# Patient Record
Sex: FEMALE | Race: OTHER | NOT HISPANIC OR LATINO | Employment: OTHER | URBAN - METROPOLITAN AREA
[De-identification: names, ages, dates, MRNs, and addresses within clinical notes are randomized per-mention and may not be internally consistent; named-entity substitution may affect disease eponyms.]

---

## 2017-01-04 ENCOUNTER — HISTORICAL (OUTPATIENT)
Dept: ADMINISTRATIVE | Facility: HOSPITAL | Age: 62
End: 2017-01-04

## 2017-01-05 LAB — HOMOCYSTEINE: 11.3 UMOL/L (ref 0–15)

## 2017-01-06 LAB
CARDIOLIPIN IGA SER IA-ACNC: <9 APL U/ML (ref 0–11)
CARDIOLIPIN IGG SER IA-ACNC: <9 GPL U/ML (ref 0–14)
CARDIOLIPIN IGM SER IA-ACNC: <9 MPL U/ML (ref 0–12)

## 2017-01-07 LAB — FACT VIII SPEC-IMP: 127 % (ref 57–163)

## 2017-11-01 LAB
ALBUMIN SERPL-MCNC: 4.2 G/DL (ref 3.6–5.1)
ALBUMIN/GLOB SERPL: 1.3 (CALC) (ref 1–2.5)
ALP SERPL-CCNC: 80 U/L (ref 33–130)
ALT SERPL-CCNC: 24 U/L (ref 6–29)
AST SERPL-CCNC: 19 U/L (ref 10–35)
BASOPHILS # BLD AUTO: 61 CELLS/UL (ref 0–200)
BASOPHILS NFR BLD AUTO: 1 %
BILIRUB SERPL-MCNC: 0.4 MG/DL (ref 0.2–1.2)
BUN SERPL-MCNC: 21 MG/DL (ref 7–25)
BUN/CREAT SERPL: 18 (CALC) (ref 6–22)
CALCIUM SERPL-MCNC: 10.1 MG/DL (ref 8.6–10.4)
CHLORIDE SERPL-SCNC: 102 MMOL/L (ref 98–110)
CO2 SERPL-SCNC: 32 MMOL/L (ref 20–31)
CREAT SERPL-MCNC: 1.17 MG/DL (ref 0.5–0.99)
EOSINOPHIL # BLD AUTO: 189 CELLS/UL (ref 15–500)
EOSINOPHIL NFR BLD AUTO: 3.1 %
ERYTHROCYTE [DISTWIDTH] IN BLOOD BY AUTOMATED COUNT: 12 % (ref 11–15)
GFR SERPL CREATININE-BSD FRML MDRD: 50 ML/MIN/1.73M2
GLOBULIN SER CALC-MCNC: 3.3 G/DL (CALC) (ref 1.9–3.7)
GLUCOSE SERPL-MCNC: 136 MG/DL (ref 65–99)
HCT VFR BLD AUTO: 41 % (ref 35–45)
HGB BLD-MCNC: 13.6 G/DL (ref 11.7–15.5)
LYMPHOCYTES # BLD AUTO: 2251 CELLS/UL (ref 850–3900)
LYMPHOCYTES NFR BLD AUTO: 36.9 %
MCH RBC QN AUTO: 30.2 PG (ref 27–33)
MCHC RBC AUTO-ENTMCNC: 33.2 G/DL (ref 32–36)
MCV RBC AUTO: 91.1 FL (ref 80–100)
MONOCYTES # BLD AUTO: 555 CELLS/UL (ref 200–950)
MONOCYTES NFR BLD AUTO: 9.1 %
NEUTROPHILS # BLD AUTO: 3044 CELLS/UL (ref 1500–7800)
NEUTROPHILS NFR BLD AUTO: 49.9 %
PLATELET # BLD AUTO: 378 THOUSAND/UL (ref 140–400)
PMV BLD REES-ECKER: 9.3 FL (ref 7.5–12.5)
POTASSIUM SERPL-SCNC: 4.5 MMOL/L (ref 3.5–5.3)
PROT SERPL-MCNC: 7.5 G/DL (ref 6.1–8.1)
RBC # BLD AUTO: 4.5 MILLION/UL (ref 3.8–5.1)
SODIUM SERPL-SCNC: 142 MMOL/L (ref 135–146)
WBC # BLD AUTO: 6.1 THOUSAND/UL (ref 3.8–10.8)

## 2017-11-09 ENCOUNTER — OFFICE VISIT (OUTPATIENT)
Dept: HEMATOLOGY/ONCOLOGY | Facility: CLINIC | Age: 62
End: 2017-11-09
Payer: OTHER GOVERNMENT

## 2017-11-09 VITALS
HEART RATE: 76 BPM | BODY MASS INDEX: 29.81 KG/M2 | HEIGHT: 62 IN | RESPIRATION RATE: 18 BRPM | DIASTOLIC BLOOD PRESSURE: 72 MMHG | WEIGHT: 162 LBS | SYSTOLIC BLOOD PRESSURE: 110 MMHG | TEMPERATURE: 98 F

## 2017-11-09 DIAGNOSIS — D63.1 ANEMIA, CHRONIC RENAL FAILURE, STAGE 2 (MILD): ICD-10-CM

## 2017-11-09 DIAGNOSIS — Z85.528 HX OF RENAL CELL CANCER: ICD-10-CM

## 2017-11-09 DIAGNOSIS — D63.8 ANEMIA OF CHRONIC DISORDER: ICD-10-CM

## 2017-11-09 DIAGNOSIS — N18.2 ANEMIA, CHRONIC RENAL FAILURE, STAGE 2 (MILD): ICD-10-CM

## 2017-11-09 PROCEDURE — 99213 OFFICE O/P EST LOW 20 MIN: CPT | Mod: ,,, | Performed by: INTERNAL MEDICINE

## 2017-11-09 RX ORDER — GLIPIZIDE 10 MG/1
TABLET, FILM COATED, EXTENDED RELEASE ORAL
COMMUNITY
End: 2018-12-27

## 2017-11-09 RX ORDER — VALSARTAN 320 MG/1
TABLET ORAL
COMMUNITY
End: 2017-12-20 | Stop reason: SDUPTHER

## 2017-11-09 RX ORDER — TOLTERODINE 2 MG/1
CAPSULE, EXTENDED RELEASE ORAL
COMMUNITY
Start: 2017-09-03 | End: 2018-12-27

## 2017-11-09 RX ORDER — ATORVASTATIN CALCIUM 20 MG/1
20 TABLET, FILM COATED ORAL
COMMUNITY
End: 2019-08-12

## 2017-11-09 RX ORDER — TRAZODONE HYDROCHLORIDE 50 MG/1
TABLET ORAL
COMMUNITY
End: 2017-12-20 | Stop reason: SDUPTHER

## 2017-11-09 RX ORDER — AMLODIPINE BESYLATE 5 MG/1
5 TABLET ORAL
COMMUNITY
End: 2018-11-20 | Stop reason: DRUGHIGH

## 2017-11-09 RX ORDER — FLUTICASONE PROPIONATE 50 MCG
1 SPRAY, SUSPENSION (ML) NASAL
COMMUNITY
End: 2017-12-20 | Stop reason: SDUPTHER

## 2017-11-09 RX ORDER — INSULIN GLARGINE 100 [IU]/ML
INJECTION, SOLUTION SUBCUTANEOUS
COMMUNITY
Start: 2017-10-30 | End: 2018-12-27

## 2017-11-09 RX ORDER — INSULIN LISPRO 100 [IU]/ML
INJECTION, SUSPENSION SUBCUTANEOUS
COMMUNITY
Start: 2017-10-05 | End: 2017-12-20 | Stop reason: SDUPTHER

## 2017-11-09 RX ORDER — CETIRIZINE HYDROCHLORIDE 10 MG/1
10 TABLET ORAL
COMMUNITY

## 2017-11-09 RX ORDER — NAPROXEN SODIUM 220 MG
TABLET ORAL
COMMUNITY

## 2017-11-09 RX ORDER — INSULIN LISPRO 100 [IU]/ML
INJECTION, SUSPENSION SUBCUTANEOUS
COMMUNITY
Start: 2017-10-30 | End: 2017-12-20 | Stop reason: SDUPTHER

## 2017-11-09 RX ORDER — METOPROLOL SUCCINATE 50 MG/1
TABLET, EXTENDED RELEASE ORAL
COMMUNITY
Start: 2017-10-16 | End: 2019-11-20 | Stop reason: SDUPTHER

## 2017-11-09 RX ORDER — ESOMEPRAZOLE MAGNESIUM 40 MG/1
40 CAPSULE, DELAYED RELEASE ORAL
COMMUNITY
End: 2019-11-20 | Stop reason: SDUPTHER

## 2017-11-09 RX ORDER — ASPIRIN 325 MG
100 TABLET, DELAYED RELEASE (ENTERIC COATED) ORAL
COMMUNITY
End: 2019-03-01

## 2017-11-09 RX ORDER — GABAPENTIN 600 MG/1
TABLET ORAL
COMMUNITY
Start: 2017-11-06 | End: 2019-08-12 | Stop reason: SDUPTHER

## 2017-11-09 NOTE — PROGRESS NOTES
[facility] Hematology/Oncology  PROGRESS NOTE      Subjective:       Patient ID:   NAME: Payton Castillo : 1955     62 y.o. female    Referring Doc: Ana  Other Physicians: Corona Haro    Chief Complaint:  Anemia/renal ca f/u    History of Present Illness:     Patient returns today for a regularly scheduled follow-up visit.  The patient had recent CT scans and blood work. She is here with a family relative. She is doing ok. She has chronic aches with sciatica. She denies any CP, SOB, HA's or N/V. She had foot surgery at end of MAy 2017.             ROS:   GEN: normal without any fever, night sweats or weight loss  HEENT: normal with no HA's, sore throat, stiff neck, changes in vision  CV: normal with no CP, SOB, PND, PALMA or orthopnea  PULM: normal with no SOB, cough, hemoptysis, sputum or pleuritic pain  GI: normal with no abdominal pain, nausea, vomiting, constipation, diarrhea, melanotic stools, BRBPR, or hematemesis  : normal with no hematuria, dysuria  BREAST: normal with no mass, discharge, pain  SKIN: normal with no rash, erythema, bruising, or swelling    Allergies:  Review of patient's allergies indicates:   Allergen Reactions    Aspartame Other (See Comments)     Headache      Fructose Other (See Comments)     headache    Monosodium glutamate Other (See Comments)     Headache      Pcn [penicillins]      Rash         Medications:    Current Outpatient Prescriptions:     metoprolol succinate (TOPROL XL) 50 MG 24 hr tablet, TAKE 1 TABLET IN THE MORNING AND 2 TABLETS IN THE EVENING, Disp: , Rfl:     tolterodine (DETROL LA) 2 MG Cp24, , Disp: , Rfl:     amlodipine (NORVASC) 5 MG tablet, Take 5 mg by mouth every evening., Disp: , Rfl:     amLODIPine (NORVASC) 5 MG tablet, Take 5 mg by mouth., Disp: , Rfl:     amlodipine-valsartan (EXFORGE) 5-160 mg per tablet, Take 1 tablet by mouth daily as needed., Disp: , Rfl:     atorvastatin (LIPITOR) 20 MG tablet, Take 20 mg by mouth once daily.,  Disp: , Rfl:     atorvastatin (LIPITOR) 20 MG tablet, Take 20 mg by mouth., Disp: , Rfl:     CALCIUM CARBONATE (CALCIUM 300 ORAL), Take 400 mg by mouth once daily., Disp: , Rfl:     CALCIUM CARBONATE/VITAMIN D3 (VITAMIN D-3 ORAL), Take 600 Units by mouth once daily., Disp: , Rfl:     cetirizine (ZYRTEC) 10 MG tablet, Take 10 mg by mouth once daily., Disp: , Rfl:     cetirizine (ZYRTEC) 10 MG tablet, Take 10 mg by mouth., Disp: , Rfl:     co-enzyme Q-10 50 mg capsule, Take 100 mg by mouth., Disp: , Rfl:     esomeprazole (NEXIUM) 40 MG capsule, Take 40 mg by mouth., Disp: , Rfl:     fluticasone (FLONASE) 50 mcg/actuation nasal spray, 1 spray by Each Nare route once daily., Disp: , Rfl:     fluticasone (FLONASE) 50 mcg/actuation nasal spray, 1 spray., Disp: , Rfl:     FREESTYLE LANCETS MISC, by Misc.(Non-Drug; Combo Route) route 3 (three) times daily., Disp: , Rfl:     gabapentin (NEURONTIN) 600 MG tablet, , Disp: , Rfl:     glipiZIDE (GLUCOTROL) 10 MG TR24, Take by mouth., Disp: , Rfl:     HUMALOG MIX 75-25 100 unit/mL (75-25) Susp, , Disp: , Rfl:     HUMALOG MIX 75-25 KWIKPEN 100 unit/mL (75-25) InPn, , Disp: , Rfl:     insulin glargine (LANTUS SOLOSTAR) 100 unit/mL (3 mL) InPn pen, Inject 60 Units into the skin once daily., Disp: , Rfl:     INSULIN NPL/INSULIN LISPRO (HUMALOG MIX 75-25 KWIKPEN SUBQ), Inject 20 Units into the skin 2 (two) times daily before meals. Breakfast and dinner, Disp: , Rfl:     INSULIN NPL/INSULIN LISPRO (HUMALOG MIX 75-25 KWIKPEN SUBQ), Inject 15 Units into the skin with lunch., Disp: , Rfl:     insulin syringe-needle U-100 0.3 mL 30 Syrg, by Misc.(Non-Drug; Combo Route) route 3 (three) times daily., Disp: , Rfl:     LANTUS 100 unit/mL injection, , Disp: , Rfl:     metoprolol succinate (TOPROL-XL) 50 MG 24 hr tablet, Take 50 mg by mouth every evening., Disp: , Rfl:     METOPROLOL SUCCINATE ORAL, Take by mouth., Disp: , Rfl:     omeprazole (PRILOSEC) 40 MG capsule, Take  "40 mg by mouth once daily., Disp: , Rfl:     potassium 99 mg Tab, Take 1 tablet by mouth., Disp: , Rfl:     pregabalin (LYRICA) 50 MG capsule, Take 50 mg by mouth 3 (three) times daily., Disp: , Rfl:     trazodone (DESYREL) 50 MG tablet, Take 50 mg by mouth every evening., Disp: , Rfl:     traZODone (DESYREL) 50 MG tablet, Take by mouth., Disp: , Rfl:     valsartan (DIOVAN) 320 MG tablet, Take 320 mg by mouth once daily., Disp: , Rfl:     valsartan (DIOVAN) 320 MG tablet, Take by mouth., Disp: , Rfl:     PMHx/PSHx Updates:  See patient's last visit with me on 4/4/2017.  See H&P on 10/11/2006        Pathology:  See path Aug 2014          Objective:     Vitals:  Blood pressure 110/72, pulse 76, temperature 98.1 °F (36.7 °C), resp. rate 18, height 5' 2" (1.575 m), weight 73.5 kg (162 lb).    Physical Examination:   GEN: no apparent distress, comfortable; AAOx3  HEAD: atraumatic and normocephalic  EYES: no pallor, no icterus, PERRLA  ENT: OMM, no pharyngeal erythema, external ears WNL; no nasal discharge; no thrush  NECK: no masses, thyroid normal, trachea midline, no LAD/LN's, supple  CV: RRR with no murmur; normal pulse; normal S1 and S2; no pedal edema  CHEST: Normal respiratory effort; CTAB; normal breath sounds; no wheeze or crackles  ABDOM: nontender and nondistended; soft; normal bowel sounds; no rebound/guarding  MUSC/Skeletal: ROM normal; no crepitus; joints normal; no deformities or arthropathy  EXTREM: no clubbing, cyanosis, inflammation or swelling  SKIN: no rashes, lesions, ulcers, petechiae or subcutaneous nodules  : no bright  NEURO: grossly intact; motor/sensory WNL; AAOx3; no tremors  PSYCH: normal mood, affect and behavior  LYMPH: normal cervical, supraclavicular, axillary and groin LN's            Labs:     10/31/2017      Radiology/Diagnostic Studies:    CT 11/1/2017    I have reviewed all available lab results and radiology reports.    Assessment/Plan:   (1) 62 y.o. female with diagnosis of " chronic anemia and thrombocytopenia in the past  - multifactorial anemia process with underlying anemia of chronic disorders and anemia of chronic renal  - latest labs wnl    (2) prior left nephrectomy in Aug 2014  - Stage I renal cell carcinoma  - followed by Dr Hill with   - CT scans on 11/1/2017 negative    (3) CRI - followed by Dr Tommy Haro with nephrology    (4) Chronic joint/hip issues        PLAN:  1. F/u with PCP,  and Neph  2. Check labs again in one year  3.  RTC in 12 months  Fax note to Estrellita Perez and Sergio    Discussion:     I have explained all of the above in detail and the patient understands all of the current recommendation(s). I have answered all of their questions to the best of my ability and to their complete satisfaction.   The patient is to continue with the current management plan.            Electronically signed by Sebastien Tavarez MD

## 2017-12-20 ENCOUNTER — OFFICE VISIT (OUTPATIENT)
Dept: ORTHOPEDICS | Facility: CLINIC | Age: 62
End: 2017-12-20
Payer: OTHER GOVERNMENT

## 2017-12-20 VITALS
SYSTOLIC BLOOD PRESSURE: 144 MMHG | HEIGHT: 62 IN | DIASTOLIC BLOOD PRESSURE: 80 MMHG | WEIGHT: 162 LBS | HEART RATE: 72 BPM | BODY MASS INDEX: 29.81 KG/M2

## 2017-12-20 DIAGNOSIS — M54.16 SPINAL STENOSIS OF LUMBAR REGION WITH RADICULOPATHY: Primary | ICD-10-CM

## 2017-12-20 DIAGNOSIS — M48.061 SPINAL STENOSIS OF LUMBAR REGION WITH RADICULOPATHY: Primary | ICD-10-CM

## 2017-12-20 PROCEDURE — 99213 OFFICE O/P EST LOW 20 MIN: CPT | Mod: ,,, | Performed by: ORTHOPAEDIC SURGERY

## 2017-12-20 RX ORDER — DOXEPIN HYDROCHLORIDE 25 MG/1
1 CAPSULE ORAL DAILY
COMMUNITY
Start: 2017-11-27 | End: 2019-03-01

## 2017-12-20 RX ORDER — SOLIFENACIN SUCCINATE 5 MG/1
5 TABLET, FILM COATED ORAL NIGHTLY
Status: ON HOLD | COMMUNITY
End: 2024-01-26 | Stop reason: CLARIF

## 2017-12-20 NOTE — LETTER
December 20, 2017      Lavon Perez MD  [address]  [place] [address]           [facility]  [address] [facility]  [address]  [facility] [address]  Phone: [phone]  Fax: [phone]          Patient: Payton Castillo   MR Number: [id number]   YOB: 1955   Date of Visit: 12/20/2017       Dear Dr. Lavon Perez:    Thank you for referring Payton Castillo to me for evaluation. Attached you will find relevant portions of my assessment and plan of care.    If you have questions, please do not hesitate to call me. I look forward to following Payton Castillo along with you.    Sincerely,    Rasta Goff Jr., MD    Enclosure  CC:  No Recipients    If you would like to receive this communication electronically, please contact [email or web address] or [phone] to request more information on [employer] Link access.    For providers and/or their staff who would like to refer a patient to [facility], please contact us through our one-stop-shop provider referral line, [facility], at [phone].    If you feel you have received this communication in error or would no longer like to receive these types of communications, please e-mail [email or web address]

## 2017-12-20 NOTE — PROGRESS NOTES
Subjective:       Chief Complaint    Chief Complaint   Patient presents with    Hip Pain     NC, Left hip pain.  Chronic hip pain since the 1970s she has dealt with.  Got worse over the last 5 years.  She does have groin pain.  Pain in the left hip/buttock area.  Previous MRI of the L-Spine done on 12/14/17.  No xrays.  Pain does shoot down her leg.  Hx of back issues.       HPI  Payton Castillo is a 62 y.o.  female who presents       Past History  Past Medical History:   Diagnosis Date    Anemia of chronic disorder 11/9/2017    Anemia, chronic renal failure, stage 2 (mild) 11/9/2017    Asthma     Cancer     kidney    Cataract of right eye     CKD (chronic kidney disease), stage III     Diabetes mellitus     type II    Fibromyalgia     GERD (gastroesophageal reflux disease)     Hx of renal cell cancer - left nephrectomy Aug 2014 11/9/2017    Hypercholesteremia     Hypertension     IDDM (insulin dependent diabetes mellitus)     Lumbar spondylosis     Lumbosacral radiculopathy     Osteopenia     Palpitations     Plantar fasciitis, right     Syncope 2/2012    Tachycardia     Thoracic radiculopathy     TMJ (dislocation of temporomandibular joint)      Past Surgical History:   Procedure Laterality Date    EYE SURGERY      left cataract    JOINT REPLACEMENT Right     knee    SHOULDER OPEN ROTATOR CUFF REPAIR Right     labral repair    TOTAL KNEE ARTHROPLASTY Left 2014    TUBAL LIGATION      TUMOR REMOVAL  2014    from kidney     Social History     Social History    Marital status:      Spouse name: N/A    Number of children: N/A    Years of education: N/A     Occupational History    Not on file.     Social History Main Topics    Smoking status: Never Smoker    Smokeless tobacco: Never Used    Alcohol use No    Drug use: No    Sexual activity: Not on file     Other Topics Concern    Not on file     Social History Narrative    No narrative on file         Medications  Current  Outpatient Prescriptions   Medication Sig    amLODIPine (NORVASC) 5 MG tablet Take 5 mg by mouth.    amlodipine-valsartan (EXFORGE) 5-160 mg per tablet Take 1 tablet by mouth daily as needed.    atorvastatin (LIPITOR) 20 MG tablet Take 20 mg by mouth.    CALCIUM CARBONATE/VITAMIN D3 (VITAMIN D-3 ORAL) Take 600 Units by mouth once daily.    cetirizine (ZYRTEC) 10 MG tablet Take 10 mg by mouth.    co-enzyme Q-10 50 mg capsule Take 100 mg by mouth.    diphenhydramine-acetaminophen (TYLENOL PM)  mg Tab Take 1 tablet by mouth nightly as needed.    doxepin (SINEQUAN) 25 MG capsule Take 1 capsule by mouth once daily.    esomeprazole (NEXIUM) 40 MG capsule Take 40 mg by mouth.    fluticasone (FLONASE) 50 mcg/actuation nasal spray 1 spray by Each Nare route once daily.    FREESTYLE LANCETS MISC by Misc.(Non-Drug; Combo Route) route 3 (three) times daily.    gabapentin (NEURONTIN) 600 MG tablet     glipiZIDE (GLUCOTROL) 10 MG TR24 Take by mouth.    insulin glargine (LANTUS SOLOSTAR) 100 unit/mL (3 mL) InPn pen Inject 60 Units into the skin once daily.    INSULIN NPL/INSULIN LISPRO (HUMALOG MIX 75-25 KWIKPEN SUBQ) Inject 20 Units into the skin 2 (two) times daily before meals. Breakfast and dinner    insulin syringe-needle U-100 0.3 mL 30 Syrg by Misc.(Non-Drug; Combo Route) route 3 (three) times daily.    LANTUS 100 unit/mL injection     metoprolol succinate (TOPROL XL) 50 MG 24 hr tablet TAKE 1 TABLET IN THE MORNING AND 2 TABLETS IN THE EVENING    omeprazole (PRILOSEC) 40 MG capsule Take 40 mg by mouth once daily.    potassium 99 mg Tab Take 1 tablet by mouth.    pregabalin (LYRICA) 50 MG capsule Take 50 mg by mouth 3 (three) times daily.    solifenacin (VESICARE) 5 MG tablet Take 5 mg by mouth.    tolterodine (DETROL LA) 2 MG Cp24     trazodone (DESYREL) 50 MG tablet Take 50 mg by mouth every evening.    valsartan (DIOVAN) 320 MG tablet Take 320 mg by mouth once daily.     No current  facility-administered medications for this visit.        Allergies  Review of patient's allergies indicates:   Allergen Reactions    Aspartame      headache    Fructose      headache  migraine      Monosodium glutamate      Headache      Pcn [penicillins] Rash         Review of Systems     Constitutional: Negative    HENT: Negative  Eyes: Negative  Respiratory: Negative  Cardiovascular: Negative  Musculoskeletal: HPI  Skin: Negative  Neurological: Negative  Hematological: Negative  Endocrine: Negative      Physical Exam    Vitals:    12/20/17 1538   BP: (!) 144/80   Pulse: 72     Physical Examination:Lumbar spine examination reveals exquisite tenderness in the left sciatic notch, gluteal area. She has tenderness in her lumbosacral area when she bends the straight leg raising appears to be negative and the hamstrings. Moderately tight. She can flex to 70°. Is able to heel toe walk. She has painless full range of motion in both hips. Absent knee and ankle jerks. Bilateral total knees are present. Has a protuberant abdomen. She is a little bit overweight but is not morbidly obese. Does appear to be physically deconditioned.     Skin-clear  General appearance -  well appearing, and in no distress  Mental status - awake  Neck - supple  Chest -  symmetric air entry  Heart - normal rate   Abdomen - soft      Assessment/Plan   Spinal stenosis of lumbar region with radiculopathy       MRI that was done some the 14th 2017 was reviewed. She appears to have foraminal stenosis at L3-4 and L5-1. Has seen Dr. Rollins review is ago and was told it. He could not help her. She would like to see Dr. Sukhdev Fuentes and possibly get a second opinion from Dr Rollins.      This note was dictated using voice recognition software and may contain grammatical errors.

## 2018-10-05 ENCOUNTER — OFFICE VISIT (OUTPATIENT)
Dept: ORTHOPEDICS | Facility: CLINIC | Age: 63
End: 2018-10-05
Payer: OTHER GOVERNMENT

## 2018-10-05 VITALS — BODY MASS INDEX: 29.81 KG/M2 | HEIGHT: 62 IN | WEIGHT: 162 LBS

## 2018-10-05 DIAGNOSIS — M70.61 TROCHANTERIC BURSITIS OF BOTH HIPS: ICD-10-CM

## 2018-10-05 DIAGNOSIS — M54.16 SPINAL STENOSIS OF LUMBAR REGION WITH RADICULOPATHY: Primary | ICD-10-CM

## 2018-10-05 DIAGNOSIS — M48.061 SPINAL STENOSIS OF LUMBAR REGION WITH RADICULOPATHY: Primary | ICD-10-CM

## 2018-10-05 DIAGNOSIS — M70.62 TROCHANTERIC BURSITIS OF BOTH HIPS: ICD-10-CM

## 2018-10-05 PROCEDURE — 99213 OFFICE O/P EST LOW 20 MIN: CPT | Mod: ,,, | Performed by: ORTHOPAEDIC SURGERY

## 2018-10-05 NOTE — PROGRESS NOTES
Subjective:       Chief Complaint    Chief Complaint   Patient presents with    Follow-up     Rt shoulder surgery in 2005. Pt states that she has Bursitis in Rt shoulder and has a piercing pain in Rt shoulder joint x 1 year. She also reports tingling in her rt arms and fingers. She states that she started working part time instead of full time and it has helped. Pt also complains of Left hip pain and requesting an injection.       HPI  Payton Castillo is a 63 y.o.  female who presents with multiple complaints. The worst one is the left hip. Is a chronic ongoing issue. He has bilateral total knee replacements. Insulin-dependent diabetic. Has never had back surgery. Has diabetic neuropathy of the lower extremities.      Past History  Past Medical History:   Diagnosis Date    Anemia of chronic disorder 11/9/2017    Anemia, chronic renal failure, stage 2 (mild) 11/9/2017    Asthma     Cancer     kidney    Cataract of right eye     CKD (chronic kidney disease), stage III     Diabetes mellitus     type II    Fibromyalgia     GERD (gastroesophageal reflux disease)     Hx of renal cell cancer - left nephrectomy Aug 2014 11/9/2017    Hypercholesteremia     Hypertension     IDDM (insulin dependent diabetes mellitus)     Lumbar spondylosis     Lumbosacral radiculopathy     Osteopenia     Palpitations     Plantar fasciitis, right     Syncope 2/2012    Tachycardia     Thoracic radiculopathy     TMJ (dislocation of temporomandibular joint)      Past Surgical History:   Procedure Laterality Date    CATARACT EXTRACTION Left 03/2016    EXTRACTION-CATARACT-IOL Right 3/23/2016    Performed by [facility] Back II, MD at [facility] OR    EXTRACTION-CATARACT-IOL Left 3/9/2016    Performed by [facility] II, [facility] at [facility] OR    JOINT REPLACEMENT Right     knee    SHOULDER OPEN ROTATOR CUFF REPAIR Right 02/2005    labral repair    TOTAL KNEE ARTHROPLASTY Left 2014    TUBAL LIGATION  1990    TUMOR REMOVAL  2014     from kidney     Social History     Socioeconomic History    Marital status:      Spouse name: Not on file    Number of children: Not on file    Years of education: Not on file    Highest education level: Not on file   Social Needs    Financial resource strain: Not on file    Food insecurity - worry: Not on file    Food insecurity - inability: Not on file    Transportation needs - medical: Not on file    Transportation needs - non-medical: Not on file   Occupational History    Not on file   Tobacco Use    Smoking status: Never Smoker    Smokeless tobacco: Never Used   Substance and Sexual Activity    Alcohol use: No    Drug use: No    Sexual activity: Not on file   Other Topics Concern    Not on file   Social History Narrative    Not on file         Medications  Current Outpatient Medications   Medication Sig    amLODIPine (NORVASC) 5 MG tablet Take 5 mg by mouth.    atorvastatin (LIPITOR) 20 MG tablet Take 20 mg by mouth.    cetirizine (ZYRTEC) 10 MG tablet Take 10 mg by mouth.    co-enzyme Q-10 50 mg capsule Take 100 mg by mouth.    diphenhydramine-acetaminophen (TYLENOL PM)  mg Tab Take 1 tablet by mouth nightly as needed.    doxepin (SINEQUAN) 25 MG capsule Take 1 capsule by mouth once daily.    esomeprazole (NEXIUM) 40 MG capsule Take 40 mg by mouth.    fluticasone (FLONASE) 50 mcg/actuation nasal spray 1 spray by Each Nare route once daily.    FREESTYLE LANCETS MISC by Misc.(Non-Drug; Combo Route) route 3 (three) times daily.    gabapentin (NEURONTIN) 600 MG tablet     insulin glargine (LANTUS SOLOSTAR) 100 unit/mL (3 mL) InPn pen Inject 60 Units into the skin once daily.    INSULIN NPL/INSULIN LISPRO (HUMALOG MIX 75-25 KWIKPEN SUBQ) Inject 20 Units into the skin 2 (two) times daily before meals. Breakfast and dinner    insulin syringe-needle U-100 0.3 mL 30 Syrg by Misc.(Non-Drug; Combo Route) route 3 (three) times daily.    LANTUS 100 unit/mL injection      metoprolol succinate (TOPROL XL) 50 MG 24 hr tablet TAKE 1 TABLET IN THE MORNING AND 2 TABLETS IN THE EVENING    potassium 99 mg Tab Take 1 tablet by mouth.    solifenacin (VESICARE) 5 MG tablet Take 5 mg by mouth.    amlodipine-valsartan (EXFORGE) 5-160 mg per tablet Take 1 tablet by mouth daily as needed.    CALCIUM CARBONATE/VITAMIN D3 (VITAMIN D-3 ORAL) Take 600 Units by mouth once daily.    glipiZIDE (GLUCOTROL) 10 MG TR24 Take by mouth.    omeprazole (PRILOSEC) 40 MG capsule Take 40 mg by mouth once daily.    pregabalin (LYRICA) 50 MG capsule Take 50 mg by mouth 3 (three) times daily.    tolterodine (DETROL LA) 2 MG Cp24     trazodone (DESYREL) 50 MG tablet Take 50 mg by mouth every evening.    valsartan (DIOVAN) 320 MG tablet Take 320 mg by mouth once daily.     No current facility-administered medications for this visit.        Allergies  Review of patient's allergies indicates:   Allergen Reactions    Aspartame      headache    Fructose      headache  migraine      Monosodium glutamate      Headache      Pcn [penicillins] Rash         Review of Systems     Constitutional: Negative    HENT: Negative  Eyes: Negative  Respiratory: Negative  Cardiovascular: Negative  Musculoskeletal: HPI  Skin: Negative  Neurological: Negative  Hematological: Negative  Endocrine: Negative      Physical Exam    There were no vitals filed for this visit.  Physical Examination: Her spine examination reveals flexion, 60°. Tenderness in the gluteal areas bilaterally as well as the trochanteric bursa is. Bilateral total knees are present. Able to heel toe walk with difficulty. Absent knee and ankle jerks. Patient has a protuberant abdomen. Does not appear to be grossly overweight.     Skin-  General appearance -  well appearing, and in no distress  Mental status - awake  Neck - supple  Chest -  symmetric air entry  Heart - normal rate   Abdomen - soft      Assessment/Plan   Spinal stenosis of lumbar region with  radiculopathy            This note was dictated using voice recognition software and may contain grammatical errors.

## 2018-10-05 NOTE — LETTER
October 5, 2018      Lavon Perez MD  [address]  [place] [address]           [facility]  [address]  [address]  [place] [address]  Phone: [phone]  Fax: [phone]          Patient: Payton Castillo   MR Number: [id number]   YOB: 1955   Date of Visit: 10/5/2018       Dear Dr. Lavon Perez:    Thank you for referring Payton Castillo to me for evaluation. Attached you will find relevant portions of my assessment and plan of care.    If you have questions, please do not hesitate to call me. I look forward to following Payton Castillo along with you.    Sincerely,    Rasta Goff Jr., MD    Enclosure  CC:  No Recipients    If you would like to receive this communication electronically, please contact [email or web address] or [phone] to request more information on [employer] Link access.    For providers and/or their staff who would like to refer a patient to [facility], please contact us through our one-stop-shop provider referral line, [facility], at [phone].    If you feel you have received this communication in error or would no longer like to receive these types of communications, please e-mail [email or web address]

## 2018-11-15 ENCOUNTER — TELEPHONE (OUTPATIENT)
Dept: HEMATOLOGY/ONCOLOGY | Facility: CLINIC | Age: 63
End: 2018-11-15

## 2018-11-15 DIAGNOSIS — Z85.528 HX OF RENAL CELL CANCER: Primary | ICD-10-CM

## 2018-11-15 NOTE — TELEPHONE ENCOUNTER
Called patient let her know labs were put into [facility] she can go by anytime to get them drawn         ----- Message from Misty Jensen sent at 11/15/2018  9:00 AM CST -----  Pt needs yearly blood work orders put in for [facility].  Please call when done.  She will likely go on mOnday.    CB#     Thanks,  [email or web address]

## 2018-11-15 NOTE — TELEPHONE ENCOUNTER
----- Message from Misty Jensen sent at 11/15/2018  9:00 AM CST -----  Pt needs yearly blood work orders put in for [facility].  Please call when done.  She will likely go on mOnday.    CB#     Thanks,  Misty

## 2018-11-20 ENCOUNTER — OFFICE VISIT (OUTPATIENT)
Dept: ORTHOPEDICS | Facility: CLINIC | Age: 63
End: 2018-11-20
Payer: OTHER GOVERNMENT

## 2018-11-20 VITALS — HEIGHT: 62 IN | BODY MASS INDEX: 29.81 KG/M2 | WEIGHT: 162 LBS

## 2018-11-20 DIAGNOSIS — M75.121 COMPLETE TEAR OF RIGHT ROTATOR CUFF: Primary | ICD-10-CM

## 2018-11-20 LAB
ALBUMIN SERPL-MCNC: 4.1 G/DL (ref 3.6–5.1)
ALBUMIN/GLOB SERPL: 1.2 (CALC) (ref 1–2.5)
ALP SERPL-CCNC: 70 U/L (ref 33–130)
ALT SERPL-CCNC: 33 U/L (ref 6–29)
AST SERPL-CCNC: 26 U/L (ref 10–35)
BASOPHILS # BLD AUTO: 62 CELLS/UL (ref 0–200)
BASOPHILS NFR BLD AUTO: 0.7 %
BILIRUB SERPL-MCNC: 0.6 MG/DL (ref 0.2–1.2)
BUN SERPL-MCNC: 20 MG/DL (ref 7–25)
BUN/CREAT SERPL: 16 (CALC) (ref 6–22)
CALCIUM SERPL-MCNC: 9.7 MG/DL (ref 8.6–10.4)
CHLORIDE SERPL-SCNC: 104 MMOL/L (ref 98–110)
CO2 SERPL-SCNC: 35 MMOL/L (ref 20–32)
CREAT SERPL-MCNC: 1.23 MG/DL (ref 0.5–0.99)
EOSINOPHIL # BLD AUTO: 285 CELLS/UL (ref 15–500)
EOSINOPHIL NFR BLD AUTO: 3.2 %
ERYTHROCYTE [DISTWIDTH] IN BLOOD BY AUTOMATED COUNT: 12.9 % (ref 11–15)
GFR SERPL CREATININE-BSD FRML MDRD: 47 ML/MIN/1.73M2
GLOBULIN SER CALC-MCNC: 3.4 G/DL (CALC) (ref 1.9–3.7)
GLUCOSE SERPL-MCNC: 57 MG/DL (ref 65–99)
HCT VFR BLD AUTO: 40.2 % (ref 35–45)
HGB BLD-MCNC: 13.9 G/DL (ref 11.7–15.5)
LYMPHOCYTES # BLD AUTO: 2937 CELLS/UL (ref 850–3900)
LYMPHOCYTES NFR BLD AUTO: 33 %
MCH RBC QN AUTO: 31.7 PG (ref 27–33)
MCHC RBC AUTO-ENTMCNC: 34.6 G/DL (ref 32–36)
MCV RBC AUTO: 91.8 FL (ref 80–100)
MONOCYTES # BLD AUTO: 623 CELLS/UL (ref 200–950)
MONOCYTES NFR BLD AUTO: 7 %
NEUTROPHILS # BLD AUTO: 4993 CELLS/UL (ref 1500–7800)
NEUTROPHILS NFR BLD AUTO: 56.1 %
PLATELET # BLD AUTO: 348 THOUSAND/UL (ref 140–400)
PMV BLD REES-ECKER: 9.3 FL (ref 7.5–12.5)
POTASSIUM SERPL-SCNC: 4.2 MMOL/L (ref 3.5–5.3)
PROT SERPL-MCNC: 7.5 G/DL (ref 6.1–8.1)
RBC # BLD AUTO: 4.38 MILLION/UL (ref 3.8–5.1)
SODIUM SERPL-SCNC: 141 MMOL/L (ref 135–146)
WBC # BLD AUTO: 8.9 THOUSAND/UL (ref 3.8–10.8)

## 2018-11-20 PROCEDURE — 73030 X-RAY EXAM OF SHOULDER: CPT | Mod: FY,RT,, | Performed by: ORTHOPAEDIC SURGERY

## 2018-11-20 PROCEDURE — 99213 OFFICE O/P EST LOW 20 MIN: CPT | Mod: ,,, | Performed by: ORTHOPAEDIC SURGERY

## 2018-11-20 RX ORDER — AMLODIPINE BESYLATE 2.5 MG/1
5 TABLET ORAL DAILY
COMMUNITY
Start: 2018-09-17 | End: 2023-02-01

## 2018-11-20 RX ORDER — LOSARTAN POTASSIUM 50 MG/1
1 TABLET ORAL DAILY
COMMUNITY
Start: 2018-10-28 | End: 2019-08-12 | Stop reason: SDUPTHER

## 2018-11-20 NOTE — PROGRESS NOTES
Subjective:       Chief Complaint    Chief Complaint   Patient presents with    Follow-up     RIGHT SHOULDER ONLY.  Hx of right rotator cuff repair in 2005.  Painful off & on since approx. 2015.  Pain has increased over the years w/ no known injuries.  Diagnosed w/ bursitis many years ago.  Pain is located in the shoulder w/ some radiation down into the upper arm.  Laying on it & lifting/pulling on it @ work makes the pain worse.  No recent x-rays.       HPI  Payton Castillo is a 63 y.o.  female who presents is taken today suggest a high riding humeral head which would be compatible with a complete rotator cuff tear.      Past History  Past Medical History:   Diagnosis Date    Anemia of chronic disorder 11/9/2017    Anemia, chronic renal failure, stage 2 (mild) 11/9/2017    Asthma     Cancer     kidney    Cataract of right eye     CKD (chronic kidney disease), stage III     Diabetes mellitus     type II    Fibromyalgia     GERD (gastroesophageal reflux disease)     Hx of renal cell cancer - left nephrectomy Aug 2014 11/9/2017    Hypercholesteremia     Hypertension     IDDM (insulin dependent diabetes mellitus)     Lumbar spondylosis     Lumbosacral radiculopathy     Osteopenia     Palpitations     Plantar fasciitis, right     Syncope 2/2012    Tachycardia     Thoracic radiculopathy     TMJ (dislocation of temporomandibular joint)      Past Surgical History:   Procedure Laterality Date    CATARACT EXTRACTION Left 03/2016    EXTRACTION-CATARACT-IOL Right 3/23/2016    [facility] at [facility]    EXTRACTION-CATARACT-IOL Left 3/9/2016    [facility] at [facility]    JOINT REPLACEMENT Right     knee    SHOULDER OPEN ROTATOR CUFF REPAIR Right 02/2005    labral repair    TOTAL KNEE ARTHROPLASTY Left 2014    TUBAL LIGATION  1990    TUMOR REMOVAL  2014    from kidney     Social History     Socioeconomic History    Marital status:      Spouse name:  Not on file    Number of children: Not on file    Years of education: Not on file    Highest education level: Not on file   Social Needs    Financial resource strain: Not on file    Food insecurity - worry: Not on file    Food insecurity - inability: Not on file    Transportation needs - medical: Not on file    Transportation needs - non-medical: Not on file   Occupational History    Not on file   Tobacco Use    Smoking status: Never Smoker    Smokeless tobacco: Never Used   Substance and Sexual Activity    Alcohol use: No    Drug use: No    Sexual activity: Not on file   Other Topics Concern    Not on file   Social History Narrative    Not on file         Medications  Current Outpatient Medications   Medication Sig    amLODIPine (NORVASC) 2.5 MG tablet Take 1 tablet by mouth once daily.    atorvastatin (LIPITOR) 20 MG tablet Take 20 mg by mouth.    CALCIUM CARBONATE/VITAMIN D3 (VITAMIN D-3 ORAL) Take 600 Units by mouth once daily.    cetirizine (ZYRTEC) 10 MG tablet Take 10 mg by mouth.    co-enzyme Q-10 50 mg capsule Take 100 mg by mouth.    diphenhydramine-acetaminophen (TYLENOL PM)  mg Tab Take 1 tablet by mouth nightly as needed.    doxepin (SINEQUAN) 25 MG capsule Take 1 capsule by mouth once daily.    esomeprazole (NEXIUM) 40 MG capsule Take 40 mg by mouth.    fluticasone (FLONASE) 50 mcg/actuation nasal spray 1 spray by Each Nare route once daily.    FREESTYLE LANCETS MISC by Misc.(Non-Drug; Combo Route) route 3 (three) times daily.    gabapentin (NEURONTIN) 600 MG tablet     glipiZIDE (GLUCOTROL) 10 MG TR24 Take by mouth.    insulin glargine (LANTUS SOLOSTAR) 100 unit/mL (3 mL) InPn pen Inject 60 Units into the skin once daily.    INSULIN NPL/INSULIN LISPRO (HUMALOG MIX 75-25 KWIKPEN SUBQ) Inject 20 Units into the skin 2 (two) times daily before meals. Breakfast and dinner    insulin syringe-needle U-100 0.3 mL 30 Syrg by Misc.(Non-Drug; Combo Route) route 3 (three)  times daily.    LANTUS 100 unit/mL injection     losartan (COZAAR) 50 MG tablet Take 1 tablet by mouth once daily.    metoprolol succinate (TOPROL XL) 50 MG 24 hr tablet TAKE 1 TABLET IN THE MORNING AND 2 TABLETS IN THE EVENING    omeprazole (PRILOSEC) 40 MG capsule Take 40 mg by mouth once daily.    potassium 99 mg Tab Take 1 tablet by mouth.    pregabalin (LYRICA) 50 MG capsule Take 50 mg by mouth 3 (three) times daily.    solifenacin (VESICARE) 5 MG tablet Take 5 mg by mouth.    tolterodine (DETROL LA) 2 MG Cp24     trazodone (DESYREL) 50 MG tablet Take 50 mg by mouth every evening.    valsartan (DIOVAN) 320 MG tablet Take 320 mg by mouth once daily.     No current facility-administered medications for this visit.        Allergies  Review of patient's allergies indicates:   Allergen Reactions    Aspartame      headache      Fructose      headache      Monosodium glutamate Other (See Comments)     Headache      Penicillins Itching and Rash     Rash         Review of Systems     Constitutional: Negative    HENT: Negative  Eyes: Negative  Respiratory: Negative  Cardiovascular: Negative  Musculoskeletal: HPI  Skin: Negative  Neurological: Negative  Hematological: Negative  Endocrine: Negative      Physical Exam    There were no vitals filed for this visit.  Physical Examination: Right shoulder examination reveals tenderness over the greater tuberosity bicipital groove region. Positive impingement signs. Internal rotation to the bra line. Abduction, forward flexion 160° with pain. Ac joint was nontender. Internal rotation to the bra line. Scaption 90°. External rotation 60°.     Skin-clear  General appearance -  well appearing, and in no distress  Mental status - awake  Neck - supple  Chest -  symmetric air entry  Heart - normal rate   Abdomen - soft      Assessment/Plan   Complete tear of right rotator cuff  -     X-ray Shoulder 2 or More Views Right      MRI ordered. she will also do surgery either in  late January or early February as indicated.      This note was dictated using voice recognition software and may contain grammatical errors.

## 2018-11-20 NOTE — LETTER
November 20, 2018      Lavon Perez MD  [address]  [place] [address]           [facility] - Orthopedic Surgery  [address]  [address]  [place] [address]  Phone: [phone]  Fax: [phone]          Patient: Payton Castillo   MR Number: [id number]   YOB: 1955   Date of Visit: 11/20/2018       Dear Dr. Lavon Perez:    Thank you for referring Payton Castillo to me for evaluation. Attached you will find relevant portions of my assessment and plan of care.    If you have questions, please do not hesitate to call me. I look forward to following Payton Castillo along with you.    Sincerely,    Rasta Goff Jr., MD    Enclosure  CC:  No Recipients    If you would like to receive this communication electronically, please contact [email or web address] or [phone] to request more information on [employer] Link access.    For providers and/or their staff who would like to refer a patient to [facility], please contact us through our one-stop-shop provider referral line, [facility], at [phone].    If you feel you have received this communication in error or would no longer like to receive these types of communications, please e-mail [email or web address]

## 2018-11-26 ENCOUNTER — OFFICE VISIT (OUTPATIENT)
Dept: HEMATOLOGY/ONCOLOGY | Facility: CLINIC | Age: 63
End: 2018-11-26
Payer: OTHER GOVERNMENT

## 2018-11-26 VITALS
HEIGHT: 66 IN | WEIGHT: 161.38 LBS | DIASTOLIC BLOOD PRESSURE: 77 MMHG | HEART RATE: 54 BPM | BODY MASS INDEX: 25.94 KG/M2 | TEMPERATURE: 98 F | SYSTOLIC BLOOD PRESSURE: 152 MMHG

## 2018-11-26 DIAGNOSIS — Z85.528 HX OF RENAL CELL CANCER: ICD-10-CM

## 2018-11-26 DIAGNOSIS — D63.1 ANEMIA, CHRONIC RENAL FAILURE, STAGE 2 (MILD): ICD-10-CM

## 2018-11-26 DIAGNOSIS — N18.2 ANEMIA, CHRONIC RENAL FAILURE, STAGE 2 (MILD): ICD-10-CM

## 2018-11-26 DIAGNOSIS — D63.8 ANEMIA OF CHRONIC DISORDER: Primary | ICD-10-CM

## 2018-11-26 PROCEDURE — 99214 OFFICE O/P EST MOD 30 MIN: CPT | Mod: ,,, | Performed by: INTERNAL MEDICINE

## 2018-11-26 NOTE — LETTER
November 26, 2018      Lavon Perez MD  [address]  [place] [address]           [facility] - Hematology Oncology  [address]  [address]  [place] [address]  Phone: [phone]  Fax: [phone]          Patient: Payton Castillo   MR Number: [id number]   YOB: 1955   Date of Visit: 11/26/2018       Dear Dr. Lavon Perez:    Thank you for referring Payton Castillo to me for evaluation. Attached you will find relevant portions of my assessment and plan of care.    If you have questions, please do not hesitate to call me. I look forward to following Payton Castillo along with you.    Sincerely,    Sebastien Tavarez MD    Enclosure  CC:  No Recipients    If you would like to receive this communication electronically, please contact [email or web address] or [phone] to request more information on [employer] Link access.    For providers and/or their staff who would like to refer a patient to [facility], please contact us through our one-stop-shop provider referral line, [facility], at [phone].    If you feel you have received this communication in error or would no longer like to receive these types of communications, please e-mail [email or web address]

## 2018-11-26 NOTE — PROGRESS NOTES
[facility] Hematology/Oncology  PROGRESS NOTE      Subjective:       Patient ID:   NAME: Payton Castillo : 1955     63 y.o. female    Referring Doc: Ana  Other Physicians: Corona Haro    Chief Complaint:  Anemia/renal ca f/u    History of Present Illness:     Patient returns today for a regularly scheduled follow-up visit.  She is here by herself. She is doing ok. She has chronic aches with sciatica. She denies any CP, SOB, HA's or N/V.She is planning to have right shoulder surgery (#2) with Dr Faye in 2019.           ROS:   GEN: normal without any fever, night sweats or weight loss  HEENT: normal with no HA's, sore throat, stiff neck, changes in vision  CV: normal with no CP, SOB, PND, PALMA or orthopnea  PULM: normal with no SOB, cough, hemoptysis, sputum or pleuritic pain  GI: normal with no abdominal pain, nausea, vomiting, constipation, diarrhea, melanotic stools, BRBPR, or hematemesis  : normal with no hematuria, dysuria  BREAST: normal with no mass, discharge, pain  SKIN: normal with no rash, erythema, bruising, or swelling    Allergies:  Review of patient's allergies indicates:   Allergen Reactions    Aspartame Other (See Comments)     Headache      Fructose Other (See Comments)     headache    Monosodium glutamate Other (See Comments)     Headache      Pcn [penicillins]      Rash         Medications:    Current Outpatient Medications:     amLODIPine (NORVASC) 2.5 MG tablet, Take 1 tablet by mouth once daily., Disp: , Rfl:     atorvastatin (LIPITOR) 20 MG tablet, Take 20 mg by mouth., Disp: , Rfl:     CALCIUM CARBONATE/VITAMIN D3 (VITAMIN D-3 ORAL), Take 600 Units by mouth once daily., Disp: , Rfl:     cetirizine (ZYRTEC) 10 MG tablet, Take 10 mg by mouth., Disp: , Rfl:     co-enzyme Q-10 50 mg capsule, Take 100 mg by mouth., Disp: , Rfl:     diphenhydramine-acetaminophen (TYLENOL PM)  mg Tab, Take 1 tablet by mouth nightly as needed., Disp: , Rfl:     doxepin (SINEQUAN)  25 MG capsule, Take 1 capsule by mouth once daily., Disp: , Rfl:     esomeprazole (NEXIUM) 40 MG capsule, Take 40 mg by mouth., Disp: , Rfl:     fluticasone (FLONASE) 50 mcg/actuation nasal spray, 1 spray by Each Nare route once daily., Disp: , Rfl:     FREESTYLE LANCETS MISC, by Misc.(Non-Drug; Combo Route) route 3 (three) times daily., Disp: , Rfl:     gabapentin (NEURONTIN) 600 MG tablet, , Disp: , Rfl:     glipiZIDE (GLUCOTROL) 10 MG TR24, Take by mouth., Disp: , Rfl:     insulin glargine (LANTUS SOLOSTAR) 100 unit/mL (3 mL) InPn pen, Inject 60 Units into the skin once daily., Disp: , Rfl:     INSULIN NPL/INSULIN LISPRO (HUMALOG MIX 75-25 KWIKPEN SUBQ), Inject 20 Units into the skin 2 (two) times daily before meals. Breakfast and dinner, Disp: , Rfl:     insulin syringe-needle U-100 0.3 mL 30 Syrg, by Misc.(Non-Drug; Combo Route) route 3 (three) times daily., Disp: , Rfl:     LANTUS 100 unit/mL injection, , Disp: , Rfl:     losartan (COZAAR) 50 MG tablet, Take 1 tablet by mouth once daily., Disp: , Rfl:     metoprolol succinate (TOPROL XL) 50 MG 24 hr tablet, TAKE 1 TABLET IN THE MORNING AND 2 TABLETS IN THE EVENING, Disp: , Rfl:     omeprazole (PRILOSEC) 40 MG capsule, Take 40 mg by mouth once daily., Disp: , Rfl:     potassium 99 mg Tab, Take 1 tablet by mouth., Disp: , Rfl:     pregabalin (LYRICA) 50 MG capsule, Take 50 mg by mouth 3 (three) times daily., Disp: , Rfl:     solifenacin (VESICARE) 5 MG tablet, Take 5 mg by mouth., Disp: , Rfl:     tolterodine (DETROL LA) 2 MG Cp24, , Disp: , Rfl:     trazodone (DESYREL) 50 MG tablet, Take 50 mg by mouth every evening., Disp: , Rfl:     valsartan (DIOVAN) 320 MG tablet, Take 320 mg by mouth once daily., Disp: , Rfl:     PMHx/PSHx Updates:  See patient's last visit with me on 11/9/2017  See H&P on 10/11/2006        Pathology:  See path Aug 2014          Objective:     Vitals:  Blood pressure (!) 152/77, pulse (!) 54, temperature 97.5 °F (36.4  "°C), height 5' 6" (1.676 m), weight 73.2 kg (161 lb 6.4 oz).    Physical Examination:   GEN: no apparent distress, comfortable; AAOx3  HEAD: atraumatic and normocephalic  EYES: no pallor, no icterus, PERRLA  ENT: OMM, no pharyngeal erythema, external ears WNL; no nasal discharge; no thrush  NECK: no masses, thyroid normal, trachea midline, no LAD/LN's, supple  CV: RRR with no murmur; normal pulse; normal S1 and S2; no pedal edema  CHEST: Normal respiratory effort; CTAB; normal breath sounds; no wheeze or crackles  ABDOM: nontender and nondistended; soft; normal bowel sounds; no rebound/guarding  MUSC/Skeletal: ROM normal; no crepitus; joints normal; no deformities or arthropathy  EXTREM: no clubbing, cyanosis, inflammation or swelling  SKIN: no rashes, lesions, ulcers, petechiae or subcutaneous nodules  : no bright  NEURO: grossly intact; motor/sensory WNL; AAOx3; no tremors  PSYCH: normal mood, affect and behavior  LYMPH: normal cervical, supraclavicular, axillary and groin LN's            Labs:     11/19/2018  Lab Results   Component Value Date    WBC 8.9 11/19/2018    HGB 13.9 11/19/2018    HCT 40.2 11/19/2018    MCV 91.8 11/19/2018     11/19/2018     BMP  Lab Results   Component Value Date     11/19/2018    K 4.2 11/19/2018     11/19/2018    CO2 35 (H) 11/19/2018    BUN 20 11/19/2018    CREATININE 1.23 (H) 11/19/2018    CALCIUM 9.7 11/19/2018    ESTGFRAFRICA 54 (L) 11/19/2018    EGFRNONAA 47 (L) 11/19/2018     Lab Results   Component Value Date    ALT 33 (H) 11/19/2018    AST 26 11/19/2018    ALKPHOS 70 11/19/2018    BILITOT 0.6 11/19/2018             Radiology/Diagnostic Studies:    CT 11/1/2017    I have reviewed all available lab results and radiology reports.    Assessment/Plan:   (1) 63 y.o. female with diagnosis of chronic anemia and thrombocytopenia in the past  - multifactorial anemia process with underlying anemia of chronic disorders and anemia of chronic renal  - latest labs wnl - " no residual anemia or tcp    (2) prior left nephrectomy in Aug 2014  - Stage I renal cell carcinoma  - followed by Dr Hill with   - CT scans on 11/1/2017 negative  - recent US of kidneys about a month ago - negative per patient    (3) CRI - followed by Dr Tommy Haro with nephrology    (4) Chronic joint/hip issues    1. Anemia of chronic disorder     2. Anemia, chronic renal failure, stage 2 (mild)     3. Hx of renal cell cancer - left nephrectomy Aug 2014           PLAN:  1. F/u with PCP,  and Neph  2. Check labs again in one year  3.  RTC in 12 months  4. Planned right shoulder surgery in Jan 2019 with Dr Faye  Fax note to Estrellita Perez Kreiger and Sergio    Discussion:     I have explained all of the above in detail and the patient understands all of the current recommendation(s). I have answered all of their questions to the best of my ability and to their complete satisfaction.   The patient is to continue with the current management plan.            Electronically signed by Sebastien Tavarez MD

## 2018-12-27 ENCOUNTER — OFFICE VISIT (OUTPATIENT)
Dept: ORTHOPEDICS | Facility: CLINIC | Age: 63
End: 2018-12-27
Payer: OTHER GOVERNMENT

## 2018-12-27 VITALS
WEIGHT: 161 LBS | HEIGHT: 66 IN | BODY MASS INDEX: 25.88 KG/M2 | SYSTOLIC BLOOD PRESSURE: 136 MMHG | DIASTOLIC BLOOD PRESSURE: 78 MMHG | HEART RATE: 65 BPM

## 2018-12-27 DIAGNOSIS — M75.51 BURSITIS OF RIGHT SHOULDER: ICD-10-CM

## 2018-12-27 DIAGNOSIS — G56.01 CARPAL TUNNEL SYNDROME OF RIGHT WRIST: Primary | ICD-10-CM

## 2018-12-27 DIAGNOSIS — M50.90 CERVICAL DISC DISEASE: ICD-10-CM

## 2018-12-27 PROCEDURE — 20610 DRAIN/INJ JOINT/BURSA W/O US: CPT | Mod: RT,,, | Performed by: ORTHOPAEDIC SURGERY

## 2018-12-27 PROCEDURE — 99213 OFFICE O/P EST LOW 20 MIN: CPT | Mod: 25,,, | Performed by: ORTHOPAEDIC SURGERY

## 2018-12-27 RX ORDER — INSULIN LISPRO 100 [IU]/ML
INJECTION, SUSPENSION SUBCUTANEOUS
COMMUNITY
Start: 2018-12-19 | End: 2018-12-27

## 2018-12-27 RX ORDER — TRIAMCINOLONE ACETONIDE 40 MG/ML
40 INJECTION, SUSPENSION INTRA-ARTICULAR; INTRAMUSCULAR
Status: DISCONTINUED | OUTPATIENT
Start: 2018-12-27 | End: 2018-12-27 | Stop reason: HOSPADM

## 2018-12-27 RX ADMIN — TRIAMCINOLONE ACETONIDE 40 MG: 40 INJECTION, SUSPENSION INTRA-ARTICULAR; INTRAMUSCULAR at 01:12

## 2018-12-27 NOTE — PROCEDURES
Large Joint Aspiration/Injection: R subacromial bursa  Date/Time: 12/27/2018 1:41 PM  Performed by: Cesario Clarke MD  Authorized by: Cesario Clarke MD     Consent Done?:  Yes (Verbal)  Indications:  Pain and joint swelling  Procedure site marked: Yes    Timeout: Prior to procedure the correct patient, procedure, and site was verified      Location:  Shoulder  Site:  R subacromial bursa  Prep: Patient was prepped and draped in usual sterile fashion    Needle size:  22 G  Approach:  Lateral  Medications:  40 mg triamcinolone acetonide 40 mg/mL  Patient tolerance:  Patient tolerated the procedure well with no immediate complications

## 2018-12-27 NOTE — PROGRESS NOTES
Past Medical History:   Diagnosis Date    Anemia of chronic disorder 11/9/2017    Anemia, chronic renal failure, stage 2 (mild) 11/9/2017    Asthma     Cancer     kidney    Cataract of right eye     CKD (chronic kidney disease), stage III     Diabetes mellitus     type II    Fibromyalgia     GERD (gastroesophageal reflux disease)     Hx of renal cell cancer - left nephrectomy Aug 2014 11/9/2017    Hypercholesteremia     Hypertension     IDDM (insulin dependent diabetes mellitus)     Lumbar spondylosis     Lumbosacral radiculopathy     Osteopenia     Palpitations     Plantar fasciitis, right     Syncope 2/2012    Tachycardia     Thoracic radiculopathy     TMJ (dislocation of temporomandibular joint)        Past Surgical History:   Procedure Laterality Date    CATARACT EXTRACTION Left 03/2016    EXTRACTION-CATARACT-IOL Right 3/23/2016    [facility]    EXTRACTION-CATARACT-IOL Left 3/9/2016    [facility]    JOINT REPLACEMENT Right     knee    SHOULDER OPEN ROTATOR CUFF REPAIR Right 02/2005    labral repair    TOTAL KNEE ARTHROPLASTY Left 2014    TUBAL LIGATION  1990    TUMOR REMOVAL  2014    from kidney       Current Outpatient Medications   Medication Sig    amLODIPine (NORVASC) 2.5 MG tablet Take 1 tablet by mouth once daily.    atorvastatin (LIPITOR) 20 MG tablet Take 20 mg by mouth.    cetirizine (ZYRTEC) 10 MG tablet Take 10 mg by mouth.    co-enzyme Q-10 50 mg capsule Take 100 mg by mouth.    diphenhydramine-acetaminophen (TYLENOL PM)  mg Tab Take 1 tablet by mouth nightly as needed.    esomeprazole (NEXIUM) 40 MG capsule Take 40 mg by mouth.    flash glucose sensor (FREESTYLE BANG 10 DAY SENSOR) Kit USE   TO CHECK GLUCOSE IN THE MORNING BEFORE BREAKFAST    fluticasone (FLONASE) 50 mcg/actuation nasal spray 1 spray by Each Nare route once daily.    FREESTYLE LANCETS MISC by Misc.(Non-Drug; Combo  Route) route 3 (three) times daily.    gabapentin (NEURONTIN) 600 MG tablet     insulin glargine (LANTUS SOLOSTAR) 100 unit/mL (3 mL) InPn pen Inject 60 Units into the skin once daily.    INSULIN NPL/INSULIN LISPRO (HUMALOG MIX 75-25 KWIKPEN SUBQ) Inject 20 Units into the skin 2 (two) times daily before meals. Breakfast and dinner    insulin syringe-needle U-100 0.3 mL 30 Syrg by Misc.(Non-Drug; Combo Route) route 3 (three) times daily.    losartan (COZAAR) 50 MG tablet Take 1 tablet by mouth once daily.    metoprolol succinate (TOPROL XL) 50 MG 24 hr tablet TAKE 1 TABLET IN THE MORNING AND 2 TABLETS IN THE EVENING    potassium 99 mg Tab Take 1 tablet by mouth.    solifenacin (VESICARE) 5 MG tablet Take 5 mg by mouth.    CALCIUM CARBONATE/VITAMIN D3 (VITAMIN D-3 ORAL) Take 600 Units by mouth once daily.    doxepin (SINEQUAN) 25 MG capsule Take 1 capsule by mouth once daily.    trazodone (DESYREL) 50 MG tablet Take 50 mg by mouth every evening.     No current facility-administered medications for this visit.        Review of patient's allergies indicates:   Allergen Reactions    Aspartame      headache      Fructose      headache      Monosodium glutamate Other (See Comments)     Headache      Penicillins Itching and Rash     Rash       Family History   Problem Relation Age of Onset    Stomach cancer Mother     Breast cancer Mother     Diabetes Mother     Cancer Father        Social History     Socioeconomic History    Marital status:      Spouse name: Not on file    Number of children: Not on file    Years of education: Not on file    Highest education level: Not on file   Social Needs    Financial resource strain: Not on file    Food insecurity - worry: Not on file    Food insecurity - inability: Not on file    Transportation needs - medical: Not on file    Transportation needs - non-medical: Not on file   Occupational History    Not on file   Tobacco Use    Smoking status:  "Never Smoker    Smokeless tobacco: Never Used   Substance and Sexual Activity    Alcohol use: No    Drug use: No    Sexual activity: Not on file   Other Topics Concern    Not on file   Social History Narrative    Not on file       Chief Complaint:   Chief Complaint   Patient presents with    Right Shoulder - Pain, Follow-up     Hx of right rotator cuff repair in 2005.  Painful off & on since approx. 2015. Here for Review of  MRI Right Shoulder.        Consulting Physician: Lavon Perez MD    History of Present Illness:    This is a 63 y.o. year old female who complains of patient is seen in the past has a history of right rotator cuff repair in 2005 she is having pain for the last 3 years she had  A recent MRI of the right shoulder      ROS    Examination:    Vital Signs:    Vitals:    12/27/18 1302   BP: 136/78   Pulse: 65   Weight: 73 kg (161 lb)   Height: 5' 6" (1.676 m)   PainSc:   4   PainLoc: Shoulder       This a well-developed, well nourished patient in no acute distress.    Alert and oriented and cooperative to examination.       Physical Exam: right shoulder-patient has good range of motion of the shoulder minimal discomfort today has. Right rotator cuff        Neck-patient has painful extension of the neck and decreased tilt to the right she complains of right-sided neck pain and pain radiating into her shoulder into her hand her detail reflexes are intact and symmetrical she is neurologically intact patient does have decreased sensation in the median nerve distribution      Imaging:  The MRI of the right shoulder show no evidence of any full-thickness rotator cuff tears and showed the distal clavicle resection from previous surgery and some mild degenerative changes     Assessment: patient fails to show any evidence of a rotator cuff tear right shoulder she is at evidence of a right carpal tunel syndrome and perhaps some cervical disc disease    Plan:  I would recommend we go ahead and get " a nerve conduction test of the right upper extremity and also an MRI of her cervical spine      DISCLAIMER: This note may have been dictated using voice recognition software and may contain grammatical errors.     NOTE: Consult report sent to referring provider via [employer] EMR.

## 2018-12-27 NOTE — LETTER
December 27, 2018      Lavon Perez MD  [address]  [place] [address]           [facility] - Orthopedic Surgery  [address]  [address]  [place] [address]  Phone: [phone]  Fax: [phone]          Patient: Payton Castillo   MR Number: [id number]   YOB: 1955   Date of Visit: 12/27/2018       Dear Dr. Lavon Perez:    Thank you for referring Payton Castillo to me for evaluation. Attached you will find relevant portions of my assessment and plan of care.    If you have questions, please do not hesitate to call me. I look forward to following Payton Castillo along with you.    Sincerely,    Cesario Clarke MD    Enclosure  CC:  No Recipients    If you would like to receive this communication electronically, please contact [email or web address] or [phone] to request more information on [employer] Link access.    For providers and/or their staff who would like to refer a patient to [facility], please contact us through our one-stop-shop provider referral line, [facility], at [phone].    If you feel you have received this communication in error or would no longer like to receive these types of communications, please e-mail [email or web address]

## 2019-03-01 ENCOUNTER — OFFICE VISIT (OUTPATIENT)
Dept: ORTHOPEDICS | Facility: CLINIC | Age: 64
End: 2019-03-01
Payer: OTHER GOVERNMENT

## 2019-03-01 VITALS
DIASTOLIC BLOOD PRESSURE: 60 MMHG | WEIGHT: 161 LBS | HEART RATE: 56 BPM | SYSTOLIC BLOOD PRESSURE: 140 MMHG | BODY MASS INDEX: 25.88 KG/M2 | HEIGHT: 66 IN

## 2019-03-01 DIAGNOSIS — M50.90 CERVICAL DISC DISEASE: Primary | ICD-10-CM

## 2019-03-01 PROCEDURE — 99214 OFFICE O/P EST MOD 30 MIN: CPT | Mod: ,,, | Performed by: ORTHOPAEDIC SURGERY

## 2019-03-01 PROCEDURE — 99214 PR OFFICE/OUTPT VISIT, EST, LEVL IV, 30-39 MIN: ICD-10-PCS | Mod: ,,, | Performed by: ORTHOPAEDIC SURGERY

## 2019-03-01 RX ORDER — AZITHROMYCIN 250 MG/1
TABLET, FILM COATED ORAL
COMMUNITY
Start: 2019-02-26 | End: 2019-08-12

## 2019-03-01 RX ORDER — PREDNISONE 10 MG/1
TABLET ORAL
COMMUNITY
Start: 2019-02-26 | End: 2019-08-12

## 2019-03-01 NOTE — LETTER
March 1, 2019      Lavon Perez MD  [address] [facility]  [place] [address]           [facility]  [address]  [address]  [place] [address]  Phone: [phone]  Fax: [phone]          Patient: Payton Castillo   MR Number: [id number]   YOB: 1955   Date of Visit: 3/1/2019       Dear Dr. Lavon Perez:    Thank you for referring Payton Castillo to me for evaluation. Attached you will find relevant portions of my assessment and plan of care.    If you have questions, please do not hesitate to call me. I look forward to following Payton Castillo along with you.    Sincerely,    Cesario Clarke MD    Enclosure  CC:  No Recipients    If you would like to receive this communication electronically, please contact [email or web address] or [phone] to request more information on [employer] Link access.    For providers and/or their staff who would like to refer a patient to [facility], please contact us through our one-stop-shop provider referral line, [facility], at [phone].    If you feel you have received this communication in error or would no longer like to receive these types of communications, please e-mail [email or web address]

## 2019-03-01 NOTE — PROGRESS NOTES
Past Medical History:   Diagnosis Date    Anemia of chronic disorder 11/9/2017    Anemia, chronic renal failure, stage 2 (mild) 11/9/2017    Asthma     Cancer     kidney    Cataract of right eye     CKD (chronic kidney disease), stage III     Diabetes mellitus     type II    Fibromyalgia     GERD (gastroesophageal reflux disease)     Hx of renal cell cancer - left nephrectomy Aug 2014 11/9/2017    Hypercholesteremia     Hypertension     IDDM (insulin dependent diabetes mellitus)     Lumbar spondylosis     Lumbosacral radiculopathy     Osteopenia     Palpitations     Plantar fasciitis, right     Syncope 2/2012    Tachycardia     Thoracic radiculopathy     TMJ (dislocation of temporomandibular joint)        Past Surgical History:   Procedure Laterality Date    CATARACT EXTRACTION Left 03/2016    EXTRACTION-CATARACT-IOL Right 3/23/2016    [facility]    EXTRACTION-CATARACT-IOL Left 3/9/2016    [facility] at [facility] OR    JOINT REPLACEMENT Right     knee    SHOULDER OPEN ROTATOR CUFF REPAIR Right 02/2005    labral repair    TOTAL KNEE ARTHROPLASTY Left 2014    TUBAL LIGATION  1990    TUMOR REMOVAL  2014    from kidney       Current Outpatient Medications   Medication Sig    amLODIPine (NORVASC) 2.5 MG tablet Take 1 tablet by mouth once daily.    atorvastatin (LIPITOR) 20 MG tablet Take 20 mg by mouth.    azithromycin (Z-ADDI) 250 MG tablet Take 2 tablets (500 mg) on  Day 1,  followed by 1 tablet (250 mg) once daily on Days 2 through 5.    cetirizine (ZYRTEC) 10 MG tablet Take 10 mg by mouth.    diphenhydramine-acetaminophen (TYLENOL PM)  mg Tab Take 1 tablet by mouth nightly as needed.    esomeprazole (NEXIUM) 40 MG capsule Take 40 mg by mouth.    flash glucose sensor (FREESTYLE BANG 10 DAY SENSOR) Kit USE   TO CHECK GLUCOSE IN THE MORNING BEFORE BREAKFAST    fluticasone (FLONASE) 50 mcg/actuation nasal spray 1 spray by  Each Nare route once daily.    FREESTYLE LANCETS MISC by Misc.(Non-Drug; Combo Route) route 3 (three) times daily.    gabapentin (NEURONTIN) 600 MG tablet     insulin glargine (LANTUS SOLOSTAR) 100 unit/mL (3 mL) InPn pen Inject 60 Units into the skin once daily.    INSULIN NPL/INSULIN LISPRO (HUMALOG MIX 75-25 KWIKPEN SUBQ) Inject 20 Units into the skin 2 (two) times daily before meals. Breakfast and dinner    insulin syringe-needle U-100 0.3 mL 30 Syrg by Misc.(Non-Drug; Combo Route) route 3 (three) times daily.    losartan (COZAAR) 50 MG tablet Take 1 tablet by mouth once daily.    metoprolol succinate (TOPROL XL) 50 MG 24 hr tablet TAKE 1 TABLET IN THE MORNING AND 2 TABLETS IN THE EVENING    potassium 99 mg Tab Take 1 tablet by mouth.    predniSONE (DELTASONE) 10 MG tablet     solifenacin (VESICARE) 5 MG tablet Take 5 mg by mouth.     No current facility-administered medications for this visit.        Review of patient's allergies indicates:   Allergen Reactions    Aspartame      headache      Fructose      headache      Monosodium glutamate Other (See Comments)     Headache      Penicillins Itching and Rash     Rash       Family History   Problem Relation Age of Onset    Stomach cancer Mother     Breast cancer Mother     Diabetes Mother     Cancer Father        Social History     Socioeconomic History    Marital status:      Spouse name: Not on file    Number of children: Not on file    Years of education: Not on file    Highest education level: Not on file   Social Needs    Financial resource strain: Not on file    Food insecurity - worry: Not on file    Food insecurity - inability: Not on file    Transportation needs - medical: Not on file    Transportation needs - non-medical: Not on file   Occupational History    Not on file   Tobacco Use    Smoking status: Never Smoker    Smokeless tobacco: Never Used   Substance and Sexual Activity    Alcohol use: No    Drug use: No  "   Sexual activity: Not on file   Other Topics Concern    Not on file   Social History Narrative    Not on file       Chief Complaint:   Chief Complaint   Patient presents with    Spine - Follow-up     Here for Review of  MRI C-Spine       Consulting Physician: Lavon Perez MD    History of Present Illness:    This is a 64 y.o. year old female who complains ofpatient returns today has a history of neck pain and had a recent cervical spine MRI      ROS    Examination:    Vital Signs:    Vitals:    03/01/19 0849   BP: (!) 140/60   Pulse: (!) 56   Weight: 73 kg (161 lb)   Height: 5' 6" (1.676 m)   PainSc: 0-No pain   PainLoc: Neck       This a well-developed, well nourished patient in no acute distress.    Alert and oriented and cooperative to examination.       Physical Exam: neck-atient has some restricted range of motion and complains of pain with extension and tilt to the right most for pain appears to be around the base of the neck and into the trapezial area of the right shoulder her deep tendon reflexes are intact and symmetrical and she is neurologically intact      Right shoulder-patient has excellent range of motion of her right shoulder there is no crepitance her pain appears to be at the top of the shoulder over the trapezial areaand in the rhomboid area    Imaging:  MRI of the cervical spine showed some mild multilevel cervical disc disease and facet arthritis very similar to the July 2016 MRI there was no evidence of any high-grade stenosis     Assessment: multilevel cervical disc disease patient has a history of rotator cuff repair but appear her rotator cuff feels strong patient also has a history of fibromyalgia    Plan:  We'll send patient to see Dr. woods for evaluation of her cervical spine and see if he has anything offer her for her right-sided neck pain at this time her right shoulder she has good range of motion does not appear to have any weakness in rotator cuff      DISCLAIMER: This " note may have been dictated using voice recognition software and may contain grammatical errors.     NOTE: Consult report sent to referring provider via [employer].

## 2019-08-12 ENCOUNTER — OFFICE VISIT (OUTPATIENT)
Dept: FAMILY MEDICINE | Facility: CLINIC | Age: 64
End: 2019-08-12
Payer: OTHER GOVERNMENT

## 2019-08-12 VITALS
SYSTOLIC BLOOD PRESSURE: 139 MMHG | BODY MASS INDEX: 26.03 KG/M2 | WEIGHT: 162 LBS | HEIGHT: 66 IN | HEART RATE: 58 BPM | DIASTOLIC BLOOD PRESSURE: 76 MMHG

## 2019-08-12 DIAGNOSIS — N18.30 CKD STAGE 3 DUE TO TYPE 2 DIABETES MELLITUS: ICD-10-CM

## 2019-08-12 DIAGNOSIS — E11.42 TYPE 2 DIABETES MELLITUS WITH DIABETIC POLYNEUROPATHY, WITH LONG-TERM CURRENT USE OF INSULIN: ICD-10-CM

## 2019-08-12 DIAGNOSIS — I10 ESSENTIAL HYPERTENSION: Primary | ICD-10-CM

## 2019-08-12 DIAGNOSIS — E11.22 CKD STAGE 3 DUE TO TYPE 2 DIABETES MELLITUS: ICD-10-CM

## 2019-08-12 DIAGNOSIS — Z79.4 TYPE 2 DIABETES MELLITUS WITH DIABETIC POLYNEUROPATHY, WITH LONG-TERM CURRENT USE OF INSULIN: ICD-10-CM

## 2019-08-12 DIAGNOSIS — E78.2 MIXED DYSLIPIDEMIA: ICD-10-CM

## 2019-08-12 DIAGNOSIS — K21.9 GASTROESOPHAGEAL REFLUX DISEASE WITHOUT ESOPHAGITIS: ICD-10-CM

## 2019-08-12 PROCEDURE — 99204 PR OFFICE/OUTPT VISIT, NEW, LEVL IV, 45-59 MIN: ICD-10-PCS | Mod: S$PBB,,, | Performed by: INTERNAL MEDICINE

## 2019-08-12 PROCEDURE — 99204 OFFICE O/P NEW MOD 45 MIN: CPT | Mod: S$PBB,,, | Performed by: INTERNAL MEDICINE

## 2019-08-12 PROCEDURE — 99999 PR PBB SHADOW E&M-EST. PATIENT-LVL III: CPT | Mod: PBBFAC,,, | Performed by: INTERNAL MEDICINE

## 2019-08-12 PROCEDURE — 99999 PR PBB SHADOW E&M-EST. PATIENT-LVL III: ICD-10-PCS | Mod: PBBFAC,,, | Performed by: INTERNAL MEDICINE

## 2019-08-12 PROCEDURE — 99213 OFFICE O/P EST LOW 20 MIN: CPT | Mod: PBBFAC | Performed by: INTERNAL MEDICINE

## 2019-08-12 RX ORDER — ATORVASTATIN CALCIUM 40 MG/1
1 TABLET, FILM COATED ORAL DAILY
COMMUNITY
Start: 2019-06-20 | End: 2019-11-20 | Stop reason: SDUPTHER

## 2019-08-12 RX ORDER — MELOXICAM 15 MG/1
1 TABLET ORAL DAILY
COMMUNITY
Start: 2019-08-01 | End: 2019-12-23

## 2019-08-12 RX ORDER — LOSARTAN POTASSIUM 50 MG/1
50 TABLET ORAL DAILY
Qty: 90 TABLET | Refills: 2 | Status: SHIPPED | OUTPATIENT
Start: 2019-08-12 | End: 2020-05-17

## 2019-08-12 RX ORDER — GABAPENTIN 600 MG/1
600 TABLET ORAL 3 TIMES DAILY
Qty: 270 TABLET | Refills: 2 | Status: SHIPPED | OUTPATIENT
Start: 2019-08-12 | End: 2019-08-14 | Stop reason: SDUPTHER

## 2019-08-12 RX ORDER — QUINIDINE GLUCONATE 324 MG
480 TABLET, EXTENDED RELEASE ORAL 3 TIMES DAILY
COMMUNITY
End: 2020-06-23 | Stop reason: ALTCHOICE

## 2019-08-12 NOTE — PATIENT INSTRUCTIONS
Diabetes: Activity Tips    Being more active can help you manage your diabetes. The tips on this sheet can help you get the most from your exercise. They can also help you stay safe.  Staying Active  Its important for adults to spend less time sitting and being inactive. This is especially true if you have type 2 diabetes. When you are sitting for long periods of time, get up for short sessions of light activity every 30 minutes.  You should aim for at least 150 minutes a week of exercise or physical activity. Dont let more than 2 days go by without being active.  Benefit from briskness  Brisk activity gets your heart beating faster. This can help you increase your fitness, lose extra weight, and manage your blood sugar level. Try brisk walking. Or, if you have foot or leg problems, you can try swimming or bike riding. You can break up your exercise into chunks throughout the day. Work up to at least 30 minutes of steady, brisk exercise on most days.  Warm up and cool down  Warming up and cooling down reduce your risk of injury. They also help limit muscle soreness. Do a mild version of your activity for 5 minutes before and after your routine. You can also learn stretches that will help keep your muscles loose. Your healthcare provider may show you good ways to warm up and stretch.  Do the talk-sing test  The talk-sing test is a simple way to tell how hard youre exercising. If you can talk while exercising, youre in a safe range. If youre out of breath, slow down. If you can carry a tune, its time to  the pace. Walk up a hill. Increase the resistance on your stationary bike. Or swim faster.  What about eating?  You may be told to plan your exercise for 1 to 2 hours after a meal. In most cases, you dont need to eat while being active. If you take insulin or medicine that can cause low blood sugar, test your blood sugar before exercising. And carry a fast-acting sugar that will raise your blood sugar  level quickly. This includes glucose tablets or hard candy. Use it if you feel low blood sugar symptoms.  Safety tips  These tips can help you stay safe as you become fit:  · Exercise with a friend or carry a cell phone if you have one.  · Carry or wear identification, such as a necklace or bracelet, that says you have diabetes.  · Use the proper footwear and safety equipment for your activity.  · Drink water before, during, and after exercise.  · Dress properly for the weather.  · Dont exercise in very hot or very cold weather.  · Dont exercise if you are sick.  · If you are instructed to do so, test your blood sugar before and after you exercise. Have a small carbohydrate snack if your blood sugar is low before you start exercising.   When to stop exercising and call your healthcare provider  Stop exercising and call your healthcare provider right away if you notice any of the following:  · Pain, pressure, tightness, or heaviness in the chest  · Pain or heaviness in the neck, shoulders, back, arms, legs, or feet  · Unusual shortness of breath  · Dizziness or lightheadedness  · Unusually rapid or slow pulse  · Increased joint or muscle pain  · Nausea or vomiting  Date Last Reviewed: 5/1/2016  © 0497-9126 [employer]. [address] [place] [address], [place] [address]. All rights reserved. This information is not intended as a substitute for professional medical care. Always follow your healthcare professional's instructions.

## 2019-08-12 NOTE — PROGRESS NOTES
Subjective:       Patient ID: Payton Castillo is a 64 y.o. female.    Chief Complaint: Hypertension; Hyperlipidemia; Gastroesophageal Reflux; Diabetes; Back Pain; and Chronic Kidney Disease    This is the 1st visit for Ms. Payton Castillo a pleasant 64 female.  Underlying medical issues include hypertension type 2 diabetes mellitus on insulin and dyslipidemia.  She also has gastroesophageal reflux.  She has history of kidney cancer partial left-sided nephrectomy.  She follows Dr. Tavarez for the same.  She also sees Dr. Tommy macedo, nephrology for chronic kidney disease stage 3.    She also follows with Dr. Goff for her pain arthritis problems. She also has diabetic nephropathy stage III as diabetic neuropathy.    Hypertension   This is a chronic problem. The current episode started more than 1 year ago (30). The problem is controlled. Associated symptoms include malaise/fatigue. Pertinent negatives include no chest pain, headaches, palpitations or shortness of breath. Agents associated with hypertension include steroids. Risk factors for coronary artery disease include sedentary lifestyle, diabetes mellitus and dyslipidemia. Past treatments include calcium channel blockers. The current treatment provides moderate improvement. Hypertensive end-organ damage includes kidney disease. There is no history of heart failure, left ventricular hypertrophy, PVD or retinopathy. There is no history of coarctation of the aorta, hyperaldosteronism, pheochromocytoma, renovascular disease or sleep apnea.   Hyperlipidemia   This is a chronic problem. The current episode started more than 1 year ago. The problem is controlled. She has no history of obesity. Pertinent negatives include no chest pain or shortness of breath. Current antihyperlipidemic treatment includes statins. The current treatment provides moderate improvement of lipids. Risk factors for coronary artery disease include hypertension, a sedentary lifestyle,  post-menopausal, dyslipidemia and diabetes mellitus.   Gastroesophageal Reflux   She complains of heartburn. She reports no chest pain or no coughing. This is a recurrent problem. The current episode started more than 1 year ago. Pertinent negatives include no fatigue or weight loss. She has tried a PPI for the symptoms. The treatment provided moderate relief.   Diabetes   She presents for her follow-up diabetic visit. She has type 2 diabetes mellitus. Pertinent negatives for hypoglycemia include no confusion, dizziness, headaches, nervousness/anxiousness, pallor, seizures or tremors. Pertinent negatives for diabetes include no chest pain, no fatigue, no polydipsia, no polyphagia and no weight loss. Symptoms are stable. Pertinent negatives for diabetic complications include no PVD or retinopathy. Risk factors for coronary artery disease include diabetes mellitus, dyslipidemia, hypertension and post-menopausal. She is compliant with treatment some of the time. Meal planning includes avoidance of concentrated sweets. She rarely participates in exercise. Her home blood glucose trend is fluctuating minimally. An ACE inhibitor/angiotensin II receptor blocker is being taken. Eye exam is current.   Back Pain   This is a chronic problem. The current episode started more than 1 year ago. The problem has been waxing and waning since onset. The quality of the pain is described as cramping. Associated symptoms include numbness. Pertinent negatives include no chest pain, dysuria, fever, headaches, pelvic pain or weight loss. Risk factors include menopause and sedentary lifestyle. The treatment provided moderate relief.     Chronic kidney disease has been established possibly some after her nephrectomy.  This is stable at this point.  She sees Dr. gilliam with [facility] for the same.  This is stage III.  No complains of significant fatigue.  Past Medical History:   Diagnosis Date    Allergy     aspartame,fructose monosodium glutamate  and penicillins.    Anemia of chronic disorder 11/9/2017    Anemia, chronic renal failure, stage 2 (mild) 11/9/2017    Asthma     Cancer     kidney    Cataract of right eye     CKD (chronic kidney disease), stage III     Diabetes mellitus     type II    Fibromyalgia     GERD (gastroesophageal reflux disease)     Hx of renal cell cancer - left nephrectomy Aug 2014 11/9/2017    Hypercholesteremia     Hypertension     IDDM (insulin dependent diabetes mellitus)     Lumbar spondylosis     Lumbosacral radiculopathy     Osteopenia     Palpitations     Plantar fasciitis, right     Syncope 2/2012    Tachycardia     Thoracic radiculopathy     TMJ (dislocation of temporomandibular joint)      Social History     Socioeconomic History    Marital status:      Spouse name: Owen Castillo    Number of children: 2    Years of education: Not on file    Highest education level: Not on file   Occupational History    Occupation: Part - member support [employer]     Employer: [employer]   Social Needs    Financial resource strain: Not on file    Food insecurity:     Worry: Not on file     Inability: Not on file    Transportation needs:     Medical: Not on file     Non-medical: Not on file   Tobacco Use    Smoking status: Never Smoker    Smokeless tobacco: Never Used   Substance and Sexual Activity    Alcohol use: No    Drug use: No    Sexual activity: Yes     Partners: Male   Lifestyle    Physical activity:     Days per week: Not on file     Minutes per session: Not on file    Stress: Not at all   Relationships    Social connections:     Talks on phone: Not on file     Gets together: Not on file     Attends [employer] service: Not on file     Active member of club or organization: Not on file     Attends meetings of clubs or organizations: Not on file     Relationship status: Not on file   Other Topics Concern    Not on file   Social History Narrative    Not on file     Past Surgical History:    Procedure Laterality Date    CATARACT EXTRACTION Left 03/2016    EXTRACTION-CATARACT-IOL Right 3/23/2016    Performed [facility] Back [facility], [facility]    EXTRACTION-CATARACT-IOL Left 3/9/2016    Per[facility] II[facility] at [facility]    JOINT REPLACEMENT Right     knee    SHOULDER OPEN ROTATOR CUFF REPAIR Right 02/2005    labral repair    TOTAL KNEE ARTHROPLASTY Left 2014    TUBAL LIGATION  1990    TUMOR REMOVAL  2014    from kidney     Family History   Problem Relation Age of Onset    Stomach cancer Mother     Breast cancer Mother     Diabetes Mother     Cancer Father        Review of Systems   Constitutional: Positive for malaise/fatigue. Negative for activity change, chills, fatigue, fever, unexpected weight change and weight loss.   HENT: Negative for congestion, postnasal drip and sinus pressure.    Eyes: Negative for pain, discharge and visual disturbance.   Respiratory: Positive for apnea. Negative for cough, chest tightness and shortness of breath.         Patient does have obstructive sleep apnea and uses a CPAP device.   Cardiovascular: Negative for chest pain, palpitations and leg swelling.        Hypertension.  Chronic kidney disease stage 3.   Gastrointestinal: Positive for heartburn. Negative for abdominal distention, anal bleeding, constipation and diarrhea.   Endocrine: Negative for polydipsia and polyphagia.        Type 2 diabetes mellitus dyslipidemia   Genitourinary: Negative for difficulty urinating, dysuria, flank pain, frequency, menstrual problem, pelvic pain, vaginal bleeding and vaginal pain.        Patient has an intact uterus.  Pap smear is pending at this point.  She has chronic kidney disease stage 3 and follows Dr. Tommy macedo for the same.   Musculoskeletal: Positive for back pain. Negative for arthralgias and joint swelling.   Skin: Negative for color change, pallor and rash.   Allergic/Immunologic: Negative for environmental allergies, food allergies and  "immunocompromised state.   Neurological: Positive for numbness. Negative for dizziness, tremors, seizures, syncope, light-headedness and headaches.        Neuropathy symptoms due to diabetes.  Patient takes gabapentin 600 mg 3 times a day.   Hematological: Negative for adenopathy. Does not bruise/bleed easily.   Psychiatric/Behavioral: Negative for agitation, confusion and dysphoric mood. The patient is not nervous/anxious.          Objective:      Blood pressure 139/76, pulse (!) 58, height 5' 6" (1.676 m), weight 73.5 kg (162 lb). Body mass index is 26.15 kg/m².  Physical Exam   Constitutional: She is oriented to person, place, and time. She appears well-developed and well-nourished. She is cooperative. No distress.   HENT:   Head: Normocephalic and atraumatic.   Right Ear: Tympanic membrane normal.   Left Ear: Tympanic membrane normal.   Eyes: Pupils are equal, round, and reactive to light. Conjunctivae, EOM and lids are normal. Lids are everted and swept, no foreign bodies found. Right pupil is round and reactive. Left pupil is round and reactive.   Neck: Trachea normal and normal range of motion. Neck supple.   Cardiovascular: Normal rate, regular rhythm, S1 normal, S2 normal and normal heart sounds.   Pulses:       Dorsalis pedis pulses are 1+ on the right side, and 1+ on the left side.   Pulmonary/Chest: Breath sounds normal.   Abdominal: Soft. Bowel sounds are normal. She exhibits no distension. There is no hepatosplenomegaly. There is no tenderness. There is no rigidity, no rebound and no guarding.       Saw are amy of prior surgeries are noted including scars of nephrectomy .   Musculoskeletal: Normal range of motion.        Right foot: There is normal range of motion and no deformity.        Left foot: There is normal range of motion and no deformity.   Feet:   Right Foot:   Protective Sensation: 5 sites tested. 5 sites sensed.   Skin Integrity: Negative for ulcer, blister or skin breakdown.   Left Foot: "   Protective Sensation: 5 sites tested. 5 sites sensed.   Skin Integrity: Negative for ulcer, blister or skin breakdown.   Lymphadenopathy:     She has no cervical adenopathy.     She has no axillary adenopathy.   Neurological: She is alert and oriented to person, place, and time.   Reflex Scores:       Patellar reflexes are 0 on the right side and 0 on the left side.       Achilles reflexes are 0 on the right side.  Skin: Skin is warm and dry.   Psychiatric: She has a normal mood and affect. Her behavior is normal.   Nursing note and vitals reviewed.        Assessment:       1. Essential hypertension    2. Type 2 diabetes mellitus with diabetic polyneuropathy, with long-term current use of insulin    3. CKD stage 3 due to type 2 diabetes mellitus    4. Mixed dyslipidemia    5. Gastroesophageal reflux disease without esophagitis           No visits with results within 3 Month(s) from this visit.   Latest known visit with results is:   Telephone on 11/15/2018   Component Date Value Ref Range Status    WBC 11/19/2018 8.9  3.8 - 10.8 Thousand/uL Final    RBC 11/19/2018 4.38  3.80 - 5.10 Million/uL Final    Hemoglobin 11/19/2018 13.9  11.7 - 15.5 g/dL Final    Hematocrit 11/19/2018 40.2  35.0 - 45.0 % Final    Mean Corpuscular Volume 11/19/2018 91.8  80.0 - 100.0 fL Final    Mean Corpuscular Hemoglobin 11/19/2018 31.7  27.0 - 33.0 pg Final    Mean Corpuscular Hemoglobin Conc 11/19/2018 34.6  32.0 - 36.0 g/dL Final    RDW 11/19/2018 12.9  11.0 - 15.0 % Final    Platelets 11/19/2018 348  140 - 400 Thousand/uL Final    MPV 11/19/2018 9.3  7.5 - 12.5 fL Final    Neutrophils Absolute 11/19/2018 4,993  1,500 - 7,800 cells/uL Final    Lymph # 11/19/2018 2,937  850 - 3,900 cells/uL Final    Mono # 11/19/2018 623  200 - 950 cells/uL Final    Eos # 11/19/2018 285  15 - 500 cells/uL Final    Baso # 11/19/2018 62  0 - 200 cells/uL Final    Neutrophils Relative 11/19/2018 56.1  % Final    Lymph% 11/19/2018 33.0  %  Final    Mono% 11/19/2018 7.0  % Final    Eosinophil% 11/19/2018 3.2  % Final    Basophil% 11/19/2018 0.7  % Final    Glucose 11/19/2018 57* 65 - 99 mg/dL Final    BUN, Bld 11/19/2018 20  7 - 25 mg/dL Final    Creatinine 11/19/2018 1.23* 0.50 - 0.99 mg/dL Final    eGFR if non African American 11/19/2018 47* > OR = 60 mL/min/1.73m2 Final    eGFR if  11/19/2018 54* > OR = 60 mL/min/1.73m2 Final    BUN/Creatinine Ratio 11/19/2018 16  6 - 22 (calc) Final    Sodium 11/19/2018 141  135 - 146 mmol/L Final    Potassium 11/19/2018 4.2  3.5 - 5.3 mmol/L Final    Chloride 11/19/2018 104  98 - 110 mmol/L Final    CO2 11/19/2018 35* 20 - 32 mmol/L Final    Calcium 11/19/2018 9.7  8.6 - 10.4 mg/dL Final    Total Protein 11/19/2018 7.5  6.1 - 8.1 g/dL Final    Albumin 11/19/2018 4.1  3.6 - 5.1 g/dL Final    Globulin, Total 11/19/2018 3.4  1.9 - 3.7 g/dL (calc) Final    Albumin/Globulin Ratio 11/19/2018 1.2  1.0 - 2.5 (calc) Final    Total Bilirubin 11/19/2018 0.6  0.2 - 1.2 mg/dL Final    Alkaline Phosphatase 11/19/2018 70  33 - 130 U/L Final    AST 11/19/2018 26  10 - 35 U/L Final    ALT 11/19/2018 33* 6 - 29 U/L Final         Plan:           Essential hypertension  -     Comprehensive metabolic panel; Future; Expected date: 08/19/2019  -     losartan (COZAAR) 50 MG tablet; Take 1 tablet (50 mg total) by mouth once daily.  Dispense: 90 tablet; Refill: 2    Type 2 diabetes mellitus with diabetic polyneuropathy, with long-term current use of insulin  -     Hemoglobin A1c; Future; Expected date: 08/19/2019  -     Microalbumin/creatinine urine ratio; Future; Expected date: 08/19/2019  -     gabapentin (NEURONTIN) 600 MG tablet; Take 1 tablet (600 mg total) by mouth 3 (three) times daily.  Dispense: 270 tablet; Refill: 2    CKD stage 3 due to type 2 diabetes mellitus    Mixed dyslipidemia  -     Lipid panel; Future; Expected date: 08/19/2019    Gastroesophageal reflux disease without  esophagitis    Advised Ms. Castillo to monitor Blood sugars at home and record them.  Exercise, watch diet and loose weight.  keep a close eye on feet and keep them clean. Annual eye examination. Annual influenza vaccine.  Monitor HgbA1c every 3 to 6 months. Monitor urine microalbumin every year.keep LDL less than 100. Monitor blood pressure and target blood pressure 120/70.    Patient has been advised to watch diet and exercise. Avoid fried and fatty food. Compliance to medications and follow up urged.    This is patient's 1st visit to my office.  I have reviewed her basic medical issues.  I would like to review her prior medical records for completion of preventive care issues like vaccinations, immunizations and issues like colonoscopy or mammogram/Pap smear.    Her medications have been reviewed and some of them have been refilled.    I would like to see her back in approximately 6 weeks time to review her prior medical records and complete any gaps.    She will continue to watch her diet and do some form of exercise.  Cut down on the sweets and sugary foods.  More greens and vegetables.  Keep hydrated.  Try to minimize the use of meloxicam given her chronic kidney disease stage 3.  Medications like meloxicam or anti inflammatory medications can affect the kidneys in the long run.    Back pain seems to be major issue for which she is struggling and has partially disabled her also.  She will be following up with her orthopedic specialist Dr. Goff for further instructions and interventions as and when felt necessity.    Follow-up in 6 weeks.    Spent approximately 40-45 minutes review of existing medical conditions and 50% of time face-to-face discussing about medications conditions future plan of action.      Current Outpatient Medications:     amLODIPine (NORVASC) 2.5 MG tablet, Take 1 tablet by mouth once daily., Disp: , Rfl:     atorvastatin (LIPITOR) 40 MG tablet, 1 tablet once daily., Disp: , Rfl:      cetirizine (ZYRTEC) 10 MG tablet, Take 10 mg by mouth., Disp: , Rfl:     diphenhydramine-acetaminophen (TYLENOL PM)  mg Tab, Take 1 tablet by mouth nightly as needed., Disp: , Rfl:     esomeprazole (NEXIUM) 40 MG capsule, Take 40 mg by mouth., Disp: , Rfl:     ferrous gluconate (FERGON) 240 (27 FE) MG tablet, Take 480 mg by mouth 3 (three) times daily., Disp: , Rfl:     flash glucose sensor (FREESTYLE BANG 14 DAY SENSOR MISC), by Misc.(Non-Drug; Combo Route) route., Disp: , Rfl:     fluticasone (FLONASE) 50 mcg/actuation nasal spray, 1 spray by Each Nare route once daily., Disp: , Rfl:     FREESTYLE LANCETS MISC, by Misc.(Non-Drug; Combo Route) route 3 (three) times daily., Disp: , Rfl:     gabapentin (NEURONTIN) 600 MG tablet, Take 1 tablet (600 mg total) by mouth 3 (three) times daily., Disp: 270 tablet, Rfl: 2    insulin glargine (LANTUS SOLOSTAR) 100 unit/mL (3 mL) InPn pen, Inject 60 Units into the skin once daily., Disp: , Rfl:     INSULIN NPL/INSULIN LISPRO (HUMALOG MIX 75-25 KWIKPEN SUBQ), Inject 20 Units into the skin 2 (two) times daily before meals. Breakfast and dinner, Disp: , Rfl:     insulin syringe-needle U-100 0.3 mL 30 Syrg, by Misc.(Non-Drug; Combo Route) route 3 (three) times daily., Disp: , Rfl:     losartan (COZAAR) 50 MG tablet, Take 1 tablet (50 mg total) by mouth once daily., Disp: 90 tablet, Rfl: 2    meloxicam (MOBIC) 15 MG tablet, 1 tablet once daily., Disp: , Rfl:     metoprolol succinate (TOPROL XL) 50 MG 24 hr tablet, TAKE 1 TABLET IN THE MORNING AND 2 TABLETS IN THE EVENING, Disp: , Rfl:     potassium 99 mg Tab, Take 1 tablet by mouth., Disp: , Rfl:     solifenacin (VESICARE) 5 MG tablet, Take 5 mg by mouth., Disp: , Rfl:

## 2019-08-14 DIAGNOSIS — E11.42 TYPE 2 DIABETES MELLITUS WITH DIABETIC POLYNEUROPATHY, WITH LONG-TERM CURRENT USE OF INSULIN: ICD-10-CM

## 2019-08-14 DIAGNOSIS — Z79.4 TYPE 2 DIABETES MELLITUS WITH DIABETIC POLYNEUROPATHY, WITH LONG-TERM CURRENT USE OF INSULIN: ICD-10-CM

## 2019-08-14 RX ORDER — GABAPENTIN 600 MG/1
600 TABLET ORAL 3 TIMES DAILY
Qty: 270 TABLET | Refills: 2 | Status: SHIPPED | OUTPATIENT
Start: 2019-08-14 | End: 2020-06-23

## 2019-09-18 LAB
ALBUMIN SERPL-MCNC: 4.4 G/DL (ref 3.6–5.1)
ALBUMIN/CREAT UR: 202 MCG/MG CREAT
ALBUMIN/GLOB SERPL: 1.5 (CALC) (ref 1–2.5)
ALP SERPL-CCNC: 79 U/L (ref 33–130)
ALT SERPL-CCNC: 26 U/L (ref 6–29)
AST SERPL-CCNC: 22 U/L (ref 10–35)
BILIRUB SERPL-MCNC: 0.6 MG/DL (ref 0.2–1.2)
BUN SERPL-MCNC: 26 MG/DL (ref 7–25)
BUN/CREAT SERPL: 20 (CALC) (ref 6–22)
CALCIUM SERPL-MCNC: 10.2 MG/DL (ref 8.6–10.4)
CHLORIDE SERPL-SCNC: 102 MMOL/L (ref 98–110)
CHOLEST SERPL-MCNC: 163 MG/DL
CHOLEST/HDLC SERPL: 3 (CALC)
CO2 SERPL-SCNC: 31 MMOL/L (ref 20–32)
CREAT SERPL-MCNC: 1.29 MG/DL (ref 0.5–0.99)
CREAT UR-MCNC: 100 MG/DL (ref 20–275)
GFRSERPLBLD MDRD-ARVRAT: 44 ML/MIN/1.73M2
GLOBULIN SER CALC-MCNC: 3 G/DL (CALC) (ref 1.9–3.7)
GLUCOSE SERPL-MCNC: 77 MG/DL (ref 65–99)
HBA1C MFR BLD: 7.3 % OF TOTAL HGB
HDLC SERPL-MCNC: 54 MG/DL
LDLC SERPL CALC-MCNC: 80 MG/DL (CALC)
MICROALBUMIN UR-MCNC: 20.2 MG/DL
NONHDLC SERPL-MCNC: 109 MG/DL (CALC)
POTASSIUM SERPL-SCNC: 4.5 MMOL/L (ref 3.5–5.3)
PROT SERPL-MCNC: 7.4 G/DL (ref 6.1–8.1)
SODIUM SERPL-SCNC: 141 MMOL/L (ref 135–146)
TRIGL SERPL-MCNC: 194 MG/DL

## 2019-09-19 ENCOUNTER — TELEPHONE (OUTPATIENT)
Dept: FAMILY MEDICINE | Facility: CLINIC | Age: 64
End: 2019-09-19

## 2019-09-19 NOTE — TELEPHONE ENCOUNTER
----- Message from John Nolan MD sent at 9/18/2019  7:10 PM CDT -----  Hemoglobin A1c is 7.3. Kidney test is stable. Lipid panel is good. Keep follow up for further discussion.

## 2019-09-19 NOTE — PROGRESS NOTES
Hemoglobin A1c is 7.3. Kidney test is stable. Lipid panel is good. Keep follow up for further discussion.

## 2019-09-23 ENCOUNTER — TELEPHONE (OUTPATIENT)
Dept: FAMILY MEDICINE | Facility: CLINIC | Age: 64
End: 2019-09-23

## 2019-09-23 ENCOUNTER — OFFICE VISIT (OUTPATIENT)
Dept: FAMILY MEDICINE | Facility: CLINIC | Age: 64
End: 2019-09-23
Payer: OTHER GOVERNMENT

## 2019-09-23 VITALS
HEART RATE: 63 BPM | HEIGHT: 66 IN | SYSTOLIC BLOOD PRESSURE: 138 MMHG | DIASTOLIC BLOOD PRESSURE: 76 MMHG | WEIGHT: 159 LBS | BODY MASS INDEX: 25.55 KG/M2

## 2019-09-23 DIAGNOSIS — N18.30 CKD STAGE 3 DUE TO TYPE 2 DIABETES MELLITUS: ICD-10-CM

## 2019-09-23 DIAGNOSIS — I10 ESSENTIAL HYPERTENSION: Primary | ICD-10-CM

## 2019-09-23 DIAGNOSIS — E11.22 CKD STAGE 3 DUE TO TYPE 2 DIABETES MELLITUS: ICD-10-CM

## 2019-09-23 DIAGNOSIS — E78.2 MIXED DYSLIPIDEMIA: ICD-10-CM

## 2019-09-23 DIAGNOSIS — Z23 NEEDS FLU SHOT: ICD-10-CM

## 2019-09-23 DIAGNOSIS — J30.1 NON-SEASONAL ALLERGIC RHINITIS DUE TO POLLEN: ICD-10-CM

## 2019-09-23 DIAGNOSIS — Z11.59 NEED FOR HEPATITIS C SCREENING TEST: ICD-10-CM

## 2019-09-23 DIAGNOSIS — G56.03 BILATERAL CARPAL TUNNEL SYNDROME: Primary | ICD-10-CM

## 2019-09-23 DIAGNOSIS — E11.42 TYPE 2 DIABETES MELLITUS WITH DIABETIC POLYNEUROPATHY, WITH LONG-TERM CURRENT USE OF INSULIN: ICD-10-CM

## 2019-09-23 DIAGNOSIS — Z23 PNEUMOCOCCAL VACCINE ADMINISTERED: ICD-10-CM

## 2019-09-23 DIAGNOSIS — Z79.4 TYPE 2 DIABETES MELLITUS WITH DIABETIC POLYNEUROPATHY, WITH LONG-TERM CURRENT USE OF INSULIN: ICD-10-CM

## 2019-09-23 PROCEDURE — 90472 IMMUNIZATION ADMIN EACH ADD: CPT | Mod: PBBFAC | Performed by: INTERNAL MEDICINE

## 2019-09-23 PROCEDURE — 99214 PR OFFICE/OUTPT VISIT, EST, LEVL IV, 30-39 MIN: ICD-10-PCS | Mod: S$PBB,,, | Performed by: INTERNAL MEDICINE

## 2019-09-23 PROCEDURE — 90471 IMMUNIZATION ADMIN: CPT | Mod: PBBFAC | Performed by: INTERNAL MEDICINE

## 2019-09-23 PROCEDURE — 99999 PR PBB SHADOW E&M-EST. PATIENT-LVL IV: CPT | Mod: PBBFAC,,, | Performed by: INTERNAL MEDICINE

## 2019-09-23 PROCEDURE — 99214 OFFICE O/P EST MOD 30 MIN: CPT | Mod: PBBFAC,25 | Performed by: INTERNAL MEDICINE

## 2019-09-23 PROCEDURE — 99214 OFFICE O/P EST MOD 30 MIN: CPT | Mod: S$PBB,,, | Performed by: INTERNAL MEDICINE

## 2019-09-23 PROCEDURE — 99999 PR PBB SHADOW E&M-EST. PATIENT-LVL IV: ICD-10-PCS | Mod: PBBFAC,,, | Performed by: INTERNAL MEDICINE

## 2019-09-23 RX ORDER — SYRINGE-NEEDLE,INSULIN,0.5 ML 28 GAUGE
1 SYRINGE, EMPTY DISPOSABLE MISCELLANEOUS DAILY
Qty: 100 EACH | Refills: 3 | Status: SHIPPED | OUTPATIENT
Start: 2019-09-23 | End: 2019-11-22 | Stop reason: SDUPTHER

## 2019-09-23 RX ORDER — INSULIN GLARGINE 100 [IU]/ML
60 INJECTION, SOLUTION SUBCUTANEOUS NIGHTLY
Qty: 18 ML | Refills: 11
Start: 2019-09-23 | End: 2019-12-23

## 2019-09-23 RX ORDER — FLUTICASONE PROPIONATE 50 MCG
2 SPRAY, SUSPENSION (ML) NASAL DAILY
Qty: 3 BOTTLE | Refills: 3 | Status: SHIPPED | OUTPATIENT
Start: 2019-09-23 | End: 2020-08-31

## 2019-09-23 NOTE — PROGRESS NOTES
Subjective:       Patient ID: Payton Castillo is a 64 y.o. female.    Chief Complaint: Diabetes (lab review ); Hypertension; Hyperlipidemia; Chronic Kidney Disease; Sinus Problem; and Gastroesophageal Reflux    This is the 2nd visit for Ms. Payton Castillo a pleasant 64 female.  Underlying medical issues include hypertension type 2 diabetes mellitus on insulin and dyslipidemia.  She also has gastroesophageal reflux.  She has history of kidney cancer partial left-sided nephrectomy.  She follows Dr. Tavarez for the same.  She also sees Dr. Tommy macedo, nephrology for chronic kidney disease stage 3.    She also follows with Dr. Goff for her pain arthritis problems. She also has diabetic nephropathy stage III as diabetic neuropathy.    I have reviewed recent labs.  Creatinine is slightly on the higher side.  GFR is 44.  Creatinine is 1.09.  Microalbumin/creatinine ratio is 202.  Triglycerides of 194.  Total cholesterol 163 which is normal.  LDL cholesterol is 80 which is normal.  Blood counts show a hemoglobin of 13.9 and hematocrit of 40.2.  This blood count was done on 11/19/2018.  Other labs are recent.    Hypertension   This is a chronic problem. The current episode started more than 1 year ago (30). The problem is controlled. Associated symptoms include malaise/fatigue. Pertinent negatives include no chest pain, headaches, palpitations or shortness of breath. Agents associated with hypertension include steroids. Risk factors for coronary artery disease include sedentary lifestyle, diabetes mellitus and dyslipidemia. Past treatments include calcium channel blockers. The current treatment provides moderate improvement. Hypertensive end-organ damage includes kidney disease. There is no history of heart failure, left ventricular hypertrophy, PVD or retinopathy. There is no history of coarctation of the aorta, hyperaldosteronism, pheochromocytoma, renovascular disease or sleep apnea.   Hyperlipidemia   This is a chronic  problem. The current episode started more than 1 year ago. The problem is controlled. She has no history of obesity. Pertinent negatives include no chest pain or shortness of breath. Current antihyperlipidemic treatment includes statins. The current treatment provides moderate improvement of lipids. Risk factors for coronary artery disease include hypertension, a sedentary lifestyle, post-menopausal, dyslipidemia and diabetes mellitus.   Gastroesophageal Reflux   She complains of heartburn. She reports no chest pain or no coughing. This is a recurrent problem. The current episode started more than 1 year ago. Pertinent negatives include no fatigue or weight loss. She has tried a PPI for the symptoms. The treatment provided moderate relief.   Diabetes   She presents for her follow-up diabetic visit. She has type 2 diabetes mellitus. Pertinent negatives for hypoglycemia include no confusion, dizziness, headaches, nervousness/anxiousness, pallor, seizures or tremors. Pertinent negatives for diabetes include no chest pain, no fatigue, no polydipsia, no polyphagia and no weight loss. Symptoms are stable. Pertinent negatives for diabetic complications include no PVD or retinopathy. Risk factors for coronary artery disease include diabetes mellitus, dyslipidemia, hypertension and post-menopausal. She is compliant with treatment some of the time. Meal planning includes avoidance of concentrated sweets. She rarely participates in exercise. Her home blood glucose trend is fluctuating minimally. An ACE inhibitor/angiotensin II receptor blocker is being taken. Eye exam is current.   Back Pain   This is a chronic problem. The current episode started more than 1 year ago. The problem has been waxing and waning since onset. The quality of the pain is described as cramping. Associated symptoms include numbness. Pertinent negatives include no chest pain, dysuria, fever, headaches, pelvic pain or weight loss. Risk factors include  menopause and sedentary lifestyle. The treatment provided moderate relief.       Past Medical History:   Diagnosis Date    Allergy     aspartame,fructose monosodium glutamate and penicillins.    Anemia of chronic disorder 11/9/2017    Anemia, chronic renal failure, stage 2 (mild) 11/9/2017    Asthma     Cancer     kidney    Cataract of right eye     CKD (chronic kidney disease), stage III     Diabetes mellitus     type II    Fibromyalgia     GERD (gastroesophageal reflux disease)     Hx of renal cell cancer - left nephrectomy Aug 2014 11/9/2017    Hypercholesteremia     Hypertension     IDDM (insulin dependent diabetes mellitus)     Lumbar spondylosis     Lumbosacral radiculopathy     Osteopenia     Palpitations     Plantar fasciitis, right     Syncope 2/2012    Tachycardia     Thoracic radiculopathy     TMJ (dislocation of temporomandibular joint)      Social History     Socioeconomic History    Marital status:      Spouse name: Owen Castillo    Number of children: 2    Years of education: Not on file    Highest education level: Not on file   Occupational History    Occupation: Part - member support [employer]     Employer: [employer]   Social Needs    Financial resource strain: Not on file    Food insecurity:     Worry: Not on file     Inability: Not on file    Transportation needs:     Medical: Not on file     Non-medical: Not on file   Tobacco Use    Smoking status: Never Smoker    Smokeless tobacco: Never Used   Substance and Sexual Activity    Alcohol use: No    Drug use: No    Sexual activity: Yes     Partners: Male   Lifestyle    Physical activity:     Days per week: Not on file     Minutes per session: Not on file    Stress: Not at all   Relationships    Social connections:     Talks on phone: Not on file     Gets together: Not on file     Attends Mandaeism service: Not on file     Active member of club or organization: Not on file     Attends meetings of clubs or  organizations: Not on file     Relationship status: Not on file   Other Topics Concern    Not on file   Social History Narrative    Not on file     Past Surgical History:   Procedure Laterality Date    CATARACT EXTRACTION Left 03/2016    EXTRACTION-CATARACT-IOL Right 3/23/2016    Performed by [facility]Bck II, [facility] at [facility]    EXTRACTION-CATARACT-IOL Left 3/9/2016    Performed by [facility] Back II, [facility] at [facility]    JOINT REPLACEMENT Right     knee    SHOULDER OPEN ROTATOR CUFF REPAIR Right 02/2005    labral repair    TOTAL KNEE ARTHROPLASTY Left 2014    TUBAL LIGATION  1990    TUMOR REMOVAL  2014    from kidney     Family History   Problem Relation Age of Onset    Stomach cancer Mother     Breast cancer Mother     Diabetes Mother     Cancer Father        Review of Systems   Constitutional: Positive for malaise/fatigue. Negative for activity change, chills, fatigue, fever, unexpected weight change and weight loss.   HENT: Negative for congestion, postnasal drip and sinus pressure.    Eyes: Negative for pain, discharge and visual disturbance.   Respiratory: Positive for apnea. Negative for cough, chest tightness and shortness of breath.         Patient does have obstructive sleep apnea and uses a CPAP device.   Cardiovascular: Negative for chest pain, palpitations and leg swelling.        Hypertension.  Chronic kidney disease stage 3.   Gastrointestinal: Positive for heartburn. Negative for abdominal distention, anal bleeding, constipation and diarrhea.   Endocrine: Negative for polydipsia and polyphagia.        Type 2 diabetes mellitus dyslipidemia   Genitourinary: Negative for difficulty urinating, dysuria, flank pain, frequency, menstrual problem, pelvic pain, vaginal bleeding and vaginal pain.        Patient has an intact uterus.  Pap smear is pending at this point.  She has chronic kidney disease stage 3 and follows Dr. Tommy macedo for the same.   Musculoskeletal: Positive for back pain.  "Negative for arthralgias and joint swelling.   Skin: Negative for color change, pallor and rash.   Allergic/Immunologic: Negative for environmental allergies, food allergies and immunocompromised state.   Neurological: Positive for numbness. Negative for dizziness, tremors, seizures, syncope, light-headedness and headaches.        Neuropathy symptoms due to diabetes.  Patient takes gabapentin 600 mg 3 times a day.   Hematological: Negative for adenopathy. Does not bruise/bleed easily.   Psychiatric/Behavioral: Negative for agitation, confusion and dysphoric mood. The patient is not nervous/anxious.          Objective:      Blood pressure 138/76, pulse 63, height 5' 6" (1.676 m), weight 72.1 kg (159 lb). Body mass index is 25.66 kg/m².  Physical Exam   Constitutional: She appears well-developed and well-nourished. She is cooperative. No distress.   HENT:   Head: Normocephalic and atraumatic.   Eyes: Pupils are equal, round, and reactive to light. Conjunctivae, EOM and lids are normal. Lids are everted and swept, no foreign bodies found. No scleral icterus. Right pupil is round and reactive. Left pupil is round and reactive.   Neck: Trachea normal and normal range of motion. Neck supple. No tracheal deviation present. No thyromegaly present.   Cardiovascular: Normal rate, regular rhythm, S1 normal, S2 normal and normal heart sounds.   Pulses:       Dorsalis pedis pulses are 1+ on the right side, and 1+ on the left side.   Pulmonary/Chest: Breath sounds normal.   Abdominal: Soft. Bowel sounds are normal. She exhibits no distension. There is no hepatosplenomegaly. There is no tenderness. There is no rigidity, no rebound and no guarding.       Saw are amy of prior surgeries are noted including scars of nephrectomy .   Musculoskeletal: Normal range of motion. She exhibits no tenderness or deformity.        Right foot: There is normal range of motion and no deformity.        Left foot: There is normal range of motion and " no deformity.   Feet:   Right Foot:   Protective Sensation: 5 sites tested. 5 sites sensed.   Skin Integrity: Negative for ulcer, blister or skin breakdown.   Left Foot:   Protective Sensation: 5 sites tested. 5 sites sensed.   Skin Integrity: Negative for ulcer, blister or skin breakdown.   Lymphadenopathy:     She has no cervical adenopathy.     She has no axillary adenopathy.   Neurological: She is alert.   Numbness in fingers- Carpel tunnel   Skin: Skin is warm and dry.   Psychiatric: She has a normal mood and affect. Her behavior is normal.   Nursing note and vitals reviewed.        Assessment:       1. Essential hypertension    2. Type 2 diabetes mellitus with diabetic polyneuropathy, with long-term current use of insulin    3. CKD stage 3 due to type 2 diabetes mellitus    4. Mixed dyslipidemia    5. Needs flu shot    6. Pneumococcal vaccine administered    7. Need for hepatitis C screening test    8. Non-seasonal allergic rhinitis due to pollen         Carpel Tunnel is still a problem  Office Visit on 08/12/2019   Component Date Value Ref Range Status    Hemoglobin A1C 09/17/2019 7.3* <5.7 % of total Hgb Final    Glucose 09/17/2019 77  65 - 99 mg/dL Final    BUN, Bld 09/17/2019 26* 7 - 25 mg/dL Final    Creatinine 09/17/2019 1.29* 0.50 - 0.99 mg/dL Final    eGFR if non African American 09/17/2019 44* > OR = 60 mL/min/1.73m2 Final    eGFR if  09/17/2019 51* > OR = 60 mL/min/1.73m2 Final    BUN/Creatinine Ratio 09/17/2019 20  6 - 22 (calc) Final    Sodium 09/17/2019 141  135 - 146 mmol/L Final    Potassium 09/17/2019 4.5  3.5 - 5.3 mmol/L Final    Chloride 09/17/2019 102  98 - 110 mmol/L Final    CO2 09/17/2019 31  20 - 32 mmol/L Final    Calcium 09/17/2019 10.2  8.6 - 10.4 mg/dL Final    Total Protein 09/17/2019 7.4  6.1 - 8.1 g/dL Final    Albumin 09/17/2019 4.4  3.6 - 5.1 g/dL Final    Globulin, Total 09/17/2019 3.0  1.9 - 3.7 g/dL (calc) Final    Albumin/Globulin Ratio  09/17/2019 1.5  1.0 - 2.5 (calc) Final    Total Bilirubin 09/17/2019 0.6  0.2 - 1.2 mg/dL Final    Alkaline Phosphatase 09/17/2019 79  33 - 130 U/L Final    AST 09/17/2019 22  10 - 35 U/L Final    ALT 09/17/2019 26  6 - 29 U/L Final    Cholesterol 09/17/2019 163  <200 mg/dL Final    HDL 09/17/2019 54  >50 mg/dL Final    Triglycerides 09/17/2019 194* <150 mg/dL Final    LDL Cholesterol 09/17/2019 80  mg/dL (calc) Final    Hdl/Cholesterol Ratio 09/17/2019 3.0  <5.0 (calc) Final    Non HDL Chol. (LDL+VLDL) 09/17/2019 109  <130 mg/dL (calc) Final    Creatinine, Random Ur 09/17/2019 100  20 - 275 mg/dL Final    Microalb, Ur 09/17/2019 20.2  See Note: mg/dL Final    Microalb Creat Ratio 09/17/2019 202* <30 mcg/mg creat Final         Plan:           Essential hypertension    Type 2 diabetes mellitus with diabetic polyneuropathy, with long-term current use of insulin  -     insulin syringe-needle U-100 1 mL 28 gauge Syrg; 1 Syringe by Misc.(Non-Drug; Combo Route) route once daily.  Dispense: 100 each; Refill: 3  -     insulin glargine (LANTUS U-100 INSULIN) 100 unit/mL injection; Inject 60 Units into the skin every evening.  Dispense: 18 mL; Refill: 11    CKD stage 3 due to type 2 diabetes mellitus    Mixed dyslipidemia    Needs flu shot  -     Influenza - Quadrivalent (PF)    Pneumococcal vaccine administered  -     Pneumococcal Conjugate Vaccine (13 Valent) (IM)    Need for hepatitis C screening test  -     Cancel: Hepatitis C antibody; Future; Expected date: 09/23/2019  -     Hepatitis C antibody; Future; Expected date: 09/23/2019    Non-seasonal allergic rhinitis due to pollen  -     fluticasone propionate (FLONASE) 50 mcg/actuation nasal spray; 2 sprays (100 mcg total) by Each Nostril route once daily.  Dispense: 3 Bottle; Refill: 3    Advised Ms. Castillo to monitor Blood sugars at home and record them.  Exercise, watch diet and loose weight.  keep a close eye on feet and keep them clean. Annual eye  examination. Annual influenza vaccine.  Monitor HgbA1c every 3 to 6 months. Monitor urine microalbumin every year.keep LDL less than 100. Monitor blood pressure and target blood pressure 120/70.        Today she will be updated on the flu shot and the 1st of the pneumonia vaccines.  I have also requested a hepatitis C screening which is sent to the [facility] and she will do when she is due for the next labs.    She also takes meloxicam and she should check on this issue with her nephrologist also.    She does have carpal tunnel and I will make a referral to hand specialist.  She will check with the insurance coverage on the doctor that I made a referral to.  Follow-up in 3 months time.  Spent jhonatan 25 minutes with patient which involved review of pts medical conditions, labs, medications and with 50% of time face-to-face discussion about medical problems, management and any applicable changes.    Current Outpatient Medications:     amLODIPine (NORVASC) 2.5 MG tablet, Take 1 tablet by mouth once daily., Disp: , Rfl:     atorvastatin (LIPITOR) 40 MG tablet, 1 tablet once daily., Disp: , Rfl:     cetirizine (ZYRTEC) 10 MG tablet, Take 10 mg by mouth., Disp: , Rfl:     diphenhydramine-acetaminophen (TYLENOL PM)  mg Tab, Take 1 tablet by mouth nightly as needed., Disp: , Rfl:     esomeprazole (NEXIUM) 40 MG capsule, Take 40 mg by mouth., Disp: , Rfl:     ferrous gluconate (FERGON) 240 (27 FE) MG tablet, Take 480 mg by mouth 3 (three) times daily., Disp: , Rfl:     flash glucose sensor (FREESTYLE BANG 14 DAY SENSOR MISC), by Misc.(Non-Drug; Combo Route) route., Disp: , Rfl:     FREESTYLE LANCETS MISC, by Misc.(Non-Drug; Combo Route) route 3 (three) times daily., Disp: , Rfl:     gabapentin (NEURONTIN) 600 MG tablet, Take 1 tablet (600 mg total) by mouth 3 (three) times daily., Disp: 270 tablet, Rfl: 2    INSULIN NPL/INSULIN LISPRO (HUMALOG MIX 75-25 KWIKPEN SUBQ), Inject 20 Units into the skin 2  (two) times daily before meals. Breakfast and dinner, Disp: , Rfl:     insulin syringe-needle U-100 0.3 mL 30 Syrg, by Misc.(Non-Drug; Combo Route) route 3 (three) times daily., Disp: , Rfl:     losartan (COZAAR) 50 MG tablet, Take 1 tablet (50 mg total) by mouth once daily., Disp: 90 tablet, Rfl: 2    meloxicam (MOBIC) 15 MG tablet, 1 tablet once daily., Disp: , Rfl:     metoprolol succinate (TOPROL XL) 50 MG 24 hr tablet, TAKE 1 TABLET IN THE MORNING AND 2 TABLETS IN THE EVENING, Disp: , Rfl:     potassium 99 mg Tab, Take 1 tablet by mouth., Disp: , Rfl:     solifenacin (VESICARE) 5 MG tablet, Take 5 mg by mouth., Disp: , Rfl:     fluticasone propionate (FLONASE) 50 mcg/actuation nasal spray, 2 sprays (100 mcg total) by Each Nostril route once daily., Disp: 3 Bottle, Rfl: 3    insulin glargine (LANTUS U-100 INSULIN) 100 unit/mL injection, Inject 60 Units into the skin every evening., Disp: 18 mL, Rfl: 11    insulin syringe-needle U-100 1 mL 28 gauge Syrg, 1 Syringe by Misc.(Non-Drug; Combo Route) route once daily., Disp: 100 each, Rfl: 3

## 2019-09-23 NOTE — PATIENT INSTRUCTIONS
Allergic Rhinitis  Allergic rhinitis is an allergic reaction that affects the nose, and often the eyes. Its often known as nasal allergies. Nasal allergies are often due to things in the environment that are breathed in. Depending what you are sensitive to, nasal allergies may occur only during certain seasons. Or they may occur year round. Common indoor allergens include house dust mites, mold, cockroaches, and pet dander. Outdoor allergens include pollen from trees, grasses, and weeds.   Symptoms include a drippy, stuffy, and itchy nose. They also include sneezing and red and itchy eyes. You may feel tired more often. Severe allergies may also affect your breathing and trigger a condition called asthma.   Tests can be done to see what allergens are affecting you. You may be referred to an allergy specialist for testing and further evaluation.  Home care  Your healthcare provider may prescribe medicines to help relieve allergy symptoms. These may include oral medicines, nasal sprays, or eye drops.  Ask your provider for advice on how to avoid substances that you are allergic to. Below are a few tips for each type of allergen.  Pet dander:  · Do not have pets with fur and feathers.  · If you can't avoid having a pet, keep it out of your bedroom and off upholstered furniture.  Pollen:  · When pollen counts are high, keep windows of your car and home closed. If possible, use an air conditioner instead.  · Wear a filter mask when mowing or doing yard work.  House dust mites:  · Wash bedding every week in warm water and detergent and dry on a hot setting.  · Cover the mattress, box spring, and pillows with allergy covers.   · If possible, sleep in a room with no carpet, curtains, or upholstered furniture.  Cockroaches:  · Store food in sealed containers.  · Remove garbage from the home promptly.  · Fix water leaks  Mold:  · Keep humidity low by using a dehumidifier or air conditioner. Keep the dehumidifier and air  conditioner clean and free of mold.  · Clean moldy areas with bleach and water.  In general:  · Vacuum once or twice a week. If possible, use a vacuum with a high-efficiency particulate air (HEPA) filter.  · Do not smoke. Avoid cigarette smoke. Cigarette smoke is an irritant that can make symptoms worse.  Follow-up care  Follow up as advised by the healthcare provider or our staff. If you were referred to an allergy specialist, make this appointment promptly.  When to seek medical advice  Call your healthcare provider right away if the following occur:  · Coughing or wheezing  · Fever greater than 100.4°F (38°C)  · Hives (raised red bumps)  · Continuing symptoms, new symptoms, or worsening symptoms  Call 911 right away if you have:  · Trouble breathing  · Severe swelling of the face or severe itching of the eyes or mouth  Date Last Reviewed: 3/1/2017  © 0097-8291 [employer]. [address] [place] [address]. All rights reserved. This information is not intended as a substitute for professional medical care. Always follow your healthcare professional's instructions.        Diabetes: Activity Tips    Being more active can help you manage your diabetes. The tips on this sheet can help you get the most from your exercise. They can also help you stay safe.  Staying Active  Its important for adults to spend less time sitting and being inactive. This is especially true if you have type 2 diabetes. When you are sitting for long periods of time, get up for short sessions of light activity every 30 minutes.  You should aim for at least 150 minutes a week of exercise or physical activity. Dont let more than 2 days go by without being active.  Benefit from briskness  Brisk activity gets your heart beating faster. This can help you increase your fitness, lose extra weight, and manage your blood sugar level. Try brisk walking. Or, if you have foot or leg problems, you can try swimming or bike riding. You  can break up your exercise into chunks throughout the day. Work up to at least 30 minutes of steady, brisk exercise on most days.  Warm up and cool down  Warming up and cooling down reduce your risk of injury. They also help limit muscle soreness. Do a mild version of your activity for 5 minutes before and after your routine. You can also learn stretches that will help keep your muscles loose. Your healthcare provider may show you good ways to warm up and stretch.  Do the talk-sing test  The talk-sing test is a simple way to tell how hard youre exercising. If you can talk while exercising, youre in a safe range. If youre out of breath, slow down. If you can carry a tune, its time to  the pace. Walk up a hill. Increase the resistance on your stationary bike. Or swim faster.  What about eating?  You may be told to plan your exercise for 1 to 2 hours after a meal. In most cases, you dont need to eat while being active. If you take insulin or medicine that can cause low blood sugar, test your blood sugar before exercising. And carry a fast-acting sugar that will raise your blood sugar level quickly. This includes glucose tablets or hard candy. Use it if you feel low blood sugar symptoms.  Safety tips  These tips can help you stay safe as you become fit:  · Exercise with a friend or carry a cell phone if you have one.  · Carry or wear identification, such as a necklace or bracelet, that says you have diabetes.  · Use the proper footwear and safety equipment for your activity.  · Drink water before, during, and after exercise.  · Dress properly for the weather.  · Dont exercise in very hot or very cold weather.  · Dont exercise if you are sick.  · If you are instructed to do so, test your blood sugar before and after you exercise. Have a small carbohydrate snack if your blood sugar is low before you start exercising.   When to stop exercising and call your healthcare provider  Stop exercising and call your  healthcare provider right away if you notice any of the following:  · Pain, pressure, tightness, or heaviness in the chest  · Pain or heaviness in the neck, shoulders, back, arms, legs, or feet  · Unusual shortness of breath  · Dizziness or lightheadedness  · Unusually rapid or slow pulse  · Increased joint or muscle pain  · Nausea or vomiting  Date Last Reviewed: 5/1/2016  © 9367-8606 [employer]. [address] [place] [address], [place] [address]. All rights reserved. This information is not intended as a substitute for professional medical care. Always follow your healthcare professional's instructions.

## 2019-09-24 LAB
HCV AB S/CO SERPL IA: 0.11
HCV AB SERPL QL IA: NORMAL

## 2019-09-25 ENCOUNTER — TELEPHONE (OUTPATIENT)
Dept: FAMILY MEDICINE | Facility: CLINIC | Age: 64
End: 2019-09-25

## 2019-09-25 NOTE — TELEPHONE ENCOUNTER
----- Message from John Nolan MD sent at 9/24/2019  4:45 PM CDT -----  Notify patient that her hepatitis C screening was negative.

## 2019-10-17 ENCOUNTER — TELEPHONE (OUTPATIENT)
Dept: FAMILY MEDICINE | Facility: CLINIC | Age: 64
End: 2019-10-17

## 2019-10-17 DIAGNOSIS — Z85.528 HISTORY OF KIDNEY CANCER: Primary | ICD-10-CM

## 2019-10-17 NOTE — TELEPHONE ENCOUNTER
Pt has kidney cancer needs fup   Pended  Please sign     Referral has been written for Joe     Please fax to his office.

## 2019-11-13 ENCOUNTER — OFFICE VISIT (OUTPATIENT)
Dept: FAMILY MEDICINE | Facility: CLINIC | Age: 64
End: 2019-11-13
Payer: OTHER GOVERNMENT

## 2019-11-13 VITALS
OXYGEN SATURATION: 96 % | SYSTOLIC BLOOD PRESSURE: 120 MMHG | RESPIRATION RATE: 17 BRPM | HEART RATE: 63 BPM | WEIGHT: 163 LBS | BODY MASS INDEX: 26.2 KG/M2 | TEMPERATURE: 98 F | HEIGHT: 66 IN | DIASTOLIC BLOOD PRESSURE: 70 MMHG

## 2019-11-13 DIAGNOSIS — Z01.419 WELL WOMAN EXAM WITH ROUTINE GYNECOLOGICAL EXAM: Primary | ICD-10-CM

## 2019-11-13 PROCEDURE — 99396 PREV VISIT EST AGE 40-64: CPT | Mod: S$PBB,,, | Performed by: NURSE PRACTITIONER

## 2019-11-13 PROCEDURE — 99214 OFFICE O/P EST MOD 30 MIN: CPT | Performed by: NURSE PRACTITIONER

## 2019-11-13 PROCEDURE — 99396 PR PREVENTIVE VISIT,EST,40-64: ICD-10-PCS | Mod: S$PBB,,, | Performed by: NURSE PRACTITIONER

## 2019-11-13 RX ORDER — METHYLPREDNISOLONE 4 MG/1
TABLET ORAL
COMMUNITY
Start: 2019-11-08 | End: 2019-12-23

## 2019-11-13 RX ORDER — HYDROCODONE BITARTRATE AND ACETAMINOPHEN 5; 325 MG/1; MG/1
TABLET ORAL
Refills: 0 | COMMUNITY
Start: 2019-10-28 | End: 2019-12-23

## 2019-11-13 NOTE — PATIENT INSTRUCTIONS
The Range of Pap Test Results  When your Pap test is sent to the lab, the lab studies your cell samples and reports any abnormal cell changes. Your health care provider can discuss these changes with you. In some cases, an abnormal Pap test is due to an infection. More serious cell changes range from dysplasia to cancer. Talk to your health care provider about your Pap test.    Normal results  Cervical cells, even normal ones, are always changing. As they mature, normal squamous cells move from deeper layers within the cervix. Over time, these cells flatten and cover the surface of the cervix. Within the cervical canal, the cells are different. These glandular cells are taller and not as flat as the cells on the surface of the cervix. When a Pap test sample shows healthy cells of both types, the results are negative. Keep having Pap tests as often as directed.  Abnormal results  A positive Pap test result means some cells in the sample showed abnormal changes. These results are grouped by the type of cell change and the location, or extent, of the changes. Depending on the results, you may need further testing.  · Inflammation: Noncancerous changes are present. They may be due to normal cell repair. Or, they may be caused by an infection, such as HPV or yeast. Further testing may be needed. (Also called reactive cellular changes.)  · Atypical squamous cells: Test results are unclear. Cells on the surface of the cervix show changes, but their significance is not yet known. Testing for HPV and other sexually transmitted infections (STIs) may be needed. Treatment may be required. (Reported as ASC-US or ASC-H.)  · Atypical glandular cells: Cells lining the cervical canal show abnormal changes. Further testing is likely. You may also have treatment to destroy or remove problem cells. (Reported as AGC.)  · Mild dysplasia: Cells show distinct changes. More testing or HPV typing may be done. You may also have treatment to  destroy or remove problem cells. (Reported as low-grade MAGDALENA or WILLIAMS 1.)  · Moderate to severe dysplasia: Cells show precancerous changes. Or, noninvasive cancer (carcinoma in situ) may be present. Treatment to destroy or remove problem cells is likely. (Reported as high-grade MAGDALENA or WILLIAMS 2 or WILLIAMS 3.)  · Cancer: Different types of cancer may be detected by your Pap test. More tests to assess the cancer's extent are likely. The type of treatment will depend on the test results and other factors, such as age and health history. (Reported as squamous cell carcinoma, endocervical adenocarcinoma in situ, or adenocarcinoma.)  Date Last Reviewed: 5/12/2015  © 7093-8258 [employer]. [address] [place] [address], [place] [address]. All rights reserved. This information is not intended as a substitute for professional medical care. Always follow your healthcare professional's instructions.        Clinical Breast Exam    Many health organizations recommend a yearly clinical breast exam. This exam may be done by a gynecologist, family healthcare provider, nurse practitioner, or specially trained nurse. Yearly breast exams help to make sure that breast conditions are found early.  Your healthcare providers role  A healthcare professional knows the tests and follow-up care needed if a problem is found. Your clinical exam is also a great time to ask questions about breast self-exams. You can find out if youre checking your breasts in the best way. Or you may want to ask how pregnancy, breast implants, or breast reduction surgery affect the way you should check your breasts.  Diagnostic tests  If a clinical exam reveals a breast change, you may have other tests to find out more. These tests may include:  · Mammography. A low-dose X-ray of your breast tissue.  · Ultrasound. An imaging test that uses sound waves to create images of your breast.  · Biopsy. A small amount of breast tissue is removed by needle or by a cut  (incision). The tissue is then checked under a microscope.  Guidelines for having clinical breast exams  The American College of Obstetricians and Gynecologists recommends that starting at age 29, you should have a clinical breast exam every 1 to 3 years. After age 40, have a clinical breast exam each year. If youre at higher risk for breast cancer, you may need exams more often. Risk factors for breast cancer may include:  · Being over 50 or postmenopausal  · Having a family history of breast cancer  · Having the BRCA1 or BRCA2 gene mutation or certain other gene mutations  · Having more menstrual periods due to starting menstruation early (before age 12) or having a late menopause (after age 55)  · Having no pregnancies  · Having a first pregnancy after age 30  · Being obese  · Having a history of radiation treatment to your chest area  · Exposure to GRICEL during your mother's pregnancy  · Not being active  · Drinking too much alcohol  · Having dense breast tissue  · Taking hormone therapy after menopause  Other health organizations have different recommendations. Talk to your healthcare provider about what is best for you.   Date Last Reviewed: 8/13/2015  © 2954-9463 [employer]. [address] [place] [address]. All rights reserved. This information is not intended as a substitute for professional medical care. Always follow your healthcare professional's instructions.

## 2019-11-13 NOTE — PROGRESS NOTES
SUBJECTIVE:      Patient ID: Payton Castillo is a 64 y.o. female.    Chief Complaint: Gynecologic Exam    Established patient of Dr. Nolan here for screening pap and gyn exam. Her last pap was a little over 10 years ago- denies any hx of abnormal pap smears. She is currently sexually active with one male partner (her ). She has some reports of vaginal dryness but denies any other  complaints. She does use lubrication which helps to reduce dryness during sex. She has no concerns about STD infections. Her mammogram is due- she had one last year at [facility] which was normal. She does have a family history of breast cancer (mother). She denies doing SBE at home.       Family History   Problem Relation Age of Onset    Stomach cancer Mother     Breast cancer Mother     Diabetes Mother     Cancer Father       Social History     Socioeconomic History    Marital status:      Spouse name: Owen Castillo    Number of children: 2    Years of education: Not on file    Highest education level: Not on file   Occupational History    Occupation: Part - member support [employer]     Employer: [employer]   Social Needs    Financial resource strain: Not on file    Food insecurity:     Worry: Not on file     Inability: Not on file    Transportation needs:     Medical: Not on file     Non-medical: Not on file   Tobacco Use    Smoking status: Never Smoker    Smokeless tobacco: Never Used   Substance and Sexual Activity    Alcohol use: No    Drug use: No    Sexual activity: Yes     Partners: Male   Lifestyle    Physical activity:     Days per week: Not on file     Minutes per session: Not on file    Stress: Not at all   Relationships    Social connections:     Talks on phone: Not on file     Gets together: Not on file     Attends Jainism service: Not on file     Active member of club or organization: Not on file     Attends meetings of clubs or organizations: Not on file     Relationship status: Not on file    Other Topics Concern    Not on file   Social History Narrative    Not on file     Current Outpatient Medications   Medication Sig Dispense Refill    amLODIPine (NORVASC) 2.5 MG tablet Take 1 tablet by mouth once daily.      atorvastatin (LIPITOR) 40 MG tablet 1 tablet once daily.      cetirizine (ZYRTEC) 10 MG tablet Take 10 mg by mouth.      diphenhydramine-acetaminophen (TYLENOL PM)  mg Tab Take 1 tablet by mouth nightly as needed.      esomeprazole (NEXIUM) 40 MG capsule Take 40 mg by mouth.      ferrous gluconate (FERGON) 240 (27 FE) MG tablet Take 480 mg by mouth 3 (three) times daily.      flash glucose sensor (FREESTYLE BANG 14 DAY SENSOR) Kit 1 Device by Misc.(Non-Drug; Combo Route) route every 14 (fourteen) days. 6 kit 2    fluticasone propionate (FLONASE) 50 mcg/actuation nasal spray 2 sprays (100 mcg total) by Each Nostril route once daily. 3 Bottle 3    FREESTYLE LANCETS MISC by Misc.(Non-Drug; Combo Route) route 3 (three) times daily.      gabapentin (NEURONTIN) 600 MG tablet Take 1 tablet (600 mg total) by mouth 3 (three) times daily. 270 tablet 2    HYDROcodone-acetaminophen (NORCO) 5-325 mg per tablet TK 1 T PO Q 4 TO 6 H PRN P  0    insulin glargine (LANTUS U-100 INSULIN) 100 unit/mL injection Inject 60 Units into the skin every evening. 18 mL 11    INSULIN NPL/INSULIN LISPRO (HUMALOG MIX 75-25 KWIKPEN SUBQ) Inject 20 Units into the skin 3 (three) times daily with meals. Breakfast and dinner      insulin syringe-needle U-100 0.3 mL 30 Syrg by Misc.(Non-Drug; Combo Route) route 3 (three) times daily.      insulin syringe-needle U-100 1 mL 28 gauge Syrg 1 Syringe by Misc.(Non-Drug; Combo Route) route once daily. 100 each 3    losartan (COZAAR) 50 MG tablet Take 1 tablet (50 mg total) by mouth once daily. 90 tablet 2    methylPREDNISolone (MEDROL DOSEPACK) 4 mg tablet       metoprolol succinate (TOPROL XL) 50 MG 24 hr tablet TAKE 1 TABLET IN THE MORNING AND 2 TABLETS IN THE  EVENING      potassium 99 mg Tab Take 1 tablet by mouth.      solifenacin (VESICARE) 5 MG tablet Take 5 mg by mouth.      meloxicam (MOBIC) 15 MG tablet 1 tablet once daily.       No current facility-administered medications for this visit.      Review of patient's allergies indicates:   Allergen Reactions    Aspartame      headache      Fructose      headache      Monosodium glutamate Other (See Comments)     Headache      Penicillins Itching and Rash     Rash      Past Medical History:   Diagnosis Date    Allergy     aspartame,fructose monosodium glutamate and penicillins.    Anemia of chronic disorder 11/9/2017    Anemia, chronic renal failure, stage 2 (mild) 11/9/2017    Asthma     Cancer     kidney    Cataract of right eye     CKD (chronic kidney disease), stage III     Diabetes mellitus     type II    Fibromyalgia     GERD (gastroesophageal reflux disease)     Hx of renal cell cancer - left nephrectomy Aug 2014 11/9/2017    Hypercholesteremia     Hypertension     IDDM (insulin dependent diabetes mellitus)     Lumbar spondylosis     Lumbosacral radiculopathy     Osteopenia     Palpitations     Plantar fasciitis, right     Syncope 2/2012    Tachycardia     Thoracic radiculopathy     TMJ (dislocation of temporomandibular joint)      Past Surgical History:   Procedure Laterality Date    CATARACT EXTRACTION Left 03/2016    JOINT REPLACEMENT Right     knee    SHOULDER OPEN ROTATOR CUFF REPAIR Right 02/2005    labral repair    TOTAL KNEE ARTHROPLASTY Left 2014    TUBAL LIGATION  1990    TUMOR REMOVAL  2014    from kidney       Review of Systems   Constitutional: Negative for chills, fever and unexpected weight change.   HENT: Negative for congestion and sore throat.    Respiratory: Negative for shortness of breath.    Cardiovascular: Negative for chest pain.   Gastrointestinal: Negative for abdominal pain, diarrhea, nausea and vomiting.   Genitourinary: Negative for difficulty  "urinating, dysuria, frequency, genital sores, hematuria, pelvic pain, urgency, vaginal bleeding and vaginal discharge.   Skin: Negative for rash.   Hematological: Negative for adenopathy.      OBJECTIVE:      Vitals:    11/13/19 0759   BP: 120/70   Pulse: 63   Resp: 17   Temp: 98.4 °F (36.9 °C)   SpO2: 96%   Weight: 73.9 kg (163 lb)   Height: 5' 6" (1.676 m)     Physical Exam   Constitutional: She appears well-developed and well-nourished. No distress.   HENT:   Head: Normocephalic.   Mouth/Throat: Oropharynx is clear and moist.   Eyes: Pupils are equal, round, and reactive to light.   Neck: Neck supple.   Cardiovascular: Normal rate, regular rhythm and normal heart sounds.   No murmur heard.  Pulmonary/Chest: Effort normal and breath sounds normal. She has no wheezes. She has no rales.   Abdominal: Soft. Bowel sounds are normal. She exhibits no distension. There is no tenderness.   Genitourinary: Vagina normal and uterus normal. Pelvic exam was performed with patient supine. There is no rash, tenderness, lesion or injury on the right labia. There is no tenderness, lesion or injury on the left labia. Uterus is not enlarged and not tender. Cervix exhibits no motion tenderness, no discharge and no friability. Right adnexum displays no mass and no tenderness. Left adnexum displays no mass and no tenderness. No erythema, tenderness or bleeding in the vagina. No vaginal discharge found.   Genitourinary Comments: NISHA Carnes present for exam   Mild vaginal atrophy present    Lymphadenopathy:     She has no cervical adenopathy.   Skin: Skin is warm and dry. No rash noted.   Psychiatric: She has a normal mood and affect. Her behavior is normal.   Nursing note and vitals reviewed.     Assessment:       1. Well woman exam with routine gynecological exam        Plan:       Well woman exam with routine gynecological exam  -     Pap Test - Chlamydia/Gonococcus/Trichomonas, VITOR & HPV    *will schedule her mammogram today and get " previous results for the chart   *discussed monthly SBE at home and how to perform    Follow up if symptoms worsen or fail to improve.      11/13/2019 IGOR Rivera, FNP

## 2019-11-19 ENCOUNTER — TELEPHONE (OUTPATIENT)
Dept: FAMILY MEDICINE | Facility: CLINIC | Age: 64
End: 2019-11-19

## 2019-11-19 DIAGNOSIS — Z85.528 HISTORY OF KIDNEY CANCER: Primary | ICD-10-CM

## 2019-11-19 LAB
C TRACH RRNA CVX QL NAA+PROBE: NEGATIVE
CYTOLOGIST CVX/VAG CYTO: NORMAL
CYTOLOGY CVX/VAG DOC CYTO: NORMAL
DX ICD CODE: NORMAL
HPV I/H RISK 1 DNA CVX QL PROBE+SIG AMP: NEGATIVE
Lab: NORMAL
N GONORRHOEA RRNA CVX QL NAA+PROBE: NEGATIVE
OTHER STN SPEC: NORMAL
STAT OF ADQ CVX/VAG CYTO-IMP: NORMAL
T VAGINALIS RRNA SPEC QL NAA+PROBE: NEGATIVE

## 2019-11-20 DIAGNOSIS — Z79.4 TYPE 2 DIABETES MELLITUS WITH DIABETIC POLYNEUROPATHY, WITH LONG-TERM CURRENT USE OF INSULIN: ICD-10-CM

## 2019-11-20 DIAGNOSIS — E11.42 TYPE 2 DIABETES MELLITUS WITH DIABETIC POLYNEUROPATHY, WITH LONG-TERM CURRENT USE OF INSULIN: ICD-10-CM

## 2019-11-20 NOTE — TELEPHONE ENCOUNTER
Pt notified of results    ----- Message from TAMMY Weir sent at 11/20/2019  8:13 AM CST -----  Please notify patient that results are normal of her pap smear

## 2019-11-22 ENCOUNTER — TELEPHONE (OUTPATIENT)
Dept: HEMATOLOGY/ONCOLOGY | Facility: CLINIC | Age: 64
End: 2019-11-22

## 2019-11-22 DIAGNOSIS — D63.8 ANEMIA OF CHRONIC DISORDER: ICD-10-CM

## 2019-11-22 DIAGNOSIS — Z79.4 TYPE 2 DIABETES MELLITUS WITH DIABETIC POLYNEUROPATHY, WITH LONG-TERM CURRENT USE OF INSULIN: ICD-10-CM

## 2019-11-22 DIAGNOSIS — D63.1 ANEMIA, CHRONIC RENAL FAILURE, STAGE 2 (MILD): ICD-10-CM

## 2019-11-22 DIAGNOSIS — E11.42 TYPE 2 DIABETES MELLITUS WITH DIABETIC POLYNEUROPATHY, WITH LONG-TERM CURRENT USE OF INSULIN: ICD-10-CM

## 2019-11-22 DIAGNOSIS — N18.2 ANEMIA, CHRONIC RENAL FAILURE, STAGE 2 (MILD): ICD-10-CM

## 2019-11-22 DIAGNOSIS — Z85.528 HX OF RENAL CELL CANCER: Primary | ICD-10-CM

## 2019-11-22 RX ORDER — INSULIN GLARGINE 100 [IU]/ML
60 INJECTION, SOLUTION SUBCUTANEOUS NIGHTLY
Qty: 54 ML | Refills: 3 | OUTPATIENT
Start: 2019-11-22 | End: 2020-11-21

## 2019-11-22 RX ORDER — ESOMEPRAZOLE MAGNESIUM 40 MG/1
40 CAPSULE, DELAYED RELEASE ORAL DAILY
Qty: 90 CAPSULE | Refills: 1 | Status: SHIPPED | OUTPATIENT
Start: 2019-11-22 | End: 2020-09-24

## 2019-11-22 RX ORDER — METOPROLOL SUCCINATE 50 MG/1
50 TABLET, EXTENDED RELEASE ORAL DAILY
Qty: 90 TABLET | Refills: 1 | Status: SHIPPED | OUTPATIENT
Start: 2019-11-22 | End: 2020-05-20

## 2019-11-22 RX ORDER — SYRINGE-NEEDLE,INSULIN,0.5 ML 28 GAUGE
1 SYRINGE, EMPTY DISPOSABLE MISCELLANEOUS DAILY
Qty: 100 EACH | Refills: 3 | Status: SHIPPED | OUTPATIENT
Start: 2019-11-22 | End: 2021-01-26 | Stop reason: SDUPTHER

## 2019-11-22 RX ORDER — ATORVASTATIN CALCIUM 40 MG/1
40 TABLET, FILM COATED ORAL NIGHTLY
Qty: 90 TABLET | Refills: 1 | Status: SHIPPED | OUTPATIENT
Start: 2019-11-22 | End: 2020-12-09

## 2019-11-22 NOTE — PROGRESS NOTES
Insulin syringe needles sent.  Discussed with patient about combination Humalog 75/25 and long-acting insulin.

## 2019-11-23 LAB
ALBUMIN SERPL-MCNC: 3.8 G/DL (ref 3.6–5.1)
ALBUMIN/GLOB SERPL: 1.2 (CALC) (ref 1–2.5)
ALP SERPL-CCNC: 77 U/L (ref 33–130)
ALT SERPL-CCNC: 17 U/L (ref 6–29)
AST SERPL-CCNC: 14 U/L (ref 10–35)
BASOPHILS # BLD AUTO: 49 CELLS/UL (ref 0–200)
BASOPHILS NFR BLD AUTO: 0.6 %
BILIRUB SERPL-MCNC: 0.5 MG/DL (ref 0.2–1.2)
BUN SERPL-MCNC: 27 MG/DL (ref 7–25)
BUN/CREAT SERPL: 22 (CALC) (ref 6–22)
CALCIUM SERPL-MCNC: 9.8 MG/DL (ref 8.6–10.4)
CHLORIDE SERPL-SCNC: 105 MMOL/L (ref 98–110)
CO2 SERPL-SCNC: 30 MMOL/L (ref 20–32)
CREAT SERPL-MCNC: 1.25 MG/DL (ref 0.5–0.99)
EOSINOPHIL # BLD AUTO: 238 CELLS/UL (ref 15–500)
EOSINOPHIL NFR BLD AUTO: 2.9 %
ERYTHROCYTE [DISTWIDTH] IN BLOOD BY AUTOMATED COUNT: 12.7 % (ref 11–15)
GFRSERPLBLD MDRD-ARVRAT: 45 ML/MIN/1.73M2
GLOBULIN SER CALC-MCNC: 3.2 G/DL (CALC) (ref 1.9–3.7)
GLUCOSE SERPL-MCNC: 85 MG/DL (ref 65–99)
HCT VFR BLD AUTO: 39.3 % (ref 35–45)
HGB BLD-MCNC: 12.8 G/DL (ref 11.7–15.5)
LYMPHOCYTES # BLD AUTO: 3567 CELLS/UL (ref 850–3900)
LYMPHOCYTES NFR BLD AUTO: 43.5 %
MCH RBC QN AUTO: 29.8 PG (ref 27–33)
MCHC RBC AUTO-ENTMCNC: 32.6 G/DL (ref 32–36)
MCV RBC AUTO: 91.6 FL (ref 80–100)
MONOCYTES # BLD AUTO: 582 CELLS/UL (ref 200–950)
MONOCYTES NFR BLD AUTO: 7.1 %
NEUTROPHILS # BLD AUTO: 3764 CELLS/UL (ref 1500–7800)
NEUTROPHILS NFR BLD AUTO: 45.9 %
PLATELET # BLD AUTO: 338 THOUSAND/UL (ref 140–400)
PMV BLD REES-ECKER: 9 FL (ref 7.5–12.5)
POTASSIUM SERPL-SCNC: 5 MMOL/L (ref 3.5–5.3)
PROT SERPL-MCNC: 7 G/DL (ref 6.1–8.1)
RBC # BLD AUTO: 4.29 MILLION/UL (ref 3.8–5.1)
SODIUM SERPL-SCNC: 142 MMOL/L (ref 135–146)
WBC # BLD AUTO: 8.2 THOUSAND/UL (ref 3.8–10.8)

## 2019-11-25 ENCOUNTER — TELEPHONE (OUTPATIENT)
Dept: FAMILY MEDICINE | Facility: CLINIC | Age: 64
End: 2019-11-25

## 2019-11-25 PROBLEM — N18.30 ANEMIA, CHRONIC RENAL FAILURE, STAGE 3 (MODERATE): Status: ACTIVE | Noted: 2017-11-09

## 2019-11-25 NOTE — TELEPHONE ENCOUNTER
Pt returned call from speaking with you on Friday r/t conversation about why she is on two long acting insulins. Pt stated that she had went to ER awhile back and her BS was 400 and they had d/c'd her glipizide and started her on the lantus.

## 2019-11-25 NOTE — PROGRESS NOTES
[facility] Hematology/Oncology  PROGRESS NOTE      Subjective:       Patient ID:   NAME: Payton Castillo : 1955     64 y.o. female    Referring Doc: An (new PCP)  Other Physicians: Corona Haro; Mahesh (Hand Surg); [place]    Chief Complaint:  Anemia/renal ca f/u    History of Present Illness:     Patient returns today for a regularly scheduled follow-up visit.  She is here by herself. She is doing ok. She has chronic aches with sciatica. She denies any CP, SOB, HA's or N/V. She did not end up having right shoulder surgery; she has chronic shoulder issues;  She is followed by Dr Faye and Dr Aragon [facility]        ROS:   GEN: normal without any fever, night sweats or weight loss  HEENT: normal with no HA's, sore throat, stiff neck, changes in vision  CV: normal with no CP, SOB, PND, PALMA or orthopnea  PULM: normal with no SOB, cough, hemoptysis, sputum or pleuritic pain  GI: normal with no abdominal pain, nausea, vomiting, constipation, diarrhea, melanotic stools, BRBPR, or hematemesis  : normal with no hematuria, dysuria  BREAST: normal with no mass, discharge, pain  SKIN: normal with no rash, erythema, bruising, or swelling    Allergies:  Review of patient's allergies indicates:   Allergen Reactions    Aspartame Other (See Comments)     Headache      Fructose Other (See Comments)     headache    Monosodium glutamate Other (See Comments)     Headache      Pcn [penicillins]      Rash         Medications:    Current Outpatient Medications:     amLODIPine (NORVASC) 2.5 MG tablet, Take 1 tablet by mouth once daily., Disp: , Rfl:     atorvastatin (LIPITOR) 40 MG tablet, Take 1 tablet (40 mg total) by mouth every evening., Disp: 90 tablet, Rfl: 1    cetirizine (ZYRTEC) 10 MG tablet, Take 10 mg by mouth., Disp: , Rfl:     diphenhydramine-acetaminophen (TYLENOL PM)  mg Tab, Take 1 tablet by mouth nightly as needed., Disp: , Rfl:     esomeprazole (NEXIUM) 40 MG capsule, Take 1 capsule  (40 mg total) by mouth once daily., Disp: 90 capsule, Rfl: 1    ferrous gluconate (FERGON) 240 (27 FE) MG tablet, Take 480 mg by mouth 3 (three) times daily., Disp: , Rfl:     flash glucose sensor (FREESTYLE BANG 14 DAY SENSOR) Kit, 1 Device by Misc.(Non-Drug; Combo Route) route every 14 (fourteen) days., Disp: 6 kit, Rfl: 2    fluticasone propionate (FLONASE) 50 mcg/actuation nasal spray, 2 sprays (100 mcg total) by Each Nostril route once daily., Disp: 3 Bottle, Rfl: 3    FREESTYLE LANCETS MISC, by Misc.(Non-Drug; Combo Route) route 3 (three) times daily., Disp: , Rfl:     gabapentin (NEURONTIN) 600 MG tablet, Take 1 tablet (600 mg total) by mouth 3 (three) times daily., Disp: 270 tablet, Rfl: 2    HYDROcodone-acetaminophen (NORCO) 5-325 mg per tablet, TK 1 T PO Q 4 TO 6 H PRN P, Disp: , Rfl: 0    insulin glargine (LANTUS U-100 INSULIN) 100 unit/mL injection, Inject 60 Units into the skin every evening., Disp: 18 mL, Rfl: 11    INSULIN NPL/INSULIN LISPRO (HUMALOG MIX 75-25 KWIKPEN SUBQ), Inject 20 Units into the skin 3 (three) times daily with meals. Breakfast and dinner, Disp: , Rfl:     insulin syringe-needle U-100 0.3 mL 30 Syrg, by Misc.(Non-Drug; Combo Route) route 3 (three) times daily., Disp: , Rfl:     insulin syringe-needle U-100 1 mL 28 gauge Syrg, 1 Syringe by Misc.(Non-Drug; Combo Route) route once daily., Disp: 100 each, Rfl: 3    losartan (COZAAR) 50 MG tablet, Take 1 tablet (50 mg total) by mouth once daily., Disp: 90 tablet, Rfl: 2    meloxicam (MOBIC) 15 MG tablet, 1 tablet once daily., Disp: , Rfl:     methylPREDNISolone (MEDROL DOSEPACK) 4 mg tablet, , Disp: , Rfl:     metoprolol succinate (TOPROL XL) 50 MG 24 hr tablet, Take 1 tablet (50 mg total) by mouth once daily., Disp: 90 tablet, Rfl: 1    potassium 99 mg Tab, Take 1 tablet by mouth., Disp: , Rfl:     solifenacin (VESICARE) 5 MG tablet, Take 5 mg by mouth., Disp: , Rfl:     PMHx/PSHx Updates:  See patient's last visit  with me on 11/26/2018  See H&P on 10/11/2006        Pathology:  See path Aug 2014          Objective:     Vitals:  Blood pressure 130/74, pulse 73, temperature 98.5 °F (36.9 °C), temperature source Oral, resp. rate 19, weight 75.3 kg (165 lb 14.4 oz).    Physical Examination:   GEN: no apparent distress, comfortable; AAOx3  HEAD: atraumatic and normocephalic  EYES: no pallor, no icterus, PERRLA  ENT: OMM, no pharyngeal erythema, external ears WNL; no nasal discharge; no thrush  NECK: no masses, thyroid normal, trachea midline, no LAD/LN's, supple  CV: RRR with no murmur; normal pulse; normal S1 and S2; no pedal edema  CHEST: Normal respiratory effort; CTAB; normal breath sounds; no wheeze or crackles  ABDOM: nontender and nondistended; soft; normal bowel sounds; no rebound/guarding  MUSC/Skeletal: ROM normal; no crepitus; joints normal; no deformities or arthropathy  EXTREM: no clubbing, cyanosis, inflammation or swelling  SKIN: no rashes, lesions, ulcers, petechiae or subcutaneous nodules  : no bright  NEURO: grossly intact; motor/sensory WNL; AAOx3; no tremors  PSYCH: normal mood, affect and behavior  LYMPH: normal cervical, supraclavicular, axillary and groin LN's            Labs:     11/12/2019  Lab Results   Component Value Date    WBC 8.2 11/22/2019    HGB 12.8 11/22/2019    HCT 39.3 11/22/2019    MCV 91.6 11/22/2019     11/22/2019     BMP  Lab Results   Component Value Date     11/22/2019    K 5.0 11/22/2019     11/22/2019    CO2 30 11/22/2019    BUN 27 (H) 11/22/2019    CREATININE 1.25 (H) 11/22/2019    CALCIUM 9.8 11/22/2019    ESTGFRAFRICA 53 (L) 11/22/2019    EGFRNONAA 45 (L) 11/22/2019     Lab Results   Component Value Date    ALT 17 11/22/2019    AST 14 11/22/2019    ALKPHOS 77 11/22/2019    BILITOT 0.5 11/22/2019             Radiology/Diagnostic Studies:    CT 11/1/2017    I have reviewed all available lab results and radiology reports.    Assessment/Plan:   (1) 64 y.o. female with  diagnosis of chronic anemia and thrombocytopenia in the past  - multifactorial anemia process with underlying anemia of chronic disorders and anemia of chronic renal  - latest labs wnl - no residual anemia or tcp    (2) prior left nephrectomy in Aug 2014  - Stage I renal cell carcinoma  - followed by Dr Hill with   - CT scans on 11/1/2017 negative  - recent US of kidneys - negative per patient    (3) CRI - followed by Dr Tommy Haro with nephrology    (4) Chronic joint/hip issues - followed by Dr Faye and plans to see Dr Aragon in near future    1. Hx of renal cell cancer - left nephrectomy Aug 2014     2. Anemia, chronic renal failure, stage 3 (moderate)     3. Anemia of chronic disorder           PLAN:  1. F/u with PCP,  and Neph  2. Check labs again in one year  3.  RTC in 12 months    Fax note to Estrellita Nolan Kreiger and Leonid Hill    Discussion:     I have explained all of the above in detail and the patient understands all of the current recommendation(s). I have answered all of their questions to the best of my ability and to their complete satisfaction.   The patient is to continue with the current management plan.            Electronically signed by Sebastien Tavarez MD

## 2019-11-26 ENCOUNTER — OFFICE VISIT (OUTPATIENT)
Dept: HEMATOLOGY/ONCOLOGY | Facility: CLINIC | Age: 64
End: 2019-11-26
Payer: OTHER GOVERNMENT

## 2019-11-26 VITALS
TEMPERATURE: 99 F | WEIGHT: 165.88 LBS | HEART RATE: 73 BPM | RESPIRATION RATE: 19 BRPM | DIASTOLIC BLOOD PRESSURE: 74 MMHG | BODY MASS INDEX: 26.78 KG/M2 | SYSTOLIC BLOOD PRESSURE: 130 MMHG

## 2019-11-26 DIAGNOSIS — D63.1 ANEMIA, CHRONIC RENAL FAILURE, STAGE 3 (MODERATE): ICD-10-CM

## 2019-11-26 DIAGNOSIS — N18.30 ANEMIA, CHRONIC RENAL FAILURE, STAGE 3 (MODERATE): ICD-10-CM

## 2019-11-26 DIAGNOSIS — Z85.528 HX OF RENAL CELL CANCER: Primary | ICD-10-CM

## 2019-11-26 DIAGNOSIS — D63.8 ANEMIA OF CHRONIC DISORDER: ICD-10-CM

## 2019-11-26 PROCEDURE — 99213 PR OFFICE/OUTPT VISIT, EST, LEVL III, 20-29 MIN: ICD-10-PCS | Mod: S$GLB,,, | Performed by: INTERNAL MEDICINE

## 2019-11-26 PROCEDURE — 99213 OFFICE O/P EST LOW 20 MIN: CPT | Mod: S$GLB,,, | Performed by: INTERNAL MEDICINE

## 2019-12-06 DIAGNOSIS — Z79.4 TYPE 2 DIABETES MELLITUS WITH DIABETIC POLYNEUROPATHY, WITH LONG-TERM CURRENT USE OF INSULIN: ICD-10-CM

## 2019-12-06 DIAGNOSIS — E11.42 TYPE 2 DIABETES MELLITUS WITH DIABETIC POLYNEUROPATHY, WITH LONG-TERM CURRENT USE OF INSULIN: ICD-10-CM

## 2019-12-06 RX ORDER — INSULIN GLARGINE 100 [IU]/ML
60 INJECTION, SOLUTION SUBCUTANEOUS NIGHTLY
Qty: 18 ML | Refills: 11
Start: 2019-12-06 | End: 2020-12-05

## 2019-12-23 ENCOUNTER — OFFICE VISIT (OUTPATIENT)
Dept: FAMILY MEDICINE | Facility: CLINIC | Age: 64
End: 2019-12-23
Payer: OTHER GOVERNMENT

## 2019-12-23 VITALS
BODY MASS INDEX: 26.52 KG/M2 | TEMPERATURE: 98 F | HEART RATE: 54 BPM | HEIGHT: 66 IN | SYSTOLIC BLOOD PRESSURE: 139 MMHG | DIASTOLIC BLOOD PRESSURE: 70 MMHG | WEIGHT: 165 LBS

## 2019-12-23 DIAGNOSIS — I10 ESSENTIAL HYPERTENSION: ICD-10-CM

## 2019-12-23 DIAGNOSIS — N18.30 CKD STAGE 3 DUE TO TYPE 2 DIABETES MELLITUS: ICD-10-CM

## 2019-12-23 DIAGNOSIS — Z79.4 TYPE 2 DIABETES MELLITUS WITH DIABETIC POLYNEUROPATHY, WITH LONG-TERM CURRENT USE OF INSULIN: Primary | ICD-10-CM

## 2019-12-23 DIAGNOSIS — E11.42 TYPE 2 DIABETES MELLITUS WITH DIABETIC POLYNEUROPATHY, WITH LONG-TERM CURRENT USE OF INSULIN: Primary | ICD-10-CM

## 2019-12-23 DIAGNOSIS — E11.22 CKD STAGE 3 DUE TO TYPE 2 DIABETES MELLITUS: ICD-10-CM

## 2019-12-23 DIAGNOSIS — J06.9 UPPER RESPIRATORY TRACT INFECTION, UNSPECIFIED TYPE: ICD-10-CM

## 2019-12-23 DIAGNOSIS — Z12.39 SCREENING FOR BREAST CANCER: ICD-10-CM

## 2019-12-23 DIAGNOSIS — Z12.31 ENCOUNTER FOR SCREENING MAMMOGRAM FOR BREAST CANCER: Primary | ICD-10-CM

## 2019-12-23 PROCEDURE — 99214 PR OFFICE/OUTPT VISIT, EST, LEVL IV, 30-39 MIN: ICD-10-PCS | Mod: S$PBB,,, | Performed by: INTERNAL MEDICINE

## 2019-12-23 PROCEDURE — 99214 OFFICE O/P EST MOD 30 MIN: CPT | Mod: S$PBB,,, | Performed by: INTERNAL MEDICINE

## 2019-12-23 PROCEDURE — 99213 OFFICE O/P EST LOW 20 MIN: CPT | Performed by: INTERNAL MEDICINE

## 2019-12-23 RX ORDER — PEN NEEDLE, DIABETIC 30 GX3/16"
1 NEEDLE, DISPOSABLE MISCELLANEOUS 3 TIMES DAILY
Qty: 100 EACH | Refills: 3 | Status: SHIPPED | OUTPATIENT
Start: 2019-12-23

## 2019-12-23 RX ORDER — INSULIN GLARGINE 100 [IU]/ML
70 INJECTION, SOLUTION SUBCUTANEOUS NIGHTLY
Qty: 1 VIAL | Refills: 3 | Status: SHIPPED | OUTPATIENT
Start: 2019-12-23 | End: 2020-01-28 | Stop reason: SDUPTHER

## 2019-12-23 RX ORDER — INSULIN GLARGINE 100 [IU]/ML
70 INJECTION, SOLUTION SUBCUTANEOUS NIGHTLY
Qty: 7 VIAL | Refills: 3 | Status: SHIPPED | OUTPATIENT
Start: 2019-12-23 | End: 2019-12-23 | Stop reason: SDUPTHER

## 2019-12-23 RX ORDER — INSULIN LISPRO 100 [IU]/ML
20 INJECTION, SOLUTION INTRAVENOUS; SUBCUTANEOUS
Qty: 5 SYRINGE | Refills: 3 | Status: SHIPPED | OUTPATIENT
Start: 2019-12-23 | End: 2020-01-28 | Stop reason: SDUPTHER

## 2019-12-23 RX ORDER — PEN NEEDLE, DIABETIC 30 GX3/16"
1 NEEDLE, DISPOSABLE MISCELLANEOUS 3 TIMES DAILY
Qty: 300 EACH | Refills: 3 | Status: SHIPPED | OUTPATIENT
Start: 2019-12-23 | End: 2019-12-23 | Stop reason: SDUPTHER

## 2019-12-23 RX ORDER — INSULIN LISPRO 100 [IU]/ML
20 INJECTION, SOLUTION INTRAVENOUS; SUBCUTANEOUS
Qty: 18 ML | Refills: 3 | Status: SHIPPED | OUTPATIENT
Start: 2019-12-23 | End: 2019-12-23 | Stop reason: SDUPTHER

## 2019-12-23 NOTE — PATIENT INSTRUCTIONS
Using a Blood Sugar Log    You have diabetes. This means your body has trouble regulating a sugar called glucose. To help manage your diabetes, youll need to check your blood sugar level as directed by your healthcare provider. Keeping a log of your blood sugar levels will help you track your blood sugar readings. Its a simple and easy way to see how well you are controlling your diabetes.  Checking your blood sugar level  You can check your blood sugar level with a blood glucose meter. Youll first prick the side of your finger with a tiny lancet to draw a tiny drop of blood onto the test strip. Some glucose meters let you use another place on your body to test. But these other places should not be used in some cases as they may be inaccurate. Follow the instructions for your glucose meter. And talk with your healthcare provider before doing the test on other places.  The strip goes into the meter first, then a drop of blood is placed on the tip of the strip. The meter then shows a reading that tells you the level of your blood sugar. Your readings should be in your target range as often as possible. This means not too high or too low. Staying in this range helps lower your risk for complications. Your healthcare provider will help you figure out the target range that is best for you.  Tracking your readings  Every time you check your blood sugar, use your log to keep track of your readings. Your meter will also probably have a memory feature that your healthcare provider can check at your next visit. You may be advised by your healthcare provider to check your blood sugar in the morning, at bedtime, and before and after meals. Be sure to write down all of your numbers. Also use your log to record things that might have affected your blood sugar. Some examples include being sick, certain medicines, being physically active, feeling stressed, or skipping meals.   Lessons learned from your readings  Tracking your  blood sugar readings helps you see patterns. These patterns tell you how your actions affect your blood sugar. For instance, you may have higher numbers after eating certain foods or lower numbers after exercise. They just help you understand how to stay in your target range more often, so that your diabetes remains in good control.  Sharing your log with your healthcare team  Bring your blood sugar log and glucose meter with you to all of your healthcare appointments. This can help your healthcare team make changes to your treatment plan, if needed. This may involve making changes in what you eat, what medicines you take, or how much you exercise.  To learn more  The resources below can help you learn more:  · American Diabetes Association 153-341-3204 www.diabetes.org  · [employer] 867-919-5967 www.lighthouse.org  · [employer] [place] 195-889-0754 www.nei.nih.gov  · [facility] 385-674-0498 www.hormone.org  Date Last Reviewed: 5/1/2016  © 2883-0412 [employer]. [address] [place] [address], [place] [address]. All rights reserved. This information is not intended as a substitute for professional medical care. Always follow your healthcare professional's instructions.

## 2019-12-23 NOTE — PROGRESS NOTES
Notify patient that her hepatitis C screening was negative.Subjective:       Patient ID: Payton Castillo is a 64 y.o. female.    Chief Complaint: Hypertension; Hyperlipidemia; Diabetes; and Sore Throat    This is the fup visit for Ms. Payton Castillo a pleasant 64 female.  Underlying medical issues include hypertension type 2 diabetes mellitus on insulin and dyslipidemia.  She also has gastroesophageal reflux.  She has history of kidney cancer partial left-sided nephrectomy ( tumor removed still 95% remaining).  She follows Dr. Tavarez for the same.  She also sees Dr. Tommy macedo, nephrology for chronic kidney disease stage 3.    She also follows with Dr. Goff for her pain arthritis problems. She also has diabetic nephropathy stage III as diabetic neuropathy.    I have reviewed recent labs.  Creatinine is slightly on the higher side.  GFR is 44.  Creatinine is 1.25.  Triglycerides of 194.  Total cholesterol 163 which is normal.  LDL cholesterol is 80 which is normal.  Blood counts show a hemoglobin of 12.8 and hematocrit of 39.3.      Hypertension   This is a chronic problem. The current episode started more than 1 year ago (30). The problem is controlled. Associated symptoms include malaise/fatigue. Pertinent negatives include no chest pain, headaches, palpitations or shortness of breath. Agents associated with hypertension include steroids. Risk factors for coronary artery disease include sedentary lifestyle, diabetes mellitus and dyslipidemia. Past treatments include calcium channel blockers. The current treatment provides moderate improvement. Hypertensive end-organ damage includes kidney disease. There is no history of heart failure, left ventricular hypertrophy, PVD or retinopathy. There is no history of coarctation of the aorta, hyperaldosteronism, pheochromocytoma, renovascular disease or sleep apnea.   Hyperlipidemia   This is a chronic problem. The current episode started more than 1 year ago. The problem  is controlled. She has no history of obesity. Pertinent negatives include no chest pain or shortness of breath. Current antihyperlipidemic treatment includes statins. The current treatment provides moderate improvement of lipids. Risk factors for coronary artery disease include hypertension, a sedentary lifestyle, post-menopausal, dyslipidemia and diabetes mellitus.   Gastroesophageal Reflux   She complains of heartburn and a sore throat. She reports no abdominal pain, no chest pain or no coughing. This is a recurrent problem. The current episode started more than 1 year ago. Pertinent negatives include no fatigue. She has tried a PPI for the symptoms. The treatment provided moderate relief.   Diabetes   She presents for her follow-up diabetic visit. She has type 2 diabetes mellitus. Pertinent negatives for hypoglycemia include no confusion, dizziness, headaches, nervousness/anxiousness, pallor, seizures or tremors. Pertinent negatives for diabetes include no chest pain, no fatigue, no polydipsia and no polyphagia. Symptoms are stable. Pertinent negatives for diabetic complications include no PVD or retinopathy. Risk factors for coronary artery disease include diabetes mellitus, dyslipidemia, hypertension and post-menopausal. She is compliant with treatment some of the time. Meal planning includes avoidance of concentrated sweets. She rarely participates in exercise. Her home blood glucose trend is fluctuating minimally. An ACE inhibitor/angiotensin II receptor blocker is being taken. Eye exam is current.   URI    This is a new problem. The current episode started in the past 7 days. The problem has been waxing and waning. There has been no fever. Associated symptoms include congestion and a sore throat. Pertinent negatives include no abdominal pain, chest pain, coughing, diarrhea, dysuria, headaches or rash. The treatment provided mild relief.       Past Medical History:   Diagnosis Date    Allergy      aspartame,fructose monosodium glutamate and penicillins.    Anemia of chronic disorder 11/9/2017    Anemia, chronic renal failure, stage 2 (mild) 11/9/2017    Anemia, chronic renal failure, stage 3 (moderate) 11/9/2017    Asthma     Cancer     kidney    Cataract of right eye     CKD (chronic kidney disease), stage III     Diabetes mellitus     type II    Fibromyalgia     GERD (gastroesophageal reflux disease)     Hx of renal cell cancer - left nephrectomy Aug 2014 11/9/2017    Hypercholesteremia     Hypertension     IDDM (insulin dependent diabetes mellitus)     Lumbar spondylosis     Lumbosacral radiculopathy     Osteopenia     Palpitations     Plantar fasciitis, right     Syncope 2/2012    Tachycardia     Thoracic radiculopathy     TMJ (dislocation of temporomandibular joint)      Social History     Socioeconomic History    Marital status:      Spouse name: Owen Castillo    Number of children: 2    Years of education: Not on file    Highest education level: Not on file   Occupational History    Occupation: Part - member support [employer]     Employer: [employer]   Social Needs    Financial resource strain: Not on file    Food insecurity:     Worry: Not on file     Inability: Not on file    Transportation needs:     Medical: Not on file     Non-medical: Not on file   Tobacco Use    Smoking status: Never Smoker    Smokeless tobacco: Never Used   Substance and Sexual Activity    Alcohol use: No    Drug use: No    Sexual activity: Yes     Partners: Male   Lifestyle    Physical activity:     Days per week: Not on file     Minutes per session: Not on file    Stress: Not at all   Relationships    Social connections:     Talks on phone: Not on file     Gets together: Not on file     Attends [employer] service: Not on file     Active member of club or organization: Not on file     Attends meetings of clubs or organizations: Not on file     Relationship status: Not on file   Other  Topics Concern    Not on file   Social History Narrative    Not on file     Past Surgical History:   Procedure Laterality Date    CATARACT EXTRACTION Left 03/2016    JOINT REPLACEMENT Right     knee    SHOULDER OPEN ROTATOR CUFF REPAIR Right 02/2005    labral repair    TOTAL KNEE ARTHROPLASTY Left 2014    TUBAL LIGATION  1990    TUMOR REMOVAL  2014    from kidney     Family History   Problem Relation Age of Onset    Stomach cancer Mother     Breast cancer Mother     Diabetes Mother     Cancer Father        Review of Systems   Constitutional: Positive for malaise/fatigue. Negative for activity change, chills, fatigue, fever and unexpected weight change.   HENT: Positive for congestion and sore throat. Negative for postnasal drip and sinus pressure.         Dry mouth due to VESIcare   Eyes: Negative for pain, discharge and visual disturbance.   Respiratory: Positive for apnea. Negative for cough, chest tightness and shortness of breath.         Patient does have obstructive sleep apnea and uses a CPAP device.   Cardiovascular: Negative for chest pain, palpitations and leg swelling.        Hypertension.  Chronic kidney disease stage 3.   Gastrointestinal: Positive for heartburn. Negative for abdominal distention, abdominal pain, anal bleeding, constipation and diarrhea.   Endocrine: Negative for polydipsia and polyphagia.        Type 2 diabetes mellitus dyslipidemia   Genitourinary: Negative for difficulty urinating, dysuria, flank pain and frequency.        Patient has an intact uterus.  Pap smear is pending at this point.  She has chronic kidney disease stage 3 and follows Dr. Tommy macedo for the same.   Musculoskeletal: Positive for back pain. Negative for arthralgias and joint swelling.        Hand saw Dr Leonid Aragon   Skin: Negative for color change, pallor and rash.   Allergic/Immunologic: Negative for environmental allergies, food allergies and immunocompromised state.   Neurological: Positive for  "numbness. Negative for dizziness, tremors, seizures, syncope, light-headedness and headaches.        Neuropathy symptoms due to diabetes.  Patient takes gabapentin 600 mg 3 times a day.   Hematological: Negative for adenopathy. Does not bruise/bleed easily.   Psychiatric/Behavioral: Negative for agitation, confusion and dysphoric mood. The patient is not nervous/anxious.          Objective:      Blood pressure 139/70, pulse (!) 54, temperature 97.8 °F (36.6 °C), height 5' 6" (1.676 m), weight 74.8 kg (165 lb). Body mass index is 26.63 kg/m².  Physical Exam   Constitutional: She appears well-developed and well-nourished. She is cooperative. No distress.   HENT:   Head: Normocephalic and atraumatic.   Mouth/Throat: Mucous membranes are dry. No tonsillar abscesses.   Dryness in mouth.  Oral airways Mallampati score 3.  Difficult examination.   Eyes: Pupils are equal, round, and reactive to light. Conjunctivae, EOM and lids are normal. Lids are everted and swept, no foreign bodies found. No scleral icterus. Right pupil is round and reactive. Left pupil is round and reactive.   Neck: Trachea normal and normal range of motion. Neck supple. No tracheal deviation present. No thyromegaly present.   Cardiovascular: Normal rate, regular rhythm, S1 normal, S2 normal and normal heart sounds.   Pulses:       Dorsalis pedis pulses are 1+ on the right side, and 1+ on the left side.   Pulmonary/Chest: Breath sounds normal.   Abdominal: Soft. Bowel sounds are normal. She exhibits no distension. There is no hepatosplenomegaly. There is no tenderness. There is no rigidity, no rebound and no guarding.       scar amy of prior surgeries are noted including scars of nephrectomy .   Musculoskeletal: Normal range of motion. She exhibits no tenderness or deformity.        Right foot: There is normal range of motion and no deformity.        Left foot: There is normal range of motion and no deformity.   Feet:   Right Foot:   Protective " Sensation: 5 sites tested. 5 sites sensed.   Skin Integrity: Negative for ulcer, blister or skin breakdown.   Left Foot:   Protective Sensation: 5 sites tested. 5 sites sensed.   Skin Integrity: Negative for ulcer, blister or skin breakdown.   Lymphadenopathy:     She has no cervical adenopathy.     She has no axillary adenopathy.   Neurological: She is alert.   Numbness in fingers- Carpel tunnel   Skin: Skin is warm and dry.   Psychiatric: She has a normal mood and affect. Her behavior is normal.   Nursing note and vitals reviewed.        Assessment:       1. Type 2 diabetes mellitus with diabetic polyneuropathy, with long-term current use of insulin    2. CKD stage 3 due to type 2 diabetes mellitus    3. Essential hypertension    4. Screening for breast cancer    5. Upper respiratory tract infection, unspecified type           Telephone on 11/22/2019   Component Date Value Ref Range Status    WBC 11/22/2019 8.2  3.8 - 10.8 Thousand/uL Final    RBC 11/22/2019 4.29  3.80 - 5.10 Million/uL Final    Hemoglobin 11/22/2019 12.8  11.7 - 15.5 g/dL Final    Hematocrit 11/22/2019 39.3  35.0 - 45.0 % Final    Mean Corpuscular Volume 11/22/2019 91.6  80.0 - 100.0 fL Final    Mean Corpuscular Hemoglobin 11/22/2019 29.8  27.0 - 33.0 pg Final    Mean Corpuscular Hemoglobin Conc 11/22/2019 32.6  32.0 - 36.0 g/dL Final    RDW 11/22/2019 12.7  11.0 - 15.0 % Final    Platelets 11/22/2019 338  140 - 400 Thousand/uL Final    MPV 11/22/2019 9.0  7.5 - 12.5 fL Final    Neutrophils Absolute 11/22/2019 3,764  1,500 - 7,800 cells/uL Final    Lymph # 11/22/2019 3,567  850 - 3,900 cells/uL Final    Mono # 11/22/2019 582  200 - 950 cells/uL Final    Eos # 11/22/2019 238  15 - 500 cells/uL Final    Baso # 11/22/2019 49  0 - 200 cells/uL Final    Neutrophils Relative 11/22/2019 45.9  % Final    Lymph% 11/22/2019 43.5  % Final    Mono% 11/22/2019 7.1  % Final    Eosinophil% 11/22/2019 2.9  % Final    Basophil% 11/22/2019 0.6   % Final    Glucose 11/22/2019 85  65 - 99 mg/dL Final    BUN, Bld 11/22/2019 27* 7 - 25 mg/dL Final    Creatinine 11/22/2019 1.25* 0.50 - 0.99 mg/dL Final    eGFR if non African American 11/22/2019 45* > OR = 60 mL/min/1.73m2 Final    eGFR if  11/22/2019 53* > OR = 60 mL/min/1.73m2 Final    BUN/Creatinine Ratio 11/22/2019 22  6 - 22 (calc) Final    Sodium 11/22/2019 142  135 - 146 mmol/L Final    Potassium 11/22/2019 5.0  3.5 - 5.3 mmol/L Final    Chloride 11/22/2019 105  98 - 110 mmol/L Final    CO2 11/22/2019 30  20 - 32 mmol/L Final    Calcium 11/22/2019 9.8  8.6 - 10.4 mg/dL Final    Total Protein 11/22/2019 7.0  6.1 - 8.1 g/dL Final    Albumin 11/22/2019 3.8  3.6 - 5.1 g/dL Final    Globulin, Total 11/22/2019 3.2  1.9 - 3.7 g/dL (calc) Final    Albumin/Globulin Ratio 11/22/2019 1.2  1.0 - 2.5 (calc) Final    Total Bilirubin 11/22/2019 0.5  0.2 - 1.2 mg/dL Final    Alkaline Phosphatase 11/22/2019 77  33 - 130 U/L Final    AST 11/22/2019 14  10 - 35 U/L Final    ALT 11/22/2019 17  6 - 29 U/L Final   Office Visit on 11/13/2019   Component Date Value Ref Range Status    DIAGNOSIS: 11/13/2019 Comment   Final    Specimen adequacy: 11/13/2019 Comment   Final    Clinician Provided ICD10 11/13/2019 Comment   Final    Performed by: 11/13/2019 Comment   Final    . 11/13/2019 .   Final    Note: 11/13/2019 Comment   Final    HPV, high-risk 11/13/2019 Negative  Negative Final    Chlamydia, Nuc. Acid Amp 11/13/2019 Negative  Negative Final    Gonococcus, Nuc. Acid Amp 11/13/2019 Negative  Negative Final    Trich vag by VITOR 11/13/2019 Negative  Negative Final         Plan:           Type 2 diabetes mellitus with diabetic polyneuropathy, with long-term current use of insulin  -     Discontinue: insulin glargine (LANTUS U-100 INSULIN) 100 unit/mL injection; Inject 70 Units into the skin every evening.  Dispense: 7 vial; Refill: 3  -     Discontinue: insulin lispro (HUMALOG KWIKPEN  "INSULIN) 100 unit/mL pen; Inject 20 Units into the skin 3 (three) times daily before meals.  Dispense: 18 mL; Refill: 3  -     Discontinue: pen needle, diabetic (ADVOCATE PEN NEEDLE) 31 gauge x 5/16" Ndle; 1 Device by Misc.(Non-Drug; Combo Route) route 3 (three) times daily.  Dispense: 300 each; Refill: 3  -     pen needle, diabetic (ADVOCATE PEN NEEDLE) 31 gauge x 5/16" Ndle; 1 Device by Misc.(Non-Drug; Combo Route) route 3 (three) times daily.  Dispense: 100 each; Refill: 3  -     insulin lispro (HUMALOG KWIKPEN INSULIN) 100 unit/mL pen; Inject 20 Units into the skin 3 (three) times daily before meals.  Dispense: 5 Syringe; Refill: 3  -     insulin glargine (LANTUS U-100 INSULIN) 100 unit/mL injection; Inject 70 Units into the skin every evening.  Dispense: 1 vial; Refill: 3  -     Hemoglobin A1c; Future; Expected date: 03/23/2020    CKD stage 3 due to type 2 diabetes mellitus    Essential hypertension    Screening for breast cancer  -     Mammo Digital Screening Bilat; Future; Expected date: 12/24/2019    Upper respiratory tract infection, unspecified type      Today's visit, we have discontinued the Humalog 75/25 which is a combination of long and short-acting insulin.  She is already on Lantus long-acting.    I will change Humalog 75/25 to Humalog plain 20 units 3 times a day and a new prescription has been sent to [facility].  New order for needles also has been given and refills also have been sent for Lantus insulin which I will increase to 70 units per day.    For her cold and congestion she will do some salt water gargles and steam inhalation.    She does have a dry mouth and she will use some candy or lozenges or small sips of water to keep it moist.  This dry mouth his because of bladder medication VESIcare.  Check hemoglobin A1c for follow-up      Spent jhonatan 25 minutes with patient which involved review of pts medical conditions, labs, medications and with 50% of time " face-to-face discussion about medical problems, management and any applicable changes.    Current Outpatient Medications:     amLODIPine (NORVASC) 2.5 MG tablet, Take 1 tablet by mouth once daily., Disp: , Rfl:     atorvastatin (LIPITOR) 40 MG tablet, Take 1 tablet (40 mg total) by mouth every evening., Disp: 90 tablet, Rfl: 1    cetirizine (ZYRTEC) 10 MG tablet, Take 10 mg by mouth., Disp: , Rfl:     diphenhydramine-acetaminophen (TYLENOL PM)  mg Tab, Take 1 tablet by mouth nightly as needed., Disp: , Rfl:     esomeprazole (NEXIUM) 40 MG capsule, Take 1 capsule (40 mg total) by mouth once daily., Disp: 90 capsule, Rfl: 1    ferrous gluconate (FERGON) 240 (27 FE) MG tablet, Take 480 mg by mouth 3 (three) times daily., Disp: , Rfl:     flash glucose sensor (FREESTYLE BANG 14 DAY SENSOR) Kit, 1 Device by Misc.(Non-Drug; Combo Route) route every 14 (fourteen) days., Disp: 6 kit, Rfl: 2    fluticasone propionate (FLONASE) 50 mcg/actuation nasal spray, 2 sprays (100 mcg total) by Each Nostril route once daily., Disp: 3 Bottle, Rfl: 3    FREESTYLE LANCETS MISC, by Misc.(Non-Drug; Combo Route) route 3 (three) times daily., Disp: , Rfl:     gabapentin (NEURONTIN) 600 MG tablet, Take 1 tablet (600 mg total) by mouth 3 (three) times daily., Disp: 270 tablet, Rfl: 2    insulin glargine (LANTUS U-100 INSULIN) 100 unit/mL injection, Inject 70 Units into the skin every evening., Disp: 1 vial, Rfl: 3    insulin syringe-needle U-100 0.3 mL 30 Syrg, by Misc.(Non-Drug; Combo Route) route 3 (three) times daily., Disp: , Rfl:     insulin syringe-needle U-100 1 mL 28 gauge Syrg, 1 Syringe by Misc.(Non-Drug; Combo Route) route once daily., Disp: 100 each, Rfl: 3    losartan (COZAAR) 50 MG tablet, Take 1 tablet (50 mg total) by mouth once daily., Disp: 90 tablet, Rfl: 2    metoprolol succinate (TOPROL XL) 50 MG 24 hr tablet, Take 1 tablet (50 mg total) by mouth once daily., Disp: 90 tablet, Rfl: 1    potassium 99 mg  "Tab, Take 1 tablet by mouth., Disp: , Rfl:     solifenacin (VESICARE) 5 MG tablet, Take 5 mg by mouth., Disp: , Rfl:     insulin lispro (HUMALOG KWIKPEN INSULIN) 100 unit/mL pen, Inject 20 Units into the skin 3 (three) times daily before meals., Disp: 5 Syringe, Rfl: 3    pen needle, diabetic (ADVOCATE PEN NEEDLE) 31 gauge x 5/16" Ndle, 1 Device by Misc.(Non-Drug; Combo Route) route 3 (three) times daily., Disp: 100 each, Rfl: 3  "

## 2020-01-06 ENCOUNTER — TELEPHONE (OUTPATIENT)
Dept: FAMILY MEDICINE | Facility: CLINIC | Age: 65
End: 2020-01-06

## 2020-01-06 ENCOUNTER — HOSPITAL ENCOUNTER (OUTPATIENT)
Dept: RADIOLOGY | Facility: HOSPITAL | Age: 65
Discharge: HOME | End: 2020-01-06
Attending: INTERNAL MEDICINE
Payer: OTHER GOVERNMENT

## 2020-01-06 VITALS — HEIGHT: 66 IN | BODY MASS INDEX: 26.5 KG/M2 | WEIGHT: 164.88 LBS

## 2020-01-06 DIAGNOSIS — Z12.31 ENCOUNTER FOR SCREENING MAMMOGRAM FOR BREAST CANCER: ICD-10-CM

## 2020-01-06 PROCEDURE — 77067 SCR MAMMO BI INCL CAD: CPT | Mod: TC,PO

## 2020-01-06 NOTE — TELEPHONE ENCOUNTER
----- Message from John Nolan MD sent at 1/6/2020 10:17 AM CST -----  Mammogram OK . Next in 1 year time

## 2020-01-28 DIAGNOSIS — E11.42 TYPE 2 DIABETES MELLITUS WITH DIABETIC POLYNEUROPATHY, WITH LONG-TERM CURRENT USE OF INSULIN: ICD-10-CM

## 2020-01-28 DIAGNOSIS — Z79.4 TYPE 2 DIABETES MELLITUS WITH DIABETIC POLYNEUROPATHY, WITH LONG-TERM CURRENT USE OF INSULIN: ICD-10-CM

## 2020-01-28 RX ORDER — INSULIN LISPRO 100 [IU]/ML
20 INJECTION, SOLUTION INTRAVENOUS; SUBCUTANEOUS
Qty: 5 SYRINGE | Refills: 3 | Status: SHIPPED | OUTPATIENT
Start: 2020-01-28 | End: 2020-02-06 | Stop reason: CLARIF

## 2020-01-28 RX ORDER — INSULIN GLARGINE 100 [IU]/ML
70 INJECTION, SOLUTION SUBCUTANEOUS NIGHTLY
Qty: 1 VIAL | Refills: 3 | Status: SHIPPED | OUTPATIENT
Start: 2020-01-28 | End: 2020-03-02 | Stop reason: SDUPTHER

## 2020-02-06 DIAGNOSIS — Z79.4 TYPE 2 DIABETES MELLITUS WITH DIABETIC POLYNEUROPATHY, WITH LONG-TERM CURRENT USE OF INSULIN: Primary | ICD-10-CM

## 2020-02-06 DIAGNOSIS — E11.42 TYPE 2 DIABETES MELLITUS WITH DIABETIC POLYNEUROPATHY, WITH LONG-TERM CURRENT USE OF INSULIN: Primary | ICD-10-CM

## 2020-02-06 RX ORDER — INSULIN ASPART 100 [IU]/ML
20 INJECTION, SOLUTION INTRAVENOUS; SUBCUTANEOUS
Qty: 18 SYRINGE | Refills: 3 | Status: SHIPPED | OUTPATIENT
Start: 2020-02-06 | End: 2020-02-06 | Stop reason: SDUPTHER

## 2020-02-06 NOTE — TELEPHONE ENCOUNTER
Prescription for NovoLog 20 units 3 times a day FlexPen sent to the pharmacy.  I have a feeling that pens might be more expensive and not covered by the insurance.  But let us see.      ----- Message from Anika Antonio LPN sent at 2/4/2020  9:50 AM CST -----  Regarding: FW: Rapid Insulin  The pharmacy said you would have to submit an rx for Novolog so they can run it & if there is a problem they will let us know.  ----- Message -----  From: John Nolan MD  Sent: 2/2/2020   9:55 PM CST  To: Anika Antonio LPN  Subject: Rapid Insulin                                    Please fax to pharmacy if Inj insulin Novolog aspart is preferred instead of Lispro Humalog    SR  ----- Message -----  From: Anika Antonio LPN  Sent: 1/16/2020   2:24 PM CST  To: John Nolan MD        ----- Message -----  From: Aubrie Noyola  Sent: 1/16/2020   1:52 PM CST  To: An Lopez Staff    PRIOR AUTHORIZATION, 01/16/20

## 2020-02-07 RX ORDER — INSULIN ASPART 100 [IU]/ML
20 INJECTION, SOLUTION INTRAVENOUS; SUBCUTANEOUS
Qty: 18 SYRINGE | Refills: 3 | Status: SHIPPED | OUTPATIENT
Start: 2020-02-07 | End: 2020-03-23 | Stop reason: SDUPTHER

## 2020-03-02 DIAGNOSIS — E11.42 TYPE 2 DIABETES MELLITUS WITH DIABETIC POLYNEUROPATHY, WITH LONG-TERM CURRENT USE OF INSULIN: ICD-10-CM

## 2020-03-02 DIAGNOSIS — Z79.4 TYPE 2 DIABETES MELLITUS WITH DIABETIC POLYNEUROPATHY, WITH LONG-TERM CURRENT USE OF INSULIN: ICD-10-CM

## 2020-03-03 DIAGNOSIS — M18.11 PRIMARY OSTEOARTHRITIS OF FIRST CARPOMETACARPAL JOINT OF RIGHT HAND: ICD-10-CM

## 2020-03-03 DIAGNOSIS — G56.01 CARPAL TUNNEL SYNDROME ON RIGHT: Primary | ICD-10-CM

## 2020-03-03 PROBLEM — M79.641 RIGHT HAND PAIN: Status: ACTIVE | Noted: 2020-03-03

## 2020-03-03 RX ORDER — INSULIN GLARGINE 100 [IU]/ML
70 INJECTION, SOLUTION SUBCUTANEOUS NIGHTLY
Qty: 1 VIAL | Refills: 3 | Status: SHIPPED | OUTPATIENT
Start: 2020-03-03 | End: 2020-03-04 | Stop reason: SDUPTHER

## 2020-03-03 NOTE — PROGRESS NOTES
"  [facility]  Initial Evaluation     Date: 3/10/2020  Name: Payton Castillo  Clinic Number: [id number]    Therapy Diagnosis:   Encounter Diagnosis   Name Primary?    Right hand pain      Physician: Leonid Aragon MD    Physician Orders: (R) carpal tunnel syndrome;  (R) OA of 1st metacarpal;  Evaluate and treat;  ROM, US, Paraffin, massage, deep heat, slow progressive strengthening  Surgical Procedure and Date: Excision of bony osteophyte of (R) thumb IP joint radial and ulnar border;  Arthrotomy IP (R) thumb; Excision of mucosynovial cyst (R) thumb IP joint; (R) thumb flexor tenosynovectomy; Median nerve decompression at (R) wrist;  Ulnar nerve decompression at elbow with submuscular transposition; (R) carpal trapeziectomy;  01/28/2020  Dominant Side: right  Date of Onset: 01/28/2020             6 weeks, 0 days            Mechanism of Injury: (R) carpal tunnel syndrome;  (R) OA of 1st metacarpal    Environmental Concerns/Fall Risk: None  Language: None  Cultural/Spiritual: None  Barriers to Learning: None   Abuse/Neglect/Nutritional: None  Medications:  See med card   Allergies:  See allergy card   X-Rays/Tests:  See Epic    Insurance Authorization Period Expiration: 03/03/2020-12/31/2020  Plan of Care Certification Period: 03/10/2020-06/10/2020  Date of Return to MD: 03/16/2020    Occupation:  unknown  Working presently: no  Workers Compensation: no      Visit # / Visits authorized: 1 / 104  Time In:1:00  Time Out: 1:50  Total Billable Time: 50 minutes        Precautions:  Pt. Reports, "kidney CA ( approx. 2014), no pacemaker, no allergies to latex or adhesives."      Subjective     Pt. Reports, "I was having a lot of problems with my hands.  So I went to the doctor and decided to have surgery.  I was out of insurance so a lady at Dr. Aragon's office named Debi showed me some exercises to do with my hand.  Because of the lack of insurance I was unable to get a splint for my wrist.   I have been " "gripping a ball at home.  I have to compensate a lot to do things.  I have not really tried buttoning or anything like that.  I get sharp pains in my (R) wrist when I use it.  Before I had the sutures removed I was at home and reached back for a blanket and heard a loud pop and had a sharp pain in the thumb area of my (R) hand.  I told the tech that took my sutures out and he said I would be ok."    Pain   At rest: 2/10  With work/ Activity: 4/10  Sleepin/10  Location of Pain: (R) hand/wrist    Objective     Sensation: hypersensitive to touch  Scar / Wound: closed, cool to touch, pinkish in color;  Ashy color to skin on (R) hand with increased dark hair growth  Edema:  (R) / (L)  Cm   MCPs:  18.8  /  18.3  PWC:  17.6  / 15.4    (R)  //  (L)thumb  PP: 7.2  // 5.7  IP: 6.8  // 5.5  DP: 5.3 // 5.0        AROM: hand (R)  //   (L)      thumb           (measured with plastic goni)  MP: 0 / 22  //  0/42  IP:    0 / 22  //   0/67                Palmar ABD: 40 / 50  Radial ABD: 40  /  45                Opposition: 7.8 cm ( from thumb tip to DPC)                       PROM: hand (R) thumb    FLEX ( measured with plastic goni)  MP: 29  IP:  33                                          AROM: wrist          (R)   //  (L)  DF/VF: 50 / 65  //  70/75  RD/UD: 5 / 25  //  20/35  SUP/PRON: 80 / 85  // 90/90      Manual Muscle Test:  Wrist   (8 weeks)       (R)         Dorsi Flexion: TBA  Volar Flexion:  TBA  Radial Deviation:  TBA  Ulnar Deviation:  TBA         Strength: (KEYONNA Dynamometer in lbs.),Position II  ( 8 weeks)  (R):   TBA  (L):   TBA    Pinch Strength: (Pinch Gauge in lbs)  (8 weeks)      (R) /  (L)  LAT:  TBA  3PT: TBA  2PT:  TBA    Coordination:  9 Hole Peg test ( in/out) seconds   (not assessed 2* to limited ROM)  (R):  TBA  (L):  TBA        Quick DASH SCORE: 59%      Treatment Included:   OT evaluation and instruction in written HEP including 0# DF/VF, RD/UD, and SUP/PRON;  Thumb tip to 2nd-5th digit tips, " Palmar and radial ABD, and IP thumb joint blocking and reverse joint blocking, and opposition x 10 reps x 3-4 times per day.  Pt. Educated on modalities for home pain management, activity modifications and precautions, scar/edema  massage.  Pt. Demo understanding of above.     Patient/Family Education: role of OT, goals for OT, scheduling/cancellations - pt verbalized understanding. Discussed insurance limitations with patient.        Assessment:     Problem List :       Decreased ROM,  Decreased  and pinch strength,   Decreased muscle strength,   Decreased functional abilities,  Increased pain,   Edema      Payton Castillo is a 65 y.o. female referred to outpatient occupational therapy and presents with a medical diagnosis of (R) carpal tunnel syndrome;  (R) OA of 1st metacarpal  resulting in limited ROM, strength, functional abilities, increased pain and inflammation and demonstrates limitations as described in the chart above. Following medical record review it is determined that pt will benefit from occupational therapy services in order to maximize pain free and/or functional use of (R) hand/wrist. The following goals were discussed with the patient and patient is in agreement with them as to be addressed in the treatment plan. The patient's rehab potential is  Good.      The following goals were discussed with the patient and patient is in agreement with them as to be addressed in the treatment plan.     Goals:   Goals:  1)   Pt. to be IND with HEP and modalities for pain management by 1 week.   2)   Pt. will report   4/10 pain on average with activity to A with exercises by 6-8 weeks.  3)   Pt. will increase ROM 3-5 degrees to increase functional use for ADL's by 6-8 weeks.  4)   Pt. will decrease edema 0.1-0.3 mm to increase joint mobility /flexibility by 6-8 weeks.        Plan:   Patient to be treated by Occupational Therapy    2-3    times per week for     90 days   during the certification period from    03/10/2020  to 06/10/2020   to achieve the established goals.  Treatment to include :  paraffin, fluidotherapy, manual therapy/joint mobilizations, kinesiotaping, dry needling performed by certified physical therapist,   modalities for pain management, US 1.0 /3.3mhz, therapeutic exercises/activities,        strengthening,    as well as any other treatments deemed necessary based on the patient's needs or progress.                 I certify the need for these services furnished under this plan of treatment and while under my care    ____________________________________                         __________________  Physician/Referring Practitioner                                               Date of Signature    Toshia Fisher, OT

## 2020-03-04 DIAGNOSIS — Z79.4 TYPE 2 DIABETES MELLITUS WITH DIABETIC POLYNEUROPATHY, WITH LONG-TERM CURRENT USE OF INSULIN: ICD-10-CM

## 2020-03-04 DIAGNOSIS — E11.42 TYPE 2 DIABETES MELLITUS WITH DIABETIC POLYNEUROPATHY, WITH LONG-TERM CURRENT USE OF INSULIN: ICD-10-CM

## 2020-03-04 NOTE — TELEPHONE ENCOUNTER
When I called pt. she said her Lantus has to go to [facility] or the ins. won't pay for it. She would also like a 90 day supply.

## 2020-03-05 RX ORDER — INSULIN GLARGINE 100 [IU]/ML
70 INJECTION, SOLUTION SUBCUTANEOUS NIGHTLY
Qty: 1 VIAL | Refills: 3 | Status: SHIPPED | OUTPATIENT
Start: 2020-03-05 | End: 2020-03-23 | Stop reason: SDUPTHER

## 2020-03-06 ENCOUNTER — LAB VISIT (OUTPATIENT)
Dept: LAB | Facility: HOSPITAL | Age: 65
End: 2020-03-06
Attending: INTERNAL MEDICINE
Payer: MEDICARE

## 2020-03-06 DIAGNOSIS — N18.30 CHRONIC KIDNEY DISEASE, STAGE III (MODERATE): Primary | ICD-10-CM

## 2020-03-06 DIAGNOSIS — R80.9 PROTEINURIA: ICD-10-CM

## 2020-03-06 DIAGNOSIS — I10 ESSENTIAL HYPERTENSION, MALIGNANT: ICD-10-CM

## 2020-03-06 LAB
ALBUMIN SERPL BCP-MCNC: 4.3 G/DL (ref 3.5–5.2)
ANION GAP SERPL CALC-SCNC: 9 MMOL/L (ref 8–16)
BUN SERPL-MCNC: 25 MG/DL (ref 8–23)
CALCIUM SERPL-MCNC: 10 MG/DL (ref 8.7–10.5)
CHLORIDE SERPL-SCNC: 99 MMOL/L (ref 95–110)
CO2 SERPL-SCNC: 30 MMOL/L (ref 23–29)
CREAT SERPL-MCNC: 1.2 MG/DL (ref 0.5–1.4)
CREAT UR-MCNC: 140 MG/DL (ref 15–325)
EST. GFR  (AFRICAN AMERICAN): 54.8 ML/MIN/1.73 M^2
EST. GFR  (NON AFRICAN AMERICAN): 47.5 ML/MIN/1.73 M^2
GLUCOSE SERPL-MCNC: 210 MG/DL (ref 70–110)
PHOSPHATE SERPL-MCNC: 3.9 MG/DL (ref 2.7–4.5)
POTASSIUM SERPL-SCNC: 5.3 MMOL/L (ref 3.5–5.1)
PROT UR-MCNC: 181 MG/DL (ref 0–15)
PROT/CREAT UR: 1.29 MG/G{CREAT} (ref 0–0.2)
SODIUM SERPL-SCNC: 138 MMOL/L (ref 136–145)

## 2020-03-06 PROCEDURE — 82570 ASSAY OF URINE CREATININE: CPT

## 2020-03-06 PROCEDURE — 80069 RENAL FUNCTION PANEL: CPT

## 2020-03-06 PROCEDURE — 36415 COLL VENOUS BLD VENIPUNCTURE: CPT

## 2020-03-10 ENCOUNTER — CLINICAL SUPPORT (OUTPATIENT)
Dept: REHABILITATION | Facility: HOSPITAL | Age: 65
End: 2020-03-10
Payer: MEDICARE

## 2020-03-10 DIAGNOSIS — M79.641 RIGHT HAND PAIN: ICD-10-CM

## 2020-03-10 PROCEDURE — 97165 OT EVAL LOW COMPLEX 30 MIN: CPT | Performed by: OCCUPATIONAL THERAPIST

## 2020-03-16 NOTE — PROGRESS NOTES
"                            Occupational Therapy Daily Treatment Note       Date: 3/18/2020  Name: Payton Castillo  Clinic Number: [id number]    Therapy Diagnosis:   Encounter Diagnosis   Name Primary?    Right hand pain Yes     Physician: Leonid Aragon MD    Physician Orders: (R) carpal tunnel syndrome;  (R) OA of 1st metacarpal;  Evaluate and treat;  ROM, US, Paraffin, massage, deep heat, slow progressive strengthening  Medical Diagnosis: (R) hand / wrist pain;  (R) carpal tunnel syndrome;  (R) OA of 1st metacarpal  Surgical Procedure and Date: Excision of bony osteophyte of (R) thumb IP joint radial and ulnar border;  Arthrotomy IP (R) thumb; Excision of mucosynovial cyst (R) thumb IP joint; (R) thumb flexor tenosynovectomy; Median nerve decompression at (R) wrist;  Ulnar nerve decompression at elbow with submuscular transposition; (R) carpal trapeziectomy;  01/28/2020  Insurance Authorization Period Expiration: 03/03/2020-12/31/2020  Plan of Care Certification Period: 03/10/2020-06/10/2020  Date of Return to MD: 03/16/2020    Visit # / Visits authorized: 2 / 104  Time In: 8:55  Time Out: 9:50  Total Billable Time: 53 minutes    Precautions:  Standard;  Kidney CA ( approx. 2014)      Post Op:  7 weeks, 1 days      Subjective     Pt reports: "I went back to see the doctor and he said I needed pain medicine because therapy was going to be aggressive.  I was washing dishes the other day and a pan slipped out of my hands and it hurt my wrist.  My while arm has been hurting even into my back.  But I have shoulder problems even before surgery.  They did a CT scan Monday at Dr. Cagle office and he said everything looked good.  He said I need to use my hand more often."    1-3/10 pain (R) hand/ wrist    Objective    Patient seen by OT this session. Tx consisted of:      Supervised modalities after being cleared for contradictions  x  10 minutes:     -Paraffin with MH to (R)  hand  to increase blood flow, circulation, " "pain management and for tissue elasticity prior to therex  x  10 minutes.    -Fluidotherapy to  (R)  hand to increase blood flow, circulation, tissue elasticity, desensitization, sensory re-education and for pain management  X 0 minutes.  (not completed this session)        Direct contact modalities after being cleared for contraindications x   8 minutes:  -Ultrasound applied to (R) hand  on   3.3 Mhz with 0.7 w/cm2 @ 100 % duty cycle to decrease pain and inflammation  /////  to remold scar tissue and increase tissue elasticity x 8 minutes.        Manual therapy techniques to increase joint mobilization /// scar massage/// soft tissue mobilization   x   15 minutes:     -Scar Massage to volar aspect of (R) wrist and dorsal aspect of (R) thumb region with mini therapeutic vibrator  to decrease dense scar adhesions and improve tensile glide  x 5 minutes.  -Manual Therapy techniques to (R) hand/ wrist  Including light joint mobilizations, stretching, and PROM to increase joint mobility, ROM and for pain management  x  5 minutes   -Soft Tissue Mobilization to (R) hand / wrist from distal to proximal to decrease edema and increase ROM and functional abilities  x  5 minutes      Therapeutic  Exercises to improve functional performance while increasing strength, endurance, ROM,  and flexibility  x   20 minutes:    · - Tendon glides for intrinsic +/-, flat fist and composite fist x 10 reps  · - Joint blocking and reverse joint blocking for IP of (R) thumb for EXT/FLEX x 10 reps   - High lighter rolls for intrinsics x 20 reps    - LARGE wooden blocks thumb tip to palm x 10 blocks  x 2 sets   - PVC DF/VF (SUP/PRON), RD/UD  x 20 reps   - Blue ball on tabletop for DF/VF stretches x 20 reps    Initiate in future therapy sessions:     - Sponge ("H" shaped) squeezes for digital ABD/ADD x 10 reps each  · - AAROM for digital FLEX isolated and composite digital FLEX for MCP, PIP, and DIP  · - Tabletop digital EXT x 20 reps  · - Towel " "walking for digital FLEX/EXT x 3 reps  · - Quick flicks for digital EXT with sponge   - Rice bucket with ### glass beads for desensitization ( vision occluded) x 1 rep   - [place] Slot with ### washers and  #### pennies ( 5 in hand) for FMC x 1 rep  · - Cotton balls thumb tip to 2nd-5th digit tips x 2 reps x 20 balls  · - Colored bead stringing with thumb to 2nd-5th digit tips x 50 beads  · - Silver balls in CW/CCW motion x 3min  · - Mini colored pegboard for FMC and in-hand manipulation  (5 in-hand) x 40 pegs    -  ###PHG (black spring) for composite  x 20 reps   - ### Theraputty for rolling, pulling, PVC for "cookie cutting" and medicine top x 10 reps  - ### digiflex for isolated x 10 reps ### digiflex for composite x 20 reps   - Wooden pegboard with ### clothespins with 3PT pinch x 3 reps     - Velcro checkerboard (on wedge) with "dart throwing" pattern x 1 rep  - Low load prolonged stretches for DF/VF with ### leg weight ( on wedge) x 30 seconds each x 2 reps   - Wrist roller coaster for AROM of wrist in all planes x 10 reps   - RED Exercise ball on table top for RD/UD stretches ( with hands in "W" pattern)   - ### Flexbar for SUP/PRON and DF/VF x 20 reps   - ### Wrist exerstick in standing for DF/VF x 3 reps      Assessment     Pt. will continue to benefit from skilled OT intervention to increase functional abilities, ROM, and strength and pain control.  Pt. demonstrated proper understanding of each exercise.  Pt continues to require verbal and tactile cues for throughout therapy session to maintain position and prevent compensation.   Pt continues to be limited in functional and leisurely pursuits. Pain limits patient's participation in ADL's. Pt is not able to carryout necessary vocational tasks.  Pt's spiritual, cultural and educational needs considered and pt agreeable to plan of care and goals.  Patient is making good progress towards established goals.  Pt. tolerated exercises within pain tolerance.   " "Reviewed HEP with pt., see EMR under "patient instructions" for provided exercises, activity modifications and limitations, modalities for home pain management.  Pt. demo understanding of above.      Pt. tolerated US and Paraffin with no irritation.      New/Revised Goals: Cont POC        Plan      Continue 2-3x week x 90 days during the certification period    03/10/2020   to 06/10/2020   with established POC in pursuit of OT goals.      Toshia Fisher, OT   "

## 2020-03-18 ENCOUNTER — CLINICAL SUPPORT (OUTPATIENT)
Dept: REHABILITATION | Facility: HOSPITAL | Age: 65
End: 2020-03-18
Payer: MEDICARE

## 2020-03-18 DIAGNOSIS — M79.641 RIGHT HAND PAIN: Primary | ICD-10-CM

## 2020-03-18 PROCEDURE — 97035 APP MDLTY 1+ULTRASOUND EA 15: CPT | Performed by: OCCUPATIONAL THERAPIST

## 2020-03-18 PROCEDURE — 97140 MANUAL THERAPY 1/> REGIONS: CPT | Performed by: OCCUPATIONAL THERAPIST

## 2020-03-18 PROCEDURE — 97018 PARAFFIN BATH THERAPY: CPT | Performed by: OCCUPATIONAL THERAPIST

## 2020-03-18 PROCEDURE — 97110 THERAPEUTIC EXERCISES: CPT | Performed by: OCCUPATIONAL THERAPIST

## 2020-03-19 NOTE — PROGRESS NOTES
"                            Occupational Therapy Daily Treatment Note       Date: 3/23/2020  Name: Payton Castillo  Clinic Number: [id number]    Therapy Diagnosis:   Encounter Diagnosis   Name Primary?    Right hand pain Yes     Physician: Leonid Aragon MD    Physician Orders: (R) carpal tunnel syndrome;  (R) OA of 1st metacarpal;  Evaluate and treat;  ROM, US, Paraffin, massage, deep heat, slow progressive strengthening  Medical Diagnosis: (R) hand / wrist pain;  (R) carpal tunnel syndrome;  (R) OA of 1st metacarpal  Surgical Procedure and Date: Excision of bony osteophyte of (R) thumb IP joint radial and ulnar border;  Arthrotomy IP (R) thumb; Excision of mucosynovial cyst (R) thumb IP joint; (R) thumb flexor tenosynovectomy; Median nerve decompression at (R) wrist;  Ulnar nerve decompression at elbow with submuscular transposition; (R) carpal trapeziectomy;  01/28/2020  Insurance Authorization Period Expiration: 03/03/2020-12/31/2020  Plan of Care Certification Period: 03/10/2020-06/10/2020  Date of Return to MD: 03/16/2020    Visit # / Visits authorized: 3 / 104  Time In: 10:00  Time Out: 10:55  Total Billable Time: 53 minutes    Precautions:  Standard;  Kidney CA ( approx. 2014)      Post Op:  7 weeks, 6 days      Subjective     Pt reports: "I am still having numbness in my small finger of my right hand.  I went to the doctor last week hoping to get an injection under my shoulder blade to relief the pain but the doctor did not want to do it because he was afraid of puncturing my lung.  I am interested in tele-health if its an option."    1-3/10 pain (R) hand/ wrist    Objective    Patient seen by OT this session. Tx consisted of:      Supervised modalities after being cleared for contradictions  x  10 minutes:     -Paraffin with MH to (R)  hand  to increase blood flow, circulation, pain management and for tissue elasticity prior to therex  x  0 minutes.   (not completed this session)    -Fluidotherapy to  (R)  " "hand to increase blood flow, circulation, tissue elasticity, desensitization, sensory re-education and for pain management  X 10 minutes.         Direct contact modalities after being cleared for contraindications x   8 minutes:  -Ultrasound applied to (R) hand  on   3.3 Mhz with 0.7 w/cm2 @ 100 % duty cycle to decrease pain and inflammation  /////  to remold scar tissue and increase tissue elasticity x 8 minutes.        Manual therapy techniques to increase joint mobilization /// scar massage/// soft tissue mobilization   x   15 minutes:     -Scar Massage to volar aspect of (R) wrist and dorsal aspect of (R) thumb region with mini therapeutic vibrator and scar extractor on volar aspect of incision sites to decrease dense scar adhesions and improve tensile glide  x 5 minutes.  -Manual Therapy techniques to (R) hand/ wrist  Including light joint mobilizations, stretching, and PROM to increase joint mobility, ROM and for pain management  x  5 minutes   -Soft Tissue Mobilization to (R) hand / wrist from distal to proximal to decrease edema and increase ROM and functional abilities  x  5 minutes      Therapeutic  Exercises to improve functional performance while increasing strength, endurance, ROM,  and flexibility  x   20 minutes:    · - Tendon glides for intrinsic +/-, flat fist and composite fist x 10 reps  · - Joint blocking and reverse joint blocking for IP of (R) thumb for EXT/FLEX x 10 reps   - High lighter rolls for intrinsics x 20 reps    - LARGE wooden blocks thumb tip to palm x 10 blocks  x 2 sets   - PVC DF/VF (SUP/PRON), RD/UD  x 20 reps   - Blue ball on tabletop for DF/VF stretches x 20 reps   - RED Exercise ball on table top for RD/UD stretches ( with hands in "W" pattern) x 3 sec holds x 10 reps each   - Colored bead stringing with thumb to 2nd-5th digit tips x 40 beads    Initiate in future therapy sessions:     - Sponge ("H" shaped) squeezes for digital ABD/ADD x 10 reps each  · - AAROM for digital " "FLEX isolated and composite digital FLEX for MCP, PIP, and DIP  · - Tabletop digital EXT x 20 reps  · - Towel walking for digital FLEX/EXT x 3 reps  · - Quick flicks for digital EXT with sponge   - Rice bucket with ### glass beads for desensitization ( vision occluded) x 1 rep   - [place] Slot with ### washers and  #### pennies ( 5 in hand) for FMC x 1 rep  · - Cotton balls thumb tip to palm x 2 reps  x 10 balls  · - Silver balls in CW/CCW motion x 3min  · - Mini colored pegboard for FMC and in-hand manipulation  (5 in-hand) x 40 pegs    -  ###PHG (black spring) for composite  x 20 reps   - ### Theraputty for rolling, pulling, PVC for "cookie cutting" and medicine top x 10 reps  - ### digiflex for isolated x 10 reps ### digiflex for composite x 20 reps   - Wooden pegboard with ### clothespins with 3PT pinch x 3 reps     - Velcro checkerboard (on wedge) with "dart throwing" pattern x 1 rep  - Low load prolonged stretches for DF/VF with ### leg weight ( on wedge) x 30 seconds each x 2 reps   - Wrist roller coaster for AROM of wrist in all planes x 10 reps   - ### Flexbar for SUP/PRON and DF/VF x 20 reps   - ### Wrist exerstick in standing for DF/VF x 3 reps      Assessment     Pt. will continue to benefit from skilled OT intervention to increase functional abilities, ROM, and strength and pain control.  Pt. demonstrated proper understanding of each exercise.  Pt continues to require verbal and tactile cues for throughout therapy session to maintain position and prevent compensation.   Pt continues to be limited in functional and leisurely pursuits. Pain limits patient's participation in ADL's. Pt is not able to carryout necessary vocational tasks.  Pt's spiritual, cultural and educational needs considered and pt agreeable to plan of care and goals.  Patient is making good progress towards established goals.  Pt. tolerated exercises within pain tolerance.   Reviewed HEP with pt., see EMR under "patient instructions" " for provided exercises, activity modifications and limitations, modalities for home pain management.  Pt. demo understanding of above.      Pt. tolerated US and Fluidotherapy with no irritation. Pt. Provided with Dycem for scar management.      Pt. Tolerated addition of colored bead stringing and red exercise ball on table top for RD/UD stretches with slight stretching pain reported.     Due to possible clinic closure 2* to coronavirus: OT educated pt.on  Epic MyChart communication, HEP, moist heat source, scar/ edema massage, modalities for home pain management and activity modifications.  Pt. was educated on tele-health options, patient requested to participate in tele-health visits when and if they are available during the COVID-19 situation. Pt. Demo understanding of above.    New/Revised Goals: Cont POC        Plan      Continue 2-3x week x 90 days during the certification period    03/10/2020   to 06/10/2020   with established POC in pursuit of OT goals.      Toshia Fisher, OT

## 2020-03-23 ENCOUNTER — CLINICAL SUPPORT (OUTPATIENT)
Dept: REHABILITATION | Facility: HOSPITAL | Age: 65
End: 2020-03-23
Payer: MEDICARE

## 2020-03-23 ENCOUNTER — OFFICE VISIT (OUTPATIENT)
Dept: FAMILY MEDICINE | Facility: CLINIC | Age: 65
End: 2020-03-23
Payer: MEDICARE

## 2020-03-23 VITALS
TEMPERATURE: 98 F | RESPIRATION RATE: 16 BRPM | BODY MASS INDEX: 25.55 KG/M2 | HEART RATE: 74 BPM | SYSTOLIC BLOOD PRESSURE: 124 MMHG | DIASTOLIC BLOOD PRESSURE: 78 MMHG | HEIGHT: 66 IN | WEIGHT: 159 LBS

## 2020-03-23 DIAGNOSIS — I10 ESSENTIAL HYPERTENSION: ICD-10-CM

## 2020-03-23 DIAGNOSIS — M79.641 RIGHT HAND PAIN: Primary | ICD-10-CM

## 2020-03-23 DIAGNOSIS — Z79.4 TYPE 2 DIABETES MELLITUS WITH DIABETIC POLYNEUROPATHY, WITH LONG-TERM CURRENT USE OF INSULIN: Primary | ICD-10-CM

## 2020-03-23 DIAGNOSIS — M89.8X1 CHRONIC SCAPULAR PAIN: ICD-10-CM

## 2020-03-23 DIAGNOSIS — E11.22 CKD STAGE 3 DUE TO TYPE 2 DIABETES MELLITUS: ICD-10-CM

## 2020-03-23 DIAGNOSIS — M25.511 CHRONIC RIGHT SHOULDER PAIN: ICD-10-CM

## 2020-03-23 DIAGNOSIS — G89.29 CHRONIC RIGHT SHOULDER PAIN: ICD-10-CM

## 2020-03-23 DIAGNOSIS — G89.29 CHRONIC SCAPULAR PAIN: ICD-10-CM

## 2020-03-23 DIAGNOSIS — N18.30 CKD STAGE 3 DUE TO TYPE 2 DIABETES MELLITUS: ICD-10-CM

## 2020-03-23 DIAGNOSIS — E11.42 TYPE 2 DIABETES MELLITUS WITH DIABETIC POLYNEUROPATHY, WITH LONG-TERM CURRENT USE OF INSULIN: Primary | ICD-10-CM

## 2020-03-23 PROCEDURE — 99214 OFFICE O/P EST MOD 30 MIN: CPT | Performed by: INTERNAL MEDICINE

## 2020-03-23 PROCEDURE — 99214 OFFICE O/P EST MOD 30 MIN: CPT | Mod: S$PBB,,, | Performed by: INTERNAL MEDICINE

## 2020-03-23 PROCEDURE — 97110 THERAPEUTIC EXERCISES: CPT | Performed by: OCCUPATIONAL THERAPIST

## 2020-03-23 PROCEDURE — 97035 APP MDLTY 1+ULTRASOUND EA 15: CPT | Performed by: OCCUPATIONAL THERAPIST

## 2020-03-23 PROCEDURE — 97022 WHIRLPOOL THERAPY: CPT | Performed by: OCCUPATIONAL THERAPIST

## 2020-03-23 PROCEDURE — 99214 PR OFFICE/OUTPT VISIT, EST, LEVL IV, 30-39 MIN: ICD-10-PCS | Mod: S$PBB,,, | Performed by: INTERNAL MEDICINE

## 2020-03-23 PROCEDURE — 97140 MANUAL THERAPY 1/> REGIONS: CPT | Performed by: OCCUPATIONAL THERAPIST

## 2020-03-23 RX ORDER — INSULIN GLARGINE 100 [IU]/ML
70 INJECTION, SOLUTION SUBCUTANEOUS NIGHTLY
Qty: 21 VIAL | Refills: 3 | Status: SHIPPED | OUTPATIENT
Start: 2020-03-23 | End: 2021-03-04

## 2020-03-23 RX ORDER — INSULIN ASPART 100 [IU]/ML
20 INJECTION, SOLUTION INTRAVENOUS; SUBCUTANEOUS
Qty: 18 SYRINGE | Refills: 3 | Status: SHIPPED | OUTPATIENT
Start: 2020-03-23 | End: 2020-12-31

## 2020-03-23 NOTE — PATIENT INSTRUCTIONS
Diabetes: Activity Tips    Being more active can help you manage your diabetes. The tips on this sheet can help you get the most from your exercise. They can also help you stay safe.  Staying Active  Its important for adults to spend less time sitting and being inactive. This is especially true if you have type 2 diabetes. When you are sitting for long periods of time, get up for short sessions of light activity every 30 minutes.  You should aim for at least 150 minutes a week of exercise or physical activity. Dont let more than 2 days go by without being active.  Benefit from briskness  Brisk activity gets your heart beating faster. This can help you increase your fitness, lose extra weight, and manage your blood sugar level. Try brisk walking. Or, if you have foot or leg problems, you can try swimming or bike riding. You can break up your exercise into chunks throughout the day. Work up to at least 30 minutes of steady, brisk exercise on most days.  Warm up and cool down  Warming up and cooling down reduce your risk of injury. They also help limit muscle soreness. Do a mild version of your activity for 5 minutes before and after your routine. You can also learn stretches that will help keep your muscles loose. Your healthcare provider may show you good ways to warm up and stretch.  Do the talk-sing test  The talk-sing test is a simple way to tell how hard youre exercising. If you can talk while exercising, youre in a safe range. If youre out of breath, slow down. If you can carry a tune, its time to  the pace. Walk up a hill. Increase the resistance on your stationary bike. Or swim faster.  What about eating?  You may be told to plan your exercise for 1 to 2 hours after a meal. In most cases, you dont need to eat while being active. If you take insulin or medicine that can cause low blood sugar, test your blood sugar before exercising. And carry a fast-acting sugar that will raise your blood sugar  level quickly. This includes glucose tablets or hard candy. Use it if you feel low blood sugar symptoms.  Safety tips  These tips can help you stay safe as you become fit:  · Exercise with a friend or carry a cell phone if you have one.  · Carry or wear identification, such as a necklace or bracelet, that says you have diabetes.  · Use the proper footwear and safety equipment for your activity.  · Drink water before, during, and after exercise.  · Dress properly for the weather.  · Dont exercise in very hot or very cold weather.  · Dont exercise if you are sick.  · If you are instructed to do so, test your blood sugar before and after you exercise. Have a small carbohydrate snack if your blood sugar is low before you start exercising.   When to stop exercising and call your healthcare provider  Stop exercising and call your healthcare provider right away if you notice any of the following:  · Pain, pressure, tightness, or heaviness in the chest  · Pain or heaviness in the neck, shoulders, back, arms, legs, or feet  · Unusual shortness of breath  · Dizziness or lightheadedness  · Unusually rapid or slow pulse  · Increased joint or muscle pain  · Nausea or vomiting  Date Last Reviewed: 5/1/2016  © 1010-5329 [employer]. [address] [place] [address], [place] [address]. All rights reserved. This information is not intended as a substitute for professional medical care. Always follow your healthcare professional's instructions.        Exercise to Manage Your Blood Sugar    Being physically active every day can help you manage your blood sugar. Thats because an active lifestyle can improve your bodys ability to use insulin. Daily activity can also help delay or prevent complications of diabetes. And its a great way to relieve stress. If you arent normally active, be sure to consult your healthcare provider before getting started.  How much activity do you need?  If daily activity is new to you, start slow  "and steady. Begin with 10 minutes of activity each day. Then work up to at least 150 minutes a week of physical activity. Don't let more than 2 days go by without being active. When sitting for long periods of time, get up for short sessions of light activity every 30 minutes  Just move!  You dont have to join a gym or own pricey sports equipment. Just get out and walk. Walking is an aerobic exercise that makes your heart and lungs work hard. It helps your heart and blood vessels. Walking needs only a sturdy pair of sneakers and your own two feet. The more you walk, the easier it gets:  · Schedule time every day to move your feet.  · Make it part of your daily routine.  · Walk with a friend or a group to keep it interesting and fun.  · Try taking several short walks during the day to meet your daily activity goal.  A pedometer makes every step count  A pedometer is a small device that keeps track of how many steps you take. You can clip it to your belt (or a strap on your arm or leg) and go about your daily routine. "Smartphones" now also have apps to record your walking. At the end of the day, the pedometer shows the total number of steps you took. Use a pedometer to set daily goals for yourself. For instance, if you walk 4,000 steps a day, try adding 200 more steps each day. Aim for a goal of 7,500. With every step, youre doing a little more to help your body use insulin.   Adding resistance exercise  Resistance exercise (also called strength training), makes muscles stronger. It also helps muscles use insulin better. Ask your healthcare provider whether this type of exercise is right for you. If it is, your healthcare provider can help you work it in to your activity plan.  Staying safe  Being active may cause blood sugar to drop faster than usual. This is especially true if you take medicine to manage your blood sugar. But there are things you can do to help reduce the risk of accidental lows. Keep these tips " in mind:  · Always carry identification when you exercise outside your home. Carry a cell phone to use in case of emergency.  · If you can, include friends and family in your activities.  · Wear a medical ID bracelet that says you have diabetes.  · Use the right safety equipment for the activity you do (such as a bicycle helmet when you ride a bicycle outdoors). Wear closed-toed shoes that fit your feet well.  · Drink plenty of water before and during activity.  · Keep a fast-acting sugar (such as glucose tablets) on hand in case of low blood sugar.  · Dress properly for the weather. Wear a hat if its herlinda, or wait until evening if its too hot.  · Avoid being active for long periods in very hot or very cold weather.  · Skip activity if youre sick.     Notice how activity affects blood sugar  Physical activity is important when you have diabetes. But you need to keep an eye on your blood sugar level. Check often if you have been active for longer than usual, or if the activity was unplanned. Make it a habit to check your blood sugar before being active. And check again a few hours later. Use your log book to write down how activity affects your numbers. If you take insulin, you may be able to adjust your dose before a planned activity. This can help prevent lows. You may also need to take a small carbohydrate snack before the exercise. Talk to your healthcare provider to learn more.    Date Last Reviewed: 6/1/2016  © 5467-4966 [employer]. [address] [place] [address], [place] [address]. All rights reserved. This information is not intended as a substitute for professional medical care. Always follow your healthcare professional's instructions.

## 2020-03-23 NOTE — LETTER
March 23, 2020        Rasta Goff Jr., MD  [address]  [address]  [place] [address]             [facility]  [address]  [place] [address]  Phone: [phone]  Fax: [phone]   Patient: Payton Castillo   MR Number: [id number]   YOB: 1955   Date of Visit: 3/23/2020     Dear Dr. Goff,     Kindly evaluate miss Cain with complains of right posterior scapular tip pain.  I am not sure where this pain is coming from.  Is it coming from spine or some of the part of the body needs to be evaluated.  She states that she had got a cortisone shot at this spot in past from some other provider but I do not feel comfortable with that.    Kind regards  Sincerely,      John Nolan MD            CC  No Recipients    Enclosure

## 2020-03-23 NOTE — PROGRESS NOTES
Subjective:       Patient ID: Payton Castillo is a 65 y.o. female.    Chief Complaint: Diabetes; Hypertension; Hyperlipidemia; and Shoulder Pain    Miss Payton Le is a 65-year-old female who comes for follow-up.  Underlying medical issues of hypertension, dyslipidemia, type 2 diabetes mellitus on insulin has been noted.  Her sugars have been rather fluctuating recently.  Because of ongoing psychosocial issues she has not been able to check her blood sugars recently.      She has also been experiencing pain in the right shoulder.  She has degenerative arthritis of the right shoulder since more than 10 years.    She also requests sending prescription for Lantus and Humalog to [facility] mail order pharmacy.    Patient's potassium level is slightly elevated.  Hemoglobin A1c has slightly crept up to 7.5.    Diabetes   She presents for her follow-up diabetic visit. She has type 2 diabetes mellitus. Her disease course has been fluctuating. Pertinent negatives for hypoglycemia include no confusion, dizziness, headaches, mood changes, nervousness/anxiousness, pallor, seizures or tremors. Associated symptoms include fatigue. Pertinent negatives for diabetes include no chest pain, no polydipsia and no polyphagia. Risk factors for coronary artery disease include hypertension, sedentary lifestyle, post-menopausal, dyslipidemia and diabetes mellitus. Current diabetic treatment includes insulin injections. She is compliant with treatment most of the time. An ACE inhibitor/angiotensin II receptor blocker is being taken.   Hypertension   This is a chronic problem. The current episode started more than 1 year ago. Pertinent negatives include no chest pain, headaches, palpitations or shortness of breath. Risk factors for coronary artery disease include sedentary lifestyle, dyslipidemia, diabetes mellitus and post-menopausal state. Past treatments include beta blockers, angiotensin blockers and calcium channel blockers. The  current treatment provides moderate improvement. Compliance problems include psychosocial issues.    Hyperlipidemia   This is a chronic problem. The current episode started more than 1 year ago. The problem is controlled. Exacerbating diseases include diabetes and obesity. Pertinent negatives include no chest pain or shortness of breath. The current treatment provides moderate improvement of lipids. Risk factors for coronary artery disease include post-menopausal, obesity, dyslipidemia and diabetes mellitus.   Shoulder Pain    The pain is present in the right shoulder. This is a recurrent problem. The current episode started more than 1 month ago. The problem occurs constantly. The problem has been unchanged. The quality of the pain is described as burning. Associated symptoms include numbness. Pertinent negatives include no fever or headaches. The treatment provided mild relief. Family history includes arthritis. Her past medical history is significant for diabetes.       Past Medical History:   Diagnosis Date    Allergy     aspartame,fructose monosodium glutamate and penicillins.    Anemia of chronic disorder 11/9/2017    Anemia, chronic renal failure, stage 2 (mild) 11/9/2017    Anemia, chronic renal failure, stage 3 (moderate) 11/9/2017    Asthma     Cancer     kidney    Cataract of right eye     CKD (chronic kidney disease), stage III     Diabetes mellitus     type II    Fibromyalgia     GERD (gastroesophageal reflux disease)     Hx of renal cell cancer - left nephrectomy Aug 2014 11/9/2017    Hypercholesteremia     Hypertension     IDDM (insulin dependent diabetes mellitus)     Lumbar spondylosis     Lumbosacral radiculopathy     Osteopenia     Palpitations     Plantar fasciitis, right     Syncope 2/2012    Tachycardia     Thoracic radiculopathy     TMJ (dislocation of temporomandibular joint)      Social History     Socioeconomic History    Marital status:      Spouse name:  Owen Anna    Number of children: 2    Years of education: Not on file    Highest education level: Not on file   Occupational History    Occupation: Part - member support [employer]     Employer: [employer]   Social Needs    Financial resource strain: Not on file    Food insecurity:     Worry: Not on file     Inability: Not on file    Transportation needs:     Medical: Not on file     Non-medical: Not on file   Tobacco Use    Smoking status: Never Smoker    Smokeless tobacco: Never Used   Substance and Sexual Activity    Alcohol use: No    Drug use: No    Sexual activity: Yes     Partners: Male   Lifestyle    Physical activity:     Days per week: Not on file     Minutes per session: Not on file    Stress: Not at all   Relationships    Social connections:     Talks on phone: Not on file     Gets together: Not on file     Attends [employer]ism service: Not on file     Active member of club or organization: Not on file     Attends meetings of clubs or organizations: Not on file     Relationship status: Not on file   Other Topics Concern    Not on file   Social History Narrative    Not on file     Past Surgical History:   Procedure Laterality Date    CATARACT EXTRACTION Left 03/2016    JOINT REPLACEMENT Right     knee    SHOULDER OPEN ROTATOR CUFF REPAIR Right 02/2005    labral repair    TOTAL KNEE ARTHROPLASTY Left 2014    TUBAL LIGATION  1990    TUMOR REMOVAL  2014    from kidney     Family History   Problem Relation Age of Onset    Stomach cancer Mother     Breast cancer Mother     Diabetes Mother     Cancer Father        Review of Systems   Constitutional: Positive for fatigue. Negative for activity change, chills, fever and unexpected weight change.   HENT: Negative for congestion, postnasal drip, sinus pressure and sore throat.         Dry mouth due to VESIcare   Eyes: Negative for pain, discharge and visual disturbance.   Respiratory: Positive for apnea. Negative for cough, chest tightness  "and shortness of breath.         Patient does have obstructive sleep apnea and uses a CPAP device.   Cardiovascular: Negative for chest pain, palpitations and leg swelling.        Hypertension.  Chronic kidney disease stage 3.   Gastrointestinal: Negative for abdominal distention, abdominal pain, anal bleeding, constipation and diarrhea.   Endocrine: Negative for polydipsia and polyphagia.        Type 2 diabetes mellitus dyslipidemia   Genitourinary: Negative for difficulty urinating, dysuria, flank pain and frequency.        Patient has an intact uterus.  Pap smear is pending at this point.  She has chronic kidney disease stage 3 and follows Dr. Tommy macedo for the same.   Musculoskeletal: Positive for back pain. Negative for arthralgias and joint swelling.        Rt shoulder pain-on specifically examining the pain seems to be under the right scapula.  The tip of the scapula.  It is not on the shoulder itself.  Perhaps moving the shoulder might aggravate this pain.  This has been going on for several years.  In past she had got a injection of steroid probably at that spot.   Skin: Negative for color change, pallor and rash.   Allergic/Immunologic: Negative for environmental allergies, food allergies and immunocompromised state.   Neurological: Positive for numbness. Negative for dizziness, tremors, seizures, syncope, light-headedness and headaches.        Neuropathy symptoms due to diabetes.  Patient takes gabapentin 600 mg 3 times a day.   Hematological: Negative for adenopathy. Does not bruise/bleed easily.   Psychiatric/Behavioral: Negative for agitation, confusion and dysphoric mood. The patient is not nervous/anxious.          Objective:      Blood pressure 124/78, pulse 74, temperature 97.8 °F (36.6 °C), resp. rate 16, height 5' 6" (1.676 m), weight 72.1 kg (159 lb). Body mass index is 25.66 kg/m².  Physical Exam   Constitutional: She appears well-developed and well-nourished. She is cooperative. No " distress.   HENT:   Head: Normocephalic and atraumatic.   Mouth/Throat: Mucous membranes are dry. No tonsillar abscesses.   Dryness in mouth.  Oral airways Mallampati score 3.  Difficult examination.   Eyes: Conjunctivae, EOM and lids are normal. Lids are everted and swept, no foreign bodies found. No scleral icterus. Right pupil is round and reactive. Left pupil is round and reactive.   Neck: Trachea normal and normal range of motion. Neck supple. No tracheal deviation present. No thyromegaly present.   Cardiovascular: Normal rate, regular rhythm, S1 normal, S2 normal and normal heart sounds.   Pulses:       Dorsalis pedis pulses are 1+ on the right side, and 1+ on the left side.   Pulmonary/Chest: Breath sounds normal.   Abdominal: Soft. Bowel sounds are normal. She exhibits no distension. There is no hepatosplenomegaly. There is no tenderness. There is no rigidity, no rebound and no guarding.       scar amy of prior surgeries are noted including scars of nephrectomy .   Musculoskeletal: She exhibits no tenderness or deformity.        Right foot: There is normal range of motion and no deformity.        Left foot: There is normal range of motion and no deformity.   Feet:   Right Foot:   Protective Sensation: 5 sites tested. 4 sites sensed.   Skin Integrity: Negative for ulcer, blister or skin breakdown.   Left Foot:   Protective Sensation: 5 sites tested. 4 sites sensed.   Skin Integrity: Negative for ulcer, blister or skin breakdown.   Lymphadenopathy:     She has no cervical adenopathy.     She has no axillary adenopathy.   Neurological: She is alert.   Numbness in fingers- Carpel tunnel    She has diminished feelings and monofilament sensations in the toes.   Skin: Skin is warm and dry.   Psychiatric: She has a normal mood and affect. Her behavior is normal.   Nursing note and vitals reviewed.        Assessment:       1. Type 2 diabetes mellitus with diabetic polyneuropathy, with long-term current use of  insulin    2. CKD stage 3 due to type 2 diabetes mellitus    3. Essential hypertension    4. Chronic right shoulder pain    5. Chronic scapular pain           Lab Visit on 03/06/2020   Component Date Value Ref Range Status    Glucose 03/06/2020 210* 70 - 110 mg/dL Final    Sodium 03/06/2020 138  136 - 145 mmol/L Final    Potassium 03/06/2020 5.3* 3.5 - 5.1 mmol/L Final    Chloride 03/06/2020 99  95 - 110 mmol/L Final    CO2 03/06/2020 30* 23 - 29 mmol/L Final    BUN, Bld 03/06/2020 25* 8 - 23 mg/dL Final    Calcium 03/06/2020 10.0  8.7 - 10.5 mg/dL Final    Creatinine 03/06/2020 1.2  0.5 - 1.4 mg/dL Final    Albumin 03/06/2020 4.3  3.5 - 5.2 g/dL Final    Phosphorus 03/06/2020 3.9  2.7 - 4.5 mg/dL Final    eGFR if African American 03/06/2020 54.8* >60 mL/min/1.73 m^2 Final    eGFR if non African American 03/06/2020 47.5* >60 mL/min/1.73 m^2 Final    Anion Gap 03/06/2020 9  8 - 16 mmol/L Final    Protein, Urine Random 03/06/2020 181* 0 - 15 mg/dL Final    Creatinine, Random Ur 03/06/2020 140.0  15.0 - 325.0 mg/dL Final    Prot/Creat Ratio, Ur 03/06/2020 1.29* 0.00 - 0.20 Final   Lab Visit on 03/06/2020   Component Date Value Ref Range Status    Hemoglobin A1C 03/06/2020 7.5* 4.5 - 6.2 % Final    Estimated Avg Glucose 03/06/2020 169* 68 - 131 mg/dL Final         Plan:           Type 2 diabetes mellitus with diabetic polyneuropathy, with long-term current use of insulin  -     insulin aspart U-100 (NOVOLOG FLEXPEN U-100 INSULIN) 100 unit/mL (3 mL) InPn pen; Inject 20 Units into the skin 3 (three) times daily with meals.  Dispense: 18 Syringe; Refill: 3  -     insulin glargine (LANTUS U-100 INSULIN) 100 unit/mL injection; Inject 70 Units into the skin every evening.  Dispense: 21 vial; Refill: 3  -     Hemoglobin A1c; Future; Expected date: 06/15/2020    CKD stage 3 due to type 2 diabetes mellitus  -     Renal function panel; Future; Expected date: 06/15/2020    Essential hypertension  -     Lipid  panel; Future; Expected date: 06/15/2020    Chronic right shoulder pain    Chronic scapular pain  -     Ambulatory referral/consult to Orthopedics; Future; Expected date: 03/30/2020      Patient's multiple medical conditions have been reviewed.  Refills for Lantus and NovoLog have been sent to mail order pharmacy.  Compliance to diet and monitoring have been recommended again.    Blood pressure monitoring at home has been recommended.  Gone for walks and exercise as much as is feasible during this COVID 19 times.    She has a chronic pain in the right scapular region.  I am not sure if it is referred from the spine.  I am concerned about giving a shot of cortisone here without complications.  I will make a referral to Dr. Goff.    Cut down on NSAIDS  Patient's potassium level was elevated at 5.3.  She is currently not taking potassium supplements and I have advised her to completely stop it.  This will be deleted from her medication list.  Follow-up 2-3 months to review labs again.    Spent jhonatan 25 minutes with patient which involved review of pts medical conditions, labs, medications and with 50% of time face-to-face discussion about medical problems, management and any applicable changes.    Current Outpatient Medications:     amLODIPine (NORVASC) 2.5 MG tablet, Take 1 tablet by mouth once daily., Disp: , Rfl:     atorvastatin (LIPITOR) 40 MG tablet, Take 1 tablet (40 mg total) by mouth every evening., Disp: 90 tablet, Rfl: 1    cetirizine (ZYRTEC) 10 MG tablet, Take 10 mg by mouth., Disp: , Rfl:     diphenhydramine-acetaminophen (TYLENOL PM)  mg Tab, Take 1 tablet by mouth nightly as needed., Disp: , Rfl:     esomeprazole (NEXIUM) 40 MG capsule, Take 1 capsule (40 mg total) by mouth once daily., Disp: 90 capsule, Rfl: 1    ferrous gluconate (FERGON) 240 (27 FE) MG tablet, Take 480 mg by mouth 3 (three) times daily., Disp: , Rfl:     flash glucose sensor (FREESTYLE BANG 14 DAY SENSOR) Kit, 1 Device  "by Misc.(Non-Drug; Combo Route) route every 14 (fourteen) days., Disp: 6 kit, Rfl: 2    fluticasone propionate (FLONASE) 50 mcg/actuation nasal spray, 2 sprays (100 mcg total) by Each Nostril route once daily., Disp: 3 Bottle, Rfl: 3    FREESTYLE LANCETS MISC, by Misc.(Non-Drug; Combo Route) route 3 (three) times daily., Disp: , Rfl:     gabapentin (NEURONTIN) 600 MG tablet, Take 1 tablet (600 mg total) by mouth 3 (three) times daily., Disp: 270 tablet, Rfl: 2    insulin aspart U-100 (NOVOLOG FLEXPEN U-100 INSULIN) 100 unit/mL (3 mL) InPn pen, Inject 20 Units into the skin 3 (three) times daily with meals., Disp: 18 Syringe, Rfl: 3    insulin glargine (LANTUS U-100 INSULIN) 100 unit/mL injection, Inject 70 Units into the skin every evening., Disp: 21 vial, Rfl: 3    insulin syringe-needle U-100 0.3 mL 30 Syrg, by Misc.(Non-Drug; Combo Route) route 3 (three) times daily., Disp: , Rfl:     insulin syringe-needle U-100 1 mL 28 gauge Syrg, 1 Syringe by Misc.(Non-Drug; Combo Route) route once daily., Disp: 100 each, Rfl: 3    losartan (COZAAR) 50 MG tablet, Take 1 tablet (50 mg total) by mouth once daily., Disp: 90 tablet, Rfl: 2    metoprolol succinate (TOPROL XL) 50 MG 24 hr tablet, Take 1 tablet (50 mg total) by mouth once daily., Disp: 90 tablet, Rfl: 1    pen needle, diabetic (ADVOCATE PEN NEEDLE) 31 gauge x 5/16" Ndle, 1 Device by Misc.(Non-Drug; Combo Route) route 3 (three) times daily., Disp: 100 each, Rfl: 3    solifenacin (VESICARE) 5 MG tablet, Take 5 mg by mouth., Disp: , Rfl:   "

## 2020-03-25 NOTE — PROGRESS NOTES
"                            Occupational Therapy Daily Treatment Note       Date: 3/26/2020  Name: Payton Castillo  Clinic Number: [id number]    Therapy Diagnosis:   Encounter Diagnosis   Name Primary?    Right hand pain Yes     Physician: Leonid Aragon MD    Physician Orders: (R) carpal tunnel syndrome;  (R) OA of 1st metacarpal;  Evaluate and treat;  ROM, US, Paraffin, massage, deep heat, slow progressive strengthening  Medical Diagnosis: (R) hand / wrist pain;  (R) carpal tunnel syndrome;  (R) OA of 1st metacarpal  Surgical Procedure and Date: Excision of bony osteophyte of (R) thumb IP joint radial and ulnar border;  Arthrotomy IP (R) thumb; Excision of mucosynovial cyst (R) thumb IP joint; (R) thumb flexor tenosynovectomy; Median nerve decompression at (R) wrist;  Ulnar nerve decompression at elbow with submuscular transposition; (R) carpal trapeziectomy;  01/28/2020  Insurance Authorization Period Expiration: 03/03/2020-12/31/2020  Plan of Care Certification Period: 03/10/2020-06/10/2020  Date of Return to MD: 03/16/2020    Visit # / Visits authorized: 4 / 104  Time In: 1:00  Time Out: 1:55  Total Billable Time: 53 minutes    Precautions:  Standard;  Kidney CA ( approx. 2014)      Post Op:  8 weeks, 1 days      Subjective     Pt reports: "I am having the most pain in my hand at night.  I have been trying to move my thumb at home.  I have a little stress ball that I have been squeezing at home."    1-3/10 pain (R) hand/ wrist    Objective    Patient seen by OT this session. Tx consisted of:      Supervised modalities after being cleared for contradictions  x  10 minutes:     -Paraffin with MH to (R)  hand  to increase blood flow, circulation, pain management and for tissue elasticity prior to therex  x  0 minutes.   (not completed this session)    -Fluidotherapy to  (R)  hand to increase blood flow, circulation, tissue elasticity, desensitization, sensory re-education and for pain management  X 10 minutes. " "        Direct contact modalities after being cleared for contraindications x   8 minutes:  -Ultrasound applied to (R) hand  on   3.3 Mhz with 0.7 w/cm2 @ 100 % duty cycle to decrease pain and inflammation  /////  to remold scar tissue and increase tissue elasticity x 8 minutes.        Manual therapy techniques to increase joint mobilization /// scar massage/// soft tissue mobilization   x   15 minutes:     -Scar Massage to volar aspect of (R) wrist and dorsal aspect of (R) thumb region with mini therapeutic vibrator and scar extractor on volar aspect of incision sites to decrease dense scar adhesions and improve tensile glide  x 5 minutes.  -Manual Therapy techniques to (R) hand/ wrist  Including light joint mobilizations, stretching, and PROM to increase joint mobility, ROM and for pain management  x  5 minutes   -Soft Tissue Mobilization to (R) hand / wrist from distal to proximal to decrease edema and increase ROM and functional abilities  x  5 minutes      Therapeutic  Exercises to improve functional performance while increasing strength, endurance, ROM,  and flexibility  x   20 minutes:    · - Tendon glides for intrinsic +/-, flat fist and composite fist x 10 reps  · - Joint blocking and reverse joint blocking for IP of (R) thumb for EXT/FLEX x 10 reps   - High lighter rolls for intrinsics x 20 reps    - LARGE wooden blocks thumb tip to palm x 10 blocks  x 2 sets   - PVC DF/VF (SUP/PRON), RD/UD  x 20 reps   - Blue ball on tabletop for DF/VF stretches x 20 reps   - RED Exercise ball on table top for RD/UD stretches ( with hands in "W" pattern) x 3 sec holds x 10 reps each   - Colored bead stringing with thumb to 2nd-5th digit tips x 40 beads   - Macaroni Transfer for composite  x 1 reps   - Velcro checkerboard (on wedge) with "dart throwing" pattern x 1 rep      Initiate in future therapy sessions:     - Sponge ("H" shaped) squeezes for digital ABD/ADD x 10 reps each  · - AAROM for digital FLEX isolated and " "composite digital FLEX for MCP, PIP, and DIP  · - Tabletop digital EXT x 20 reps  · - Towel walking for digital FLEX/EXT x 3 reps  · - Quick flicks for digital EXT with sponge   - Rice bucket with ### glass beads for desensitization ( vision occluded) x 1 rep   - [place] Slot with ### washers and  #### pennies ( 5 in hand) for FMC x 1 rep  · - Cotton balls thumb tip to palm x 2 reps  x 10 balls  · - Silver balls in CW/CCW motion x 3min  · - Mini colored pegboard for FMC and in-hand manipulation  (5 in-hand) x 40 pegs    -  ###PHG (black spring) for composite  x 20 reps   - ### Theraputty for rolling, pulling, PVC for "cookie cutting" and medicine top x 10 reps  - ### digiflex for isolated x 10 reps ### digiflex for composite x 20 reps   - Wooden pegboard with ### clothespins with 3PT pinch x 3 reps    - Low load prolonged stretches for DF/VF with ### leg weight ( on wedge) x 30 seconds each x 2 reps   - Wrist roller coaster for AROM of wrist in all planes x 10 reps   - ### Flexbar for SUP/PRON and DF/VF x 20 reps   - ### Wrist exerstick in standing for DF/VF x 3 reps    03/26/2020 (post modalities, PROM)  Opposition: 4.1 cm ( from thumb tip to DPC)       Assessment     Pt. will continue to benefit from skilled OT intervention to increase functional abilities, ROM, and strength and pain control.  Pt. demonstrated proper understanding of each exercise.  Pt continues to require verbal and tactile cues for throughout therapy session to maintain position and prevent compensation.   Pt continues to be limited in functional and leisurely pursuits. Pain limits patient's participation in ADL's. Pt is not able to carryout necessary vocational tasks.  Pt's spiritual, cultural and educational needs considered and pt agreeable to plan of care and goals.  Patient is making good progress towards established goals.  Pt. tolerated exercises within pain tolerance.   Reviewed HEP with pt., see EMR under "patient instructions" for " provided exercises, activity modifications and limitations, modalities for home pain management.  Pt. demo understanding of above.      Pt. tolerated US and Fluidotherapy with no irritation. Pt. Provided with Dycem for scar management.      Pt. Tolerated addition of velcro checkerboard and  macaroni transfer with no reported pain.     Due to possible clinic closure 2* to coronavirus: OT educated pt.on  Epic MyChart communication, HEP, moist heat source, scar/ edema massage, modalities for home pain management and activity modifications.  Pt. was educated on tele-health options, patient requested to participate in tele-health visits when and if they are available during the COVID-19 situation. Pt. Demo understanding of above.    New/Revised Goals: Cont POC        Plan      Continue 2-3x week x 90 days during the certification period    03/10/2020   to 06/10/2020   with established POC in pursuit of OT goals.      Toshia Fisher, OT

## 2020-03-26 ENCOUNTER — CLINICAL SUPPORT (OUTPATIENT)
Dept: REHABILITATION | Facility: HOSPITAL | Age: 65
End: 2020-03-26
Payer: MEDICARE

## 2020-03-26 DIAGNOSIS — M79.641 RIGHT HAND PAIN: Primary | ICD-10-CM

## 2020-03-26 PROCEDURE — 97022 WHIRLPOOL THERAPY: CPT | Performed by: OCCUPATIONAL THERAPIST

## 2020-03-26 PROCEDURE — 97110 THERAPEUTIC EXERCISES: CPT | Performed by: OCCUPATIONAL THERAPIST

## 2020-03-26 PROCEDURE — 97140 MANUAL THERAPY 1/> REGIONS: CPT | Performed by: OCCUPATIONAL THERAPIST

## 2020-03-26 PROCEDURE — 97035 APP MDLTY 1+ULTRASOUND EA 15: CPT | Performed by: OCCUPATIONAL THERAPIST

## 2020-03-26 NOTE — PROGRESS NOTES
"                            Occupational Therapy Daily Treatment Note       Date: 3/30/2020  Name: Payton Castillo  Clinic Number: [id number]    Therapy Diagnosis:   Encounter Diagnosis   Name Primary?    Right hand pain Yes     Physician: Leonid Aragon MD    Physician Orders: (R) carpal tunnel syndrome;  (R) OA of 1st metacarpal;  Evaluate and treat;  ROM, US, Paraffin, massage, deep heat, slow progressive strengthening  Medical Diagnosis: (R) hand / wrist pain;  (R) carpal tunnel syndrome;  (R) OA of 1st metacarpal  Surgical Procedure and Date: Excision of bony osteophyte of (R) thumb IP joint radial and ulnar border;  Arthrotomy IP (R) thumb; Excision of mucosynovial cyst (R) thumb IP joint; (R) thumb flexor tenosynovectomy; Median nerve decompression at (R) wrist;  Ulnar nerve decompression at elbow with submuscular transposition; (R) carpal trapeziectomy;  01/28/2020  Insurance Authorization Period Expiration: 03/03/2020-12/31/2020  Plan of Care Certification Period: 03/10/2020-06/10/2020  Date of Return to MD: 03/16/2020    Visit # / Visits authorized: 5 / 104  Time In: 10:10  Time Out: 11:05  Total Billable Time: 53 minutes    Precautions:  Standard;  Kidney CA ( approx. 2014)      Post Op:  8 weeks, 5 days      Subjective     Pt reports: "I have been moving my hand and wrist a lot at the house.  I think I am getting more motion in my fingers.  I think I need to strengthen my wrist."    1/10 pain (R) hand/ wrist    Objective    Patient seen by OT this session. Tx consisted of:      Supervised modalities after being cleared for contradictions  x  10 minutes:     -Paraffin with MH to (R)  hand  to increase blood flow, circulation, pain management and for tissue elasticity prior to therex  x  0 minutes.   (not completed this session)    -Fluidotherapy to  (R)  hand to increase blood flow, circulation, tissue elasticity, desensitization, sensory re-education and for pain management  X 10 minutes.         Direct " "contact modalities after being cleared for contraindications x   8 minutes:  -Ultrasound applied to (R) hand  on   3.3 Mhz with 0.7 w/cm2 @ 100 % duty cycle to decrease pain and inflammation  /////  to remold scar tissue and increase tissue elasticity x 8 minutes.        Manual therapy techniques to increase joint mobilization /// scar massage/// soft tissue mobilization   x   15 minutes:     -Scar Massage to volar aspect of (R) wrist and dorsal aspect of (R) thumb region with mini therapeutic vibrator and scar extractor on volar aspect of incision sites to decrease dense scar adhesions and improve tensile glide  x 5 minutes.  -Manual Therapy techniques to (R) hand/ wrist  Including light joint mobilizations, stretching, and PROM to increase joint mobility, ROM and for pain management  x  5 minutes   -Soft Tissue Mobilization to (R) hand / wrist from distal to proximal to decrease edema and increase ROM and functional abilities  x  5 minutes      Therapeutic  Exercises to improve functional performance while increasing strength, endurance, ROM,  and flexibility  x   20 minutes:    · - Tendon glides for intrinsic +/-, flat fist and composite fist x 10 reps  · - Joint blocking and reverse joint blocking for IP of (R) thumb for EXT/FLEX x 10 reps   - High lighter rolls for intrinsics x 20 reps    - LARGE wooden blocks thumb tip to palm x 10 blocks  x 2 sets   - 0.5# leg wieght DF/VF (SUP/PRON), RD/UD  x 20 reps   - Blue ball on tabletop for DF/VF stretches x 20 reps   - RED Exercise ball on table top for RD/UD stretches ( with hands in "W" pattern) x 3 sec holds x 10 reps each   - Colored bead stringing with thumb to 2nd-5th digit tips x 40 beads   - Macaroni Transfer for composite  x 1 reps   - Velcro checkerboard (on wedge) with "dart throwing" pattern x 1 rep   - Wrist roller coaster for AROM of wrist in all planes x 10 reps      Initiate in future therapy sessions:     - Sponge ("H" shaped) squeezes for digital " "ABD/ADD x 10 reps each  · - AAROM for digital FLEX isolated and composite digital FLEX for MCP, PIP, and DIP  · - Tabletop digital EXT x 20 reps  · - Towel walking for digital FLEX/EXT x 3 reps  · - Quick flicks for digital EXT with sponge   - Rice bucket with ### glass beads for desensitization ( vision occluded) x 1 rep   - [place] Slot with ### washers and  #### pennies ( 5 in hand) for FMC x 1 rep  · - Cotton balls thumb tip to palm x 2 reps  x 10 balls  · - Silver balls in CW/CCW motion x 3min  · - Mini colored pegboard for FMC and in-hand manipulation  (5 in-hand) x 40 pegs    -  ###PHG (black spring) for composite  x 20 reps   - ### Theraputty for rolling, pulling, PVC for "cookie cutting" and medicine top x 10 reps  - ### digiflex for isolated x 10 reps ### digiflex for composite x 20 reps   - Wooden pegboard with ### clothespins with 3PT pinch x 3 reps    - Low load prolonged stretches for DF/VF with ### leg weight ( on wedge) x 30 seconds each x 2 reps   - ### Flexbar for SUP/PRON and DF/VF x 20 reps   - ### Wrist exerstick in standing for DF/VF x 3 reps    03/30/2020 (post modalities, PROM)  Opposition: 3.9  cm ( from thumb tip to DPC)       Assessment     Pt. will continue to benefit from skilled OT intervention to increase functional abilities, ROM, and strength and pain control.  Pt. demonstrated proper understanding of each exercise.  Pt continues to require verbal and tactile cues for throughout therapy session to maintain position and prevent compensation.   Pt continues to be limited in functional and leisurely pursuits. Pain limits patient's participation in ADL's. Pt is not able to carryout necessary vocational tasks.  Pt's spiritual, cultural and educational needs considered and pt agreeable to plan of care and goals.  Patient is making good progress towards established goals.  Pt. tolerated exercises within pain tolerance.   Reviewed HEP with pt., see EMR under "patient instructions" for " provided exercises, activity modifications and limitations, modalities for home pain management.  Pt. demo understanding of above.      Pt. tolerated US and Fluidotherapy with no irritation. Pt. Provided with Dycem for scar management.      Pt. Tolerated increase in DF/VF RD/UD; addition of wrist rollercoaster with no reported pain.     Due to possible clinic closure 2* to coronavirus: OT educated pt.on  Epic MyChart communication, HEP, moist heat source, scar/ edema massage, modalities for home pain management and activity modifications.  Pt. was educated on tele-health options, patient requested to participate in tele-health visits when and if they are available during the COVID-19 situation. Pt. Demo understanding of above.    New/Revised Goals: Cont POC        Plan      Continue 2-3x week x 90 days during the certification period    03/10/2020   to 06/10/2020   with established POC in pursuit of OT goals.      Toshia Fisher, OT

## 2020-03-30 ENCOUNTER — CLINICAL SUPPORT (OUTPATIENT)
Dept: REHABILITATION | Facility: HOSPITAL | Age: 65
End: 2020-03-30
Payer: MEDICARE

## 2020-03-30 DIAGNOSIS — M79.641 RIGHT HAND PAIN: Primary | ICD-10-CM

## 2020-03-30 PROCEDURE — 97035 APP MDLTY 1+ULTRASOUND EA 15: CPT | Performed by: OCCUPATIONAL THERAPIST

## 2020-03-30 PROCEDURE — 97022 WHIRLPOOL THERAPY: CPT | Performed by: OCCUPATIONAL THERAPIST

## 2020-03-30 PROCEDURE — 97110 THERAPEUTIC EXERCISES: CPT | Performed by: OCCUPATIONAL THERAPIST

## 2020-03-30 PROCEDURE — 97140 MANUAL THERAPY 1/> REGIONS: CPT | Performed by: OCCUPATIONAL THERAPIST

## 2020-03-31 NOTE — PROGRESS NOTES
"                            Occupational Therapy Daily Treatment Note       Date: 4/1/2020  Name: Payton Castillo  Clinic Number: [id number]    Therapy Diagnosis:   Encounter Diagnosis   Name Primary?    Right hand pain Yes     Physician: Leonid Aragon MD    Physician Orders: (R) carpal tunnel syndrome;  (R) OA of 1st metacarpal;  Evaluate and treat;  ROM, US, Paraffin, massage, deep heat, slow progressive strengthening  Medical Diagnosis: (R) hand / wrist pain;  (R) carpal tunnel syndrome;  (R) OA of 1st metacarpal  Surgical Procedure and Date: Excision of bony osteophyte of (R) thumb IP joint radial and ulnar border;  Arthrotomy IP (R) thumb; Excision of mucosynovial cyst (R) thumb IP joint; (R) thumb flexor tenosynovectomy; Median nerve decompression at (R) wrist;  Ulnar nerve decompression at elbow with submuscular transposition; (R) carpal trapeziectomy;  01/28/2020  Insurance Authorization Period Expiration: 03/03/2020-12/31/2020  Plan of Care Certification Period: 03/10/2020-06/10/2020  Date of Return to MD: 03/16/2020    Visit # / Visits authorized: 6 / 104  Time In: 8:00  Time Out: 8:55  Total Billable Time: 53 minutes    Precautions:  Standard;  Kidney CA ( approx. 2014)      Post Op:  9 weeks, 0 days      Subjective     Pt reports: "I am feeling good today.  I did not wake up with any swelling this morning."    0/10 pain (R) hand/ wrist    Objective    Patient seen by OT this session. Tx consisted of:      Supervised modalities after being cleared for contradictions  x  10 minutes:     -Paraffin with MH to (R)  hand  to increase blood flow, circulation, pain management and for tissue elasticity prior to therex  x  0 minutes.   (not completed this session)    -Fluidotherapy to  (R)  hand to increase blood flow, circulation, tissue elasticity, desensitization, sensory re-education and for pain management  X 10 minutes.         Direct contact modalities after being cleared for contraindications x   8 " "minutes:  -Ultrasound applied to (R) hand  on   3.3 Mhz with 0.7 w/cm2 @ 100 % duty cycle to decrease pain and inflammation  /////  to remold scar tissue and increase tissue elasticity x 8 minutes.        Manual therapy techniques to increase joint mobilization /// scar massage/// soft tissue mobilization   x   15 minutes:     -Scar Massage to volar aspect of (R) wrist and dorsal aspect of (R) thumb region with mini therapeutic vibrator and scar extractor on volar aspect of incision sites to decrease dense scar adhesions and improve tensile glide  x 5 minutes.  -Manual Therapy techniques to (R) hand/ wrist  Including light joint mobilizations, stretching, and PROM to increase joint mobility, ROM and for pain management  x  5 minutes   -Soft Tissue Mobilization to (R) hand / wrist from distal to proximal to decrease edema and increase ROM and functional abilities  x  5 minutes      Therapeutic  Exercises to improve functional performance while increasing strength, endurance, ROM,  and flexibility  x   20 minutes:    · - Tendon glides for intrinsic +/-, flat fist and composite fist x 10 reps  · - Joint blocking and reverse joint blocking for IP of (R) thumb for EXT/FLEX x 10 reps    - LARGE wooden blocks thumb tip to palm x 10 blocks  x 2 sets   - 0.5# leg wieght DF/VF (SUP/PRON), RD/UD  x 20 reps   - Blue ball on tabletop for DF/VF stretches x 20 reps   - RED Exercise ball on table top for RD/UD stretches ( with hands in "W" pattern) x 3 sec holds x 10 reps each   - Colored bead stringing with thumb to 2nd-5th digit tips x 40 beads   - Macaroni Transfer for composite  x 1 reps   - Velcro checkerboard (on wedge) with "dart throwing" pattern x 1 rep   - Wrist roller coaster for AROM of wrist in all planes x 10 reps   - YELLOW Theraputty for rolling x 20 reps;  pinching, and composite  x 10 reps    Initiate in future therapy sessions:     - Sponge ("H" shaped) squeezes for digital ABD/ADD x 10 reps each  · - " "AAROM for digital FLEX isolated and composite digital FLEX for MCP, PIP, and DIP  · - Tabletop digital EXT x 20 reps  · - Towel walking for digital FLEX/EXT x 3 reps  · - Quick flicks for digital EXT with sponge   - Rice bucket with ### glass beads for desensitization ( vision occluded) x 1 rep   - [place] Slot with ### washers and  #### pennies ( 5 in hand) for FMC x 1 rep  · - Cotton balls thumb tip to palm x 2 reps  x 10 balls  · - Silver balls in CW/CCW motion x 3min  · - Mini colored pegboard for FMC and in-hand manipulation  (5 in-hand) x 40 pegs    -  ###PHG (black spring) for composite  x 20 reps   - ### Theraputty for pulling, PVC for "cookie cutting" and medicine top x 10 reps  - ### digiflex for isolated x 10 reps ### digiflex for composite x 20 reps   - Wooden pegboard with ### clothespins with 3PT pinch x 3 reps    - Low load prolonged stretches for DF/VF with ### leg weight ( on wedge) x 30 seconds each x 2 reps   - ### Flexbar for SUP/PRON and DF/VF x 20 reps   - ### Wrist exerstick in standing for DF/VF x 3 reps    03/30/2020 (post modalities, PROM)  Opposition: 3.9  cm ( from thumb tip to DPC)     AROM: hand (R)  //   (L)      thumb           (measured with plastic goni)  MP: 0 / 31 (+9)  //  0/42  IP:    0 / 30 (+8)  //   0/67               Manual Muscle Test:  Wrist   (8 weeks)       (R)         Dorsi Flexion: 3+/5  Volar Flexion:  3+/5  Radial Deviation:  3/5  Ulnar Deviation:  3+/5 p!            Strength: (KEYONNA Dynamometer in lbs.),Position II  ( 8 weeks)  (R):   25  (L):   60     Pinch Strength: (Pinch Gauge in lbs)  (8 weeks)               (R) /  (L)  LAT:  3 /13  3PT: 3 /11.5  2PT:  3/ 13     Coordination:  9 Hole Peg test ( in/out) seconds     (R):  31  (L):   25     Assessment     Pt. will continue to benefit from skilled OT intervention to increase functional abilities, ROM, and strength and pain control.  Pt. demonstrated proper understanding of each exercise.  Pt continues to " "require verbal and tactile cues for throughout therapy session to maintain position and prevent compensation.   Pt continues to be limited in functional and leisurely pursuits. Pain limits patient's participation in ADL's. Pt is not able to carryout necessary vocational tasks.  Pt's spiritual, cultural and educational needs considered and pt agreeable to plan of care and goals.  Patient is making good progress towards established goals.  Pt. tolerated exercises within pain tolerance.   Reviewed HEP with pt., see EMR under "patient instructions" for provided exercises, activity modifications and limitations, modalities for home pain management.  Pt. demo understanding of above.      Pt. tolerated US and Fluidotherapy with no irritation. Pt. Provided with Dycem and silicone scar pads (night time use) for scar management.   Provided pt. With YELLOW theraputty for rolling, pinching and composite .  Pt. Demo understanding of above.      Pt. Demo increased AROM for (R) thumb MP and IP FLEX.  Pt. Reported slight pain during MMT for (R) UD.   Pt. Tolerated addition of theraputty with no reported pain.     Due to possible clinic closure 2* to coronavirus: OT educated pt.on  Epic MyChart communication, HEP, moist heat source, scar/ edema massage, modalities for home pain management and activity modifications.  Pt. was educated on tele-health options, patient requested to participate in tele-health visits when and if they are available during the COVID-19 situation. Pt. Demo understanding of above.    New/Revised Goals: Cont POC  (updated 04/01/2020)  1)   Pt. will increase MMT by a grade to A with lifting items by 6-8 weeks.   2)   Pt. will increase  strength 3-5 lbs. to open containers by 6-8 weeks.  3)   Pt. will increase pinch 1-3 lbs for buttoning by 6-8 weeks.  4)   Pt. Will decrease time during 9 hole peg test by 3 seconds to A with FMC by 6-8 weeks.       Plan      Continue 2-3x week x 90 days during the " certification period    03/10/2020   to 06/10/2020   with established POC in pursuit of OT goals.      Toshia Fisher, OT

## 2020-04-01 ENCOUNTER — CLINICAL SUPPORT (OUTPATIENT)
Dept: REHABILITATION | Facility: HOSPITAL | Age: 65
End: 2020-04-01
Payer: MEDICARE

## 2020-04-01 DIAGNOSIS — M79.641 RIGHT HAND PAIN: Primary | ICD-10-CM

## 2020-04-01 PROCEDURE — 97140 MANUAL THERAPY 1/> REGIONS: CPT | Performed by: OCCUPATIONAL THERAPIST

## 2020-04-01 PROCEDURE — 97110 THERAPEUTIC EXERCISES: CPT | Performed by: OCCUPATIONAL THERAPIST

## 2020-04-01 PROCEDURE — 97035 APP MDLTY 1+ULTRASOUND EA 15: CPT | Performed by: OCCUPATIONAL THERAPIST

## 2020-04-01 PROCEDURE — 97022 WHIRLPOOL THERAPY: CPT | Performed by: OCCUPATIONAL THERAPIST

## 2020-04-01 NOTE — PROGRESS NOTES
"                            Occupational Therapy Daily Treatment Note       Date: 4/2/2020  Name: Payton Castillo  Clinic Number: [id number]    Therapy Diagnosis:   Encounter Diagnosis   Name Primary?    Right hand pain Yes     Physician: Leonid Aragon MD    Physician Orders: (R) carpal tunnel syndrome;  (R) OA of 1st metacarpal;  Evaluate and treat;  ROM, US, Paraffin, massage, deep heat, slow progressive strengthening  Medical Diagnosis: (R) hand / wrist pain;  (R) carpal tunnel syndrome;  (R) OA of 1st metacarpal  Surgical Procedure and Date: Excision of bony osteophyte of (R) thumb IP joint radial and ulnar border;  Arthrotomy IP (R) thumb; Excision of mucosynovial cyst (R) thumb IP joint; (R) thumb flexor tenosynovectomy; Median nerve decompression at (R) wrist;  Ulnar nerve decompression at elbow with submuscular transposition; (R) carpal trapeziectomy;  01/28/2020  Insurance Authorization Period Expiration: 03/03/2020-12/31/2020  Plan of Care Certification Period: 03/10/2020-06/10/2020  Date of Return to MD: 03/16/2020    Visit # / Visits authorized: 7 / 104  Time In: 7:45  Time Out: 8:40  Total Billable Time: 53 minutes    Precautions:  Standard;  Kidney CA ( approx. 2014)      Post Op:  9 weeks, 1 days      Subjective     Pt reports: "I was having some pain in my thumb when I use it too much."    0-2/10 pain (R) hand/ wrist    Objective    Patient seen by OT this session. Tx consisted of:      Supervised modalities after being cleared for contradictions  x  10 minutes:     -Paraffin with MH to (R)  hand  to increase blood flow, circulation, pain management and for tissue elasticity prior to therex  x  0 minutes.   (not completed this session)    -Fluidotherapy to  (R)  hand to increase blood flow, circulation, tissue elasticity, desensitization, sensory re-education and for pain management  X 10 minutes.         Direct contact modalities after being cleared for contraindications x   8 minutes:  -Ultrasound " "applied to (R) hand  on   3.3 Mhz with 0.7 w/cm2 @ 100 % duty cycle to decrease pain and inflammation  /////  to remold scar tissue and increase tissue elasticity x 8 minutes.        Manual therapy techniques to increase joint mobilization /// scar massage/// soft tissue mobilization   x   15 minutes:     -Scar Massage to volar aspect of (R) wrist and dorsal aspect of (R) thumb region with mini therapeutic vibrator and scar extractor on volar aspect of incision sites to decrease dense scar adhesions and improve tensile glide  x 5 minutes.  -Manual Therapy techniques to (R) hand/ wrist  Including light joint mobilizations, stretching, and PROM to increase joint mobility, ROM and for pain management  x  5 minutes   -Soft Tissue Mobilization to (R) hand / wrist from distal to proximal to decrease edema and increase ROM and functional abilities  x  5 minutes      Therapeutic  Exercises to improve functional performance while increasing strength, endurance, ROM,  and flexibility  x   20 minutes:    · - Tendon glides for intrinsic +/-, flat fist and composite fist x 10 reps  · - Joint blocking and reverse joint blocking for IP of (R) thumb for EXT/FLEX x 10 reps    - X-Small wooden blocks thumb tip to palm x 10 blocks  x 2 sets   - 0.5# leg wieght DF/VF (SUP/PRON), RD/UD  x 20 reps   - Colored bead stringing with thumb to 2nd-5th digit tips x 40 beads   - Macaroni Transfer for composite  x 1 reps   - Velcro checkerboard (on wedge) with "dart throwing" pattern x 1 rep   - Wrist roller coaster for AROM of wrist in all planes x 10 reps   - YELLOW Theraputty for rolling x 20 reps;  pinching, and composite  x 10 reps   - YELLOW Flexbar for SUP/PRON and DF/VF x 10 reps   - Mini colored pegboard for FMC and in-hand manipulation  (5 in-hand) x 40 pegs    Initiate in future therapy sessions:     - Sponge ("H" shaped) squeezes for digital ABD/ADD x 10 reps each  · - AAROM for digital FLEX isolated and composite digital FLEX " "for MCP, PIP, and DIP  · - Tabletop digital EXT x 20 reps  · - Towel walking for digital FLEX/EXT x 3 reps  · - Quick flicks for digital EXT with sponge   - Rice bucket with ### glass beads for desensitization ( vision occluded) x 1 rep   - [place] Slot with ### washers and  #### pennies ( 5 in hand) for FMC x 1 rep  · - Cotton balls thumb tip to palm x 2 reps  x 10 balls  · - Silver balls in CW/CCW motion x 3min    -  ###PHG (black spring) for composite  x 20 reps   - ### Theraputty for pulling, PVC for "cookie cutting" and medicine top x 10 reps  - ### digiflex for isolated x 10 reps ### digiflex for composite x 20 reps   - Wooden pegboard with ### clothespins with 3PT pinch x 3 reps   - ### Wrist exerstick in standing for DF/VF x 3 reps     - Blue ball on tabletop for DF/VF and RD/UD stretches x 20 reps  (HEP)      03/30/2020 (post modalities, PROM)  Opposition: 3.9  cm ( from thumb tip to DPC)               Assessment     Pt. will continue to benefit from skilled OT intervention to increase functional abilities, ROM, and strength and pain control.  Pt. demonstrated proper understanding of each exercise.  Pt continues to require verbal and tactile cues for throughout therapy session to maintain position and prevent compensation.   Pt continues to be limited in functional and leisurely pursuits. Pain limits patient's participation in ADL's. Pt is not able to carryout necessary vocational tasks.  Pt's spiritual, cultural and educational needs considered and pt agreeable to plan of care and goals.  Patient is making good progress towards established goals.  Pt. tolerated exercises within pain tolerance.   Reviewed HEP with pt., see EMR under "patient instructions" for provided exercises, activity modifications and limitations, modalities for home pain management.  Pt. demo understanding of above.      Pt. tolerated US and Fluidotherapy with no irritation. Pt. Provided with Dycem and silicone scar pads (night time " use) for scar management.   Provided pt. With YELLOW theraputty for rolling, pinching and composite .  Pt. Demo understanding of above.     Pt. Tolerated decrease in size during wooden blocks with slight stretching pain reported. Pt. Tolerated addition of Flexbar with no reported pain.  Pt. Tolerated addition of mini colored pegboard with no reported pain but demo limited coordination.     Due to possible clinic closure 2* to coronavirus: OT educated pt.on  Epic MyChart communication, HEP, moist heat source, scar/ edema massage, modalities for home pain management and activity modifications.  Pt. was educated on tele-health options, patient requested to participate in tele-health visits when and if they are available during the COVID-19 situation. Pt. Demo understanding of above.    New/Revised Goals: Cont POC  (updated 04/01/2020)  1)   Pt. will increase MMT by a grade to A with lifting items by 6-8 weeks.   2)   Pt. will increase  strength 3-5 lbs. to open containers by 6-8 weeks.  3)   Pt. will increase pinch 1-3 lbs for buttoning by 6-8 weeks.  4)   Pt. Will decrease time during 9 hole peg test by 3 seconds to A with FMC by 6-8 weeks.       Plan      Continue 2-3x week x 90 days during the certification period    03/10/2020   to 06/10/2020   with established POC in pursuit of OT goals.      Toshia Fisher, OT

## 2020-04-02 ENCOUNTER — CLINICAL SUPPORT (OUTPATIENT)
Dept: REHABILITATION | Facility: HOSPITAL | Age: 65
End: 2020-04-02
Payer: MEDICARE

## 2020-04-02 DIAGNOSIS — M79.641 RIGHT HAND PAIN: Primary | ICD-10-CM

## 2020-04-02 PROCEDURE — 97035 APP MDLTY 1+ULTRASOUND EA 15: CPT | Performed by: OCCUPATIONAL THERAPIST

## 2020-04-02 PROCEDURE — 97110 THERAPEUTIC EXERCISES: CPT | Performed by: OCCUPATIONAL THERAPIST

## 2020-04-02 PROCEDURE — 97022 WHIRLPOOL THERAPY: CPT | Performed by: OCCUPATIONAL THERAPIST

## 2020-04-02 PROCEDURE — 97140 MANUAL THERAPY 1/> REGIONS: CPT | Performed by: OCCUPATIONAL THERAPIST

## 2020-04-07 ENCOUNTER — CLINICAL SUPPORT (OUTPATIENT)
Dept: REHABILITATION | Facility: HOSPITAL | Age: 65
End: 2020-04-07
Payer: MEDICARE

## 2020-04-07 DIAGNOSIS — M79.641 RIGHT HAND PAIN: Primary | ICD-10-CM

## 2020-04-07 PROCEDURE — 97110 THERAPEUTIC EXERCISES: CPT | Performed by: OCCUPATIONAL THERAPIST

## 2020-04-07 PROCEDURE — 97140 MANUAL THERAPY 1/> REGIONS: CPT | Performed by: OCCUPATIONAL THERAPIST

## 2020-04-07 PROCEDURE — 97035 APP MDLTY 1+ULTRASOUND EA 15: CPT | Performed by: OCCUPATIONAL THERAPIST

## 2020-04-07 PROCEDURE — 97022 WHIRLPOOL THERAPY: CPT | Performed by: OCCUPATIONAL THERAPIST

## 2020-04-07 NOTE — PROGRESS NOTES
"                            Occupational Therapy Daily Treatment Note       Date: 4/7/2020  Name: Payton Castillo  Clinic Number: [id number]    Therapy Diagnosis:   Encounter Diagnosis   Name Primary?    Right hand pain Yes     Physician: Leonid Aragon MD    Physician Orders: (R) carpal tunnel syndrome;  (R) OA of 1st metacarpal;  Evaluate and treat;  ROM, US, Paraffin, massage, deep heat, slow progressive strengthening  Medical Diagnosis: (R) hand / wrist pain;  (R) carpal tunnel syndrome;  (R) OA of 1st metacarpal  Surgical Procedure and Date: Excision of bony osteophyte of (R) thumb IP joint radial and ulnar border;  Arthrotomy IP (R) thumb; Excision of mucosynovial cyst (R) thumb IP joint; (R) thumb flexor tenosynovectomy; Median nerve decompression at (R) wrist;  Ulnar nerve decompression at elbow with submuscular transposition; (R) carpal trapeziectomy;  01/28/2020  Insurance Authorization Period Expiration: 03/03/2020-12/31/2020  Plan of Care Certification Period: 03/10/2020-06/10/2020  Date of Return to MD: 03/16/2020    Visit # / Visits authorized: 8 / 104  Time In: 11:10  Time Out: 12:05  Total Billable Time: 53 minutes    Precautions:  Standard;  Kidney CA ( approx. 2014)      Post Op:  9 weeks,  6 days      Subjective     Pt reports: "I am mainly just stiff in my thumb.  Once I warm my hand up it gets better."    0-2/10 pain (R) hand/ wrist    Objective    Patient seen by OT this session. Tx consisted of:      Supervised modalities after being cleared for contradictions  x  10 minutes:     -Paraffin with MH to (R)  hand  to increase blood flow, circulation, pain management and for tissue elasticity prior to therex  x  0 minutes.   (not completed this session)    -Fluidotherapy to  (R)  hand to increase blood flow, circulation, tissue elasticity, desensitization, sensory re-education and for pain management  X 10 minutes.         Direct contact modalities after being cleared for contraindications x   8 " "minutes:  -Ultrasound applied to (R) hand  on   3.3 Mhz with 0.7 w/cm2 @ 100 % duty cycle to decrease pain and inflammation  /////  to remold scar tissue and increase tissue elasticity x 8 minutes.        Manual therapy techniques to increase joint mobilization /// scar massage/// soft tissue mobilization   x   15 minutes:     -Scar Massage to volar aspect of (R) wrist and dorsal aspect of (R) thumb region with mini therapeutic vibrator and scar extractor on volar aspect of incision sites to decrease dense scar adhesions and improve  tensile glide  x 5 minutes.  -Manual Therapy techniques to (R) hand/ wrist  Including light joint mobilizations, stretching, and PROM to increase joint mobility, ROM and for pain management  x  5 minutes   -Soft Tissue Mobilization to (R) hand / wrist from distal to proximal to decrease edema and increase ROM and functional abilities  x  5 minutes      Therapeutic Exercises to improve functional performance while increasing strength, endurance, ROM,  and flexibility  x   20 minutes:    · - Tendon glides for intrinsic +/-, flat fist and composite fist x 10 reps  · - Joint blocking and reverse joint blocking for IP of (R) thumb for EXT/FLEX x 10 reps    - X-Small wooden blocks thumb tip to palm x 10 blocks  x 2 sets  (not completed this session)    - 0.5# leg wieght DF/VF (SUP/PRON), RD/UD  x 20 reps   - Colored bead stringing with thumb to 2nd-5th digit tips x 40 beads   - Macaroni Transfer for composite  x 1 reps   - Velcro checkerboard (on wedge) with "dart throwing" pattern x 1 rep   - Wrist roller coaster for AROM of wrist in all planes x 10 reps   - YELLOW Theraputty for rolling x 20 reps;  pinching, and composite  x 10 reps   - YELLOW Flexbar for SUP/PRON and DF/VF x 10 reps   - Mini colored pegboard for FMC and in-hand manipulation  (5 in-hand) x 40 pegs   - Glass beads thumb to 5th digit PIP transfer x 5 beads x 1 rep    Initiate in future therapy sessions:     - Sponge " "("H" shaped) squeezes for digital ABD/ADD x 10 reps each  · - AAROM for digital FLEX isolated and composite digital FLEX for MCP, PIP, and DIP  · - Tabletop digital EXT x 20 reps  · - Towel walking for digital FLEX/EXT x 3 reps  · - Quick flicks for digital EXT with sponge   - Rice bucket with ### glass beads for desensitization ( vision occluded) x 1 rep   - [place] Slot with ### washers and  #### pennies ( 5 in hand) for FMC x 1 rep  · - Cotton balls thumb tip to palm x 2 reps  x 10 balls  · - Silver balls in CW/CCW motion x 3min    -  ###PHG (black spring) for composite  x 20 reps   - ### Theraputty for pulling, PVC for "cookie cutting" and medicine top x 10 reps  - ### digiflex for isolated x 10 reps ### digiflex for composite x 20 reps   - Wooden pegboard with ### clothespins with 3PT pinch x 3 reps   - ### Wrist exerstick in standing for DF/VF x 3 reps     - Blue ball on tabletop for DF/VF and RD/UD stretches x 20 reps  (HEP)      03/30/2020 (post modalities, PROM)  Opposition: 3.9  cm ( from thumb tip to DPC)               Assessment     Pt. will continue to benefit from skilled OT intervention to increase functional abilities, ROM, and strength and pain control.  Pt. demonstrated proper understanding of each exercise.  Pt continues to require verbal and tactile cues for throughout therapy session to maintain position and prevent compensation.   Pt continues to be limited in functional and leisurely pursuits. Pain limits patient's participation in ADL's. Pt is not able to carryout necessary vocational tasks.  Pt's spiritual, cultural and educational needs considered and pt agreeable to plan of care and goals.  Patient is making good progress towards established goals.  Pt. tolerated exercises within pain tolerance.   Reviewed HEP with pt., see EMR under "patient instructions" for provided exercises, activity modifications and limitations, modalities for home pain management.  Pt. demo understanding of above. "      Pt. tolerated US and Fluidotherapy with no irritation. Pt. Provided with Dycem and silicone scar pads (night time use) for scar management.   Provided pt. With YELLOW theraputty for rolling, pinching and composite .  Pt. Demo understanding of above.     Pt. Tolerated addition of glass beads transfer with no reported pain but demo limited coordination.     Due to possible clinic closure 2* to coronavirus: OT educated pt.on  Epic MyChart communication, HEP, moist heat source, scar/ edema massage, modalities for home pain management and activity modifications.  Pt. was educated on tele-health options, patient requested to participate in tele-health visits when and if they are available during the COVID-19 situation. Pt. Demo understanding of above.    New/Revised Goals: Cont POC  (updated 04/01/2020)  1)   Pt. will increase MMT by a grade to A with lifting items by 6-8 weeks.   2)   Pt. will increase  strength 3-5 lbs. to open containers by 6-8 weeks.  3)   Pt. will increase pinch 1-3 lbs for buttoning by 6-8 weeks.  4)   Pt. Will decrease time during 9 hole peg test by 3 seconds to A with FMC by 6-8 weeks.       Plan      Continue 2-3x week x 90 days during the certification period    03/10/2020   to 06/10/2020   with established POC in pursuit of OT goals.      Toshia Fisher, OT

## 2020-04-07 NOTE — PROGRESS NOTES
"                            Occupational Therapy Daily Treatment Note       Date: 4/8/2020  Name: Payton Castillo  Clinic Number: [id number]    Therapy Diagnosis:   Encounter Diagnosis   Name Primary?    Right hand pain Yes     Physician: Leonid Aragon MD    Physician Orders: (R) carpal tunnel syndrome;  (R) OA of 1st metacarpal;  Evaluate and treat;  ROM, US, Paraffin, massage, deep heat, slow progressive strengthening  Medical Diagnosis: (R) hand / wrist pain;  (R) carpal tunnel syndrome;  (R) OA of 1st metacarpal  Surgical Procedure and Date: Excision of bony osteophyte of (R) thumb IP joint radial and ulnar border;  Arthrotomy IP (R) thumb; Excision of mucosynovial cyst (R) thumb IP joint; (R) thumb flexor tenosynovectomy; Median nerve decompression at (R) wrist;  Ulnar nerve decompression at elbow with submuscular transposition; (R) carpal trapeziectomy;  01/28/2020  Insurance Authorization Period Expiration: 03/03/2020-12/31/2020  Plan of Care Certification Period: 03/10/2020-06/10/2020  Date of Return to MD: 03/16/2020    Visit # / Visits authorized: 9 / 104  Time In: 7:55  Time Out: 8:50  Total Billable Time: 53 minutes    Precautions:  Standard;  Kidney CA ( approx. 2014)      Post Op:  10 weeks,  0 days      Subjective     Pt reports: "I am not feeling as stiff in my thumb this morning."    0-2/10 pain (R) hand/ wrist    Objective    Patient seen by OT this session. Tx consisted of:      Supervised modalities after being cleared for contradictions  x  10 minutes:     -Paraffin with MH to (R)  hand  to increase blood flow, circulation, pain management and for tissue elasticity prior to therex  x  0 minutes.   (not completed this session)    -Fluidotherapy to  (R)  hand to increase blood flow, circulation, tissue elasticity, desensitization, sensory re-education and for pain management  X 10 minutes.         Direct contact modalities after being cleared for contraindications x   8 minutes:  -Ultrasound " "applied to (R) hand  on   3.3 Mhz with 0.7 w/cm2 @ 100 % duty cycle to decrease pain and inflammation  /////  to remold scar tissue and increase tissue elasticity x 8 minutes.        Manual therapy techniques to increase joint mobilization /// scar massage/// soft tissue mobilization   x   15 minutes:     -Scar Massage to volar aspect of (R) wrist and dorsal aspect of (R) thumb region with mini therapeutic vibrator and scar extractor on volar aspect of incision sites to decrease dense scar adhesions and improve  tensile glide  x 5 minutes.  -Manual Therapy techniques to (R) hand/ wrist  Including light joint mobilizations, stretching, and PROM to increase joint mobility, ROM and for pain management  x  5 minutes   -Soft Tissue Mobilization to (R) hand / wrist from distal to proximal to decrease edema and increase ROM and functional abilities  x  5 minutes      Therapeutic Exercises to improve functional performance while increasing strength, endurance, ROM,  and flexibility  x   20 minutes:    · - Tendon glides for intrinsic +/-, flat fist and composite fist x 10 reps  · - Joint blocking and reverse joint blocking for IP of (R) thumb for EXT/FLEX x 10 reps    - X-Small wooden blocks thumb tip to 5th DIP un/lacing x 10 blocks  x 1 sets    - Sponge ("H" shaped) squeezes for thumb ADD x 10 reps each   - 1# DF/VF (SUP/PRON), RD/UD  x 10 reps   - Colored bead stringing with thumb to 2nd-5th digit tips x 40 beads   - Macaroni Transfer for composite  x 1 reps   - Velcro checkerboard (on wedge) with "dart throwing" pattern x 1 rep   - Wrist roller coaster for AROM of wrist in all planes x 10 reps   - YELLOW Theraputty for rolling x 20 reps;   composite  x 10 reps   - YELLOW Flexbar for SUP/PRON and DF/VF x 10 reps   - Mini colored pegboard for FMC and in-hand manipulation  (5 in-hand) x 40 pegs   - Glass beads thumb to 5th digit PIP transfer x 5 beads x 1 rep   (not completed this session, beads too slippery)    -  " "15#PHG (black spring) for composite  x 20 reps      Initiate in future therapy sessions:     - Wooden pegboard with ### clothespins with 3PT pinch x 3 reps   (initiate next session)    · - AAROM for digital FLEX isolated and composite digital FLEX for MCP, PIP, and DIP  · - Tabletop digital EXT x 20 reps  · - Towel walking for digital FLEX/EXT x 3 reps  · - Quick flicks for digital EXT with sponge   - Rice bucket with ### glass beads for desensitization ( vision occluded) x 1 rep   - [place] Slot with ### washers and  #### pennies ( 5 in hand) for FMC x 1 rep  · - Cotton balls thumb tip to palm x 2 reps  x 10 balls  · - Silver balls in CW/CCW motion x 3min   - ### Theraputty for pulling, PVC for "cookie cutting" and medicine top x 10 reps  - ### digiflex for isolated x 10 reps ### digiflex for composite x 20 reps   - ### Wrist exerstick in standing for DF/VF x 3 reps   - Blue ball on tabletop for DF/VF and RD/UD stretches x 20 reps  (HEP)      03/30/2020 (post modalities, PROM)  Opposition: 3.9  cm ( from thumb tip to DPC)               Assessment     Pt. will continue to benefit from skilled OT intervention to increase functional abilities, ROM, and strength and pain control.  Pt. demonstrated proper understanding of each exercise.  Pt continues to require verbal and tactile cues for throughout therapy session to maintain position and prevent compensation.   Pt continues to be limited in functional and leisurely pursuits. Pain limits patient's participation in ADL's. Pt is not able to carryout necessary vocational tasks.  Pt's spiritual, cultural and educational needs considered and pt agreeable to plan of care and goals.  Patient is making good progress towards established goals.  Pt. tolerated exercises within pain tolerance.   Reviewed HEP with pt., see EMR under "patient instructions" for provided exercises, activity modifications and limitations, modalities for home pain management.  Pt. demo understanding of " above.      Pt. tolerated US and Fluidotherapy with no irritation. Pt. Provided with Dycem and silicone scar pads (night time use) for scar management.   Provided pt. With YELLOW theraputty for rolling, pinching and composite .  Pt. Demo understanding of above.     Pt. Tolerated increase in DF/VF, RD/UD;  Addition of sponge for thumb ADD and PHG for composite gripwith no reported pain.    Due to possible clinic closure 2* to coronavirus: OT educated pt.on  Epic MyChart communication, HEP, moist heat source, scar/ edema massage, modalities for home pain management and activity modifications.  Pt. was educated on tele-health options, patient requested to participate in tele-health visits when and if they are available during the COVID-19 situation. Pt. Demo understanding of above.    New/Revised Goals: Cont POC  (updated 04/01/2020)  1)   Pt. will increase MMT by a grade to A with lifting items by 6-8 weeks.   2)   Pt. will increase  strength 3-5 lbs. to open containers by 6-8 weeks.  3)   Pt. will increase pinch 1-3 lbs for buttoning by 6-8 weeks.  4)   Pt. Will decrease time during 9 hole peg test by 3 seconds to A with FMC by 6-8 weeks.       Plan      Continue 2-3x week x 90 days during the certification period    03/10/2020   to 06/10/2020   with established POC in pursuit of OT goals.      Toshia Fisher, OT

## 2020-04-08 ENCOUNTER — CLINICAL SUPPORT (OUTPATIENT)
Dept: REHABILITATION | Facility: HOSPITAL | Age: 65
End: 2020-04-08
Payer: MEDICARE

## 2020-04-08 DIAGNOSIS — M79.641 RIGHT HAND PAIN: Primary | ICD-10-CM

## 2020-04-08 PROCEDURE — 97035 APP MDLTY 1+ULTRASOUND EA 15: CPT | Performed by: OCCUPATIONAL THERAPIST

## 2020-04-08 PROCEDURE — 97110 THERAPEUTIC EXERCISES: CPT | Performed by: OCCUPATIONAL THERAPIST

## 2020-04-08 PROCEDURE — 97140 MANUAL THERAPY 1/> REGIONS: CPT | Performed by: OCCUPATIONAL THERAPIST

## 2020-04-08 PROCEDURE — 97022 WHIRLPOOL THERAPY: CPT | Performed by: OCCUPATIONAL THERAPIST

## 2020-04-08 NOTE — PROGRESS NOTES
"                            Occupational Therapy Daily Treatment Note       Date: 4/9/2020  Name: Payton Castillo  Clinic Number: [id number]    Therapy Diagnosis:   Encounter Diagnosis   Name Primary?    Right hand pain Yes     Physician: Leonid Aragon MD    Physician Orders: (R) carpal tunnel syndrome;  (R) OA of 1st metacarpal;  Evaluate and treat;  ROM, US, Paraffin, massage, deep heat, slow progressive strengthening  Medical Diagnosis: (R) hand / wrist pain;  (R) carpal tunnel syndrome;  (R) OA of 1st metacarpal  Surgical Procedure and Date: Excision of bony osteophyte of (R) thumb IP joint radial and ulnar border;  Arthrotomy IP (R) thumb; Excision of mucosynovial cyst (R) thumb IP joint; (R) thumb flexor tenosynovectomy; Median nerve decompression at (R) wrist;  Ulnar nerve decompression at elbow with submuscular transposition; (R) carpal trapeziectomy;  01/28/2020  Insurance Authorization Period Expiration: 03/03/2020-12/31/2020  Plan of Care Certification Period: 03/10/2020-06/10/2020  Date of Return to MD: 03/16/2020    Visit # / Visits authorized: 10 / 104  Time In: 8:50  Time Out: 9:45  Total Billable Time: 53 minutes    Precautions:  Standard;  Kidney CA ( approx. 2014)      Post Op:  10 weeks,  1 days      Subjective     Pt reports: "I am having so much pain in my shoulder.  My hand is doing ok today.  My thumb isn't as numb as it used to be."    0-2/10 pain (R) hand/ wrist    Objective    Patient seen by OT this session. Tx consisted of:      Supervised modalities after being cleared for contradictions  x  10 minutes:     -Paraffin with MH to (R)  hand  to increase blood flow, circulation, pain management and for tissue elasticity prior to therex  x  0 minutes.   (not completed this session)    -Fluidotherapy to  (R)  hand to increase blood flow, circulation, tissue elasticity, desensitization, sensory re-education and for pain management  X 10 minutes.         Direct contact modalities after being " "cleared for contraindications x   8 minutes:  -Ultrasound applied to (R) hand  on   3.3 Mhz with 0.7 w/cm2 @ 100 % duty cycle to decrease pain and inflammation  /////  to remold scar tissue and increase tissue elasticity x 8 minutes.        Manual therapy techniques to increase joint mobilization /// scar massage/// soft tissue mobilization   x   15 minutes:     -Scar Massage to volar aspect of (R) wrist and dorsal aspect of (R) thumb region with mini therapeutic vibrator and scar extractor on volar aspect of incision sites to decrease dense scar adhesions and improve  tensile glide  x 5 minutes.  -Manual Therapy techniques to (R) hand/ wrist  Including light joint mobilizations, stretching, and PROM to increase joint mobility, ROM and for pain management  x  5 minutes   -Soft Tissue Mobilization to (R) hand / wrist from distal to proximal to decrease edema and increase ROM and functional abilities  x  5 minutes      Therapeutic Exercises to improve functional performance while increasing strength, endurance, ROM,  and flexibility  x   20 minutes:    · - Tendon glides for intrinsic +/-, flat fist and composite fist x 10 reps  · - Joint blocking and reverse joint blocking for IP of (R) thumb for EXT/FLEX x 10 reps    - X-Small wooden blocks thumb tip to 5th DIP un/lacing x 10 blocks  x 1 sets    - Sponge ("H" shaped) squeezes for thumb ADD x 10 reps each   - 1# DF/VF (SUP/PRON), RD/UD  x 10 reps   - Colored bead stringing with thumb to 2nd-5th digit tips x 40 beads   - Macaroni Transfer for composite  x 1 reps   - Velcro checkerboard (on wedge) with "dart throwing" pattern x 1 rep  (not completed    - Wrist roller coaster for AROM of wrist in all planes x 10 reps   - YELLOW Theraputty for rolling x 20 reps   - YELLOW Flexbar for SUP/PRON and DF/VF x 10 reps   - Mini colored pegboard for FMC and in-hand manipulation  (5 in-hand) x 40 pegs   - 15#PHG (black spring) for composite  x 20 reps   - Rice bucket with " "10 glass beads for desensitization ( vision occluded) x 1 rep    Initiate in future therapy sessions:     - Wooden pegboard with ### clothespins with 3PT pinch x 3 reps   (initiate next session)    · - AAROM for digital FLEX isolated and composite digital FLEX for MCP, PIP, and DIP  · - Tabletop digital EXT x 20 reps  · - Towel walking for digital FLEX/EXT x 3 reps  · - Quick flicks for digital EXT with sponge   - [place] Slot with ### washers and  #### pennies ( 5 in hand) for FMC x 1 rep  · - Cotton balls thumb tip to palm x 2 reps  x 10 balls  · - Silver balls in CW/CCW motion x 3min   - ### Theraputty for pulling, PVC for "cookie cutting" and medicine top x 10 reps  - ### digiflex for isolated x 10 reps ### digiflex for composite x 20 reps   - ### Wrist exerstick in standing for DF/VF x 3 reps   - Blue ball on tabletop for DF/VF and RD/UD stretches x 20 reps  (HEP)      03/30/2020 (post modalities, PROM)  Opposition: 3.9  cm ( from thumb tip to DPC)               Assessment     Pt. will continue to benefit from skilled OT intervention to increase functional abilities, ROM, and strength and pain control.  Pt. demonstrated proper understanding of each exercise.  Pt continues to require verbal and tactile cues for throughout therapy session to maintain position and prevent compensation.   Pt continues to be limited in functional and leisurely pursuits. Pain limits patient's participation in ADL's. Pt is not able to carryout necessary vocational tasks.  Pt's spiritual, cultural and educational needs considered and pt agreeable to plan of care and goals.  Patient is making good progress towards established goals.  Pt. tolerated exercises within pain tolerance.   Reviewed HEP with pt., see EMR under "patient instructions" for provided exercises, activity modifications and limitations, modalities for home pain management.  Pt. demo understanding of above.      Pt. tolerated US and Fluidotherapy with no irritation. Pt. " Provided with Dycem and silicone scar pads (night time use) for scar management.   Provided pt. With YELLOW theraputty for rolling, pinching and composite .  Pt. Demo understanding of above.     Pt. Tolerated addition of rice bucket with no reported pain but only able to find 9 beads.     Due to possible clinic closure 2* to coronavirus: OT educated pt.on  Epic MyChart communication, HEP, moist heat source, scar/ edema massage, modalities for home pain management and activity modifications.  Pt. was educated on tele-health options, patient requested to participate in tele-health visits when and if they are available during the COVID-19 situation. Pt. Demo understanding of above.    New/Revised Goals: Cont POC  (updated 04/01/2020)  1)   Pt. will increase MMT by a grade to A with lifting items by 6-8 weeks.   2)   Pt. will increase  strength 3-5 lbs. to open containers by 6-8 weeks.  3)   Pt. will increase pinch 1-3 lbs for buttoning by 6-8 weeks.  4)   Pt. Will decrease time during 9 hole peg test by 3 seconds to A with FMC by 6-8 weeks.       Plan      Continue 2-3x week x 90 days during the certification period    03/10/2020   to 06/10/2020   with established POC in pursuit of OT goals.      Toshia Fisher, OT

## 2020-04-09 ENCOUNTER — CLINICAL SUPPORT (OUTPATIENT)
Dept: REHABILITATION | Facility: HOSPITAL | Age: 65
End: 2020-04-09
Payer: MEDICARE

## 2020-04-09 DIAGNOSIS — M79.641 RIGHT HAND PAIN: Primary | ICD-10-CM

## 2020-04-09 PROCEDURE — 97110 THERAPEUTIC EXERCISES: CPT | Performed by: OCCUPATIONAL THERAPIST

## 2020-04-09 PROCEDURE — 97022 WHIRLPOOL THERAPY: CPT | Performed by: OCCUPATIONAL THERAPIST

## 2020-04-09 PROCEDURE — 97035 APP MDLTY 1+ULTRASOUND EA 15: CPT | Performed by: OCCUPATIONAL THERAPIST

## 2020-04-09 PROCEDURE — 97140 MANUAL THERAPY 1/> REGIONS: CPT | Performed by: OCCUPATIONAL THERAPIST

## 2020-04-09 NOTE — PROGRESS NOTES
"                            Occupational Therapy Daily Treatment Note       Date: 4/13/2020  Name: Payton Castillo  Clinic Number: [id number]    Therapy Diagnosis:   Encounter Diagnosis   Name Primary?    Right hand pain Yes     Physician: Leonid Aragon MD    Physician Orders: (R) carpal tunnel syndrome;  (R) OA of 1st metacarpal;  Evaluate and treat;  ROM, US, Paraffin, massage, deep heat, slow progressive strengthening  Medical Diagnosis: (R) hand / wrist pain;  (R) carpal tunnel syndrome;  (R) OA of 1st metacarpal  Surgical Procedure and Date: Excision of bony osteophyte of (R) thumb IP joint radial and ulnar border;  Arthrotomy IP (R) thumb; Excision of mucosynovial cyst (R) thumb IP joint; (R) thumb flexor tenosynovectomy; Median nerve decompression at (R) wrist;  Ulnar nerve decompression at elbow with submuscular transposition; (R) carpal trapeziectomy;  01/28/2020  Insurance Authorization Period Expiration: 03/03/2020-12/31/2020  Plan of Care Certification Period: 03/10/2020-06/10/2020  Date of Return to MD: 03/16/2020    Visit # / Visits authorized: 11 / 104  Time In: 7:55  Time Out: 8:50  Total Billable Time: 53 minutes    Precautions:  Standard;  Kidney CA ( approx. 2014)      Post Op:  10 weeks,  5 days      Subjective     Pt reports: "I have some discomfort on the tip of my thumb when I try and open a sugar packet."    0-2/10 pain (R) hand/ wrist    Objective    Patient seen by OT this session. Tx consisted of:      Supervised modalities after being cleared for contradictions  x  10 minutes:     -Paraffin with MH to (R)  hand  to increase blood flow, circulation, pain management and for tissue elasticity prior to therex  x  0 minutes.   (not completed this session)    -Fluidotherapy to  (R)  hand to increase blood flow, circulation, tissue elasticity, desensitization, sensory re-education and for pain management  X 10 minutes.         Direct contact modalities after being cleared for contraindications x  " " 8 minutes:  -Ultrasound applied to (R) hand  on   3.3 Mhz with 0.7 w/cm2 @ 100 % duty cycle to decrease pain and inflammation  /////  to remold scar tissue and increase tissue elasticity x 8 minutes.        Manual therapy techniques to increase joint mobilization /// scar massage/// soft tissue mobilization   x   15 minutes:     -Scar Massage to volar aspect of (R) wrist and dorsal aspect of (R) thumb region with mini therapeutic vibrator and scar extractor on volar aspect of incision sites to decrease dense scar adhesions and improve  tensile glide  x 5 minutes.  -Manual Therapy techniques to (R) hand/ wrist  Including light joint mobilizations, stretching, and PROM to increase joint mobility, ROM and for pain management  x  5 minutes   -Soft Tissue Mobilization to (R) hand / wrist from distal to proximal to decrease edema and increase ROM and functional abilities  x  5 minutes      Therapeutic Exercises to improve functional performance while increasing strength, endurance, ROM,  and flexibility  x   20 minutes:    · - Tendon glides for intrinsic +/-, flat fist and composite fist x 10 reps  · - Joint blocking and reverse joint blocking for IP of (R) thumb for EXT/FLEX x 10 reps    - X-Small wooden blocks thumb tip to 5th MP un/lacing x 10 blocks  x 1 sets    - Sponge ("H" shaped) squeezes for thumb ADD x 10 reps each (not completed this session)   - 2# DF/VF (SUP/PRON), RD/UD  x 10 reps   - Colored bead stringing with thumb to 5th digit DIP x 20 beads   - Macaroni Transfer for composite  x 1 reps   - Velcro checkerboard (on wedge) with "dart throwing" pattern x 1 rep  (not completed this session)   - Wrist roller coaster for AROM of wrist in all planes x 10 reps   - YELLOW Theraputty for rolling x 20 reps   - YELLOW Flexbar for SUP/PRON and DF/VF x 10 reps   - Mini colored pegboard for FMC and in-hand manipulation  (5 in-hand) x 40 pegs   - 15#PHG (black spring) for composite  x 20 reps   - Rice bucket " "with 10 glass beads for desensitization ( vision occluded) x 1 rep   - Wooden pegboard with RED clothespins with 3PT pinch x 2 reps         Initiate in future therapy sessions:     - Increase PHG (next session)      · - Towel walking for digital FLEX/EXT x 3 reps  · - Quick flicks for digital EXT with sponge   - [place] Slot with ### washers and  #### pennies ( 5 in hand) for FMC x 1 rep  · - Cotton balls thumb tip to palm x 2 reps  x 10 balls  · - Silver balls in CW/CCW motion x 3min   - ### Theraputty for pulling, PVC for "cookie cutting" and medicine top x 10 reps  - ### digiflex for isolated x 10 reps ### digiflex for composite x 20 reps   - ### Wrist exerstick in standing for DF/VF x 3 reps   - Blue ball on tabletop for DF/VF and RD/UD stretches x 20 reps  (HEP)      04/13/2020 (post modalities, PROM)  Opposition: 3.0  cm ( from thumb tip to DPC)               Assessment     Pt. will continue to benefit from skilled OT intervention to increase functional abilities, ROM, and strength and pain control.  Pt. demonstrated proper understanding of each exercise.  Pt continues to require verbal and tactile cues for throughout therapy session to maintain position and prevent compensation.   Pt continues to be limited in functional and leisurely pursuits. Pain limits patient's participation in ADL's. Pt is not able to carryout necessary vocational tasks.  Pt's spiritual, cultural and educational needs considered and pt agreeable to plan of care and goals.  Patient is making good progress towards established goals.  Pt. tolerated exercises within pain tolerance.   Reviewed HEP with pt., see EMR under "patient instructions" for provided exercises, activity modifications and limitations, modalities for home pain management.  Pt. demo understanding of above.      Pt. tolerated US and Fluidotherapy with no irritation. Pt. Provided with Dycem and silicone scar pads (night time use) for scar management.   Provided pt. With YELLOW " theraputty for rolling, pinching and composite .  Pt. Demo understanding of above.     Pt. Tolerated increase in DF/VF, RD/UD;  addition of wooden pegboard with clothespins with no reported pain.   Pt. Demo increased thumb opposition by .9mm from DPC.      Due to possible clinic closure 2* to coronavirus: OT educated pt.on  Epic MyChart communication, HEP, moist heat source, scar/ edema massage, modalities for home pain management and activity modifications.  Pt. was educated on tele-health options, patient opted not to participate in tele-health visits when and if they are available during the COVID-19 situation. Pt. Demo understanding of above.    New/Revised Goals: Cont POC  (updated 04/01/2020)  1)   Pt. will increase MMT by a grade to A with lifting items by 6-8 weeks.   2)   Pt. will increase  strength 3-5 lbs. to open containers by 6-8 weeks.  3)   Pt. will increase pinch 1-3 lbs for buttoning by 6-8 weeks.  4)   Pt. Will decrease time during 9 hole peg test by 3 seconds to A with FMC by 6-8 weeks.       Plan      Continue 2-3x week x 90 days during the certification period    03/10/2020   to 06/10/2020   with established POC in pursuit of OT goals.      Toshia Fisher, OT

## 2020-04-13 ENCOUNTER — CLINICAL SUPPORT (OUTPATIENT)
Dept: REHABILITATION | Facility: HOSPITAL | Age: 65
End: 2020-04-13
Payer: MEDICARE

## 2020-04-13 DIAGNOSIS — M79.641 RIGHT HAND PAIN: Primary | ICD-10-CM

## 2020-04-13 PROCEDURE — 97035 APP MDLTY 1+ULTRASOUND EA 15: CPT | Performed by: OCCUPATIONAL THERAPIST

## 2020-04-13 PROCEDURE — 97140 MANUAL THERAPY 1/> REGIONS: CPT | Performed by: OCCUPATIONAL THERAPIST

## 2020-04-13 PROCEDURE — 97110 THERAPEUTIC EXERCISES: CPT | Performed by: OCCUPATIONAL THERAPIST

## 2020-04-13 PROCEDURE — 97022 WHIRLPOOL THERAPY: CPT | Performed by: OCCUPATIONAL THERAPIST

## 2020-04-13 NOTE — PROGRESS NOTES
"                            Occupational Therapy Daily Treatment Note       Date: 4/14/2020  Name: Payton Castillo  Clinic Number: [id number]    Therapy Diagnosis:   Encounter Diagnosis   Name Primary?    Right hand pain Yes     Physician: Leonid Aragon MD    Physician Orders: (R) carpal tunnel syndrome;  (R) OA of 1st metacarpal;  Evaluate and treat;  ROM, US, Paraffin, massage, deep heat, slow progressive strengthening  Medical Diagnosis: (R) hand / wrist pain;  (R) carpal tunnel syndrome;  (R) OA of 1st metacarpal  Surgical Procedure and Date: Excision of bony osteophyte of (R) thumb IP joint radial and ulnar border;  Arthrotomy IP (R) thumb; Excision of mucosynovial cyst (R) thumb IP joint; (R) thumb flexor tenosynovectomy; Median nerve decompression at (R) wrist;  Ulnar nerve decompression at elbow with submuscular transposition; (R) carpal trapeziectomy;  01/28/2020  Insurance Authorization Period Expiration: 03/03/2020-12/31/2020  Plan of Care Certification Period: 03/10/2020-06/10/2020  Date of Return to MD: 04/27/2020    Visit # / Visits authorized: 12 / 104  Time In: 10:55  Time Out: 11:50  Total Billable Time: 53 minutes    Precautions:  Standard;  Kidney CA ( approx. 2014)      Post Op:  10 weeks,  6 days      Subjective     Pt reports: "I am stiff this morning.  It must be the cooler weather."    0-1/10 pain (R) hand/ wrist    Objective    Patient seen by OT this session. Tx consisted of:      Supervised modalities after being cleared for contradictions  x  10 minutes:     -Paraffin with MH to (R)  hand  to increase blood flow, circulation, pain management and for tissue elasticity prior to therex  x  0 minutes.   (not completed this session)    -Fluidotherapy to  (R)  hand to increase blood flow, circulation, tissue elasticity, desensitization, sensory re-education and for pain management  X 10 minutes.         Direct contact modalities after being cleared for contraindications x   8 " "minutes:  -Ultrasound applied to (R) hand  on   3.3 Mhz with 0.7 w/cm2 @ 100 % duty cycle to decrease pain and inflammation  /////  to remold scar tissue and increase tissue elasticity x 8 minutes.        Manual therapy techniques to increase joint mobilization /// scar massage/// soft tissue mobilization   x   15 minutes:     -Scar Massage to volar aspect of (R) wrist and dorsal aspect of (R) thumb region with mini therapeutic vibrator and scar extractor on volar aspect of incision sites to decrease dense scar adhesions and improve  tensile glide  x 5 minutes.  -Manual Therapy techniques to (R) hand/ wrist  Including light joint mobilizations, stretching, and PROM to increase joint mobility, ROM and for pain management  x  5 minutes   -Soft Tissue Mobilization to (R) hand / wrist from distal to proximal to decrease edema and increase ROM and functional abilities  x  5 minutes      Therapeutic Exercises to improve functional performance while increasing strength, endurance, ROM,  and flexibility  x   20 minutes:      · - Joint blocking and reverse joint blocking for IP of (R) thumb for EXT/FLEX x 10 reps    - X-Small wooden blocks thumb tip to 5th MP un/lacing x 10 blocks  x 1 sets    - Sponge ("H" shaped) squeezes for thumb ADD x 10 reps each (not completed this session)   - 2# DF/VF (SUP/PRON), RD/UD  x 10 reps   - Colored bead stringing with thumb to 5th digit DIP x 20 beads   - Velcro checkerboard (on wedge) with "dart throwing" pattern x 1 rep  (not completed this session)   - Wrist roller coaster for AROM of wrist in all planes x 10 reps   - YELLOW Theraputty for rolling x 20 reps   - YELLOW Flexbar for SUP/PRON and DF/VF x 10 reps   - Mini colored pegboard for FMC and in-hand manipulation  (5 in-hand) x 40 pegs   - 25#PHG (black spring) for composite  x 20 reps   - Rice bucket with 10 glass beads for desensitization ( vision occluded) x 1 rep   - Wooden pegboard with RED clothespins with 3PT pinch x 2 " "reps         Initiate in future therapy sessions:    · - Towel walking for digital FLEX/EXT x 3 reps  · - Quick flicks for digital EXT with sponge   - [place] Slot with ### washers and  #### pennies ( 5 in hand) for FMC x 1 rep  · - Cotton balls thumb tip to palm x 2 reps  x 10 balls  · - Silver balls in CW/CCW motion x 3min   - ### Theraputty for pulling, PVC for "cookie cutting" and medicine top x 10 reps  - ### digiflex for isolated x 10 reps ### digiflex for composite x 20 reps   - ### Wrist exerstick in standing for DF/VF x 3 reps   - Blue ball on tabletop for DF/VF and RD/UD stretches x 20 reps  (HEP)      04/13/2020 (post modalities, PROM)  Opposition: 3.0  cm ( from thumb tip to DPC)               Assessment     Pt. will continue to benefit from skilled OT intervention to increase functional abilities, ROM, and strength and pain control.  Pt. demonstrated proper understanding of each exercise.  Pt continues to require verbal and tactile cues for throughout therapy session to maintain position and prevent compensation.   Pt continues to be limited in functional and leisurely pursuits. Pain limits patient's participation in ADL's. Pt is not able to carryout necessary vocational tasks.  Pt's spiritual, cultural and educational needs considered and pt agreeable to plan of care and goals.  Patient is making good progress towards established goals.  Pt. tolerated exercises within pain tolerance.   Reviewed HEP with pt., see EMR under "patient instructions" for provided exercises, activity modifications and limitations, modalities for home pain management.  Pt. demo understanding of above.      Pt. tolerated US and Fluidotherapy with no irritation. Pt. Provided with Dycem and silicone scar pads (night time use) for scar management.   Provided pt. With YELLOW theraputty for rolling, pinching and composite .  Pt. Demo understanding of above.     Pt. Tolerated increase in PHG with no reported pain.         Due to " possible clinic closure 2* to coronavirus: OT educated pt.on  Epic MyChart communication, HEP, moist heat source, scar/ edema massage, modalities for home pain management and activity modifications.  Pt. was educated on tele-health options, patient opted not to participate in tele-health visits when and if they are available during the COVID-19 situation. Pt. Demo understanding of above.    New/Revised Goals: Cont POC  (updated 04/01/2020)  1)   Pt. will increase MMT by a grade to A with lifting items by 6-8 weeks.   2)   Pt. will increase  strength 3-5 lbs. to open containers by 6-8 weeks.  3)   Pt. will increase pinch 1-3 lbs for buttoning by 6-8 weeks.  4)   Pt. Will decrease time during 9 hole peg test by 3 seconds to A with FMC by 6-8 weeks.       Plan      Continue 2-3x week x 90 days during the certification period    03/10/2020   to 06/10/2020   with established POC in pursuit of OT goals.      Toshia Fisher, OT

## 2020-04-14 ENCOUNTER — CLINICAL SUPPORT (OUTPATIENT)
Dept: REHABILITATION | Facility: HOSPITAL | Age: 65
End: 2020-04-14
Payer: MEDICARE

## 2020-04-14 DIAGNOSIS — M79.641 RIGHT HAND PAIN: Primary | ICD-10-CM

## 2020-04-14 PROCEDURE — 97140 MANUAL THERAPY 1/> REGIONS: CPT | Performed by: OCCUPATIONAL THERAPIST

## 2020-04-14 PROCEDURE — 97110 THERAPEUTIC EXERCISES: CPT | Performed by: OCCUPATIONAL THERAPIST

## 2020-04-14 PROCEDURE — 97035 APP MDLTY 1+ULTRASOUND EA 15: CPT | Performed by: OCCUPATIONAL THERAPIST

## 2020-04-14 PROCEDURE — 97022 WHIRLPOOL THERAPY: CPT | Performed by: OCCUPATIONAL THERAPIST

## 2020-04-14 NOTE — PROGRESS NOTES
"                            Occupational Therapy Daily Treatment Note       Date: 4/16/2020  Name: Payton Castillo  Clinic Number: [id number]    Therapy Diagnosis:   Encounter Diagnosis   Name Primary?    Right hand pain Yes     Physician: Leonid Aragon MD    Physician Orders: (R) carpal tunnel syndrome;  (R) OA of 1st metacarpal;  Evaluate and treat;  ROM, US, Paraffin, massage, deep heat, slow progressive strengthening  Medical Diagnosis: (R) hand / wrist pain;  (R) carpal tunnel syndrome;  (R) OA of 1st metacarpal  Surgical Procedure and Date: Excision of bony osteophyte of (R) thumb IP joint radial and ulnar border;  Arthrotomy IP (R) thumb; Excision of mucosynovial cyst (R) thumb IP joint; (R) thumb flexor tenosynovectomy; Median nerve decompression at (R) wrist;  Ulnar nerve decompression at elbow with submuscular transposition; (R) carpal trapeziectomy;  01/28/2020  Insurance Authorization Period Expiration: 03/03/2020-12/31/2020  Plan of Care Certification Period: 03/10/2020-06/10/2020  Date of Return to MD: 04/27/2020    Visit # / Visits authorized: 13 / 104  Time In: 11:00  Time Out: 11:55  Total Billable Time: 53 minutes    Precautions:  Standard;  Kidney CA ( approx. 2014)      Post Op:  11 weeks,  1 days      Subjective     Pt reports: "I am feeling pretty good today."    0-1/10 pain (R) hand/ wrist    Objective    Patient seen by OT this session. Tx consisted of:      Supervised modalities after being cleared for contradictions  x  10 minutes:     -Paraffin with MH to (R)  hand  to increase blood flow, circulation, pain management and for tissue elasticity prior to therex  x  0 minutes.   (not completed this session)    -Fluidotherapy to  (R)  hand to increase blood flow, circulation, tissue elasticity, desensitization, sensory re-education and for pain management  X 10 minutes.         Direct contact modalities after being cleared for contraindications x   8 minutes:  -Ultrasound applied to (R) hand  " "on   3.3 Mhz with 0.7 w/cm2 @ 100 % duty cycle to decrease pain and inflammation  /////  to remold scar tissue and increase tissue elasticity x 8 minutes.        Manual therapy techniques to increase joint mobilization /// scar massage/// soft tissue mobilization   x   15 minutes:     -Scar Massage to volar aspect of (R) wrist and dorsal aspect of (R) thumb region with mini therapeutic vibrator and scar extractor on volar aspect of incision sites to decrease dense scar adhesions and improve  tensile glide  x 5 minutes.  -Manual Therapy techniques to (R) hand/ wrist  Including light joint mobilizations, stretching, and PROM to increase joint mobility, ROM and for pain management  x  5 minutes   -Soft Tissue Mobilization to (R) hand / wrist from distal to proximal to decrease edema and increase ROM and functional abilities  x  5 minutes      Therapeutic Exercises to improve functional performance while increasing strength, endurance, ROM,  and flexibility  x   20 minutes:      · - Joint blocking and reverse joint blocking for IP of (R) thumb for EXT/FLEX x 10 reps    - X-Small wooden blocks thumb tip to 5th MP un/lacing x 10 blocks  x 1 sets    - Sponge ("H" shaped) squeezes for thumb ADD x 10 reps each (not completed this session)   - 2# DF/VF (SUP/PRON), RD/UD  x 10 reps   - Colored bead stringing with thumb to 5th digit DIP x 20 beads   - Velcro checkerboard (on wedge) with "dart throwing" pattern x 1 rep  (not completed this session)   - Wrist roller coaster for AROM of wrist in all planes x 10 reps   - YELLOW Theraputty for rolling x 20 reps   - YELLOW Flexbar for SUP/PRON and DF/VF x 10 reps   - Mini colored pegboard for FMC and in-hand manipulation  (5 in-hand) x 40 pegs   - 25#PHG (black spring) for composite  x 20 reps   - Rice bucket with 10 glass beads for desensitization ( vision occluded) x 1 rep   - Wooden pegboard with RED clothespins with 3PT pinch x 2 reps         Initiate in future therapy " "sessions:    · - Towel walking for digital FLEX/EXT x 3 reps  · - Quick flicks for digital EXT with sponge   - [place] Slot with ### washers and  #### pennies ( 5 in hand) for FMC x 1 rep  · - Cotton balls thumb tip to palm x 2 reps  x 10 balls  · - Silver balls in CW/CCW motion x 3min   - ### Theraputty for pulling, PVC for "cookie cutting" and medicine top x 10 reps  - ### digiflex for isolated x 10 reps ### digiflex for composite x 20 reps   - ### Wrist exerstick in standing for DF/VF x 3 reps   - Blue ball on tabletop for DF/VF and RD/UD stretches x 20 reps  (HEP)      04/13/2020 (post modalities, PROM)  Opposition: 3.0  cm ( from thumb tip to DPC)               Assessment     Pt. will continue to benefit from skilled OT intervention to increase functional abilities, ROM, and strength and pain control.  Pt. demonstrated proper understanding of each exercise.  Pt continues to require verbal and tactile cues for throughout therapy session to maintain position and prevent compensation.   Pt continues to be limited in functional and leisurely pursuits. Pain limits patient's participation in ADL's. Pt is not able to carryout necessary vocational tasks.  Pt's spiritual, cultural and educational needs considered and pt agreeable to plan of care and goals.  Patient is making good progress towards established goals.  Pt. tolerated exercises within pain tolerance.   Reviewed HEP with pt., see EMR under "patient instructions" for provided exercises, activity modifications and limitations, modalities for home pain management.  Pt. demo understanding of above.      Pt. tolerated US and Fluidotherapy with no irritation. Pt. Provided with Dycem and silicone scar pads (night time use) for scar management.   Provided pt. With YELLOW theraputty for rolling, pinching and composite .  Pt. Demo understanding of above.        Pt. Dropped pegs several times throughout exercise.    Due to possible clinic closure 2* to coronavirus: OT " educated pt.on  Epic MyChart communication, HEP, moist heat source, scar/ edema massage, modalities for home pain management and activity modifications.  Pt. was educated on tele-health options, patient opted not to participate in tele-health visits when and if they are available during the COVID-19 situation. Pt. Demo understanding of above.    New/Revised Goals: Cont POC  (updated 04/01/2020)  1)   Pt. will increase MMT by a grade to A with lifting items by 6-8 weeks.   2)   Pt. will increase  strength 3-5 lbs. to open containers by 6-8 weeks.  3)   Pt. will increase pinch 1-3 lbs for buttoning by 6-8 weeks.  4)   Pt. Will decrease time during 9 hole peg test by 3 seconds to A with FMC by 6-8 weeks.       Plan      Continue 2-3x week x 90 days during the certification period    03/10/2020   to 06/10/2020   with established POC in pursuit of OT goals.      Toshia Fisher, OT

## 2020-04-16 ENCOUNTER — CLINICAL SUPPORT (OUTPATIENT)
Dept: REHABILITATION | Facility: HOSPITAL | Age: 65
End: 2020-04-16
Payer: MEDICARE

## 2020-04-16 DIAGNOSIS — M79.641 RIGHT HAND PAIN: Primary | ICD-10-CM

## 2020-04-16 PROCEDURE — 97140 MANUAL THERAPY 1/> REGIONS: CPT | Performed by: OCCUPATIONAL THERAPIST

## 2020-04-16 PROCEDURE — 97035 APP MDLTY 1+ULTRASOUND EA 15: CPT | Performed by: OCCUPATIONAL THERAPIST

## 2020-04-16 PROCEDURE — 97022 WHIRLPOOL THERAPY: CPT | Performed by: OCCUPATIONAL THERAPIST

## 2020-04-16 PROCEDURE — 97110 THERAPEUTIC EXERCISES: CPT | Performed by: OCCUPATIONAL THERAPIST

## 2020-04-20 ENCOUNTER — CLINICAL SUPPORT (OUTPATIENT)
Dept: REHABILITATION | Facility: HOSPITAL | Age: 65
End: 2020-04-20
Payer: MEDICARE

## 2020-04-20 DIAGNOSIS — M79.641 RIGHT HAND PAIN: Primary | ICD-10-CM

## 2020-04-20 PROCEDURE — 97140 MANUAL THERAPY 1/> REGIONS: CPT | Performed by: OCCUPATIONAL THERAPIST

## 2020-04-20 PROCEDURE — 97110 THERAPEUTIC EXERCISES: CPT | Performed by: OCCUPATIONAL THERAPIST

## 2020-04-20 PROCEDURE — 97035 APP MDLTY 1+ULTRASOUND EA 15: CPT | Performed by: OCCUPATIONAL THERAPIST

## 2020-04-20 PROCEDURE — 97022 WHIRLPOOL THERAPY: CPT | Performed by: OCCUPATIONAL THERAPIST

## 2020-04-20 NOTE — PROGRESS NOTES
"                            Occupational Therapy Daily Treatment Note       Date: 4/20/2020  Name: Payton Castillo  Clinic Number: [id number]    Therapy Diagnosis:   Encounter Diagnosis   Name Primary?    Right hand pain Yes     Physician: Leonid Aragon MD    Physician Orders: (R) carpal tunnel syndrome;  (R) OA of 1st metacarpal;  Evaluate and treat;  ROM, US, Paraffin, massage, deep heat, slow progressive strengthening  Medical Diagnosis: (R) hand / wrist pain;  (R) carpal tunnel syndrome;  (R) OA of 1st metacarpal  Surgical Procedure and Date: Excision of bony osteophyte of (R) thumb IP joint radial and ulnar border;  Arthrotomy IP (R) thumb; Excision of mucosynovial cyst (R) thumb IP joint; (R) thumb flexor tenosynovectomy; Median nerve decompression at (R) wrist;  Ulnar nerve decompression at elbow with submuscular transposition; (R) carpal trapeziectomy;  01/28/2020  Insurance Authorization Period Expiration: 03/03/2020-12/31/2020  Plan of Care Certification Period: 03/10/2020-06/10/2020  Date of Return to MD: 04/27/2020    Visit # / Visits authorized: 14 / 104  Time In: 1:55  Time Out: 2:50  Total Billable Time: 53 minutes    Precautions:  Standard;  Kidney CA ( approx. 2014)      Post Op:  11 weeks,  5 days      Subjective     Pt reports: "I am doing ok today.  I had the dry needling done on my shoulder and back and I have been sore from that.  I think that has taken the focus off my hand.  I tried opening a can this morning and my wrist felt weak"    0-1/10 pain (R) hand/ wrist    Objective    Patient seen by OT this session. Tx consisted of:      Supervised modalities after being cleared for contradictions  x  10 minutes:     -Paraffin with MH to (R)  hand  to increase blood flow, circulation, pain management and for tissue elasticity prior to therex  x  0 minutes.   (not completed this session)    -Fluidotherapy to  (R)  hand to increase blood flow, circulation, tissue elasticity, desensitization, sensory " "re-education and for pain management  X 10 minutes.         Direct contact modalities after being cleared for contraindications x   8 minutes:  -Ultrasound applied to (R) hand  on   3.3 Mhz with 0.7 w/cm2 @ 100 % duty cycle to decrease pain and inflammation  /////  to remold scar tissue and increase tissue elasticity x 8 minutes.        Manual therapy techniques to increase joint mobilization /// scar massage/// soft tissue mobilization   x   15 minutes:     -Scar Massage to volar aspect of (R) wrist and dorsal aspect of (R) thumb region with mini therapeutic vibrator and scar extractor on volar aspect of incision sites to decrease dense scar adhesions and improve  tensile glide  x 5 minutes.  -Manual Therapy techniques to (R) hand/ wrist  Including light joint mobilizations, stretching, and PROM to increase joint mobility, ROM and for pain management  x  5 minutes   -Soft Tissue Mobilization to (R) hand / wrist from distal to proximal to decrease edema and increase ROM and functional abilities  x  5 minutes      Therapeutic Exercises to improve functional performance while increasing strength, endurance, ROM,  and flexibility  x   20 minutes:      · - Joint blocking and reverse joint blocking for IP of (R) thumb for EXT/FLEX x 10 reps    - X-Small wooden blocks thumb tip to 5th MP un/lacing x 10 blocks  x 1 sets    - 2# DF/VF (SUP/PRON), RD/UD  x 10 reps   - Colored bead stringing with thumb to 5th digit DIP x 20 beads   - Wrist roller coaster for AROM of wrist in all planes x 10 reps   - YELLOW Theraputty for rolling;  RED Theraputty with PVC for "cookie cutting" x 20 reps   - RED Theraputty for pulling x 10 reps   - YELLOW Flexbar for SUP/PRON and DF/VF x 10 reps   - Mini colored pegboard for FMC and in-hand manipulation  (5 in-hand) x 40 pegs   - 25#PHG (black spring) for composite  x 20 reps   - Rice bucket with 10 glass beads for desensitization ( vision occluded) x 1 rep   - Wooden pegboard with RED " "clothespins with 3PT pinch x 2 reps         Initiate in future therapy sessions:    · - Towel walking for digital FLEX/EXT x 3 reps  · - Quick flicks for digital EXT with sponge   - [place] Slot with ### washers and  #### pennies ( 5 in hand) for FMC x 1 rep  · - Cotton balls thumb tip to palm x 2 reps  x 10 balls  · - Silver balls in CW/CCW motion x 3min   - ### Theraputty for  and medicine top x 10 reps  - ### digiflex for isolated x 10 reps ### digiflex for composite x 20 reps   - ### Wrist exerstick in standing for DF/VF x 3 reps   - Blue ball on tabletop for DF/VF and RD/UD stretches x 20 reps  (HEP)      04/13/2020 (post modalities, PROM)  Opposition: 3.0  cm ( from thumb tip to DPC)               Assessment     Pt. will continue to benefit from skilled OT intervention to increase functional abilities, ROM, and strength and pain control.  Pt. demonstrated proper understanding of each exercise.  Pt continues to require verbal and tactile cues for throughout therapy session to maintain position and prevent compensation.   Pt continues to be limited in functional and leisurely pursuits. Pain limits patient's participation in ADL's. Pt is not able to carryout necessary vocational tasks.  Pt's spiritual, cultural and educational needs considered and pt agreeable to plan of care and goals.  Patient is making good progress towards established goals.  Pt. tolerated exercises within pain tolerance.   Reviewed HEP with pt., see EMR under "patient instructions" for provided exercises, activity modifications and limitations, modalities for home pain management.  Pt. demo understanding of above.      Pt. tolerated US and Fluidotherapy with no irritation. Pt. Provided with Dycem and silicone scar pads (night time use) for scar management.   Provided pt. With YELLOW theraputty for rolling, pinching and composite .  Pt. Demo understanding of above.        Pt. Tolerated addition of theraputty for "cookie cutting" and pulling " with no reported pain but demo fatigue.     Due to possible clinic closure 2* to coronavirus: OT educated pt.on  Epic MyChart communication, HEP, moist heat source, scar/ edema massage, modalities for home pain management and activity modifications.  Pt. was educated on tele-health options, patient opted not to participate in tele-health visits when and if they are available during the COVID-19 situation. Pt. Demo understanding of above.    New/Revised Goals: Cont POC  (updated 04/01/2020)  1)   Pt. will increase MMT by a grade to A with lifting items by 6-8 weeks.   2)   Pt. will increase  strength 3-5 lbs. to open containers by 6-8 weeks.  3)   Pt. will increase pinch 1-3 lbs for buttoning by 6-8 weeks.  4)   Pt. Will decrease time during 9 hole peg test by 3 seconds to A with FMC by 6-8 weeks.       Plan      Continue 2-3x week x 90 days during the certification period    03/10/2020   to 06/10/2020   with established POC in pursuit of OT goals.      Toshia Fisher, OT

## 2020-04-22 NOTE — PROGRESS NOTES
"                            Occupational Therapy Progress Note       Date: 4/23/2020  Name: Payton Castillo  Clinic Number: [id number]    Therapy Diagnosis:   Encounter Diagnosis   Name Primary?    Right hand pain Yes     Physician: Leonid Aragon MD    Physician Orders: (R) carpal tunnel syndrome;  (R) OA of 1st metacarpal;  Evaluate and treat;  ROM, US, Paraffin, massage, deep heat, slow progressive strengthening  Medical Diagnosis: (R) hand / wrist pain;  (R) carpal tunnel syndrome;  (R) OA of 1st metacarpal  Surgical Procedure and Date: Excision of bony osteophyte of (R) thumb IP joint radial and ulnar border;  Arthrotomy IP (R) thumb; Excision of mucosynovial cyst (R) thumb IP joint; (R) thumb flexor tenosynovectomy; Median nerve decompression at (R) wrist;  Ulnar nerve decompression at elbow with submuscular transposition; (R) carpal trapeziectomy;  01/28/2020  Insurance Authorization Period Expiration: 03/03/2020-12/31/2020  Plan of Care Certification Period: 03/10/2020-06/10/2020  Date of Return to MD: 04/27/2020    Visit # / Visits authorized: 15 / 104  Time In: 10:00  Time Out: 10:55  Total Billable Time: 53 minutes    Precautions:  Standard;  Kidney CA ( approx. 2014)      Post Op:  12 weeks,  1 days      Subjective     Pt reports: "I have not really done much hand exercises because of my back pain."    0-1/10 pain (R) hand/ wrist    Objective    Patient seen by OT this session. Tx consisted of:      Supervised modalities after being cleared for contradictions  x  10 minutes:     -Paraffin with MH to (R)  hand  to increase blood flow, circulation, pain management and for tissue elasticity prior to therex  x  0 minutes.   (not completed this session)    -Fluidotherapy to  (R)  hand to increase blood flow, circulation, tissue elasticity, desensitization, sensory re-education and for pain management  X 10 minutes.         Direct contact modalities after being cleared for contraindications x   8 " "minutes:  -Ultrasound applied to (R) hand  on   3.3 Mhz with 0.7 w/cm2 @ 50 % duty cycle to decrease pain and inflammation  /////  to remold scar tissue and increase tissue elasticity x 8 minutes.        Manual therapy techniques to increase joint mobilization /// scar massage/// soft tissue mobilization   x   15 minutes:     -Scar Massage to volar aspect of (R) wrist and dorsal aspect of (R) thumb region with mini therapeutic vibrator and scar extractor on volar aspect of incision sites to decrease dense scar adhesions and improve  tensile glide  x 5 minutes.  -Manual Therapy techniques to (R) hand/ wrist  Including light joint mobilizations, stretching, and PROM to increase joint mobility, ROM and for pain management  x  5 minutes   -Soft Tissue Mobilization to (R) hand / wrist from distal to proximal to decrease edema and increase ROM and functional abilities  x  5 minutes      Therapeutic Exercises to improve functional performance while increasing strength, endurance, ROM,  and flexibility  x   20 minutes:      · - Joint blocking and reverse joint blocking for IP of (R) thumb for EXT/FLEX x 10 reps    - X-Small wooden blocks thumb tip to 5th MP un/lacing x 10 blocks  x 1 sets    - 2# DF/VF (SUP/PRON), RD/UD  x 10 reps   - Colored bead stringing with thumb to 5th digit DIP x 20 beads   - Wrist roller coaster for AROM of wrist in all planes x 10 reps   - YELLOW Theraputty for rolling;  RED Theraputty with PVC for "cookie cutting" x 20 reps   - RED Theraputty for pulling x 10 reps   - YELLOW Flexbar for SUP/PRON and DF/VF x 10 reps   - Mini colored pegboard for FMC and in-hand manipulation  (5 in-hand) x 40 pegs   - 25#PHG (black spring) for composite  x 20 reps   - Rice bucket with 10 glass beads for desensitization ( vision occluded) x 1 rep   - Wooden pegboard with RED clothespins with 3PT pinch x 2 reps         Initiate in future therapy sessions:    · - Towel walking for digital FLEX/EXT x 3 reps  · - " Quick flicks for digital EXT with sponge   - [place] Slot with ### washers and  #### pennies ( 5 in hand) for FMC x 1 rep  · - Cotton balls thumb tip to palm x 2 reps  x 10 balls  · - Silver balls in CW/CCW motion x 3min   - ### Theraputty for  and medicine top x 10 reps  - ### digiflex for isolated x 10 reps ### digiflex for composite x 20 reps   - ### Wrist exerstick in standing for DF/VF x 3 reps   - Blue ball on tabletop for DF/VF and RD/UD stretches x 20 reps  (HEP)        Edema:  (R) / (L) cm  MCPs:  18.6 (-0.2)  /  18.3  PWC:  16.5 (-1.1)  / 15.4     (R)  //  (L)thumb  PP: 6.8 (-0.4)  // 5.7  IP: 6.5 (-0.3)  // 5.5  DP: 5.0 (-0.3) // 5.0          AROM: hand (R)  //   (L)      thumb           (measured with plastic goni)  MP: 0 / 37 (+6)  //  0/42  IP:    0 / 39 (+9)  //   0/67            Palmar ABD: 45 (+5) / 50  Radial ABD: 45 (+5)  /  45                 Opposition: 2.2 cm ( from thumb tip to DPC)                           PROM: hand (R) thumb    FLEX ( measured with plastic goni)  MP: 43 (+24)  IP:  46 (+23)                                           AROM: wrist          (R)   //  (L)  DF/VF: 55 (+5) / 70 (+5)  //  70/75  RD/UD: 10 (+5) / 30 (+5)  //  20/35  SUP/PRON: 85 (+5) / 90 (+5)  // 90/90           Manual Muscle Test:  Wrist   (R)         Dorsi Flexion: 3+/5  Volar Flexion:  3+/5  Radial Deviation:  3/5  Ulnar Deviation:  3+/5 p!            Strength: (KEYONNA Dynamometer in lbs.),Position II    (R):   30 (+5)  (L):   60     Pinch Strength: (Pinch Gauge in lbs)                (R) /  (L)  LAT:  5 (+2) /13  3PT: 4 (+1) /11.5  2PT:  4 (+1)/ 13         Coordination:  9 Hole Peg test ( in/out) seconds     (R):  29 (-2)  (L):   25       Quick DASH SCORE: 59%     Assessment     Pt. will continue to benefit from skilled OT intervention to increase functional abilities, ROM, and strength and pain control.  Pt. demonstrated proper understanding of each exercise.  Pt continues to require verbal and tactile cues  "for throughout therapy session to maintain position and prevent compensation.   Pt continues to be limited in functional and leisurely pursuits. Pain limits patient's participation in ADL's. Pt is not able to carryout necessary vocational tasks.  Pt's spiritual, cultural and educational needs considered and pt agreeable to plan of care and goals.  Patient is making good progress towards established goals.  Pt. tolerated exercises within pain tolerance.   Reviewed HEP with pt., see EMR under "patient instructions" for provided exercises, activity modifications and limitations, modalities for home pain management.  Pt. demo understanding of above.      Pt. tolerated US and Fluidotherapy with no irritation. Pt. Provided with Dycem and silicone scar pads (night time use) for scar management.   Provided pt. With YELLOW theraputty for rolling, pinching and composite .  Pt. Demo understanding of above.      Pt. Demo decreased edema in (R) MCPs,  PWC, (R) thumb PP, IP and DP.  Pt. Decreased reported pain on average with activity.  Pt. Demo increased AROM for (R) FLEX thumb MP, IP; opposition, palmar and radial ABD.  PROM for (R) thumb MP and IP FLEX.  Pt. Demo increased AROM for (R) wrist DF/VF, RD/UD, SUP/PRON.  Pt. Demo increased  strength for (R) hand; LAT, 3PT, and 2PT pinch strength.     Due to possible clinic closure 2* to coronavirus: OT educated pt.on  Epic MyChart communication, HEP, moist heat source, scar/ edema massage, modalities for home pain management and activity modifications.  Pt. was educated on tele-health options, patient opted not to participate in tele-health visits when and if they are available during the COVID-19 situation. Pt. Demo understanding of above.    New/Revised Goals: Cont POC  1)   Pt. to be IND with HEP and modalities for pain management by 1 week.  (MET)  2)   Pt. will report   0/10 pain on average with activity to A with exercises by 6-8 weeks.  (revised 04/23/2020)  3)   Pt. " will increase ROM 3-5 degrees to increase functional use for ADL's by 6-8 weeks. ( in progress)  4)   Pt. will decrease edema 0.1-0.3 mm to increase joint mobility /flexibility by 6-8 weeks.  ( in progress)  5)   Pt. will increase MMT by a grade to A with lifting items by 6-8 weeks.    6)   Pt. will increase  strength 3-5 lbs. to open containers by 6-8 weeks.  ( in progress)  7)   Pt. will increase pinch 1-3 lbs for buttoning by 6-8 weeks. ( in progress)  8)   Pt. Will decrease time during 9 hole peg test by 3 seconds to A with FMC by 6-8 weeks. ( in progress)      Plan      Continue 2-3x week x 90 days during the certification period    03/10/2020   to 06/10/2020   with established POC in pursuit of OT goals.      Toshia Fisher, OT

## 2020-04-23 ENCOUNTER — CLINICAL SUPPORT (OUTPATIENT)
Dept: REHABILITATION | Facility: HOSPITAL | Age: 65
End: 2020-04-23
Payer: MEDICARE

## 2020-04-23 DIAGNOSIS — M79.641 RIGHT HAND PAIN: Primary | ICD-10-CM

## 2020-04-23 PROCEDURE — 97110 THERAPEUTIC EXERCISES: CPT | Performed by: OCCUPATIONAL THERAPIST

## 2020-04-23 PROCEDURE — 97035 APP MDLTY 1+ULTRASOUND EA 15: CPT | Performed by: OCCUPATIONAL THERAPIST

## 2020-04-23 PROCEDURE — 97022 WHIRLPOOL THERAPY: CPT | Performed by: OCCUPATIONAL THERAPIST

## 2020-04-23 PROCEDURE — 97140 MANUAL THERAPY 1/> REGIONS: CPT | Performed by: OCCUPATIONAL THERAPIST

## 2020-04-23 NOTE — PROGRESS NOTES
"                            Occupational Therapy Daily Treatment Note       Date: 4/28/2020  Name: Payton Castillo  Clinic Number: [id number]    Therapy Diagnosis:   Encounter Diagnosis   Name Primary?    Right hand pain Yes     Physician: Leonid Aragon MD    Physician Orders: (R) carpal tunnel syndrome;  (R) OA of 1st metacarpal;  Evaluate and treat;  ROM, US, Paraffin, massage, deep heat, slow progressive strengthening  Medical Diagnosis: (R) hand / wrist pain;  (R) carpal tunnel syndrome;  (R) OA of 1st metacarpal  Surgical Procedure and Date: Excision of bony osteophyte of (R) thumb IP joint radial and ulnar border;  Arthrotomy IP (R) thumb; Excision of mucosynovial cyst (R) thumb IP joint; (R) thumb flexor tenosynovectomy; Median nerve decompression at (R) wrist;  Ulnar nerve decompression at elbow with submuscular transposition; (R) carpal trapeziectomy;  01/28/2020  Insurance Authorization Period Expiration: 03/03/2020-12/31/2020  Plan of Care Certification Period: 03/10/2020-06/10/2020  Date of Return to MD: 05/05/2020    Visit # / Visits authorized: 16 / 104  Time In: 10:55  Time Out: 11:50  Total Billable Time: 53 minutes    Precautions:  Standard;  Kidney CA ( approx. 2014)      Post Op:  12 weeks,  6 days      Subjective     Pt reports: "I am pretty stiff in my thumb today.  I have been trying to open lids and containers."    0-1/10 pain (R) hand/ wrist    Objective    Patient seen by OT this session. Tx consisted of:      Supervised modalities after being cleared for contradictions  x  10 minutes:     -Paraffin with MH to (R)  hand  to increase blood flow, circulation, pain management and for tissue elasticity prior to therex  x  0 minutes.   (not completed this session)    -Fluidotherapy to  (R)  hand to increase blood flow, circulation, tissue elasticity, desensitization, sensory re-education and for pain management  X 10 minutes.         Direct contact modalities after being cleared for " "contraindications x   8 minutes:  -Ultrasound applied to (R) hand  on   3.3 Mhz with 0.7 w/cm2 @ 50 % duty cycle to decrease pain and inflammation  /////  to remold scar tissue and increase tissue elasticity x 8 minutes.        Manual therapy techniques to increase joint mobilization /// scar massage/// soft tissue mobilization   x   15 minutes:     -Scar Massage to volar aspect of (R) wrist and dorsal aspect of (R) thumb region with mini therapeutic vibrator and scar extractor on volar aspect of incision sites to decrease dense scar adhesions and improve  tensile glide  x 5 minutes.  -Manual Therapy techniques to (R) hand/ wrist  Including light joint mobilizations, stretching, and PROM to increase joint mobility, ROM and for pain management  x  5 minutes   -Soft Tissue Mobilization to (R) hand / wrist from distal to proximal to decrease edema and increase ROM and functional abilities  x  5 minutes      Therapeutic Exercises to improve functional performance while increasing strength, endurance, ROM,  and flexibility  x   20 minutes:      · - Joint blocking and reverse joint blocking for IP of (R) thumb for EXT/FLEX x 10 reps    - X-Small wooden blocks thumb tip to 5th MP un/lacing x 10 blocks  x 1 sets    - 2# DF/VF (SUP/PRON), RD/UD  x 10 reps   - Colored bead stringing with thumb to 5th digit DIP x 20 beads   - Wrist roller coaster for AROM of wrist in all planes x 10 reps   - RED Theraputty with PVC for "cookie cutting" x 20 reps   - RED Theraputty for rolling and  pulling x 10 reps   - YELLOW Flexbar for SUP/PRON and DF/VF x 10 reps   - Mini colored pegboard for FMC and in-hand manipulation  (5 in-hand) x 40 pegs   - 25#PHG (black spring) for composite  x 20 reps   - Rice bucket with 10 glass beads for desensitization ( vision occluded) x 1 rep   - Wooden pegboard with RED clothespins with 3PT pinch x 2 reps         Initiate in future therapy sessions:    · - Towel walking for digital FLEX/EXT x 3 " "reps  · - Quick flicks for digital EXT with sponge   - [place] Slot with ### washers and  #### pennies ( 5 in hand) for FMC x 1 rep  · - Cotton balls thumb tip to palm x 2 reps  x 10 balls  · - Silver balls in CW/CCW motion x 3min   - ### Theraputty for  and medicine top x 10 reps  - ### digiflex for isolated x 10 reps ### digiflex for composite x 20 reps   - ### Wrist exerstick in standing for DF/VF x 3 reps       Assessment     Pt. will continue to benefit from skilled OT intervention to increase functional abilities, ROM, and strength and pain control.  Pt. demonstrated proper understanding of each exercise.  Pt continues to require verbal and tactile cues for throughout therapy session to maintain position and prevent compensation.   Pt continues to be limited in functional and leisurely pursuits. Pain limits patient's participation in ADL's. Pt is not able to carryout necessary vocational tasks.  Pt's spiritual, cultural and educational needs considered and pt agreeable to plan of care and goals.  Patient is making good progress towards established goals.  Pt. tolerated exercises within pain tolerance.   Reviewed HEP with pt., see EMR under "patient instructions" for provided exercises, activity modifications and limitations, modalities for home pain management.  Pt. demo understanding of above.      Pt. tolerated US and Fluidotherapy with no irritation. Pt. Provided with Dycem and silicone scar pads (night time use) for scar management.   Provided pt. With YELLOW theraputty for rolling, pinching and composite .  Pt. Demo understanding of above.      Pt. Tolerated increase in theraputty for rolling with no reported pain.     Due to possible clinic closure 2* to coronavirus: OT educated pt.on  Epic MyChart communication, HEP, moist heat source, scar/ edema massage, modalities for home pain management and activity modifications.  Pt. was educated on tele-health options, patient opted not to participate in " tele-health visits when and if they are available during the COVID-19 situation. Pt. Demo understanding of above.    New/Revised Goals: Cont POC        Plan      Continue 2-3x week x 90 days during the certification period    03/10/2020   to 06/10/2020   with established POC in pursuit of OT goals.      Toshia Fisher, OT

## 2020-04-28 ENCOUNTER — CLINICAL SUPPORT (OUTPATIENT)
Dept: REHABILITATION | Facility: HOSPITAL | Age: 65
End: 2020-04-28
Payer: MEDICARE

## 2020-04-28 DIAGNOSIS — M79.641 RIGHT HAND PAIN: Primary | ICD-10-CM

## 2020-04-28 PROCEDURE — 97110 THERAPEUTIC EXERCISES: CPT | Performed by: OCCUPATIONAL THERAPIST

## 2020-04-28 PROCEDURE — 97035 APP MDLTY 1+ULTRASOUND EA 15: CPT | Performed by: OCCUPATIONAL THERAPIST

## 2020-04-28 PROCEDURE — 97022 WHIRLPOOL THERAPY: CPT | Performed by: OCCUPATIONAL THERAPIST

## 2020-04-28 PROCEDURE — 97140 MANUAL THERAPY 1/> REGIONS: CPT | Performed by: OCCUPATIONAL THERAPIST

## 2020-04-28 NOTE — PROGRESS NOTES
"                            Occupational Therapy Daily Treatment Note       Date: 4/29/2020  Name: Payton Castillo  Clinic Number: [id number]    Therapy Diagnosis:   Encounter Diagnosis   Name Primary?    Right hand pain Yes     Physician: Leonid Aragon MD    Physician Orders: (R) carpal tunnel syndrome;  (R) OA of 1st metacarpal;  Evaluate and treat;  ROM, US, Paraffin, massage, deep heat, slow progressive strengthening  Medical Diagnosis: (R) hand / wrist pain;  (R) carpal tunnel syndrome;  (R) OA of 1st metacarpal  Surgical Procedure and Date: Excision of bony osteophyte of (R) thumb IP joint radial and ulnar border;  Arthrotomy IP (R) thumb; Excision of mucosynovial cyst (R) thumb IP joint; (R) thumb flexor tenosynovectomy; Median nerve decompression at (R) wrist;  Ulnar nerve decompression at elbow with submuscular transposition; (R) carpal trapeziectomy;  01/28/2020  Insurance Authorization Period Expiration: 03/03/2020-12/31/2020  Plan of Care Certification Period: 03/10/2020-06/10/2020  Date of Return to MD: 05/05/2020    Visit # / Visits authorized: 17 / 104  Time In: 2:00  Time Out: 2:55  Total Billable Time: 53 minutes    Precautions:  Standard;  Kidney CA ( approx. 2014)      Post Op:  13 weeks,  0 days      Subjective     Pt reports: "I am still having numbness in my small finger of my right hand.  I think the numbness is staying about the same."    0-1/10 pain (R) hand/ wrist    Objective    Patient seen by OT this session. Tx consisted of:      Supervised modalities after being cleared for contradictions  x  10 minutes:     -Paraffin with MH to (R)  hand  to increase blood flow, circulation, pain management and for tissue elasticity prior to therex  x  0 minutes.   (not completed this session)    -Fluidotherapy to  (R)  hand to increase blood flow, circulation, tissue elasticity, desensitization, sensory re-education and for pain management  X 10 minutes.         Direct contact modalities after being " "cleared for contraindications x   8 minutes:  -Ultrasound applied to (R) hand  on   3.3 Mhz with 0.7 w/cm2 @ 50 % duty cycle to decrease pain and inflammation  /////  to remold scar tissue and increase tissue elasticity x 8 minutes.        Manual therapy techniques to increase joint mobilization /// scar massage/// soft tissue mobilization   x   15 minutes:     -Scar Massage to volar aspect of (R) wrist and dorsal aspect of (R) thumb region with mini therapeutic vibrator and scar extractor on volar aspect of incision sites to decrease dense scar adhesions and improve  tensile glide  x 5 minutes.  -Manual Therapy techniques to (R) hand/ wrist  Including light joint mobilizations, stretching, and PROM to increase joint mobility, ROM and for pain management  x  5 minutes   -Soft Tissue Mobilization to (R) hand / wrist from distal to proximal to decrease edema and increase ROM and functional abilities  x  5 minutes      Therapeutic Exercises to improve functional performance while increasing strength, endurance, ROM,  and flexibility  x   20 minutes:      · - Joint blocking and reverse joint blocking for IP of (R) thumb for EXT/FLEX x 10 reps    - X-Small wooden blocks thumb tip to 5th MP un/lacing x 10 blocks  x 1 sets    - 2# DF/VF (SUP/PRON), RD/UD  x 10 reps   - Colored bead stringing with thumb to 5th digit DIP x 20 beads   - Wrist roller coaster for AROM of wrist in all planes x 10 reps   - YELLOW Theraputty for intrinsic pulling and thumb raking for FLEX x 10 reps   - RED Theraputty with PVC for "cookie cutting" x 20 reps   - RED Theraputty for rolling and  pulling x 10 reps   - YELLOW Flexbar for SUP/PRON and DF/VF x 10 reps   - Mini colored pegboard for FMC and in-hand manipulation  (5 in-hand) x 40 pegs   - 25#PHG (black spring) for composite  x 20 reps   - Rice bucket with (5) MEDIUM washers and (5) glass beads to increase sensitization ( vision occluded) x 1 rep   - Wooden pegboard with RED clothespins " "with 3PT pinch x 2 reps         Initiate in future therapy sessions:     - INCREASE WASHER SIZE DURING rice bucket!!!    · - Towel walking for digital FLEX/EXT x 3 reps  · - Quick flicks for digital EXT with sponge   - [place] Slot with ### washers and  #### pennies ( 5 in hand) for FMC x 1 rep  · - Cotton balls thumb tip to palm x 2 reps  x 10 balls  · - Silver balls in CW/CCW motion x 3min   - ### Theraputty for  and medicine top x 10 reps  - ### digiflex for isolated x 10 reps ### digiflex for composite x 20 reps   - ### Wrist exerstick in standing for DF/VF x 3 reps       Assessment     Pt. will continue to benefit from skilled OT intervention to increase functional abilities, ROM, and strength and pain control.  Pt. demonstrated proper understanding of each exercise.  Pt continues to require verbal and tactile cues for throughout therapy session to maintain position and prevent compensation.   Pt continues to be limited in functional and leisurely pursuits. Pain limits patient's participation in ADL's. Pt is not able to carryout necessary vocational tasks.  Pt's spiritual, cultural and educational needs considered and pt agreeable to plan of care and goals.  Patient is making good progress towards established goals.  Pt. tolerated exercises within pain tolerance.   Reviewed HEP with pt., see EMR under "patient instructions" for provided exercises, activity modifications and limitations, modalities for home pain management.  Pt. demo understanding of above.      Pt. tolerated US and Fluidotherapy with no irritation. Pt. Provided with Dycem and silicone scar pads (night time use) for scar management.   Provided pt. With YELLOW theraputty for rolling, pinching and composite .  Pt. Demo understanding of above.      Pt. Tolerated decrease in size of objects in rice bucket with limited sensation with MEDIUM washers (unable to find).   Pt. Tolerated addition of theraputty for thumb raking for FLEX and intrinsic pulls " with no reported pain but demo fatigue.     Due to possible clinic closure 2* to coronavirus: OT educated pt.on  Epic MyChart communication, HEP, moist heat source, scar/ edema massage, modalities for home pain management and activity modifications.  Pt. was educated on tele-health options, patient opted not to participate in tele-health visits when and if they are available during the COVID-19 situation. Pt. Demo understanding of above.    New/Revised Goals: Cont POC        Plan      Continue 2-3x week x 90 days during the certification period    03/10/2020   to 06/10/2020   with established POC in pursuit of OT goals.      Toshia Fisher, OT

## 2020-04-29 ENCOUNTER — CLINICAL SUPPORT (OUTPATIENT)
Dept: REHABILITATION | Facility: HOSPITAL | Age: 65
End: 2020-04-29
Payer: MEDICARE

## 2020-04-29 DIAGNOSIS — M79.641 RIGHT HAND PAIN: Primary | ICD-10-CM

## 2020-04-29 PROCEDURE — 97110 THERAPEUTIC EXERCISES: CPT | Performed by: OCCUPATIONAL THERAPIST

## 2020-04-29 PROCEDURE — 97035 APP MDLTY 1+ULTRASOUND EA 15: CPT | Performed by: OCCUPATIONAL THERAPIST

## 2020-04-29 PROCEDURE — 97022 WHIRLPOOL THERAPY: CPT | Performed by: OCCUPATIONAL THERAPIST

## 2020-04-29 PROCEDURE — 97140 MANUAL THERAPY 1/> REGIONS: CPT | Performed by: OCCUPATIONAL THERAPIST

## 2020-05-04 NOTE — PROGRESS NOTES
"                            Occupational Therapy Daily Treatment Note       Date: 5/5/2020  Name: Payton Castillo  Clinic Number: [id number]    Therapy Diagnosis:   Encounter Diagnosis   Name Primary?    Right hand pain Yes     Physician: Leonid Aragon MD    Physician Orders: (R) carpal tunnel syndrome;  (R) OA of 1st metacarpal;  Evaluate and treat;  ROM, US, Paraffin, massage, deep heat, slow progressive strengthening  Medical Diagnosis: (R) hand / wrist pain;  (R) carpal tunnel syndrome;  (R) OA of 1st metacarpal  Surgical Procedure and Date: Excision of bony osteophyte of (R) thumb IP joint radial and ulnar border;  Arthrotomy IP (R) thumb; Excision of mucosynovial cyst (R) thumb IP joint; (R) thumb flexor tenosynovectomy; Median nerve decompression at (R) wrist;  Ulnar nerve decompression at elbow with submuscular transposition; (R) carpal trapeziectomy;  01/28/2020  Insurance Authorization Period Expiration: 03/03/2020-12/31/2020  Plan of Care Certification Period: 03/10/2020-06/10/2020  Date of Return to MD: 05/05/2020    Visit # / Visits authorized: 18 / 104  Time In: 8:55  Time Out: 9:50  Total Billable Time: 53 minutes    Precautions:  Standard;  Kidney CA ( approx. 2014)      Post Op:  13 weeks,  6 days      Subjective     Pt reports: "I am having numbness in my small finger and part of my ring finger.  It all started last week after I got dry needling on Thursday or so.  I do not know what is causing it."    0-1/10 pain (R) hand/ wrist    Objective    Patient seen by OT this session. Tx consisted of:      Supervised modalities after being cleared for contradictions  x  10 minutes:     -Paraffin with MH to (R)  hand  to increase blood flow, circulation, pain management and for tissue elasticity prior to therex  x  0 minutes.   (not completed this session)    -Fluidotherapy to  (R)  hand to increase blood flow, circulation, tissue elasticity, desensitization, sensory re-education and for pain management  X " "10 minutes.         Direct contact modalities after being cleared for contraindications x   8 minutes:  -Ultrasound applied to (R) hand  on   3.3 Mhz with 0.7 w/cm2 @ 50 % duty cycle to decrease pain and inflammation  /////  to remold scar tissue and increase tissue elasticity x 8 minutes.        Manual therapy techniques to increase joint mobilization /// scar massage/// soft tissue mobilization   x   15 minutes:     -Scar Massage to volar aspect of (R) wrist and dorsal aspect of (R) thumb region with mini therapeutic vibrator and scar extractor on volar aspect of incision sites to decrease dense scar adhesions and improve  tensile glide  x 5 minutes.  -Manual Therapy techniques to (R) hand/ wrist  Including light joint mobilizations, stretching, and PROM to increase joint mobility, ROM and for pain management  x  5 minutes   -Soft Tissue Mobilization to (R) hand / wrist from distal to proximal to decrease edema and increase ROM and functional abilities  x  5 minutes      Therapeutic Exercises to improve functional performance while increasing strength, endurance, ROM,  and flexibility  x   20 minutes:      · - Joint blocking and reverse joint blocking for IP of (R) thumb for EXT/FLEX x 10 reps    - X-Small wooden blocks thumb tip to 5th MP un/lacing x 10 blocks  x 1 sets    - 2# DF/VF (SUP/PRON), RD/UD  x 10 reps   - Colored bead stringing with thumb to 5th digit DIP x 20 beads   - Wrist roller coaster for AROM of wrist in all planes x 10 reps   - YELLOW Theraputty for intrinsic pulling and thumb raking for FLEX x 10 reps   - RED Theraputty with PVC for "cookie cutting" x 20 reps   - RED Theraputty for rolling and  pulling x 10 reps   - YELLOW Flexbar for SUP/PRON and DF/VF x 10 reps   - Mini colored pegboard for FMC and in-hand manipulation  (5 in-hand) x 40 pegs   - 25#PHG (black spring) for composite  x 20 reps   - Rice bucket with (5) Nuts, (5) LARGE washers and (5) glass beads to increase sensitization ( " "vision occluded) x 1 rep   - Wooden pegboard with RED clothespins with 3PT pinch x 2 reps         Initiate in future therapy sessions:    · - Towel walking for digital FLEX/EXT x 3 reps  · - Quick flicks for digital EXT with sponge   - [place] Slot with ### washers and  #### pennies ( 5 in hand) for FMC x 1 rep  · - Cotton balls thumb tip to palm x 2 reps  x 10 balls  · - Silver balls in CW/CCW motion x 3min   - ### Theraputty for  and medicine top x 10 reps  - ### digiflex for isolated x 10 reps ### digiflex for composite x 20 reps   - ### Wrist exerstick in standing for DF/VF x 3 reps       Assessment     Pt. will continue to benefit from skilled OT intervention to increase functional abilities, ROM, and strength and pain control.  Pt. demonstrated proper understanding of each exercise.  Pt continues to require verbal and tactile cues for throughout therapy session to maintain position and prevent compensation.   Pt continues to be limited in functional and leisurely pursuits. Pain limits patient's participation in ADL's. Pt is not able to carryout necessary vocational tasks.  Pt's spiritual, cultural and educational needs considered and pt agreeable to plan of care and goals.  Patient is making good progress towards established goals.  Pt. tolerated exercises within pain tolerance.   Reviewed HEP with pt., see EMR under "patient instructions" for provided exercises, activity modifications and limitations, modalities for home pain management.  Pt. demo understanding of above.      Pt. tolerated US and Fluidotherapy with no irritation. Pt. Provided with Dycem and silicone scar pads (night time use) for scar management.   Provided pt. With YELLOW theraputty for rolling, pinching and composite .  Pt. Demo understanding of above.      Continues to demo limited sensation and moderate difficulty retrieving LARGE washers, but was able to retrieve 4 LARGE washers.  Added (5) nuts to rice bucket and was able to retrieve " with min. limitations.       Due to possible clinic closure 2* to coronavirus: OT educated pt.on  Epic MyChart communication, HEP, moist heat source, scar/ edema massage, modalities for home pain management and activity modifications.  Pt. was educated on tele-health options, patient opted not to participate in tele-health visits when and if they are available during the COVID-19 situation. Pt. Demo understanding of above.    New/Revised Goals: Cont POC        Plan      Continue 2-3x week x 90 days during the certification period    03/10/2020   to 06/10/2020   with established POC in pursuit of OT goals.      Toshia Fisher, OT

## 2020-05-05 ENCOUNTER — CLINICAL SUPPORT (OUTPATIENT)
Dept: REHABILITATION | Facility: HOSPITAL | Age: 65
End: 2020-05-05
Payer: MEDICARE

## 2020-05-05 DIAGNOSIS — M79.641 RIGHT HAND PAIN: Primary | ICD-10-CM

## 2020-05-05 PROCEDURE — 97035 APP MDLTY 1+ULTRASOUND EA 15: CPT | Performed by: OCCUPATIONAL THERAPIST

## 2020-05-05 PROCEDURE — 97140 MANUAL THERAPY 1/> REGIONS: CPT | Performed by: OCCUPATIONAL THERAPIST

## 2020-05-05 PROCEDURE — 97022 WHIRLPOOL THERAPY: CPT | Performed by: OCCUPATIONAL THERAPIST

## 2020-05-05 PROCEDURE — 97110 THERAPEUTIC EXERCISES: CPT | Performed by: OCCUPATIONAL THERAPIST

## 2020-05-06 NOTE — PROGRESS NOTES
"                            Occupational Therapy Daily Treatment Note       Date: 5/12/2020  Name: Payton Castillo  Clinic Number: [id number]    Therapy Diagnosis:   Encounter Diagnosis   Name Primary?    Right hand pain Yes     Physician: Leonid Aragon MD    Physician Orders: (R) carpal tunnel syndrome;  (R) OA of 1st metacarpal;  Evaluate and treat;  ROM, US, Paraffin, massage, deep heat, slow progressive strengthening  Medical Diagnosis: (R) hand / wrist pain;  (R) carpal tunnel syndrome;  (R) OA of 1st metacarpal  Surgical Procedure and Date: Excision of bony osteophyte of (R) thumb IP joint radial and ulnar border;  Arthrotomy IP (R) thumb; Excision of mucosynovial cyst (R) thumb IP joint; (R) thumb flexor tenosynovectomy; Median nerve decompression at (R) wrist;  Ulnar nerve decompression at elbow with submuscular transposition; (R) carpal trapeziectomy;  01/28/2020  Insurance Authorization Period Expiration: 03/03/2020-12/31/2020  Plan of Care Certification Period: 03/10/2020-06/10/2020  Date of Return to MD: 07/13/2020    Visit # / Visits authorized: 20 / 104  Time In: 11:00  Time Out: 11:55  Total Billable Time: 53 minutes    Precautions:  Standard;  Kidney CA ( approx. 2014)      Post Op:  14 weeks,  6 days      Subjective     Pt reports: "I went to see a back doctor and he thinks the numbness in my small finger is coming from my neck.  He wants me to go back and get an injection.  My wrist is hurting today.   I went back for a follow up for my hand and I didn't get to see Dr. Aragon.  I saw another doctor and he said 6 more weeks of therapy.  He also said I can go back to work light duty but work will only allow me to go back full duty."    0-1/10 pain (R) hand/ wrist    Objective    Patient seen by OT this session. Tx consisted of:      Supervised modalities after being cleared for contradictions  x  10 minutes:     -Paraffin with MH to (R)  hand  to increase blood flow, circulation, pain management and " "for tissue elasticity prior to therex  x  0 minutes.   (not completed this session)    -Fluidotherapy to  (R)  hand to increase blood flow, circulation, tissue elasticity, desensitization, sensory re-education and for pain management  X 10 minutes.         Direct contact modalities after being cleared for contraindications x   8 minutes:  -Ultrasound applied to (R) hand  on   3.3 Mhz with 0.7 w/cm2 @ 50 % duty cycle to decrease pain and inflammation  /////  to remold scar tissue and increase tissue elasticity x 8 minutes.        Manual therapy techniques to increase joint mobilization /// scar massage/// soft tissue mobilization   x   15 minutes:     -Scar Massage to volar aspect of (R) wrist and dorsal aspect of (R) thumb region with mini therapeutic vibrator and scar extractor on volar aspect of incision sites to decrease dense scar adhesions and improve  tensile glide  x 5 minutes.  -Manual Therapy techniques to (R) hand/ wrist  Including light joint mobilizations, stretching, and PROM to increase joint mobility, ROM and for pain management  x  5 minutes   -Soft Tissue Mobilization to (R) hand / wrist from distal to proximal to decrease edema and increase ROM and functional abilities  x  5 minutes      Therapeutic Exercises to improve functional performance while increasing strength, endurance, ROM,  and flexibility  x   20 minutes:      · - Joint blocking and reverse joint blocking for IP of (R) thumb for EXT/FLEX x 10 reps    - X-Small wooden blocks thumb tip to 5th MP un/lacing x 10 blocks  x 1 sets    - 2# DF/VF (SUP/PRON), RD/UD  x 10 reps   - Colored bead stringing with thumb to 5th digit DIP x 20 beads   - Wrist roller coaster for AROM of wrist in all planes x 10 reps   - YELLOW Theraputty for intrinsic pulling and thumb raking for FLEX x 10 reps   - RED Theraputty with PVC for "cookie cutting" x 20 reps   - RED Theraputty for rolling and  pulling x 10 reps   - YELLOW Flexbar for SUP/PRON and DF/VF x 10 " "reps   - Mini colored pegboard for FMC and in-hand manipulation  (5 in-hand) x 40 pegs   - 25#PHG (black spring) for composite  x 20 reps   - Rice bucket with (5) Nuts, (5) LARGE washers and (5) glass beads to increase sensitization ( vision occluded) x 1 rep   - Wooden pegboard with RED clothespins with 3PT pinch x 2 reps      - YELLOW digiflex for isolated x 10 reps RED digiflex for composite x 20 reps   - RED Digiweb for intrinsics x 10 reps    Initiate in future therapy sessions:    · - Towel walking for digital FLEX/EXT x 3 reps  · - Quick flicks for digital EXT with sponge   - [place] Slot with ### washers and  #### pennies ( 5 in hand) for FMC x 1 rep  · - Cotton balls thumb tip to palm x 2 reps  x 10 balls  · - Silver balls in CW/CCW motion x 3min   - ### Theraputty for  and medicine top x 10 reps   - ### Wrist exerstick in standing for DF/VF x 3 reps       Assessment     Pt. will continue to benefit from skilled OT intervention to increase functional abilities, ROM, and strength and pain control.  Pt. demonstrated proper understanding of each exercise.  Pt continues to require verbal and tactile cues for throughout therapy session to maintain position and prevent compensation.   Pt continues to be limited in functional and leisurely pursuits. Pain limits patient's participation in ADL's. Pt is not able to carryout necessary vocational tasks.  Pt's spiritual, cultural and educational needs considered and pt agreeable to plan of care and goals.  Patient is making good progress towards established goals.  Pt. tolerated exercises within pain tolerance.   Reviewed HEP with pt., see EMR under "patient instructions" for provided exercises, activity modifications and limitations, modalities for home pain management.  Pt. demo understanding of above.      Pt. tolerated US and Fluidotherapy with no irritation. Pt. Provided with Dycem and silicone scar pads (night time use) for scar management.   Provided pt. With " YELLOW theraputty for rolling, pinching and composite .  Pt. Demo understanding of above.      Pt. Tolerated addition of digiweb for intrinsics and digiflex for isolated and composite digital FLEX with no reported pain.       Due to possible clinic closure 2* to coronavirus: OT educated pt.on  Epic MyChart communication, HEP, moist heat source, scar/ edema massage, modalities for home pain management and activity modifications.  Pt. was educated on tele-health options, patient opted not to participate in tele-health visits when and if they are available during the COVID-19 situation. Pt. Demo understanding of above.    New/Revised Goals: Cont POC        Plan      Continue 2-3x week x 90 days during the certification period    03/10/2020   to 06/10/2020   with established POC in pursuit of OT goals.      Toshia Fishre, OT

## 2020-05-07 ENCOUNTER — CLINICAL SUPPORT (OUTPATIENT)
Dept: REHABILITATION | Facility: HOSPITAL | Age: 65
End: 2020-05-07
Payer: MEDICARE

## 2020-05-07 DIAGNOSIS — M79.641 RIGHT HAND PAIN: Primary | ICD-10-CM

## 2020-05-07 PROCEDURE — 97035 APP MDLTY 1+ULTRASOUND EA 15: CPT | Mod: KX

## 2020-05-07 PROCEDURE — 97110 THERAPEUTIC EXERCISES: CPT | Mod: KX

## 2020-05-07 PROCEDURE — 97140 MANUAL THERAPY 1/> REGIONS: CPT | Mod: KX

## 2020-05-07 NOTE — PROGRESS NOTES
"                            Occupational Therapy Daily Treatment Note   Date: 5/7/2020  Name: Payton Castillo  Clinic Number: [id number]    Therapy Diagnosis: right hand pain  Physician: Leonid Aragon MD    Physician Orders: (R) carpal tunnel syndrome;  (R) OA of 1st metacarpal;  Evaluate and treat;  ROM, US, Paraffin, massage, deep heat, slow progressive strengthening  Medical Diagnosis: (R) hand / wrist pain;  (R) carpal tunnel syndrome;  (R) OA of 1st metacarpal  Surgical Procedure and Date: Excision of bony osteophyte of (R) thumb IP joint radial and ulnar border;  Arthrotomy IP (R) thumb; Excision of mucosynovial cyst (R) thumb IP joint; (R) thumb flexor tenosynovectomy; Median nerve decompression at (R) wrist;  Ulnar nerve decompression at elbow with submuscular transposition; (R) carpal trapeziectomy;  01/28/2020  Insurance Authorization Period Expiration: 03/03/2020-12/31/2020  Plan of Care Certification Period: 03/10/2020-06/10/2020  Date of Return to MD: 05/05/2020    Visit # / Visits authorized: 19 / 104  Time In: 9:02  Time Out: 9:55  Total Billable Time: 48 minutes    Precautions:  Standard;  Kidney CA ( approx. 2014)  Post Op:  14 weeks,  1 day    Subjective     Pt reports: "The doctor's office canceled my appt yesterday and rescheduled for Mon.  The numbness in my pinkie into my hand comes and goes depending on how I move my shoulder.   Sharp pain in the scapula isn't as bad since started the dry needling.  Still have shoulder and back pain.  Couldn't open a water bottle yesterday, didn't have the strength and the thumb joint was sore."  0-1/10 pain (R) hand/ wrist    Objective    Patient seen by OT this session. Tx consisted of:    Supervised modalities after being cleared for contradictions  x 10 minutes  -Fluidotherapy to  (R)  hand to increase blood flow, circulation, tissue elasticity, desensitization, sensory re-education and for pain management  X 10 minutes.     Direct contact modalities after " "being cleared for contraindications x  8 minutes:  -Ultrasound applied to (R) hand  on   3.3 Mhz with 0.7 w/cm2 @ 50 % duty cycle to decrease pain and inflammation  /////  to remold scar tissue and increase tissue elasticity x 8 minutes.    Manual therapy techniques to increase joint mobilization /// scar massage/// soft tissue mobilization   x  12 minutes:   -Scar Massage to volar aspect of (R) wrist and dorsal aspect of (R) thumb region with mini therapeutic vibrator and scar extractor on volar aspect of incision sites to decrease dense scar adhesions and improve  tensile glide  x 5 minutes.  -Manual Therapy techniques to (R) hand/ wrist  Including light joint mobilizations, stretching, and PROM to increase joint mobility, ROM and for pain management  x  5 minutes   -Soft Tissue Mobilization to (R) hand / wrist from distal to proximal to decrease edema and increase ROM and functional abilities  x  5 minutes    Therapeutic Exercises to improve functional performance while increasing strength, endurance, ROM,  and flexibility  x 18 minutes:  ·  Joint blocking and reverse joint blocking for IP of (R) thumb for EXT/FLEX x 10 reps    -2# DF/VF (SUP/PRON), RD/UD  x 10 reps   - RED Theraputty with PVC for "cookie cutting" x 20 reps   - RED Theraputty for rolling and  pulling x 10 reps   - YELLOW Flexbar for SUP/PRON and DF/VF x 10 reps    25#PHG (black spring) for composite  x 20 reps              - Wooden pegboard with RED clothespins with 3PT pinch x 2 reps        NOT PERFORMED all ex below:                -- Mini colored pegboard for FMC and in-hand manipulation  (5 in-hand) x 40 pegs    Rice bucket with (5) Nuts, (5) LARGE washers and (5) glass beads to increase sensitization ( vision occluded) x 1 rep             - Colored bead stringing with thumb to 5th digit DIP x 20 beads   - Wrist roller coaster for AROM of wrist in all planes x 10 reps   - YELLOW Theraputty for intrinsic pulling and thumb raking for FLEX x " 10 reps              -X-Small wooden blocks thumb tip to 5th MP un/lacing x 10 blocks  x 1 sets   Assessment    Patient with difficulty achieving thumb IP flexion, minimal stiffness noted at joint.  Patient noting decrease in hand strength affecting daily activity.  Patient performed all ex as requested with minimal increase in soreness.     Plan      Continue 2-3x week x 90 days during the certification period    03/10/2020   to 06/10/2020   with established POC in pursuit of OT goals.      Barb Garcia, OT

## 2020-05-12 ENCOUNTER — CLINICAL SUPPORT (OUTPATIENT)
Dept: REHABILITATION | Facility: HOSPITAL | Age: 65
End: 2020-05-12
Payer: MEDICARE

## 2020-05-12 DIAGNOSIS — M79.641 RIGHT HAND PAIN: Primary | ICD-10-CM

## 2020-05-12 PROCEDURE — 97110 THERAPEUTIC EXERCISES: CPT | Performed by: OCCUPATIONAL THERAPIST

## 2020-05-12 PROCEDURE — 97035 APP MDLTY 1+ULTRASOUND EA 15: CPT | Performed by: OCCUPATIONAL THERAPIST

## 2020-05-12 PROCEDURE — 97140 MANUAL THERAPY 1/> REGIONS: CPT | Performed by: OCCUPATIONAL THERAPIST

## 2020-05-12 PROCEDURE — 97022 WHIRLPOOL THERAPY: CPT | Performed by: OCCUPATIONAL THERAPIST

## 2020-05-12 NOTE — PROGRESS NOTES
"                            Occupational Therapy Daily Treatment Note       Date: 5/13/2020  Name: Payton Castillo  Clinic Number: [id number]    Therapy Diagnosis:   Encounter Diagnosis   Name Primary?    Right hand pain Yes     Physician: Leonid Aragon MD    Physician Orders: (R) carpal tunnel syndrome;  (R) OA of 1st metacarpal;  Evaluate and treat;  ROM, US, Paraffin, massage, deep heat, slow progressive strengthening  Medical Diagnosis: (R) hand / wrist pain;  (R) carpal tunnel syndrome;  (R) OA of 1st metacarpal  Surgical Procedure and Date: Excision of bony osteophyte of (R) thumb IP joint radial and ulnar border;  Arthrotomy IP (R) thumb; Excision of mucosynovial cyst (R) thumb IP joint; (R) thumb flexor tenosynovectomy; Median nerve decompression at (R) wrist;  Ulnar nerve decompression at elbow with submuscular transposition; (R) carpal trapeziectomy;  01/28/2020  Insurance Authorization Period Expiration: 03/03/2020-12/31/2020  Plan of Care Certification Period: 03/10/2020-06/10/2020  Date of Return to MD: 07/13/2020    Visit # / Visits authorized: 21 / 104  Time In: 2:00  Time Out: 2:55  Total Billable Time: 53 minutes    Precautions:  Standard;  Kidney CA ( approx. 2014)      Post Op:  15 weeks,  0 days      Subjective     Pt reports: "I am hurting in my while right arm.  The pain goes from my neck down to my hand.  I am still having a lot of numbness and tingling."    2/10 pain (R) hand/ wrist    Objective    Patient seen by OT this session. Tx consisted of:      Supervised modalities after being cleared for contradictions  x  10 minutes:  -Fluidotherapy to  (R)  hand to increase blood flow, circulation, tissue elasticity, desensitization, sensory re-education and for pain management  X 10 minutes.         Direct contact modalities after being cleared for contraindications x   8 minutes:  -Ultrasound applied to (R) hand  on   3.3 Mhz with 0.7 w/cm2 @ 50 % duty cycle to decrease pain and inflammation  " "/////  to remold scar tissue and increase tissue elasticity x 8 minutes.        Manual therapy techniques to increase joint mobilization /// scar massage/// soft tissue mobilization   x   15 minutes:     -Scar Massage to volar aspect of (R) wrist and dorsal aspect of (R) thumb region with mini therapeutic vibrator and scar extractor on volar aspect of incision sites to decrease dense scar adhesions and improve  tensile glide  x 5 minutes.  -Manual Therapy techniques to (R) hand/ wrist  Including light joint mobilizations, stretching, and PROM to increase joint mobility, ROM and for pain management  x  5 minutes   -Soft Tissue Mobilization to (R) hand / wrist from distal to proximal to decrease edema and increase ROM and functional abilities  x  5 minutes      Therapeutic Exercises to improve functional performance while increasing strength, endurance, ROM,  and flexibility  x   20 minutes:      · - Joint blocking and reverse joint blocking for IP of (R) thumb for EXT/FLEX x 10 reps    - X-Small wooden blocks thumb tip to 5th MP un/lacing x 10 blocks  x 1 sets    - 2# DF/VF (SUP/PRON), RD/UD  x 10 reps   - Colored bead stringing with thumb to 5th digit DIP x 20 beads   - Wrist roller coaster for AROM of wrist in all planes x 10 reps   - YELLOW Theraputty for intrinsic pulling and thumb raking for FLEX x 10 reps   - RED Theraputty with PVC for "cookie cutting" x 20 reps   - RED Theraputty for rolling and  pulling x 10 reps   - RED Flexbar for SUP/PRON and DF/VF x 10 reps   - Mini colored pegboard for FMC and in-hand manipulation  (5 in-hand) x 40 pegs   - 25#PHG (black spring) for composite  x 20 reps   - Rice bucket with (3) Nuts, (3) LARGE washers and (3) glass beads to increase sensitization ( vision occluded) x 1 rep   - Wooden pegboard with GREEN clothespins with 3PT pinch x 2 reps      - YELLOW digiflex for isolated x 10 reps RED digiflex for composite x 20 reps   - RED Digiweb for intrinsics x 10 " "reps    Initiate in future therapy sessions:    · - Towel walking for digital FLEX/EXT x 3 reps  · - Quick flicks for digital EXT with sponge   - [place] Slot with ### washers and  #### pennies ( 5 in hand) for FMC x 1 rep  · - Cotton balls thumb tip to palm x 2 reps  x 10 balls  · - Silver balls in CW/CCW motion x 3min   - ### Theraputty for  and medicine top x 10 reps   - ### Wrist exerstick in standing for DF/VF x 3 reps    -Paraffin with MH to (R)  hand  to increase blood flow, circulation, pain management and for tissue elasticity prior to therex  x  0 minutes.   (not completed this session)     Assessment     Pt. will continue to benefit from skilled OT intervention to increase functional abilities, ROM, and strength and pain control.  Pt. demonstrated proper understanding of each exercise.  Pt continues to require verbal and tactile cues for throughout therapy session to maintain position and prevent compensation.   Pt continues to be limited in functional and leisurely pursuits. Pain limits patient's participation in ADL's. Pt is not able to carryout necessary vocational tasks.  Pt's spiritual, cultural and educational needs considered and pt agreeable to plan of care and goals.  Patient is making good progress towards established goals.  Pt. tolerated exercises within pain tolerance.   Reviewed HEP with pt., see EMR under "patient instructions" for provided exercises, activity modifications and limitations, modalities for home pain management.  Pt. demo understanding of above.      Pt. tolerated US and Fluidotherapy with no irritation. Pt. Provided with Dycem and silicone scar pads (night time use) for scar management.   Provided pt. With YELLOW theraputty for rolling, pinching and composite .  Pt. Demo understanding of above.      Pt. Reported increased pain pre therapy session, and decreased pain post therapy session.  Pt. Reported numbness and tingling pre/ during/ and post therapy session.   Decreased " the amount of washer, glass beads, and nuts in rice bucket 2* to limited sensation.   Pt. Tolerated increase in clothespins during wooden pegboard, Flexbar for SUP/PRON and DF/VF with no reported pain.     Due to possible clinic closure 2* to coronavirus: OT educated pt.on  Epic MyChart communication, HEP, moist heat source, scar/ edema massage, modalities for home pain management and activity modifications.  Pt. was educated on tele-health options, patient opted not to participate in tele-health visits when and if they are available during the COVID-19 situation. Pt. Demo understanding of above.    New/Revised Goals: Cont POC        Plan      Continue 2-3x week x 90 days during the certification period    03/10/2020   to 06/10/2020   with established POC in pursuit of OT goals.      Toshia Fisher, OT

## 2020-05-13 ENCOUNTER — CLINICAL SUPPORT (OUTPATIENT)
Dept: REHABILITATION | Facility: HOSPITAL | Age: 65
End: 2020-05-13
Payer: MEDICARE

## 2020-05-13 DIAGNOSIS — M79.641 RIGHT HAND PAIN: Primary | ICD-10-CM

## 2020-05-13 PROCEDURE — 97140 MANUAL THERAPY 1/> REGIONS: CPT | Performed by: OCCUPATIONAL THERAPIST

## 2020-05-13 PROCEDURE — 97035 APP MDLTY 1+ULTRASOUND EA 15: CPT | Performed by: OCCUPATIONAL THERAPIST

## 2020-05-13 PROCEDURE — 97022 WHIRLPOOL THERAPY: CPT | Performed by: OCCUPATIONAL THERAPIST

## 2020-05-13 PROCEDURE — 97110 THERAPEUTIC EXERCISES: CPT | Performed by: OCCUPATIONAL THERAPIST

## 2020-05-16 DIAGNOSIS — I10 ESSENTIAL HYPERTENSION: ICD-10-CM

## 2020-05-17 RX ORDER — LOSARTAN POTASSIUM 50 MG/1
TABLET ORAL
Qty: 90 TABLET | Refills: 3 | Status: SHIPPED | OUTPATIENT
Start: 2020-05-17 | End: 2022-04-06

## 2020-05-19 NOTE — PROGRESS NOTES
"                            Occupational Therapy Daily Treatment Note       Date: 5/21/2020  Name: Payton Castillo  Clinic Number: [id number]    Therapy Diagnosis:   Encounter Diagnosis   Name Primary?    Right hand pain Yes     Physician: Leonid Aragon MD    Physician Orders: (R) carpal tunnel syndrome;  (R) OA of 1st metacarpal;  Evaluate and treat;  ROM, US, Paraffin, massage, deep heat, slow progressive strengthening  Medical Diagnosis: (R) hand / wrist pain;  (R) carpal tunnel syndrome;  (R) OA of 1st metacarpal  Surgical Procedure and Date: Excision of bony osteophyte of (R) thumb IP joint radial and ulnar border;  Arthrotomy IP (R) thumb; Excision of mucosynovial cyst (R) thumb IP joint; (R) thumb flexor tenosynovectomy; Median nerve decompression at (R) wrist;  Ulnar nerve decompression at elbow with submuscular transposition; (R) carpal trapeziectomy;  01/28/2020  Insurance Authorization Period Expiration: 03/03/2020-12/31/2020  Plan of Care Certification Period: 03/10/2020-06/10/2020  Date of Return to MD: 07/13/2020    Visit # / Visits authorized: 21 / 104  Time In: 11:05  Time Out: 12:00  Total Billable Time: 55 minutes    Precautions:  Standard;  Kidney CA ( approx. 2014)      Post Op:   01/28/2020      Subjective     Pt reports: "I have been having waves of stabbing pain in my hand on the pinky side.  I got an injection in my neck and it has not kicked in yet."    1/10 pain (R) hand/ wrist    Objective    Patient seen by OT this session. Tx consisted of:      Supervised modalities after being cleared for contradictions  x  10 minutes:  -Fluidotherapy to  (R)  hand to increase blood flow, circulation, tissue elasticity, desensitization, sensory re-education and for pain management  X 10 minutes.         Direct contact modalities after being cleared for contraindications x   8 minutes:  -Ultrasound applied to ulnar aspect of (R) hand  on   3.3 Mhz with 0.7 w/cm2 @ 50 % duty cycle to decrease pain and " "inflammation  /////  to remold scar tissue and increase tissue elasticity x 8 minutes.        Manual therapy techniques to increase joint mobilization /// scar massage/// soft tissue mobilization   x   10 minutes:     -Scar Massage to volar aspect of (R) wrist and dorsal aspect of (R) thumb region with mini therapeutic vibrator and scar extractor on volar aspect of incision sites to decrease dense scar adhesions and improve  tensile glide  x 3 minutes.  -Manual Therapy techniques to (R) hand/ wrist  Including light joint mobilizations, stretching, and PROM to increase joint mobility, ROM and for pain management  x  5 minutes   -Soft Tissue Mobilization to (R) hand / wrist from distal to proximal to decrease edema and increase ROM and functional abilities  x  2 minutes      Therapeutic Exercises to improve functional performance while increasing strength, endurance, ROM,  and flexibility  x   25 minutes:      · - Joint blocking and reverse joint blocking for IP of (R) thumb for EXT/FLEX x 10 reps    - X-Small wooden blocks thumb tip to 5th MP un/lacing x 10 blocks  x 1 sets    - 2# DF/VF (SUP/PRON), RD/UD  x 10 reps   - Colored bead stringing with thumb to 5th digit DIP x 20 beads   - Wrist roller coaster for AROM of wrist in all planes x 10 reps   - YELLOW Theraputty for intrinsic pulling and thumb raking for FLEX x 10 reps   - RED Theraputty with PVC for "cookie cutting" x 20 reps   - RED Theraputty for rolling and  pulling x 10 reps   - RED Flexbar for SUP/PRON and DF/VF x 10 reps   - Mini colored pegboard for FMC and in-hand manipulation  (5 in-hand) x 40 pegs   - 25#PHG (black spring) for composite  x 20 reps   - Rice bucket with (3) Nuts, (3) LARGE washers and (3) glass beads to increase sensitization ( vision occluded) x 1 rep   - Wooden pegboard with GREEN clothespins with 3PT pinch x 2 reps      - YELLOW digiflex for isolated x 10 reps RED digiflex for composite x 20 reps   - RED Digiweb for intrinsics x " "10 reps   - 2 YELLOW rubber band gripper for thumb FLEX x 10 reps    Initiate in future therapy sessions:    · - Towel walking for digital FLEX/EXT x 3 reps  · - Quick flicks for digital EXT with sponge   - [place] Slot with ### washers and  #### pennies ( 5 in hand) for FMC x 1 rep  · - Cotton balls thumb tip to palm x 2 reps  x 10 balls  · - Silver balls in CW/CCW motion x 3min   - ### Theraputty for  and medicine top x 10 reps   - ### Wrist exerstick in standing for DF/VF x 3 reps         Assessment     Pt. will continue to benefit from skilled OT intervention to increase functional abilities, ROM, and strength and pain control.  Pt. demonstrated proper understanding of each exercise.  Pt continues to require verbal and tactile cues for throughout therapy session to maintain position and prevent compensation.   Pt continues to be limited in functional and leisurely pursuits. Pain limits patient's participation in ADL's. Pt is not able to carryout necessary vocational tasks.  Pt's spiritual, cultural and educational needs considered and pt agreeable to plan of care and goals.  Patient is making good progress towards established goals.  Pt. tolerated exercises within pain tolerance.   Reviewed HEP with pt., see EMR under "patient instructions" for provided exercises, activity modifications and limitations, modalities for home pain management.  Pt. demo understanding of above.      Pt. tolerated US and Fluidotherapy with no irritation. Pt. Provided with Dycem and silicone scar pads (night time use) for scar management.   Provided pt. With YELLOW theraputty for rolling, pinching and composite .  Pt. Demo understanding of above.       Pt. Tolerated addition of rubber band gripper for thumb FLEX with no reported pain.     1.8cm: ( measured with wrist goni) thumb tip to PP (post modalities and MT)    Due to possible clinic closure 2* to coronavirus: OT educated pt.on  Epic MyChart communication, HEP, moist heat source, " scar/ edema massage, modalities for home pain management and activity modifications.  Pt. was educated on tele-health options, patient opted not to participate in tele-health visits when and if they are available during the COVID-19 situation. Pt. Demo understanding of above.    New/Revised Goals: Cont POC        Plan      Continue 2-3x week x 90 days during the certification period    03/10/2020   to 06/10/2020   with established POC in pursuit of OT goals.      Toshia Fisher, OT

## 2020-05-20 RX ORDER — METOPROLOL SUCCINATE 50 MG/1
TABLET, EXTENDED RELEASE ORAL
Qty: 90 TABLET | Refills: 3 | Status: SHIPPED | OUTPATIENT
Start: 2020-05-20 | End: 2020-07-07 | Stop reason: SDUPTHER

## 2020-05-21 ENCOUNTER — CLINICAL SUPPORT (OUTPATIENT)
Dept: REHABILITATION | Facility: HOSPITAL | Age: 65
End: 2020-05-21
Payer: MEDICARE

## 2020-05-21 DIAGNOSIS — M79.641 RIGHT HAND PAIN: Primary | ICD-10-CM

## 2020-05-21 PROCEDURE — 97035 APP MDLTY 1+ULTRASOUND EA 15: CPT | Performed by: OCCUPATIONAL THERAPIST

## 2020-05-21 PROCEDURE — 97140 MANUAL THERAPY 1/> REGIONS: CPT | Performed by: OCCUPATIONAL THERAPIST

## 2020-05-21 PROCEDURE — 97022 WHIRLPOOL THERAPY: CPT | Performed by: OCCUPATIONAL THERAPIST

## 2020-05-21 PROCEDURE — 97110 THERAPEUTIC EXERCISES: CPT | Performed by: OCCUPATIONAL THERAPIST

## 2020-05-25 NOTE — PROGRESS NOTES
"                            Occupational Therapy Progress Note       Date: 5/26/2020  Name: Payton Castillo  Clinic Number: [id number]    Therapy Diagnosis:   Encounter Diagnosis   Name Primary?    Right hand pain Yes     Physician: Leonid Aragon MD    Physician Orders: (R) carpal tunnel syndrome;  (R) OA of 1st metacarpal;  Evaluate and treat;  ROM, US, Paraffin, massage, deep heat, slow progressive strengthening  Medical Diagnosis: (R) hand / wrist pain;  (R) carpal tunnel syndrome;  (R) OA of 1st metacarpal  Surgical Procedure and Date: Excision of bony osteophyte of (R) thumb IP joint radial and ulnar border;  Arthrotomy IP (R) thumb; Excision of mucosynovial cyst (R) thumb IP joint; (R) thumb flexor tenosynovectomy; Median nerve decompression at (R) wrist;  Ulnar nerve decompression at elbow with submuscular transposition; (R) carpal trapeziectomy;  01/28/2020  Insurance Authorization Period Expiration: 03/03/2020-12/31/2020  Plan of Care Certification Period: 03/10/2020-06/10/2020  Date of Return to MD: 07/13/2020    Visit # / Visits authorized: 23 / 104  Time In: 11:10  Time Out: 11:55  Total Billable Time: 43 minutes    Precautions:  Standard;  Kidney CA ( approx. 2014)      Post Op:   01/28/2020      Subjective     Pt reports: "I had some slight pain in my hand over the weekend.    1/10 pain (R) hand/ wrist    Objective    Patient seen by OT this session. Tx consisted of:      Supervised modalities after being cleared for contradictions  x  10 minutes:  -Fluidotherapy to  (R)  hand to increase blood flow, circulation, tissue elasticity, desensitization, sensory re-education and for pain management  X 10 minutes.         Direct contact modalities after being cleared for contraindications x   8 minutes:  -Ultrasound applied to ulnar aspect of (R) hand  on   3.3 Mhz with 0.7 w/cm2 @ 50 % duty cycle to decrease pain and inflammation  /////  to remold scar tissue and increase tissue elasticity x 8 " "minutes.        Manual therapy techniques to increase joint mobilization /// scar massage/// soft tissue mobilization   x   10 minutes:     -Scar Massage to volar aspect of (R) wrist and dorsal aspect of (R) thumb region with mini therapeutic vibrator and scar extractor on volar aspect of incision sites to decrease dense scar adhesions and improve  tensile glide  x 3 minutes.  -Manual Therapy techniques to (R) hand/ wrist  Including light joint mobilizations, stretching, and PROM to increase joint mobility, ROM and for pain management  x  5 minutes   -Soft Tissue Mobilization to (R) hand / wrist from distal to proximal to decrease edema and increase ROM and functional abilities  x  2 minutes      Therapeutic Exercises to improve functional performance while increasing strength, endurance, ROM,  and flexibility  x   15 minutes:      · - Joint blocking and reverse joint blocking for IP of (R) thumb for EXT/FLEX x 10 reps    - X-Small wooden blocks thumb tip to 5th MP un/lacing x 10 blocks  x 1 sets    - 2# DF/VF (SUP/PRON), RD/UD  x 10 reps   - Colored bead stringing with thumb to 5th digit DIP x 20 beads   - YELLOW Theraputty for intrinsic pulling and thumb raking for FLEX x 10 reps   - RED Theraputty with PVC for "cookie cutting" x 20 reps   - RED Theraputty for rolling and  pulling x 10 reps   - RED Flexbar for SUP/PRON and DF/VF x 10 reps   - Mini colored pegboard for FMC and in-hand manipulation  (5 in-hand) x 40 pegs   - 25#PHG (black spring) for composite  x 20 reps   - Rice bucket with (3) Nuts, (3) LARGE washers and (3) glass beads to increase sensitization ( vision occluded) x 1 rep   - Wooden pegboard with GREEN clothespins with 3PT pinch x 2 reps      - RED digiflex for isolated x 10 reps GREEN digiflex for composite x 20 reps   - RED Digiweb for intrinsics x 10 reps   - 2 YELLOW rubber band gripper for thumb FLEX x 10 reps    Initiate in future therapy sessions:    · - Towel walking for digital " FLEX/EXT x 3 reps  · - Quick flicks for digital EXT with sponge   - [place] Slot with ### washers and  #### pennies ( 5 in hand) for FMC x 1 rep  · - Cotton balls thumb tip to palm x 2 reps  x 10 balls  · - Silver balls in CW/CCW motion x 3min   - ### Theraputty for  and medicine top x 10 reps   - ### Wrist exerstick in standing for DF/VF x 3 reps    Edema:  (R) / (L) cm  MCPs:  18.5 (-0.1)  /  18.3  PWC:  16.3 (-0.2)  / 15.4     (R)  //  (L)thumb  PP: 6.8 (-0.0)  // 5.7  IP: 6.4 (-0.1)  // 5.5  DP: 5.2 (+0.2) // 5.0        AROM: hand (R)  //   (L)      thumb           (measured with plastic goni)  MP: 0 / 38 (+1)  //  0/42  IP:    0 / 39 (+9)  //   0/67            Palmar ABD: 50 (+5) / 50  Radial ABD: 45 (+0, at max)  /  45                 Opposition: 1.8 cm (+0.4) ( from thumb tip to DPC)            PROM: hand (R) thumb    FLEX ( measured with plastic goni)  MP: 44 (+1)  IP:  44 (-2)        AROM: wrist          (R)   //  (L)  DF/VF: 60 (+5) / 70 (+0)  //  70/75  RD/UD: 10 (+0) / 30 (+0)  //  20/35  SUP/PRON: 90 (+5) / 90   // 90/90            Manual Muscle Test:  Wrist   (R)         Dorsi Flexion: 3+/5  Volar Flexion:  3+/5  Radial Deviation:  3/5  Ulnar Deviation:  3+/5 p!            Strength: (KEYONNA Dynamometer in lbs.),Position II    (R):   35 (+5)  (L):   60     Pinch Strength: (Pinch Gauge in lbs)                (R) /  (L)  LAT:  6 (+1) /13  3PT: 5 (+1) /11.5  2PT:  4 (+0)/ 13         Coordination:  9 Hole Peg test ( in/out) seconds     (R):  29   (L):   25        Quick DASH SCORE: 59%     Assessment     Pt. will continue to benefit from skilled OT intervention to increase functional abilities, ROM, and strength and pain control.  Pt. demonstrated proper understanding of each exercise.  Pt continues to require verbal and tactile cues for throughout therapy session to maintain position and prevent compensation.   Pt continues to be limited in functional and leisurely pursuits. Pain limits patient's  "participation in ADL's. Pt is not able to carryout necessary vocational tasks.  Pt's spiritual, cultural and educational needs considered and pt agreeable to plan of care and goals.  Patient is making good progress towards established goals.  Pt. tolerated exercises within pain tolerance.   Reviewed HEP with pt., see EMR under "patient instructions" for provided exercises, activity modifications and limitations, modalities for home pain management.  Pt. demo understanding of above.      Pt. tolerated US and Fluidotherapy with no irritation. Pt. Provided with Dycem and silicone scar pads (night time use) for scar management.   Provided pt. With YELLOW theraputty for rolling, pinching and composite .  Pt. Demo understanding of above.            Pt. Demo increased  strength in (R) hand, increased LAT and 3PT pinch but remained the same for 2PT pinch.   Pt. Demo decreased edema in (R) MCPs, PWC, thumb IP but increased edema for thumb DP and remained the same for thumb PP.  Pt. Demo increased AROM for palmar ABD and @ max for palmar and radial ABD.  Pt. Demo increased AROM for (R) thumb MP and IP FLEX and opposition, increased PROM for (R) thumb IP and MP FLEX.   Pt. Demo increased AROM for (R) wrist DF/VF and SUP, remained the same for RD/UD and @ max. For SUP/PRON.   Pt. Continues to report numbness in (R) small and ring fingers and pain remains approx. The same on average with activity.     New/Revised Goals: Cont POC  1)   Pt. to be IND with HEP and modalities for pain management by 1 week.  (MET)  2)   Pt. will report   0/10 pain on average with activity to A with exercises by 6-8 weeks.  (In Progress)  3)   Pt. will increase ROM 3-5 degrees to increase functional use for ADL's by 6-8 weeks. ( in progress)  4)   Pt. will decrease edema 0.1-0.3 mm to increase joint mobility /flexibility by 6-8 weeks.  ( in progress)  5)   Pt. will increase MMT by a grade to A with lifting items by 6-8 weeks.    6)   Pt. will " increase  strength 3-5 lbs. to open containers by 6-8 weeks.  ( in progress)  7)   Pt. will increase pinch 1-3 lbs for buttoning by 6-8 weeks. ( in progress)  8)   Pt. Will decrease time during 9 hole peg test by 3 seconds to A with FMC by 6-8 weeks.      Plan      Continue 2-3x week x 90 days during the certification period    05/25/2020   to 08/25/2020   with established POC in pursuit of OT goals.    I certify the need for these services furnished under this plan of treatment and while under my care     ____________________________________                         __________________  Physician/Referring Practitioner                                               Date of Signature    Toshia Fisher, OT

## 2020-05-26 ENCOUNTER — CLINICAL SUPPORT (OUTPATIENT)
Dept: REHABILITATION | Facility: HOSPITAL | Age: 65
End: 2020-05-26
Payer: MEDICARE

## 2020-05-26 DIAGNOSIS — M79.641 RIGHT HAND PAIN: Primary | ICD-10-CM

## 2020-05-26 PROCEDURE — 97110 THERAPEUTIC EXERCISES: CPT | Performed by: OCCUPATIONAL THERAPIST

## 2020-05-26 PROCEDURE — 97035 APP MDLTY 1+ULTRASOUND EA 15: CPT | Performed by: OCCUPATIONAL THERAPIST

## 2020-05-26 PROCEDURE — 97140 MANUAL THERAPY 1/> REGIONS: CPT | Performed by: OCCUPATIONAL THERAPIST

## 2020-06-02 ENCOUNTER — CLINICAL SUPPORT (OUTPATIENT)
Dept: REHABILITATION | Facility: HOSPITAL | Age: 65
End: 2020-06-02
Payer: MEDICARE

## 2020-06-02 PROCEDURE — 97110 THERAPEUTIC EXERCISES: CPT | Performed by: OCCUPATIONAL THERAPIST

## 2020-06-02 PROCEDURE — 97035 APP MDLTY 1+ULTRASOUND EA 15: CPT | Performed by: OCCUPATIONAL THERAPIST

## 2020-06-02 PROCEDURE — 97140 MANUAL THERAPY 1/> REGIONS: CPT | Performed by: OCCUPATIONAL THERAPIST

## 2020-06-02 NOTE — PROGRESS NOTES
"                            Occupational Therapy Daily Treatment Note       Date: 6/2/2020  Name: Payton Castillo  Clinic Number: [id number]    Therapy Diagnosis:   No diagnosis found.  Physician: Leonid Aragon MD    Physician Orders: (R) carpal tunnel syndrome;  (R) OA of 1st metacarpal;  Evaluate and treat;  ROM, US, Paraffin, massage, deep heat, slow progressive strengthening  Medical Diagnosis: (R) hand / wrist pain;  (R) carpal tunnel syndrome;  (R) OA of 1st metacarpal  Surgical Procedure and Date: Excision of bony osteophyte of (R) thumb IP joint radial and ulnar border;  Arthrotomy IP (R) thumb; Excision of mucosynovial cyst (R) thumb IP joint; (R) thumb flexor tenosynovectomy; Median nerve decompression at (R) wrist;  Ulnar nerve decompression at elbow with submuscular transposition; (R) carpal trapeziectomy;  01/28/2020  Insurance Authorization Period Expiration: 03/03/2020-12/31/2020  Plan of Care Certification Period: 05/26/2020-08/26/2020  Date of Return to MD: 07/13/2020    Visit # / Visits authorized: 24 / 104  Time In: 11:40  Time Out: 12:25  Total Billable Time: 43 minutes    Precautions:  Standard;  Kidney CA ( approx. 2014)      Post Op:   01/28/2020      Subjective     Pt reports: "I am having stiffness in my thumb."    1/10 pain (R) hand/ wrist    Objective    Patient seen by OT this session. Tx consisted of:      Supervised modalities after being cleared for contradictions  x  10 minutes:  -Fluidotherapy to  (R)  hand to increase blood flow, circulation, tissue elasticity, desensitization, sensory re-education and for pain management  X 10 minutes.         Direct contact modalities after being cleared for contraindications x   8 minutes:  -Ultrasound applied to radial aspect of (R) hand  on   3.3 Mhz with 0.7 w/cm2 @ 50 % duty cycle to decrease pain and inflammation  /////  to remold scar tissue and increase tissue elasticity x 8 minutes.        Manual therapy techniques to increase joint " "mobilization /// scar massage/// soft tissue mobilization   x   10 minutes:     -Scar Massage to volar aspect of (R) wrist and dorsal aspect of (R) thumb region with mini therapeutic vibrator and scar extractor on volar aspect of incision sites to decrease dense scar adhesions and improve  tensile glide  x 3 minutes.  -Manual Therapy techniques to (R) hand/ wrist  Including light joint mobilizations, stretching, and PROM to increase joint mobility, ROM and for pain management  x  5 minutes   -Soft Tissue Mobilization to (R) hand / wrist from distal to proximal to decrease edema and increase ROM and functional abilities  x  2 minutes      Therapeutic Exercises to improve functional performance while increasing strength, endurance, ROM,  and flexibility  x   15 minutes:      · - Joint blocking and reverse joint blocking for IP of (R) thumb for EXT/FLEX x 10 reps    - X-Small wooden blocks thumb tip to 5th MP un/lacing x 10 blocks  x 1 sets    - 2# DF/VF (SUP/PRON), RD/UD  x 10 reps   - Colored bead stringing with thumb to 5th digit DIP x 20 beads   - YELLOW Theraputty for medicine cap, intrinsic pulling and thumb raking for FLEX x 10 reps   - RED Theraputty with PVC for "cookie cutting" x 20 reps   - RED Theraputty for rolling and  pulling x 10 reps   - RED Flexbar for SUP/PRON and DF/VF x 10 reps   - Mini colored pegboard for FMC and in-hand manipulation  (5 in-hand) x 40 pegs   - 25#PHG (black spring) for composite  x 20 reps   - Rice bucket with (3) Nuts, (3) LARGE washers and (3) glass beads to increase sensitization ( vision occluded) x 1 rep   - Wooden pegboard with GREEN clothespins with 3PT pinch x 2 reps      - RED digiflex for isolated x 10 reps GREEN digiflex for composite x 20 reps   - RED Digiweb for intrinsics x 10 reps   - 3 YELLOW rubber band gripper for thumb FLEX x 10 reps    Initiate in future therapy sessions:    · - Towel walking for digital FLEX/EXT x 3 reps  · - Quick flicks for digital EXT " "with sponge   - [place] Slot with ### washers and  #### pennies ( 5 in hand) for FMC x 1 rep  · - Cotton balls thumb tip to palm x 2 reps  x 10 balls  · - Silver balls in CW/CCW motion x 3min   - ### Wrist exerstick in standing for DF/VF x 3 reps           Assessment     Pt. will continue to benefit from skilled OT intervention to increase functional abilities, ROM, and strength and pain control.  Pt. demonstrated proper understanding of each exercise.  Pt continues to require verbal and tactile cues for throughout therapy session to maintain position and prevent compensation.   Pt continues to be limited in functional and leisurely pursuits. Pain limits patient's participation in ADL's. Pt is not able to carryout necessary vocational tasks.  Pt's spiritual, cultural and educational needs considered and pt agreeable to plan of care and goals.  Patient is making good progress towards established goals.  Pt. tolerated exercises within pain tolerance.   Reviewed HEP with pt., see EMR under "patient instructions" for provided exercises, activity modifications and limitations, modalities for home pain management.  Pt. demo understanding of above.      Pt. tolerated US and Fluidotherapy with no irritation. Pt. Provided with Dycem and silicone scar pads (night time use) for scar management.   Provided pt. With YELLOW theraputty for rolling, pinching and composite .  Pt. Demo understanding of above.      Pt. Tolerated addition of medicine top with theraputty; increase in rubber band gripper with no reported pain.  Pt. Unable to hold thumb in opposition during "place and hold".     New/Revised Goals: Cont POC  1)   Pt. to be IND with HEP and modalities for pain management by 1 week.  (MET)  2)   Pt. will report   0/10 pain on average with activity to A with exercises by 6-8 weeks.  (In Progress)  3)   Pt. will increase ROM 3-5 degrees to increase functional use for ADL's by 6-8 weeks. ( in progress)  4)   Pt. will decrease " edema 0.1-0.3 mm to increase joint mobility /flexibility by 6-8 weeks.  ( in progress)  5)   Pt. will increase MMT by a grade to A with lifting items by 6-8 weeks.    6)   Pt. will increase  strength 3-5 lbs. to open containers by 6-8 weeks.  ( in progress)  7)   Pt. will increase pinch 1-3 lbs for buttoning by 6-8 weeks. ( in progress)  8)   Pt. Will decrease time during 9 hole peg test by 3 seconds to A with FMC by 6-8 weeks.      Plan      Continue 2-3x week x 90 days during the certification period    05/26/2020   to 08/26/2020   with established POC in pursuit of OT goals.      Toshia Fisher, OT

## 2020-06-04 NOTE — PROGRESS NOTES
"                            Occupational Therapy Daily Treatment Note       Date: 6/9/2020  Name: Payton Castillo  Clinic Number: [id number]    Therapy Diagnosis:   Encounter Diagnosis   Name Primary?    Right hand pain Yes     Physician: Leonid Aragon MD    Physician Orders: (R) carpal tunnel syndrome;  (R) OA of 1st metacarpal;  Evaluate and treat;  ROM, US, Paraffin, massage, deep heat, slow progressive strengthening  Medical Diagnosis: (R) hand / wrist pain;  (R) carpal tunnel syndrome;  (R) OA of 1st metacarpal  Surgical Procedure and Date: Excision of bony osteophyte of (R) thumb IP joint radial and ulnar border;  Arthrotomy IP (R) thumb; Excision of mucosynovial cyst (R) thumb IP joint; (R) thumb flexor tenosynovectomy; Median nerve decompression at (R) wrist;  Ulnar nerve decompression at elbow with submuscular transposition; (R) carpal trapeziectomy;  01/28/2020  Insurance Authorization Period Expiration: 03/03/2020-12/31/2020  Plan of Care Certification Period: 05/26/2020-08/26/2020  Date of Return to MD: 07/13/2020    Visit # / Visits authorized: 25 / 104  Time In: 2:00  Time Out: 2:45  Total Billable Time: 43 minutes    Precautions:  Standard;  Kidney CA ( approx. 2014)      Post Op:   01/28/2020      Subjective     Pt reports: "I am doing ok today.  I was doing a lot of cleaning over the weekend and had some sharp shooting pains in my hand.  The pain eventually went away.  I have been using my thumb a lot."     1/10 pain (R) hand/ wrist    Objective    Patient seen by OT this session. Tx consisted of:      Supervised modalities after being cleared for contradictions  x  10 minutes:  -Fluidotherapy to  (R)  hand to increase blood flow, circulation, tissue elasticity, desensitization, sensory re-education and for pain management  X 10 minutes.         Direct contact modalities after being cleared for contraindications x   8 minutes:  -Ultrasound applied to radial aspect of (R) hand  on   3.3 Mhz with 0.7 " "w/cm2 @ 50 % duty cycle to decrease pain and inflammation  /////  to remold scar tissue and increase tissue elasticity x 8 minutes.        Manual therapy techniques to increase joint mobilization /// scar massage/// soft tissue mobilization   x   10 minutes:     -Scar Massage to volar aspect of (R) wrist and dorsal aspect of (R) thumb region with mini therapeutic vibrator and scar extractor on volar aspect of incision sites to decrease dense scar adhesions and improve  tensile glide  x 3 minutes.  -Manual Therapy techniques to (R) hand/ wrist  Including light joint mobilizations, stretching, and PROM to increase joint mobility, ROM and for pain management  x  5 minutes   -Soft Tissue Mobilization to (R) hand / wrist from distal to proximal to decrease edema and increase ROM and functional abilities  x  2 minutes      Therapeutic Exercises to improve functional performance while increasing strength, endurance, ROM,  and flexibility  x   15 minutes:      · - Joint blocking and reverse joint blocking for IP of (R) thumb for EXT/FLEX x 10 reps    - X-Small wooden blocks thumb tip to 5th MP un/lacing x 10 blocks  x 1 sets    - 2# DF/VF (SUP/PRON), RD/UD  x 10 reps   - Colored bead stringing with thumb to 5th digit DIP x 20 beads   - YELLOW Theraputty for intrinsic pulling and thumb raking for FLEX;  RED Theraputty for  medicine cap x 10 reps   - RED Theraputty with PVC for "cookie cutting" x 20 reps   - RED Theraputty for rolling and  pulling x 10 reps   - RED Flexbar for SUP/PRON and DF/VF x 10 reps   - Mini colored pegboard for FMC and in-hand manipulation  (5 in-hand) x 40 pegs   - 35#PHG (black spring) for composite  x 20 reps   - Rice bucket with (3) Nuts, (3) LARGE washers and (3) glass beads to increase sensitization ( vision occluded) x 1 rep  (not completed this session)   - Wooden pegboard with GREEN clothespins with 3PT pinch x 2 reps      - RED digiflex for isolated x 10 reps GREEN digiflex for composite " "x 20 reps   - RED Digiweb for intrinsics x 10 reps   - 3 YELLOW rubber band gripper for thumb FLEX x 10 reps    Initiate in future therapy sessions:    · - Towel walking for digital FLEX/EXT x 3 reps  · - Quick flicks for digital EXT with sponge   - [place] Slot with ### washers and  #### pennies ( 5 in hand) for FMC x 1 rep  · - Cotton balls thumb tip to palm x 2 reps  x 10 balls  · - Silver balls in CW/CCW motion x 3min   - ### Wrist exerstick in standing for DF/VF x 3 reps           Assessment     Pt. will continue to benefit from skilled OT intervention to increase functional abilities, ROM, and strength and pain control.  Pt. demonstrated proper understanding of each exercise.  Pt continues to require verbal and tactile cues for throughout therapy session to maintain position and prevent compensation.   Pt continues to be limited in functional and leisurely pursuits. Pain limits patient's participation in ADL's. Pt is not able to carryout necessary vocational tasks.  Pt's spiritual, cultural and educational needs considered and pt agreeable to plan of care and goals.  Patient is making good progress towards established goals.  Pt. tolerated exercises within pain tolerance.   Reviewed HEP with pt., see EMR under "patient instructions" for provided exercises, activity modifications and limitations, modalities for home pain management.  Pt. demo understanding of above.      Pt. tolerated US and Fluidotherapy with no irritation. Pt. Provided with Dycem and silicone scar pads (night time use) for scar management.   Provided pt. With YELLOW theraputty for rolling, pinching and composite .  Pt. Demo understanding of above.      Pt. Unable to hold thumb in opposition during "place and hold".   Pt. Tolerated increase in theraputty with medicine cap, PHG with no reported pain.     New/Revised Goals: Cont POC  1)   Pt. to be IND with HEP and modalities for pain management by 1 week.  (MET)  2)   Pt. will report   0/10 " pain on average with activity to A with exercises by 6-8 weeks.  (In Progress)  3)   Pt. will increase ROM 3-5 degrees to increase functional use for ADL's by 6-8 weeks. ( in progress)  4)   Pt. will decrease edema 0.1-0.3 mm to increase joint mobility /flexibility by 6-8 weeks.  ( in progress)  5)   Pt. will increase MMT by a grade to A with lifting items by 6-8 weeks.    6)   Pt. will increase  strength 3-5 lbs. to open containers by 6-8 weeks.  ( in progress)  7)   Pt. will increase pinch 1-3 lbs for buttoning by 6-8 weeks. ( in progress)  8)   Pt. Will decrease time during 9 hole peg test by 3 seconds to A with FMC by 6-8 weeks.      Plan      Continue 2-3x week x 90 days during the certification period    05/26/2020   to 08/26/2020   with established POC in pursuit of OT goals.      Toshia Fisher, OT

## 2020-06-09 ENCOUNTER — CLINICAL SUPPORT (OUTPATIENT)
Dept: REHABILITATION | Facility: HOSPITAL | Age: 65
End: 2020-06-09
Payer: MEDICARE

## 2020-06-09 DIAGNOSIS — M79.641 RIGHT HAND PAIN: Primary | ICD-10-CM

## 2020-06-09 PROCEDURE — 97140 MANUAL THERAPY 1/> REGIONS: CPT | Performed by: OCCUPATIONAL THERAPIST

## 2020-06-09 PROCEDURE — 97110 THERAPEUTIC EXERCISES: CPT | Performed by: OCCUPATIONAL THERAPIST

## 2020-06-09 PROCEDURE — 97035 APP MDLTY 1+ULTRASOUND EA 15: CPT | Performed by: OCCUPATIONAL THERAPIST

## 2020-06-11 ENCOUNTER — CLINICAL SUPPORT (OUTPATIENT)
Dept: REHABILITATION | Facility: HOSPITAL | Age: 65
End: 2020-06-11
Payer: MEDICARE

## 2020-06-11 DIAGNOSIS — M79.641 RIGHT HAND PAIN: Primary | ICD-10-CM

## 2020-06-11 PROCEDURE — 97110 THERAPEUTIC EXERCISES: CPT | Performed by: OCCUPATIONAL THERAPIST

## 2020-06-11 PROCEDURE — 97140 MANUAL THERAPY 1/> REGIONS: CPT | Performed by: OCCUPATIONAL THERAPIST

## 2020-06-11 PROCEDURE — 97022 WHIRLPOOL THERAPY: CPT | Performed by: OCCUPATIONAL THERAPIST

## 2020-06-11 NOTE — PROGRESS NOTES
"                            Occupational Therapy Daily Treatment Note       Date: 6/16/2020  Name: Payton Castillo  Clinic Number: [id number]    Therapy Diagnosis:   Encounter Diagnosis   Name Primary?    Right hand pain Yes     Physician: Leonid Aragon MD    Physician Orders: (R) carpal tunnel syndrome;  (R) OA of 1st metacarpal;  Evaluate and treat;  ROM, US, Paraffin, massage, deep heat, slow progressive strengthening  Medical Diagnosis: (R) hand / wrist pain;  (R) carpal tunnel syndrome;  (R) OA of 1st metacarpal  Surgical Procedure and Date: Excision of bony osteophyte of (R) thumb IP joint radial and ulnar border;  Arthrotomy IP (R) thumb; Excision of mucosynovial cyst (R) thumb IP joint; (R) thumb flexor tenosynovectomy; Median nerve decompression at (R) wrist;  Ulnar nerve decompression at elbow with submuscular transposition; (R) carpal trapeziectomy;  01/28/2020  Insurance Authorization Period Expiration: 03/03/2020-12/31/2020  Plan of Care Certification Period: 05/26/2020-08/26/2020  Date of Return to MD: 07/13/2020    Visit # / Visits authorized: 27 / 104  Time In: 10:55  Time Out: 11:40  Total Billable Time: 45 minutes    Precautions:  Standard;  Kidney CA ( approx. 2014)      Post Op:   01/28/2020      Subjective     Pt reports: "I am feeling pretty good in my hand. I do not think my hand is too stiff."    0/10 pain (R) hand/ wrist    Objective    Patient seen by OT this session. Tx consisted of:      Supervised modalities after being cleared for contradictions  x  10 minutes:  -Fluidotherapy to  (R)  hand to increase blood flow, circulation, tissue elasticity, desensitization, sensory re-education and for pain management  X 10 minutes.         Direct contact modalities after being cleared for contraindications x   0 minutes:  -Ultrasound applied to radial aspect of (R) hand  on   3.3 Mhz with 0.7 w/cm2 @ 50 % duty cycle to decrease pain and inflammation  /////  to remold scar tissue and increase tissue " "elasticity x 0 minutes.  (not completed today)        Manual therapy techniques to increase joint mobilization /// scar massage/// soft tissue mobilization   x   15 minutes:     -Scar Massage to volar aspect of (R) wrist and dorsal aspect of (R) thumb region with mini therapeutic vibrator and scar extractor on volar aspect of incision sites to decrease dense scar adhesions and improve  tensile glide  x 3 minutes.  -Manual Therapy techniques to (R) hand/ wrist  Including light joint mobilizations, stretching, and PROM to increase joint mobility, ROM and for pain management  x  10 minutes   -Soft Tissue Mobilization to (R) hand / wrist from distal to proximal to decrease edema and increase ROM and functional abilities  x  2 minutes      Therapeutic Exercises to improve functional performance while increasing strength, endurance, ROM,  and flexibility  x   20 minutes:      · - Joint blocking and reverse joint blocking for IP of (R) thumb for EXT/FLEX x 10 reps    - 2# DF/VF (SUP/PRON), RD/UD  x 10 reps   - Colored bead stringing with thumb to 5th digit DIP x 20 beads   - YELLOW Theraputty for intrinsic pulling and thumb raking for FLEX;  RED Theraputty for  medicine cap x 10 reps   - RED Theraputty with PVC for "cookie cutting" x 20 reps   - GREEN Theraputty for rolling and  pulling x 10 reps   - RED Flexbar for SUP/PRON and DF/VF x 10 reps   - Mini colored pegboard for FMC and in-hand manipulation  (5 in-hand) x 40 pegs   - 35#PHG (black spring) for composite  x 20 reps   - Rice bucket with (3) Nuts, (3) LARGE washers and (3) glass beads to increase sensitization ( vision occluded) x 1 rep  (not completed this session)   - Wooden pegboard with GREEN clothespins with 3PT pinch x 2 reps      - RED digiflex for isolated x 10 reps GREEN digiflex for composite x 20 reps   - RED Digiweb for intrinsics x 10 reps   - 3 YELLOW rubber band gripper for thumb FLEX x 10 reps   - [place] Slot with (10) LARGE washers and  (10) " "pennies ( 5 in hand) for FMC x 1 rep    Initiate in future therapy sessions:      · - Cotton balls thumb tip to palm x 2 reps  x 10 balls  · - Silver balls in CW/CCW motion x 3min   - ### Wrist exerstick in standing for DF/VF x 3 reps           Assessment     Pt. will continue to benefit from skilled OT intervention to increase functional abilities, ROM, and strength and pain control.  Pt. demonstrated proper understanding of each exercise.  Pt continues to require verbal and tactile cues for throughout therapy session to maintain position and prevent compensation.   Pt continues to be limited in functional and leisurely pursuits. Pain limits patient's participation in ADL's. Pt is not able to carryout necessary vocational tasks.  Pt's spiritual, cultural and educational needs considered and pt agreeable to plan of care and goals.  Patient is making good progress towards established goals.  Pt. tolerated exercises within pain tolerance.   Reviewed HEP with pt., see EMR under "patient instructions" for provided exercises, activity modifications and limitations, modalities for home pain management.  Pt. demo understanding of above.      Pt. tolerated  Fluidotherapy with no irritation. Pt. Provided with Dycem and silicone scar pads (night time use) for scar management.   Provided pt. With YELLOW theraputty for rolling, pinching and composite ; cotton ball for thumb IP rolling on palm/5th digit.  Pt. Demo understanding of above.      Opposition DPC to thumb tip : 1.9cm ( post modalities for PROM; measured with wrist goni.)    Pt. Unable to hold thumb in opposition during "place and hold".   Pt. Able to oppose thumb to 5th PP actively.     Pt. Tolerated increase in theraputty for rolling and pulling with no reported pain.     New/Revised Goals: Cont POC  1)   Pt. to be IND with HEP and modalities for pain management by 1 week.  (MET)  2)   Pt. will report   0/10 pain on average with activity to A with exercises by " 6-8 weeks.  (In Progress)  3)   Pt. will increase ROM 3-5 degrees to increase functional use for ADL's by 6-8 weeks. ( in progress)  4)   Pt. will decrease edema 0.1-0.3 mm to increase joint mobility /flexibility by 6-8 weeks.  ( in progress)  5)   Pt. will increase MMT by a grade to A with lifting items by 6-8 weeks.    6)   Pt. will increase  strength 3-5 lbs. to open containers by 6-8 weeks.  ( in progress)  7)   Pt. will increase pinch 1-3 lbs for buttoning by 6-8 weeks. ( in progress)  8)   Pt. Will decrease time during 9 hole peg test by 3 seconds to A with FMC by 6-8 weeks.      Plan      Continue 2-3x week x 90 days during the certification period    05/26/2020   to 08/26/2020   with established POC in pursuit of OT goals.      Toshia Fisher, OT

## 2020-06-16 ENCOUNTER — CLINICAL SUPPORT (OUTPATIENT)
Dept: REHABILITATION | Facility: HOSPITAL | Age: 65
End: 2020-06-16
Payer: MEDICARE

## 2020-06-16 DIAGNOSIS — M79.641 RIGHT HAND PAIN: Primary | ICD-10-CM

## 2020-06-16 PROCEDURE — 97022 WHIRLPOOL THERAPY: CPT | Performed by: OCCUPATIONAL THERAPIST

## 2020-06-16 PROCEDURE — 97110 THERAPEUTIC EXERCISES: CPT | Performed by: OCCUPATIONAL THERAPIST

## 2020-06-16 PROCEDURE — 97140 MANUAL THERAPY 1/> REGIONS: CPT | Performed by: OCCUPATIONAL THERAPIST

## 2020-06-16 NOTE — PROGRESS NOTES
"                            Occupational Therapy Daily Treatment Note       Date: 6/18/2020  Name: Payton Castillo  Clinic Number: [id number]    Therapy Diagnosis:   Encounter Diagnosis   Name Primary?    Right hand pain Yes     Physician: Leonid Aragon MD    Physician Orders: (R) carpal tunnel syndrome;  (R) OA of 1st metacarpal;  Evaluate and treat;  ROM, US, Paraffin, massage, deep heat, slow progressive strengthening  Medical Diagnosis: (R) hand / wrist pain;  (R) carpal tunnel syndrome;  (R) OA of 1st metacarpal  Surgical Procedure and Date: Excision of bony osteophyte of (R) thumb IP joint radial and ulnar border;  Arthrotomy IP (R) thumb; Excision of mucosynovial cyst (R) thumb IP joint; (R) thumb flexor tenosynovectomy; Median nerve decompression at (R) wrist;  Ulnar nerve decompression at elbow with submuscular transposition; (R) carpal trapeziectomy;  01/28/2020  Insurance Authorization Period Expiration: 03/03/2020-12/31/2020  Plan of Care Certification Period: 05/26/2020-08/26/2020  Date of Return to MD: 07/13/2020    Visit # / Visits authorized: 28 / 104  Time In: 11:45  Time Out: 12:30  Total Billable Time: 45 minutes    Precautions:  Standard;  Kidney CA ( approx. 2014)      Post Op:   01/28/2020      Subjective     Pt reports: "I am still having numbness in my small (R) finger."      0/10 pain (R) hand/ wrist    Objective    Patient seen by OT this session. Tx consisted of:      Supervised modalities after being cleared for contradictions  x  10 minutes:  -Fluidotherapy to  (R)  hand to increase blood flow, circulation, tissue elasticity, desensitization, sensory re-education and for pain management  X 10 minutes.         Direct contact modalities after being cleared for contraindications x   0 minutes:  -Ultrasound applied to radial aspect of (R) hand  on   3.3 Mhz with 0.7 w/cm2 @ 50 % duty cycle to decrease pain and inflammation  /////  to remold scar tissue and increase tissue elasticity x 0 " "minutes.  (not completed today)        Manual therapy techniques to increase joint mobilization /// scar massage/// soft tissue mobilization   x   15 minutes:     -Scar Massage to volar aspect of (R) wrist and dorsal aspect of (R) thumb region with mini therapeutic vibrator and scar extractor on volar aspect of incision sites to decrease dense scar adhesions and improve  tensile glide  x 3 minutes.  -Manual Therapy techniques to (R) hand/ wrist  Including light joint mobilizations, stretching, and PROM to increase joint mobility, ROM and for pain management  x  10 minutes   -Soft Tissue Mobilization to (R) hand / wrist from distal to proximal to decrease edema and increase ROM and functional abilities  x  2 minutes      Therapeutic Exercises to improve functional performance while increasing strength, endurance, ROM,  and flexibility  x   20 minutes:      · - Joint blocking and reverse joint blocking for IP of (R) thumb for EXT/FLEX x 10 reps    - 3# DF/VF (SUP/PRON), RD/UD  x 10 reps   - Colored bead stringing with thumb to 5th digit DIP x 20 beads   - YELLOW Theraputty for intrinsic pulling and thumb raking for FLEX;  RED Theraputty for  medicine cap x 10 reps   - RED Theraputty with PVC for "cookie cutting" x 20 reps   - GREEN Theraputty for rolling and  pulling x 10 reps   - RED Flexbar for SUP/PRON and DF/VF x 10 reps   - Mini colored pegboard for FMC and in-hand manipulation  (5 in-hand) x 40 pegs   - 35#PHG (black spring) for composite  x 20 reps   - Rice bucket with (3) Nuts, (3) LARGE washers and (3) glass beads to increase sensitization ( vision occluded) x 1 rep  (not completed this session)   - Wooden pegboard with GREEN clothespins with 3PT pinch x 2 reps      - RED digiflex for isolated x 10 reps GREEN digiflex for composite x 20 reps   - RED Digiweb for intrinsics x 10 reps   - 3 YELLOW rubber band gripper for thumb FLEX x 10 reps   - [place] Slot with (10) LARGE washers and  (10) pennies ( 5 in " "hand) for FMC x 1 rep    Initiate in future therapy sessions:      · - Cotton balls thumb tip to palm x 2 reps  x 10 balls  · - Silver balls in CW/CCW motion x 3min   - ### Wrist exerstick in standing for DF/VF x 3 reps           Assessment     Pt. will continue to benefit from skilled OT intervention to increase functional abilities, ROM, and strength and pain control.  Pt. demonstrated proper understanding of each exercise.  Pt continues to require verbal and tactile cues for throughout therapy session to maintain position and prevent compensation.   Pt continues to be limited in functional and leisurely pursuits. Pain limits patient's participation in ADL's. Pt is not able to carryout necessary vocational tasks.  Pt's spiritual, cultural and educational needs considered and pt agreeable to plan of care and goals.  Patient is making good progress towards established goals.  Pt. tolerated exercises within pain tolerance.   Reviewed HEP with pt., see EMR under "patient instructions" for provided exercises, activity modifications and limitations, modalities for home pain management.  Pt. demo understanding of above.      Pt. tolerated  Fluidotherapy with no irritation. Pt. Provided with Dycem and silicone scar pads (night time use) for scar management.   Provided pt. With YELLOW theraputty for rolling, pinching and composite ; cotton ball for thumb IP rolling on palm/5th digit.  Pt. Demo understanding of above.      Opposition DPC to thumb tip : 1.8cm  ( post modalities and PROM; measured with wrist goni.)    Pt. Unable to hold thumb in opposition during "place and hold".   Pt. Able to oppose thumb to 5th PP actively.     Pt. Tolerated increase in DF/VF, RD/UD with no reported pain.     New/Revised Goals: Cont POC  1)   Pt. to be IND with HEP and modalities for pain management by 1 week.  (MET)  2)   Pt. will report   0/10 pain on average with activity to A with exercises by 6-8 weeks.  (In Progress)  3)   Pt. " will increase ROM 3-5 degrees to increase functional use for ADL's by 6-8 weeks. ( in progress)  4)   Pt. will decrease edema 0.1-0.3 mm to increase joint mobility /flexibility by 6-8 weeks.  ( in progress)  5)   Pt. will increase MMT by a grade to A with lifting items by 6-8 weeks.    6)   Pt. will increase  strength 3-5 lbs. to open containers by 6-8 weeks.  ( in progress)  7)   Pt. will increase pinch 1-3 lbs for buttoning by 6-8 weeks. ( in progress)  8)   Pt. Will decrease time during 9 hole peg test by 3 seconds to A with FMC by 6-8 weeks.      Plan      Continue 2-3x week x 90 days during the certification period    05/26/2020   to 08/26/2020   with established POC in pursuit of OT goals.      Toshia Fisher, OT

## 2020-06-18 ENCOUNTER — LAB VISIT (OUTPATIENT)
Dept: LAB | Facility: HOSPITAL | Age: 65
End: 2020-06-18
Attending: INTERNAL MEDICINE
Payer: MEDICARE

## 2020-06-18 ENCOUNTER — CLINICAL SUPPORT (OUTPATIENT)
Dept: REHABILITATION | Facility: HOSPITAL | Age: 65
End: 2020-06-18
Payer: MEDICARE

## 2020-06-18 DIAGNOSIS — M79.641 RIGHT HAND PAIN: Primary | ICD-10-CM

## 2020-06-18 DIAGNOSIS — E11.22 CKD STAGE 3 DUE TO TYPE 2 DIABETES MELLITUS: ICD-10-CM

## 2020-06-18 DIAGNOSIS — Z79.4 TYPE 2 DIABETES MELLITUS WITH DIABETIC POLYNEUROPATHY, WITH LONG-TERM CURRENT USE OF INSULIN: ICD-10-CM

## 2020-06-18 DIAGNOSIS — N18.30 CKD STAGE 3 DUE TO TYPE 2 DIABETES MELLITUS: ICD-10-CM

## 2020-06-18 DIAGNOSIS — I10 ESSENTIAL HYPERTENSION: ICD-10-CM

## 2020-06-18 DIAGNOSIS — E11.42 TYPE 2 DIABETES MELLITUS WITH DIABETIC POLYNEUROPATHY, WITH LONG-TERM CURRENT USE OF INSULIN: ICD-10-CM

## 2020-06-18 LAB
ALBUMIN SERPL BCP-MCNC: 3.7 G/DL (ref 3.5–5.2)
ANION GAP SERPL CALC-SCNC: 10 MMOL/L (ref 8–16)
BUN SERPL-MCNC: 25 MG/DL (ref 8–23)
CALCIUM SERPL-MCNC: 9.8 MG/DL (ref 8.7–10.5)
CHLORIDE SERPL-SCNC: 104 MMOL/L (ref 95–110)
CHOLEST SERPL-MCNC: 174 MG/DL (ref 120–199)
CHOLEST/HDLC SERPL: 3.4 {RATIO} (ref 2–5)
CO2 SERPL-SCNC: 29 MMOL/L (ref 23–29)
CREAT SERPL-MCNC: 1.1 MG/DL (ref 0.5–1.4)
EST. GFR  (AFRICAN AMERICAN): >60 ML/MIN/1.73 M^2
EST. GFR  (NON AFRICAN AMERICAN): 52.8 ML/MIN/1.73 M^2
ESTIMATED AVG GLUCOSE: 160 MG/DL (ref 68–131)
GLUCOSE SERPL-MCNC: 98 MG/DL (ref 70–110)
HBA1C MFR BLD HPLC: 7.2 % (ref 4.5–6.2)
HDLC SERPL-MCNC: 51 MG/DL (ref 40–75)
HDLC SERPL: 29.3 % (ref 20–50)
LDLC SERPL CALC-MCNC: 85.6 MG/DL (ref 63–159)
NONHDLC SERPL-MCNC: 123 MG/DL
PHOSPHATE SERPL-MCNC: 4.1 MG/DL (ref 2.7–4.5)
POTASSIUM SERPL-SCNC: 4.7 MMOL/L (ref 3.5–5.1)
SODIUM SERPL-SCNC: 143 MMOL/L (ref 136–145)
TRIGL SERPL-MCNC: 187 MG/DL (ref 30–150)

## 2020-06-18 PROCEDURE — 80069 RENAL FUNCTION PANEL: CPT

## 2020-06-18 PROCEDURE — 36415 COLL VENOUS BLD VENIPUNCTURE: CPT

## 2020-06-18 PROCEDURE — 97110 THERAPEUTIC EXERCISES: CPT | Performed by: OCCUPATIONAL THERAPIST

## 2020-06-18 PROCEDURE — 97022 WHIRLPOOL THERAPY: CPT | Performed by: OCCUPATIONAL THERAPIST

## 2020-06-18 PROCEDURE — 80061 LIPID PANEL: CPT

## 2020-06-18 PROCEDURE — 97140 MANUAL THERAPY 1/> REGIONS: CPT | Performed by: OCCUPATIONAL THERAPIST

## 2020-06-18 PROCEDURE — 83036 HEMOGLOBIN GLYCOSYLATED A1C: CPT

## 2020-06-19 ENCOUNTER — TELEPHONE (OUTPATIENT)
Dept: FAMILY MEDICINE | Facility: CLINIC | Age: 65
End: 2020-06-19

## 2020-06-19 NOTE — TELEPHONE ENCOUNTER
----- Message from John Nolan MD sent at 6/19/2020  7:51 AM CDT -----  Results are somewhat abnormal. Please keep regular follow up.

## 2020-06-23 ENCOUNTER — OFFICE VISIT (OUTPATIENT)
Dept: FAMILY MEDICINE | Facility: CLINIC | Age: 65
End: 2020-06-23
Payer: MEDICARE

## 2020-06-23 VITALS
WEIGHT: 162 LBS | DIASTOLIC BLOOD PRESSURE: 70 MMHG | HEART RATE: 66 BPM | BODY MASS INDEX: 26.03 KG/M2 | HEIGHT: 66 IN | SYSTOLIC BLOOD PRESSURE: 120 MMHG

## 2020-06-23 DIAGNOSIS — E11.42 TYPE 2 DIABETES MELLITUS WITH DIABETIC POLYNEUROPATHY, WITH LONG-TERM CURRENT USE OF INSULIN: Primary | ICD-10-CM

## 2020-06-23 DIAGNOSIS — M54.2 CERVICALGIA: ICD-10-CM

## 2020-06-23 DIAGNOSIS — G89.29 OTHER CHRONIC PAIN: ICD-10-CM

## 2020-06-23 DIAGNOSIS — I10 ESSENTIAL HYPERTENSION: ICD-10-CM

## 2020-06-23 DIAGNOSIS — Z79.4 TYPE 2 DIABETES MELLITUS WITH DIABETIC POLYNEUROPATHY, WITH LONG-TERM CURRENT USE OF INSULIN: Primary | ICD-10-CM

## 2020-06-23 DIAGNOSIS — E78.2 MIXED DYSLIPIDEMIA: ICD-10-CM

## 2020-06-23 PROCEDURE — 99213 OFFICE O/P EST LOW 20 MIN: CPT | Performed by: INTERNAL MEDICINE

## 2020-06-23 PROCEDURE — 99214 PR OFFICE/OUTPT VISIT, EST, LEVL IV, 30-39 MIN: ICD-10-PCS | Mod: S$PBB,,, | Performed by: INTERNAL MEDICINE

## 2020-06-23 PROCEDURE — 99214 OFFICE O/P EST MOD 30 MIN: CPT | Mod: S$PBB,,, | Performed by: INTERNAL MEDICINE

## 2020-06-23 RX ORDER — PREGABALIN 50 MG/1
50 CAPSULE ORAL 3 TIMES DAILY
Qty: 270 CAPSULE | Refills: 1 | Status: SHIPPED | OUTPATIENT
Start: 2020-06-23 | End: 2020-09-24

## 2020-06-23 NOTE — PROGRESS NOTES
Subjective:       Patient ID: Payton Castillo is a 65 y.o. female.    Chief Complaint: Hypertension (lab review ), Hyperlipidemia, and Diabetes    Miss Payton Le is a 65-year-old female who comes for follow-up.  Underlying medical issues of hypertension, dyslipidemia, type 2 diabetes mellitus on insulin has been noted.  Her sugars have been rather fluctuating recently.  Because of ongoing psychosocial issues she has not been able to check her blood sugars recently.          Hypertension  This is a chronic problem. The current episode started more than 1 year ago. Pertinent negatives include no chest pain, headaches, palpitations or shortness of breath. Risk factors for coronary artery disease include sedentary lifestyle, dyslipidemia, diabetes mellitus and post-menopausal state. Past treatments include beta blockers, angiotensin blockers and calcium channel blockers. The current treatment provides moderate improvement. Compliance problems include psychosocial issues.  There is no history of heart failure or left ventricular hypertrophy.   Hyperlipidemia  This is a chronic problem. The current episode started more than 1 year ago. The problem is controlled. Exacerbating diseases include diabetes and obesity. Pertinent negatives include no chest pain or shortness of breath. Current antihyperlipidemic treatment includes statins. The current treatment provides moderate improvement of lipids. Risk factors for coronary artery disease include post-menopausal, obesity, dyslipidemia and diabetes mellitus.   Diabetes  She presents for her follow-up diabetic visit. She has type 2 diabetes mellitus. Her disease course has been fluctuating. Pertinent negatives for hypoglycemia include no confusion, dizziness, headaches, mood changes, nervousness/anxiousness, pallor, seizures or tremors. Associated symptoms include fatigue. Pertinent negatives for diabetes include no chest pain, no polydipsia and no polyphagia. Risk factors for  coronary artery disease include hypertension, sedentary lifestyle, post-menopausal, dyslipidemia and diabetes mellitus. Current diabetic treatment includes insulin injections. She is compliant with treatment most of the time. An ACE inhibitor/angiotensin II receptor blocker is being taken.   Shoulder Pain   The pain is present in the right shoulder. This is a recurrent problem. The current episode started more than 1 month ago. The problem occurs constantly. The problem has been unchanged. The quality of the pain is described as burning. Associated symptoms include numbness. Pertinent negatives include no fever or headaches. The treatment provided mild relief. Family history includes arthritis. Her past medical history is significant for diabetes.       Past Medical History:   Diagnosis Date    Allergy     aspartame,fructose monosodium glutamate and penicillins.    Anemia of chronic disorder 11/9/2017    Anemia, chronic renal failure, stage 2 (mild) 11/9/2017    Anemia, chronic renal failure, stage 3 (moderate) 11/9/2017    Asthma     Cancer     kidney    Cataract of right eye     CKD (chronic kidney disease), stage III     Diabetes mellitus     type II    Fibromyalgia     GERD (gastroesophageal reflux disease)     Hx of renal cell cancer - left nephrectomy Aug 2014 11/9/2017    Hypercholesteremia     Hypertension     IDDM (insulin dependent diabetes mellitus)     Lumbar spondylosis     Lumbosacral radiculopathy     Osteopenia     Palpitations     Plantar fasciitis, right     Syncope 2/2012    Tachycardia     Thoracic radiculopathy     TMJ (dislocation of temporomandibular joint)      Social History     Socioeconomic History    Marital status:      Spouse name: Owen Castillo    Number of children: 2    Years of education: Not on file    Highest education level: Not on file   Occupational History    Occupation: Part - member support [employer]     Employer: [employer]   Social Needs     Financial resource strain: Not on file    Food insecurity     Worry: Not on file     Inability: Not on file    Transportation needs     Medical: Not on file     Non-medical: Not on file   Tobacco Use    Smoking status: Never Smoker    Smokeless tobacco: Never Used   Substance and Sexual Activity    Alcohol use: No    Drug use: No    Sexual activity: Yes     Partners: Male   Lifestyle    Physical activity     Days per week: Not on file     Minutes per session: Not on file    Stress: Not at all   Relationships    Social connections     Talks on phone: Not on file     Gets together: Not on file     Attends Jainism service: Not on file     Active member of club or organization: Not on file     Attends meetings of clubs or organizations: Not on file     Relationship status: Not on file   Other Topics Concern    Not on file   Social History Narrative    Not on file     Past Surgical History:   Procedure Laterality Date    CATARACT EXTRACTION Left 03/2016    JOINT REPLACEMENT Right     knee    SHOULDER OPEN ROTATOR CUFF REPAIR Right 02/2005    labral repair    TOTAL KNEE ARTHROPLASTY Left 2014    TUBAL LIGATION  1990    TUMOR REMOVAL  2014    from kidney     Family History   Problem Relation Age of Onset    Stomach cancer Mother     Breast cancer Mother     Diabetes Mother     Cancer Father        Review of Systems   Constitutional: Positive for fatigue. Negative for activity change, chills, fever and unexpected weight change (gained 3 lbs).   HENT: Negative for congestion, postnasal drip, sinus pressure and sore throat.         Dry mouth due to VESIcare   Eyes: Negative for pain, discharge and visual disturbance.   Respiratory: Positive for apnea. Negative for cough, chest tightness and shortness of breath.         Patient does have obstructive sleep apnea and uses a CPAP device.   Cardiovascular: Negative for chest pain, palpitations and leg swelling.        Hypertension.  Chronic kidney  "disease stage 3.   Gastrointestinal: Negative for abdominal distention, abdominal pain, anal bleeding, constipation and diarrhea.   Endocrine: Negative for polydipsia and polyphagia.        Type 2 diabetes mellitus dyslipidemia   Genitourinary: Negative for difficulty urinating, dysuria, flank pain and frequency.        Patient has an intact uterus.  Pap smear is pending at this point.  She has chronic kidney disease stage 3 and follows Dr. Tommy macedo for the same.   Musculoskeletal: Positive for back pain. Negative for arthralgias and joint swelling.        Chronic pain   Skin: Negative for color change, pallor and rash.   Allergic/Immunologic: Negative for environmental allergies, food allergies and immunocompromised state.   Neurological: Positive for numbness. Negative for dizziness, tremors, seizures, syncope, light-headedness and headaches.        Neuropathy symptoms due to diabetes.  Patient takes gabapentin 600 mg 3 times a day.   Hematological: Negative for adenopathy. Does not bruise/bleed easily.   Psychiatric/Behavioral: Negative for agitation, confusion and dysphoric mood. The patient is not nervous/anxious.          Objective:      Blood pressure 120/70, pulse 66, height 5' 6" (1.676 m), weight 73.5 kg (162 lb). Body mass index is 26.15 kg/m².  Physical Exam  Vitals signs and nursing note reviewed.   Constitutional:       General: She is not in acute distress.     Appearance: She is well-developed.   HENT:      Head: Normocephalic and atraumatic.      Mouth/Throat:      Mouth: Mucous membranes are dry.      Tonsils: No tonsillar abscesses.   Eyes:      General: Lids are normal. Lids are everted, no foreign bodies appreciated. No scleral icterus.     Conjunctiva/sclera: Conjunctivae normal.      Pupils:      Right eye: Pupil is round and reactive.      Left eye: Pupil is round and reactive.   Neck:      Musculoskeletal: Normal range of motion and neck supple.      Thyroid: No thyromegaly.      " Trachea: Trachea normal. No tracheal deviation.   Cardiovascular:      Rate and Rhythm: Normal rate and regular rhythm.      Pulses:           Dorsalis pedis pulses are 1+ on the right side and 1+ on the left side.      Heart sounds: Normal heart sounds, S1 normal and S2 normal.   Pulmonary:      Breath sounds: Normal breath sounds.   Abdominal:      General: Bowel sounds are normal. There is no distension.      Palpations: Abdomen is soft. Abdomen is not rigid.      Tenderness: There is no abdominal tenderness. There is no guarding or rebound.          Comments: scar amy of prior surgeries are noted including scars of nephrectomy .   Musculoskeletal:         General: No tenderness or deformity.      Right foot: Normal range of motion. No deformity.      Left foot: Normal range of motion. No deformity.   Feet:      Right foot:      Protective Sensation: 5 sites tested. 4 sites sensed.      Skin integrity: No ulcer, blister or skin breakdown.      Left foot:      Protective Sensation: 5 sites tested. 4 sites sensed.      Skin integrity: No ulcer, blister or skin breakdown.   Lymphadenopathy:      Cervical: No cervical adenopathy.   Skin:     General: Skin is warm and dry.   Neurological:      Mental Status: She is alert.      Comments: Numbness in fingers- Carpel tunnel    She has diminished feelings and monofilament sensations in the toes.   Psychiatric:         Behavior: Behavior normal. Behavior is cooperative.           Assessment:       1. Type 2 diabetes mellitus with diabetic polyneuropathy, with long-term current use of insulin    2. Essential hypertension    3. Mixed dyslipidemia    4. Other chronic pain    5. Cervicalgia           Lab Visit on 06/18/2020   Component Date Value Ref Range Status    Hemoglobin A1C 06/18/2020 7.2* 4.5 - 6.2 % Final    Estimated Avg Glucose 06/18/2020 160* 68 - 131 mg/dL Final    Cholesterol 06/18/2020 174  120 - 199 mg/dL Final    Triglycerides 06/18/2020 187* 30 - 150  mg/dL Final    HDL 06/18/2020 51  40 - 75 mg/dL Final    LDL Cholesterol 06/18/2020 85.6  63.0 - 159.0 mg/dL Final    Hdl/Cholesterol Ratio 06/18/2020 29.3  20.0 - 50.0 % Final    Total Cholesterol/HDL Ratio 06/18/2020 3.4  2.0 - 5.0 Final    Non-HDL Cholesterol 06/18/2020 123  mg/dL Final    Glucose 06/18/2020 98  70 - 110 mg/dL Final    Sodium 06/18/2020 143  136 - 145 mmol/L Final    Potassium 06/18/2020 4.7  3.5 - 5.1 mmol/L Final    Chloride 06/18/2020 104  95 - 110 mmol/L Final    CO2 06/18/2020 29  23 - 29 mmol/L Final    BUN, Bld 06/18/2020 25* 8 - 23 mg/dL Final    Calcium 06/18/2020 9.8  8.7 - 10.5 mg/dL Final    Creatinine 06/18/2020 1.1  0.5 - 1.4 mg/dL Final    Albumin 06/18/2020 3.7  3.5 - 5.2 g/dL Final    Phosphorus 06/18/2020 4.1  2.7 - 4.5 mg/dL Final    eGFR if African American 06/18/2020 >60.0  >60 mL/min/1.73 m^2 Final    eGFR if non African American 06/18/2020 52.8* >60 mL/min/1.73 m^2 Final    Anion Gap 06/18/2020 10  8 - 16 mmol/L Final         Plan:           Type 2 diabetes mellitus with diabetic polyneuropathy, with long-term current use of insulin  -     Hemoglobin A1C; Future; Expected date: 09/14/2020    Essential hypertension    Mixed dyslipidemia    Other chronic pain  -     pregabalin (LYRICA) 50 MG capsule; Take 1 capsule (50 mg total) by mouth 3 (three) times daily. Please do not drive on this medication at least for the 1st month till your sure about its affect on your cognition and thinking.  Dispense: 270 capsule; Refill: 1    Cervicalgia  -     pregabalin (LYRICA) 50 MG capsule; Take 1 capsule (50 mg total) by mouth 3 (three) times daily. Please do not drive on this medication at least for the 1st month till your sure about its affect on your cognition and thinking.  Dispense: 270 capsule; Refill: 1      Patient's multiple medical conditions have been noted.  Blood sugars are stable at this point but the hemoglobin A1c continues to be greater than 7.  She has  been getting epidural injections.    She has gained some weight since the last visit.    Blood pressures are stable thus far.    Following medications have been discontinued by the patient.  1.-fergon or iron supplements  2.-Tylenol p.m.    She does not find relief from gabapentin.  She would like to try Lyrica.  I have advised that Lyrica is much more expensive and less likely to be covered by insurance.  Lyrica is also controlled prescription in the category of opiate medications and there is a definite risk of habit formation for future and alteration in cognitive processes and thinking processes.  She agrees and verbalizes understanding concerning the same.  She would like to try Lyrica.  In the initial 1 month or so she should not drive on Lyrica unless and until she understands the effects of this medication on her driving skills.  If she drives and a she is involved in an accident on this medication she is responsible for it.    Also Lyrica should not be mix with alcohol.  She is already on too many medications and she should not mix them with alcohol.    If all goes well, I will like to see her back in 3-4 months and repeat some labs.      Please utilize precautions for current COVID-19 pandemic.  Try to avoid crowds or close contact with multiple people.  Minimize outside interaction.  Wash hands with soap for  frequently upon contact.Use face mask or cover.        Spent jhonatan 25 minutes with patient which involved review of pts medical conditions, labs, medications and with 50% of time face-to-face discussion about medical problems, management and any applicable changes.    Current Outpatient Medications:     amLODIPine (NORVASC) 2.5 MG tablet, Take 1 tablet by mouth once daily., Disp: , Rfl:     atorvastatin (LIPITOR) 40 MG tablet, Take 1 tablet (40 mg total) by mouth every evening., Disp: 90 tablet, Rfl: 1    cetirizine (ZYRTEC) 10 MG tablet, Take 10 mg by mouth., Disp: , Rfl:     esomeprazole  "(NEXIUM) 40 MG capsule, Take 1 capsule (40 mg total) by mouth once daily., Disp: 90 capsule, Rfl: 1    flash glucose sensor (FREESTYLE BANG 14 DAY SENSOR) Kit, 1 Device by Misc.(Non-Drug; Combo Route) route every 14 (fourteen) days., Disp: 6 kit, Rfl: 2    fluticasone propionate (FLONASE) 50 mcg/actuation nasal spray, 2 sprays (100 mcg total) by Each Nostril route once daily., Disp: 3 Bottle, Rfl: 3    FREESTYLE LANCETS MISC, by Misc.(Non-Drug; Combo Route) route 3 (three) times daily., Disp: , Rfl:     insulin aspart U-100 (NOVOLOG FLEXPEN U-100 INSULIN) 100 unit/mL (3 mL) InPn pen, Inject 20 Units into the skin 3 (three) times daily with meals., Disp: 18 Syringe, Rfl: 3    insulin glargine (LANTUS U-100 INSULIN) 100 unit/mL injection, Inject 70 Units into the skin every evening., Disp: 21 vial, Rfl: 3    insulin syringe-needle U-100 0.3 mL 30 Syrg, by Misc.(Non-Drug; Combo Route) route 3 (three) times daily., Disp: , Rfl:     insulin syringe-needle U-100 1 mL 28 gauge Syrg, 1 Syringe by Misc.(Non-Drug; Combo Route) route once daily., Disp: 100 each, Rfl: 3    losartan (COZAAR) 50 MG tablet, TAKE 1 TABLET DAILY, Disp: 90 tablet, Rfl: 3    pen needle, diabetic (ADVOCATE PEN NEEDLE) 31 gauge x 5/16" Ndle, 1 Device by Misc.(Non-Drug; Combo Route) route 3 (three) times daily., Disp: 100 each, Rfl: 3    solifenacin (VESICARE) 5 MG tablet, Take 5 mg by mouth., Disp: , Rfl:     TOPROL XL 50 mg 24 hr tablet, TAKE 1 TABLET DAILY, Disp: 90 tablet, Rfl: 3    pregabalin (LYRICA) 50 MG capsule, Take 1 capsule (50 mg total) by mouth 3 (three) times daily. Please do not drive on this medication at least for the 1st month till your sure about its affect on your cognition and thinking., Disp: 270 capsule, Rfl: 1  "

## 2020-06-23 NOTE — PATIENT INSTRUCTIONS
Taking Your Blood Pressure  Blood pressure is the force of blood against the artery wall as it moves from the heart through the blood vessels. You can take your own blood pressure reading using a digital monitor. Take your readings the same each time, using the same arm. Take readings as often as your healthcare provider instructs.  About blood pressure monitors  Blood pressure monitors are designed for certain ages and cases. You can find monitors for older adults, for pregnant women, and for children. Make sure the one you choose is the right one for your age and situation.  The American Heart Association recommends an automatic cuff monitor that fits on your upper arm (bicep). The cuff should fit your arm size. A cuff thats too large or too small will not give an accurate reading. Measure around your upper arm to find your size.  Monitors that attach to your finger or wrist are not as accurate as monitors for your upper arm.  Ask your healthcare provider for help in choosing a monitor. Bring your monitor to your next provider visit if you need help in using it the correct way.  The steps below are general instructions for using an automatic digital monitor.  Step 1. Relax    · Take your blood pressure at the same time every day, such as in the morning or evening, or at the time your healthcare provider recommends.  · Wait at least a half-hour after smoking, eating, or exercising. Don't drink coffee, tea, soda, or other caffeinated beverages before checking your blood pressure.  · Sit comfortably at a table with both feet on the floor. Do not cross your legs or feet. Place the monitor near you.  · Rest for a few minutes before you begin.  Step 2. Wrap the cuff    · Place your arm on the table, palm up. Your arm should be at the level of your heart. Wrap the cuff around your upper arm, just above your elbow. Its best done on bare skin, not over clothing. Most cuffs will indicate where the brachial artery (the  blood vessel in the middle of the arm at the inner side of the elbow) should line up with the cuff. Look in your monitor's instruction booklet for an illustration. You can also bring your cuff to your healthcare provider and have them show you how to correctly place the cuff.  Step 3. Inflate the cuff    · Push the button that starts the pump.  · The cuff will tighten, then loosen.  · The numbers will change. When they stop changing, your blood pressure reading will appear.  · Take 2 or 3 readings one minute apart.  Step 4. Write down the results of each reading    · Write down your blood pressure numbers for each reading. Note the date and time. Keep your results in one place, such as a notebook. Even if your monitor has a built-in memory, keep a hard copy of the readings.  · Remove the cuff from your arm. Turn off the machine.  · Bring your blood pressure records with your healthcare providers at each visit.  · If you start a new blood pressure medicine, note the day you started the new medicine. Also note the day if you change the dose of your medicine. This information goes on your blood pressure recording sheet. This will help your healthcare provider monitor how well the medicine changes are working.  · Ask your healthcare provider what numbers should prompt you to call him or her. Also ask what numbers should prompt you to get help right away.  Date Last Reviewed: 11/1/2016 © 2000-2017 [employer]. [address] [place] [address], [place] [address]. All rights reserved. This information is not intended as a substitute for professional medical care. Always follow your healthcare professional's instructions.        Using a Blood Sugar Log    You have diabetes. This means your body has trouble regulating a sugar called glucose. To help manage your diabetes, youll need to check your blood sugar level as directed by your healthcare provider. Keeping a log of your blood sugar levels will help you track your  blood sugar readings. Its a simple and easy way to see how well you are controlling your diabetes.  Checking your blood sugar level  You can check your blood sugar level with a blood glucose meter. Youll first prick the side of your finger with a tiny lancet to draw a tiny drop of blood onto the test strip. Some glucose meters let you use another place on your body to test. But these other places should not be used in some cases as they may be inaccurate. Follow the instructions for your glucose meter. And talk with your healthcare provider before doing the test on other places.  The strip goes into the meter first, then a drop of blood is placed on the tip of the strip. The meter then shows a reading that tells you the level of your blood sugar. Your readings should be in your target range as often as possible. This means not too high or too low. Staying in this range helps lower your risk for complications. Your healthcare provider will help you figure out the target range that is best for you.  Tracking your readings  Every time you check your blood sugar, use your log to keep track of your readings. Your meter will also probably have a memory feature that your healthcare provider can check at your next visit. You may be advised by your healthcare provider to check your blood sugar in the morning, at bedtime, and before and after meals. Be sure to write down all of your numbers. Also use your log to record things that might have affected your blood sugar. Some examples include being sick, certain medicines, being physically active, feeling stressed, or skipping meals.   Lessons learned from your readings  Tracking your blood sugar readings helps you see patterns. These patterns tell you how your actions affect your blood sugar. For instance, you may have higher numbers after eating certain foods or lower numbers after exercise. They just help you understand how to stay in your target range more often, so that  your diabetes remains in good control.  Sharing your log with your healthcare team  Bring your blood sugar log and glucose meter with you to all of your healthcare appointments. This can help your healthcare team make changes to your treatment plan, if needed. This may involve making changes in what you eat, what medicines you take, or how much you exercise.  To learn more  The resources below can help you learn more:  · American Diabetes Association 658-042-1543 www.diabetes.org  · [employer] 838-085-9269 www.lighthouse.org  · [employer] [place] 614-201-3768 www.nei.nih.gov  · [employer] 565-097-1842 www.hormone.org  Date Last Reviewed: 5/1/2016  © 0968-4839 [employer] [facility]. [address] [place] [address], [place] [address]. All rights reserved. This information is not intended as a substitute for professional medical care. Always follow your healthcare professional's instructions.

## 2020-06-25 NOTE — PROGRESS NOTES
"                            Occupational Therapy Daily Treatment Note       Date: 6/30/2020  Name: Payton Castillo  Clinic Number: [id number]    Therapy Diagnosis:   Encounter Diagnosis   Name Primary?    Right hand pain Yes     Physician: Leonid Aragon MD    Physician Orders: (R) carpal tunnel syndrome;  (R) OA of 1st metacarpal;  Evaluate and treat;  ROM, US, Paraffin, massage, deep heat, slow progressive strengthening  Medical Diagnosis: (R) hand / wrist pain;  (R) carpal tunnel syndrome;  (R) OA of 1st metacarpal  Surgical Procedure and Date: Excision of bony osteophyte of (R) thumb IP joint radial and ulnar border;  Arthrotomy IP (R) thumb; Excision of mucosynovial cyst (R) thumb IP joint; (R) thumb flexor tenosynovectomy; Median nerve decompression at (R) wrist;  Ulnar nerve decompression at elbow with submuscular transposition; (R) carpal trapeziectomy;  01/28/2020  Insurance Authorization Period Expiration: 03/03/2020-12/31/2020  Plan of Care Certification Period: 05/26/2020-08/26/2020  Date of Return to MD: 07/13/2020    Visit # / Visits authorized: 29 / 104  Time In: 11:05  Time Out: 11:45  Total Billable Time: 40 minutes    Precautions:  Standard;  Kidney CA ( approx. 2014)      Post Op:   01/28/2020      Subjective     Pt reports: "I have pain sometimes in my thumb joint ( pt. Pointed to IP) when I go to pick something up.  I have trouble picking up small objects with my thumb facing down.  I have to turn my thumb over to pick things up."    0/10 pain (R) hand/ wrist    Objective    Patient seen by OT this session. Tx consisted of:      Supervised modalities after being cleared for contradictions  x  10 minutes:  -Fluidotherapy to  (R)  hand to increase blood flow, circulation, tissue elasticity, desensitization, sensory re-education and for pain management  X 10 minutes.         Direct contact modalities after being cleared for contraindications x   0 minutes:  -Ultrasound applied to radial aspect of (R) " hand  on   3.3 Mhz with 0.7 w/cm2 @ 50 % duty cycle to decrease pain and inflammation  /////  to remold scar tissue and increase tissue elasticity x 0 minutes.  (not completed today)        Manual therapy techniques to increase joint mobilization /// scar massage/// soft tissue mobilization   x   15 minutes:     -Scar Massage to volar aspect of (R) wrist and dorsal aspect of (R) thumb region with mini therapeutic vibrator and scar extractor on volar aspect of incision sites to decrease dense scar adhesions and improve  tensile glide  x 3 minutes.  -Manual Therapy techniques to (R) hand/ wrist  Including light joint mobilizations, stretching, and PROM to increase joint mobility, ROM and for pain management  x  10 minutes   -Soft Tissue Mobilization to (R) hand / wrist from distal to proximal to decrease edema and increase ROM and functional abilities  x  2 minutes      Therapeutic Exercises to improve functional performance while increasing strength, endurance, ROM,  and flexibility  x   15 minutes:      · - Joint blocking and reverse joint blocking for IP of (R) thumb for EXT/FLEX x 10 reps    - 3# DF/VF (SUP/PRON), RD/UD  x 10 reps   - Colored bead stringing with thumb to 5th digit DIP x 20 beads   - YELLOW Theraputty for intrinsic pulling and thumb raking for FLEX;  RED Theraputty for  medicine cap x 10 reps   - RED Flexbar for SUP/PRON and DF/VF x 10 reps   - Mini colored pegboard for FMC and in-hand manipulation  (5 in-hand) x 40 pegs   - 35#PHG (black spring) for composite  x 20 reps   - Wooden pegboard with BLUE clothespins with 3PT pinch x 1 reps      - RED digiflex for isolated x 10 reps GREEN digiflex for composite x 20 reps   - RED Digiweb for intrinsics x 10 reps   - 3 YELLOW rubber band gripper for thumb FLEX x 10 reps    Initiate in future therapy sessions:     - ### Wrist exerstick in standing for DF/VF x 3 reps           Assessment     Pt. will continue to benefit from skilled OT intervention to  "increase functional abilities, ROM, and strength and pain control.  Pt. demonstrated proper understanding of each exercise.  Pt continues to require verbal and tactile cues for throughout therapy session to maintain position and prevent compensation.   Pt continues to be limited in functional and leisurely pursuits. Pain limits patient's participation in ADL's. Pt is not able to carryout necessary vocational tasks.  Pt's spiritual, cultural and educational needs considered and pt agreeable to plan of care and goals.  Patient is making good progress towards established goals.  Pt. tolerated exercises within pain tolerance.   Reviewed HEP with pt., see EMR under "patient instructions" for provided exercises, activity modifications and limitations, modalities for home pain management.  Pt. demo understanding of above.      Pt. tolerated  Fluidotherapy with no irritation. Pt. Provided with Dycem and silicone scar pads (night time use) for scar management.   Provided pt. With YELLOW theraputty for rolling, pinching and composite ; cotton ball for thumb IP rolling on palm/5th digit.  Pt. Demo understanding of above.        Pt. Tolerated increase in clothespins with wooden pegboard with no reported pain but decreased reps 2* to fatigue.     New/Revised Goals: Cont POC  1)   Pt. to be IND with HEP and modalities for pain management by 1 week.  (MET)  2)   Pt. will report   0/10 pain on average with activity to A with exercises by 6-8 weeks.  (In Progress)  3)   Pt. will increase ROM 3-5 degrees to increase functional use for ADL's by 6-8 weeks. ( in progress)  4)   Pt. will decrease edema 0.1-0.3 mm to increase joint mobility /flexibility by 6-8 weeks.  ( in progress)  5)   Pt. will increase MMT by a grade to A with lifting items by 6-8 weeks.    6)   Pt. will increase  strength 3-5 lbs. to open containers by 6-8 weeks.  ( in progress)  7)   Pt. will increase pinch 1-3 lbs for buttoning by 6-8 weeks. ( in " progress)  8)   Pt. Will decrease time during 9 hole peg test by 3 seconds to A with FMC by 6-8 weeks.      Plan      Continue 2-3x week x 90 days during the certification period    05/26/2020   to 08/26/2020   with established POC in pursuit of OT goals.      Toshia Fisher, OT

## 2020-06-30 ENCOUNTER — CLINICAL SUPPORT (OUTPATIENT)
Dept: REHABILITATION | Facility: HOSPITAL | Age: 65
End: 2020-06-30
Payer: MEDICARE

## 2020-06-30 DIAGNOSIS — M79.641 RIGHT HAND PAIN: Primary | ICD-10-CM

## 2020-06-30 PROCEDURE — 97140 MANUAL THERAPY 1/> REGIONS: CPT | Performed by: OCCUPATIONAL THERAPIST

## 2020-06-30 PROCEDURE — 97022 WHIRLPOOL THERAPY: CPT | Performed by: OCCUPATIONAL THERAPIST

## 2020-06-30 PROCEDURE — 97110 THERAPEUTIC EXERCISES: CPT | Performed by: OCCUPATIONAL THERAPIST

## 2020-06-30 RX ORDER — FLASH GLUCOSE SENSOR
1 KIT MISCELLANEOUS
Qty: 6 KIT | Refills: 2 | Status: SHIPPED | OUTPATIENT
Start: 2020-06-30 | End: 2020-07-01

## 2020-07-02 NOTE — PROGRESS NOTES
"                            Occupational Therapy Discharge Summary        Date: 7/7/2020  Name: aPyton Castillo  Clinic Number: [id number]    Therapy Diagnosis:   Encounter Diagnosis   Name Primary?    Right hand pain Yes     Physician: Leonid Aragon MD    Physician Orders: (R) carpal tunnel syndrome;  (R) OA of 1st metacarpal;  Evaluate and treat;  ROM, US, Paraffin, massage, deep heat, slow progressive strengthening  Medical Diagnosis: (R) hand / wrist pain;  (R) carpal tunnel syndrome;  (R) OA of 1st metacarpal  Surgical Procedure and Date: Excision of bony osteophyte of (R) thumb IP joint radial and ulnar border;  Arthrotomy IP (R) thumb; Excision of mucosynovial cyst (R) thumb IP joint; (R) thumb flexor tenosynovectomy; Median nerve decompression at (R) wrist;  Ulnar nerve decompression at elbow with submuscular transposition; (R) carpal trapeziectomy;  01/28/2020  Insurance Authorization Period Expiration: 03/03/2020-12/31/2020  Plan of Care Certification Period: 05/26/2020-08/26/2020  Date of Return to MD: 07/13/2020    Visit # / Visits authorized: 30 / 104  Time In: 10:30  Time Out: 10:50  Total Billable Time: 20 minutes    Precautions:  Standard;  Kidney CA ( approx. 2014)      Post Op:   01/28/2020      Subjective     Pt reports: "I think I can continue my exercises at home.  I have been working my hand a lot."    0/10 pain (R) hand/ wrist    Objective    Patient seen by OT this session. Tx consisted of:        Therapeutic Exercises to improve functional performance while increasing strength, endurance, ROM,  and flexibility  x   20 minutes:      - pt. Reassessed A/PROM, edema, coordination, pain, MMT,  and pinch strength  - Pt. Completed the functional assessment quick DASH  - Reviewed HEP, scar management, activity modifications and precautions         Edema:  (R) / (L) cm  MCPs:  18.5   /  18.3  PWC:  16.1 (-0.2)  / 15.4     (R)  //  (L)thumb  PP: 6.6 (-0.2)  // 5.7  IP: 6.3 (-0.1)  // 5.5  DP: 5.3 " "(-0.1) // 5.0        AROM: hand (R)  //   (L)      thumb           (measured with plastic goni)  MP: 0 / 41 (+4)  //  0/42  IP:    0 / 44 (+5)  //   0/67            Palmar ABD: 50 / 50  Radial ABD: 45  /  45                 Opposition: 0.9 cm (+0.9) ( from thumb tip to DPC)            PROM: hand (R) thumb    FLEX ( measured with plastic goni)  MP: 47 (+3)  IP:  50 (+6)        AROM: wrist          (R)   //  (L)  DF/VF: 65 (+5) / 75 (+5)  //  70/75  RD/UD: 15 (+5) / 33 (+3)  //  20/35  SUP/PRON: 90  / 90   // 90/90            Manual Muscle Test:  Wrist   (R)         Dorsi Flexion: 5/5 (+2)  Volar Flexion:  5/5 (+2)  Radial Deviation:  5/5 (+3)  Ulnar Deviation:  5/5 (+2)         Strength: (KEYONNA Dynamometer in lbs.),Position II    (R):   40 (+5)  (L):   60     Pinch Strength: (Pinch Gauge in lbs)                (R) /  (L)  LAT:  8 (+2) /13  3PT: 6 (+1) /11.5  2PT:  5 (+1)/ 13         Coordination:  9 Hole Peg test ( in/out) seconds     (R):  25  (L):   25        Quick DASH SCORE: 7%     Assessment     Pt. To be D/C home 2* to reaching max. Benefit from skilled OP OT services at this time.  Pt. To continue HEP to maintain ROM, strength, functional abilities, and maintain pain.  Pt's spiritual, cultural and educational needs considered and pt agreeable to plan of care and goals.    Reviewed HEP with pt., see EMR under "patient instructions" for provided exercises, activity modifications and limitations, modalities for home pain management.  Pt. demo understanding of above.    Reviewed Dycem and silicone scar pads (night time use) for scar management.   Provided pt. With YELLOW theraputty for rolling, pinching and composite ; cotton ball for thumb IP rolling on palm/5th digit.  Pt. Demo understanding of above.      Pt. Demo decreased edema in (R) PWC, (R) thumb PP, IP and DP.  Increased A/PROM for (R) thumb MP and IP FLEX.  Increased AROM for (R) opposition.   Pt. Demo increased AROM for (R) DF/VF, RD/UD.  " Increased MMT for (R) DF/VF, RD/UD. Increased , LAT, 3PT, and 2PT pinch strength.  Decreased time during 9 hole peg test.    New/Revised Goals: Cont POC  1)   Pt. to be IND with HEP and modalities for pain management by 1 week.  (MET)  2)   Pt. will report   0/10 pain on average with activity to A with exercises by 6-8 weeks.  (MET)  3)   Pt. will increase ROM 3-5 degrees to increase functional use for ADL's by 6-8 weeks. ( MET)  4)   Pt. will decrease edema 0.1-0.3 mm to increase joint mobility /flexibility by 6-8 weeks.  (MET)  5)   Pt. will increase MMT by a grade to A with lifting items by 6-8 weeks.  (MET)  6)   Pt. will increase  strength 3-5 lbs. to open containers by 6-8 weeks.  ( MET)  7)   Pt. will increase pinch 1-3 lbs for buttoning by 6-8 weeks. ( MET)  8)   Pt. Will decrease time during 9 hole peg test by 3 seconds to A with [facility] by 6-8 weeks.  (MET)      Plan     Pt. To be D/C home 2* to reaching max. Benefit from skilled OT services at this time.  Pt. To follow up with doctor when needed.       Toshia Fisher, OT

## 2020-07-07 ENCOUNTER — CLINICAL SUPPORT (OUTPATIENT)
Dept: REHABILITATION | Facility: HOSPITAL | Age: 65
End: 2020-07-07
Payer: MEDICARE

## 2020-07-07 DIAGNOSIS — M79.641 RIGHT HAND PAIN: Primary | ICD-10-CM

## 2020-07-07 PROCEDURE — 97110 THERAPEUTIC EXERCISES: CPT | Performed by: OCCUPATIONAL THERAPIST

## 2020-07-08 RX ORDER — METOPROLOL SUCCINATE 50 MG/1
50 TABLET, EXTENDED RELEASE ORAL DAILY
Qty: 90 TABLET | Refills: 1 | Status: SHIPPED | OUTPATIENT
Start: 2020-07-08 | End: 2020-07-09 | Stop reason: SDUPTHER

## 2020-07-10 RX ORDER — METOPROLOL SUCCINATE 50 MG/1
50 TABLET, EXTENDED RELEASE ORAL 2 TIMES DAILY
Qty: 60 TABLET | Refills: 5 | Status: SHIPPED | OUTPATIENT
Start: 2020-07-10 | End: 2021-07-21

## 2020-08-13 ENCOUNTER — LAB VISIT (OUTPATIENT)
Dept: LAB | Facility: HOSPITAL | Age: 65
End: 2020-08-13
Attending: INTERNAL MEDICINE
Payer: MEDICARE

## 2020-08-13 DIAGNOSIS — R80.9 PROTEINURIA: ICD-10-CM

## 2020-08-13 DIAGNOSIS — N18.30 CHRONIC KIDNEY DISEASE, STAGE III (MODERATE): ICD-10-CM

## 2020-08-13 DIAGNOSIS — I10 ESSENTIAL HYPERTENSION, MALIGNANT: Primary | ICD-10-CM

## 2020-08-13 LAB
ALBUMIN SERPL BCP-MCNC: 4.1 G/DL (ref 3.5–5.2)
ANION GAP SERPL CALC-SCNC: 9 MMOL/L (ref 8–16)
BUN SERPL-MCNC: 24 MG/DL (ref 8–23)
CALCIUM SERPL-MCNC: 10.2 MG/DL (ref 8.7–10.5)
CHLORIDE SERPL-SCNC: 102 MMOL/L (ref 95–110)
CO2 SERPL-SCNC: 28 MMOL/L (ref 23–29)
CREAT SERPL-MCNC: 1.2 MG/DL (ref 0.5–1.4)
CREAT UR-MCNC: 116 MG/DL (ref 15–325)
EST. GFR  (AFRICAN AMERICAN): 54.8 ML/MIN/1.73 M^2
EST. GFR  (NON AFRICAN AMERICAN): 47.5 ML/MIN/1.73 M^2
GLUCOSE SERPL-MCNC: 122 MG/DL (ref 70–110)
PHOSPHATE SERPL-MCNC: 4.6 MG/DL (ref 2.7–4.5)
POTASSIUM SERPL-SCNC: 4.4 MMOL/L (ref 3.5–5.1)
PROT UR-MCNC: 60 MG/DL (ref 6–15)
PROT/CREAT UR: 0.52 MG/G{CREAT} (ref 0–0.2)
SODIUM SERPL-SCNC: 139 MMOL/L (ref 136–145)

## 2020-08-13 PROCEDURE — 84156 ASSAY OF PROTEIN URINE: CPT

## 2020-08-13 PROCEDURE — 80069 RENAL FUNCTION PANEL: CPT

## 2020-08-13 PROCEDURE — 36415 COLL VENOUS BLD VENIPUNCTURE: CPT

## 2020-09-17 ENCOUNTER — LAB VISIT (OUTPATIENT)
Dept: LAB | Facility: HOSPITAL | Age: 65
End: 2020-09-17
Attending: INTERNAL MEDICINE
Payer: MEDICARE

## 2020-09-17 DIAGNOSIS — E11.42 TYPE 2 DIABETES MELLITUS WITH DIABETIC POLYNEUROPATHY, WITH LONG-TERM CURRENT USE OF INSULIN: ICD-10-CM

## 2020-09-17 DIAGNOSIS — Z79.4 TYPE 2 DIABETES MELLITUS WITH DIABETIC POLYNEUROPATHY, WITH LONG-TERM CURRENT USE OF INSULIN: ICD-10-CM

## 2020-09-17 LAB
ESTIMATED AVG GLUCOSE: 146 MG/DL (ref 68–131)
HBA1C MFR BLD HPLC: 6.7 % (ref 4.5–6.2)

## 2020-09-17 PROCEDURE — 83036 HEMOGLOBIN GLYCOSYLATED A1C: CPT

## 2020-09-17 PROCEDURE — 36415 COLL VENOUS BLD VENIPUNCTURE: CPT

## 2020-09-24 ENCOUNTER — OFFICE VISIT (OUTPATIENT)
Dept: FAMILY MEDICINE | Facility: CLINIC | Age: 65
End: 2020-09-24
Payer: MEDICARE

## 2020-09-24 VITALS
TEMPERATURE: 97 F | DIASTOLIC BLOOD PRESSURE: 76 MMHG | WEIGHT: 170 LBS | BODY MASS INDEX: 27.32 KG/M2 | HEIGHT: 66 IN | HEART RATE: 65 BPM | SYSTOLIC BLOOD PRESSURE: 125 MMHG

## 2020-09-24 DIAGNOSIS — E55.9 VITAMIN D DEFICIENCY: ICD-10-CM

## 2020-09-24 DIAGNOSIS — E11.22 CKD STAGE 3 DUE TO TYPE 2 DIABETES MELLITUS: ICD-10-CM

## 2020-09-24 DIAGNOSIS — I10 ESSENTIAL HYPERTENSION: Chronic | ICD-10-CM

## 2020-09-24 DIAGNOSIS — M54.50 CHRONIC MIDLINE LOW BACK PAIN WITHOUT SCIATICA: ICD-10-CM

## 2020-09-24 DIAGNOSIS — N18.30 CKD STAGE 3 DUE TO TYPE 2 DIABETES MELLITUS: ICD-10-CM

## 2020-09-24 DIAGNOSIS — G89.29 CHRONIC MIDLINE LOW BACK PAIN WITHOUT SCIATICA: ICD-10-CM

## 2020-09-24 DIAGNOSIS — Z79.4 TYPE 2 DIABETES MELLITUS WITH DIABETIC POLYNEUROPATHY, WITH LONG-TERM CURRENT USE OF INSULIN: Primary | ICD-10-CM

## 2020-09-24 DIAGNOSIS — E78.2 MIXED DYSLIPIDEMIA: ICD-10-CM

## 2020-09-24 DIAGNOSIS — Z78.0 ASYMPTOMATIC MENOPAUSE: ICD-10-CM

## 2020-09-24 DIAGNOSIS — E11.42 TYPE 2 DIABETES MELLITUS WITH DIABETIC POLYNEUROPATHY, WITH LONG-TERM CURRENT USE OF INSULIN: Primary | ICD-10-CM

## 2020-09-24 PROCEDURE — 99214 PR OFFICE/OUTPT VISIT, EST, LEVL IV, 30-39 MIN: ICD-10-PCS | Mod: S$PBB,,, | Performed by: INTERNAL MEDICINE

## 2020-09-24 PROCEDURE — 99214 OFFICE O/P EST MOD 30 MIN: CPT | Performed by: INTERNAL MEDICINE

## 2020-09-24 PROCEDURE — 99214 OFFICE O/P EST MOD 30 MIN: CPT | Mod: S$PBB,,, | Performed by: INTERNAL MEDICINE

## 2020-09-24 RX ORDER — PREGABALIN 75 MG/1
75 CAPSULE ORAL 3 TIMES DAILY
Qty: 270 CAPSULE | Refills: 1 | Status: SHIPPED | OUTPATIENT
Start: 2020-09-24 | End: 2021-09-12 | Stop reason: ALTCHOICE

## 2020-09-24 NOTE — PATIENT INSTRUCTIONS
Diabetic Foot Care  Diabetes can lead to a number of foot complications. Fortunately, you can prevent most of these with a little extra foot care. If diabetes is not well controlled, it can cause damage to blood vessels and result in poor circulation to the foot. When the skin does not get enough blood flow, it becomes prone to pressure sores and ulcers, which heal slowly.  Diabetes can also damage nerves, interfering with the ability to feel pain and pressure. When you cant feel your foot normally, it is easy to injure your skin, bones, and joints without knowing it. For these reasons diabetes increases the risk of fungal infections, bunions, and ulcers. An ulcer is a sore or break in the skin. With ulcers, often the skin seems to have worn away. Deep ulcers can lead to bone infection.  Gangrene is the most serious foot complication of diabetes. It usually occurs on the tips of the toes as blackened areas of skin. The black area is dead tissue. In severe cases, gangrene spreads to involve the entire toe, other toes, and the entire foot. Foot or toe amputation may be required. Good foot care and blood sugar control can prevent this.  Home care  · Wear comfortable, well-fitting shoes.  · Wash your feet daily with warm water and mild soap.  · After drying, apply a moisturizing cream or lotion to the top and bottom of your feet. Don't put lotion between toes.  · Check your feet daily for skin breaks, blisters, swelling, or redness. Look between your toes as well. If you cannot see the bottoms of your feet, ask someone to look or use a mirror.  · Wear cotton socks and change them every day.  · Trim toenails carefully, and do not cut your cuticles.  · Strive to keep your blood sugar under control with a combination of medicines, diet, and activity.  · If you smoke and have diabetes, it is very important that you stop. Smoking reduces blood flow to your foot.  · Schedule foot exams at least every year, or more often if  you have foot problems.  · Put your feet up when sitting, wiggle toes, and move ankles to help improve blood flow.  Avoid activities that increase your risk of foot injury:  · Do not walk barefoot.  · Do not use heating pads or hot water bottles on your feet.  · Do not put your foot in a hot tub without first checking the temperature with your hand.  Follow-up care  Follow up with your healthcare provider, or as advised. Be sure to take off your shoes and socks before your appointment starts so your healthcare provider will be sure to check your feet. Report any cut, puncture, scrape, blister, or other injury to your foot. Also report if you have a bunion, hammertoes, ingrown toenail, or ulcer on your foot.  When to seek medical advice  Call your healthcare provider right away if any of these occur:  · Black skin color anywhere on the foot  · Open ulcer with pus draining from the wound  · Increasing foot or leg pain  · New areas of redness or swelling or tender areas of the foot  · Fever of 100.4°F (38°C) or greater  Date Last Reviewed: 5/25/2016  © 7433-5593 [employer]. [address] [place] [address]. All rights reserved. This information is not intended as a substitute for professional medical care. Always follow your healthcare professional's instructions.        Diabetes: Keeping Feet Healthy     Have your feet checked every time you see your healthcare provider, and at least once a year.     Diabetes can damage nerves in your feet and cause neuropathy. This condition makes it hard for you to feel injuries or sore spots. Diabetes can also change blood flow, making it harder for small problems, like a blister, to heal properly. In fact, minor injuries can quickly become serious infections that send you to the hospital. Practice self-care to protect your feet and keep them healthy.   Take special care  Here are tips for taking special care of your feet:  · Inspect your feet daily for  problems such as redness, blisters, cracks, dry skin, or numbness. Use a mirror to see the bottoms of your feet. Or, ask for help.  · Manage your diabetes. Monitor and control your blood sugar. Take all your medicines as prescribed.  · Avoid walking barefoot, even indoors. Always wear socks inside your shoes.   · Wash your feet with warm water and mild soap. Dry well, especially between toes.  · Dont treat corns or calluses yourself. Talk to your healthcare provider or podiatrist (a healthcare provider who specializes in foot care) if you need assistance trimming your toenails.  · Use moisturizing cream or lotion if you have dry skin, but dont use it between toes.  · Dont use heating pads on your feet. If you have neuropathy, you could get a burn and not feel it.  · Stop smoking. Smoking restricts blood flow and can make it harder for wounds to heal.  · Don't use sharp blades to trim your nails. Use a nail clipper and file instead.   Have regular checkups  Foot problems can develop quickly. So be sure to follow your healthcare teams schedule for regular checkups. During office visits, take off your shoes and socks as soon as you get in the exam room. Ask your healthcare provider to examine your feet for problems. This will make it easier to find and treat small skin irritations before they get worse. Regular checkups can also help keep track of the blood flow and feeling in your feet. If you have neuropathy, you may need to have checkups more often.  Wear proper footwear  Wearing proper footwear is very important. If areas of your feet have been damaged by too much pressure, your healthcare provider may recommend changing your footwear. In some cases, avoiding high heels or tight work boots may be all thats needed. Or, your healthcare provider may recommend special shoes or custom inserts. These help protect your feet and keep existing irritations from getting worse. If you need special footwear, ask your  healthcare provider if you qualify for Medicare's custom-molded and extra-depth diabetic shoe and insert program.   Make sure shoes and socks fit  Any pair of shoes--new or old--should feel comfortable as soon as you put them on. There shouldnt be any rubbing when you walk. Wear the right shoe for any activity. For instance, a running shoe is designed to keep your feet injury-free while jogging. Buy shoes at the end of the day, when your feet are larger. Make sure they provide support without feeling too loose. Make sure your socks fit, too. Wear soft, seamless, well-padded socks for activity. Cotton or microfiber socks are best to help to absorb sweat. To protect your feet, avoid shoes that are open-toed or open-heeled. If you have questions about what kinds of shoes and socks are best, talk to your healthcare team.  Get regular exercise  Regular exercise improves blood flow in your feet. It also increases foot strength and flexibility. Gentle exercises, like walking or riding a stationary bicycle, are best. You can also do special foot exercises. Just be sure to talk with your healthcare provider before starting any exercise program. Also mention if any exercise causes pain, redness, or other signs of foot problems.     Note: If you have any kind of break in the skin of your foot or ankle, keep the area clean. Then call your healthcare provider--especially if the area doesnt appear to be healing.   Date Last Reviewed: 6/1/2016  © 0192-8214 [employer]. [address] [place] [address], [place] [address]. All rights reserved. This information is not intended as a substitute for professional medical care. Always follow your healthcare professional's instructions.        Diabetes: Keeping Feet Healthy     Have your feet checked every time you see your healthcare provider, and at least once a year.     Diabetes can damage nerves in your feet and cause neuropathy. This condition makes it hard for you to feel  injuries or sore spots. Diabetes can also change blood flow, making it harder for small problems, like a blister, to heal properly. In fact, minor injuries can quickly become serious infections that send you to the hospital. Practice self-care to protect your feet and keep them healthy.   Take special care  Here are tips for taking special care of your feet:  · Inspect your feet daily for problems such as redness, blisters, cracks, dry skin, or numbness. Use a mirror to see the bottoms of your feet. Or, ask for help.  · Manage your diabetes. Monitor and control your blood sugar. Take all your medicines as prescribed.  · Avoid walking barefoot, even indoors. Always wear socks inside your shoes.   · Wash your feet with warm water and mild soap. Dry well, especially between toes.  · Dont treat corns or calluses yourself. Talk to your healthcare provider or podiatrist (a healthcare provider who specializes in foot care) if you need assistance trimming your toenails.  · Use moisturizing cream or lotion if you have dry skin, but dont use it between toes.  · Dont use heating pads on your feet. If you have neuropathy, you could get a burn and not feel it.  · Stop smoking. Smoking restricts blood flow and can make it harder for wounds to heal.  · Don't use sharp blades to trim your nails. Use a nail clipper and file instead.   Have regular checkups  Foot problems can develop quickly. So be sure to follow your healthcare teams schedule for regular checkups. During office visits, take off your shoes and socks as soon as you get in the exam room. Ask your healthcare provider to examine your feet for problems. This will make it easier to find and treat small skin irritations before they get worse. Regular checkups can also help keep track of the blood flow and feeling in your feet. If you have neuropathy, you may need to have checkups more often.  Wear proper footwear  Wearing proper footwear is very important. If areas of  your feet have been damaged by too much pressure, your healthcare provider may recommend changing your footwear. In some cases, avoiding high heels or tight work boots may be all thats needed. Or, your healthcare provider may recommend special shoes or custom inserts. These help protect your feet and keep existing irritations from getting worse. If you need special footwear, ask your healthcare provider if you qualify for Medicare's custom-molded and extra-depth diabetic shoe and insert program.   Make sure shoes and socks fit  Any pair of shoes--new or old--should feel comfortable as soon as you put them on. There shouldnt be any rubbing when you walk. Wear the right shoe for any activity. For instance, a running shoe is designed to keep your feet injury-free while jogging. Buy shoes at the end of the day, when your feet are larger. Make sure they provide support without feeling too loose. Make sure your socks fit, too. Wear soft, seamless, well-padded socks for activity. Cotton or microfiber socks are best to help to absorb sweat. To protect your feet, avoid shoes that are open-toed or open-heeled. If you have questions about what kinds of shoes and socks are best, talk to your healthcare team.  Get regular exercise  Regular exercise improves blood flow in your feet. It also increases foot strength and flexibility. Gentle exercises, like walking or riding a stationary bicycle, are best. You can also do special foot exercises. Just be sure to talk with your healthcare provider before starting any exercise program. Also mention if any exercise causes pain, redness, or other signs of foot problems.     Note: If you have any kind of break in the skin of your foot or ankle, keep the area clean. Then call your healthcare provider--especially if the area doesnt appear to be healing.   Date Last Reviewed: 6/1/2016  © 1490-7216 [employer]. [address] [place] [address], [place] [address]. All rights reserved.  This information is not intended as a substitute for professional medical care. Always follow your healthcare professional's instructions.

## 2020-09-24 NOTE — PROGRESS NOTES
Subjective:       Patient ID: Payton Castillo is a 65 y.o. female.    Chief Complaint: Hypertension (lab review ), Hyperlipidemia, Sinus Problem, Diabetes, and Back Pain    Miss Payton Le is a 65-year-old female who comes for follow-up.  Underlying medical issues of hypertension, dyslipidemia, type 2 diabetes mellitus on insulin has been noted.  Her sugars have been rather fluctuating recently.  Because of ongoing psychosocial issues she has not been able to check her blood sugars recently.      Back pain continues to bother her.  She has seen Dr. sarah Fuentes in past.  She also had epidural injections in the back with temporary relief.  At this point she is not considered to be a candidate for surgery.  She is currently taking Lyrica and would like to increase the dosage.  She is also wondering if dry needling would help her back.  She had dry needling for the right shoulder which seem to help her.    Sugars are doing okay.  Some fluctuations are noted.    Sinus symptoms are stable with Flonase.  She also takes Zyrtec or cetirizine for the same.    As far as diabetes is concerned, she uses the Amy device.    She did get a flu shot this year.  She will be due for eye checkup next month.        Hypertension  This is a chronic problem. The current episode started more than 1 year ago. Pertinent negatives include no chest pain, palpitations or shortness of breath. Risk factors for coronary artery disease include sedentary lifestyle, dyslipidemia, diabetes mellitus and post-menopausal state. Past treatments include beta blockers, angiotensin blockers and calcium channel blockers. The current treatment provides moderate improvement. Compliance problems include psychosocial issues.  There is no history of heart failure or left ventricular hypertrophy.   Hyperlipidemia  This is a chronic problem. The current episode started more than 1 year ago. The problem is controlled. Exacerbating diseases include obesity. Pertinent  negatives include no chest pain or shortness of breath. Current antihyperlipidemic treatment includes statins. The current treatment provides moderate improvement of lipids. Risk factors for coronary artery disease include post-menopausal, obesity, dyslipidemia and diabetes mellitus.   Sinus Problem  Pertinent negatives include no chills, congestion, coughing, shortness of breath, sinus pressure or sore throat.   Diabetes  She presents for her follow-up diabetic visit. She has type 2 diabetes mellitus. Her disease course has been fluctuating. Pertinent negatives for hypoglycemia include no confusion, dizziness, mood changes, nervousness/anxiousness, pallor, seizures or tremors. Associated symptoms include fatigue. Pertinent negatives for diabetes include no chest pain, no polydipsia and no polyphagia. Risk factors for coronary artery disease include hypertension, sedentary lifestyle, post-menopausal, dyslipidemia and diabetes mellitus. Current diabetic treatment includes insulin injections. She is compliant with treatment most of the time. An ACE inhibitor/angiotensin II receptor blocker is being taken.   Back Pain  This is a chronic problem. The current episode started more than 1 year ago. The problem occurs intermittently. The problem has been gradually worsening since onset. Pertinent negatives include no abdominal pain, chest pain or dysuria. Risk factors include lack of exercise and sedentary lifestyle. The treatment provided mild relief.       Past Medical History:   Diagnosis Date    Allergy     aspartame,fructose monosodium glutamate and penicillins.    Anemia of chronic disorder 11/9/2017    Anemia, chronic renal failure, stage 2 (mild) 11/9/2017    Anemia, chronic renal failure, stage 3 (moderate) 11/9/2017    Asthma     Cancer     kidney    Cataract of right eye     CKD (chronic kidney disease), stage III     Diabetes mellitus     type II    Fibromyalgia     GERD (gastroesophageal reflux  disease)     Hx of renal cell cancer - left nephrectomy Aug 2014 11/9/2017    Hypercholesteremia     Hypertension     IDDM (insulin dependent diabetes mellitus)     Lumbar spondylosis     Lumbosacral radiculopathy     Osteopenia     Palpitations     Plantar fasciitis, right     Syncope 2/2012    Tachycardia     Thoracic radiculopathy     TMJ (dislocation of temporomandibular joint)      Social History     Socioeconomic History    Marital status:      Spouse name: Owen Castillo    Number of children: 2    Years of education: Not on file    Highest education level: Not on file   Occupational History    Occupation: Part - member support [employer]     Employer: [employer]   Social Needs    Financial resource strain: Not on file    Food insecurity     Worry: Not on file     Inability: Not on file    Transportation needs     Medical: Not on file     Non-medical: Not on file   Tobacco Use    Smoking status: Never Smoker    Smokeless tobacco: Never Used   Substance and Sexual Activity    Alcohol use: No    Drug use: No    Sexual activity: Yes     Partners: Male   Lifestyle    Physical activity     Days per week: Not on file     Minutes per session: Not on file    Stress: Not at all   Relationships    Social connections     Talks on phone: Not on file     Gets together: Not on file     Attends Jewish service: Not on file     Active member of club or organization: Not on file     Attends meetings of clubs or organizations: Not on file     Relationship status: Not on file   Other Topics Concern    Not on file   Social History Narrative    Not on file     Past Surgical History:   Procedure Laterality Date    CATARACT EXTRACTION Left 03/2016    JOINT REPLACEMENT Right     knee    SHOULDER OPEN ROTATOR CUFF REPAIR Right 02/2005    labral repair    TOTAL KNEE ARTHROPLASTY Left 2014    TUBAL LIGATION  1990    TUMOR REMOVAL  2014    from kidney     Family History   Problem Relation Age of  "Onset    Stomach cancer Mother     Breast cancer Mother     Diabetes Mother     Cancer Father        Review of Systems   Constitutional: Positive for fatigue. Negative for activity change, chills and unexpected weight change (gained 3 lbs).   HENT: Negative for congestion, postnasal drip, sinus pressure and sore throat.         Dry mouth due to VESIcare   Eyes: Negative for pain, discharge and visual disturbance.   Respiratory: Positive for apnea. Negative for cough, chest tightness and shortness of breath.         Patient does have obstructive sleep apnea and uses a CPAP device.   Cardiovascular: Negative for chest pain, palpitations and leg swelling.        Hypertension.  Chronic kidney disease stage 3.   Gastrointestinal: Negative for abdominal distention, abdominal pain, anal bleeding, constipation and diarrhea.   Endocrine: Negative for polydipsia and polyphagia.        Type 2 diabetes mellitus dyslipidemia   Genitourinary: Negative for difficulty urinating, dysuria, flank pain and frequency.        Patient has an intact uterus.  Pap smear is pending at this point.  She has chronic kidney disease stage 3 and follows Dr. Tommy macedo for the same.   Musculoskeletal: Positive for back pain. Negative for arthralgias and joint swelling.        Chronic pain   Skin: Negative for color change, pallor and rash.   Allergic/Immunologic: Negative for environmental allergies, food allergies and immunocompromised state.   Neurological: Negative for dizziness, tremors, seizures, syncope and light-headedness.        Neuropathy symptoms due to diabetes.  Patient takes gabapentin 600 mg 3 times a day.   Hematological: Negative for adenopathy. Does not bruise/bleed easily.   Psychiatric/Behavioral: Negative for agitation, confusion and dysphoric mood. The patient is not nervous/anxious.          Objective:      Blood pressure 125/76, pulse 65, temperature 97.3 °F (36.3 °C), height 5' 6" (1.676 m), weight 77.1 kg (170 lb). " Body mass index is 27.44 kg/m².  Physical Exam  Vitals signs and nursing note reviewed.   Constitutional:       General: She is not in acute distress.     Appearance: She is well-developed. She is not ill-appearing, toxic-appearing or diaphoretic.   HENT:      Head: Normocephalic and atraumatic.      Mouth/Throat:      Mouth: Mucous membranes are dry.      Tonsils: No tonsillar abscesses.   Eyes:      General: Lids are normal. Lids are everted, no foreign bodies appreciated. No scleral icterus.     Conjunctiva/sclera: Conjunctivae normal.      Pupils:      Right eye: Pupil is round and reactive.      Left eye: Pupil is round and reactive.   Neck:      Musculoskeletal: Normal range of motion and neck supple.      Thyroid: No thyromegaly.      Trachea: Trachea normal. No tracheal deviation.   Cardiovascular:      Rate and Rhythm: Normal rate and regular rhythm.      Pulses:           Dorsalis pedis pulses are 1+ on the right side and 1+ on the left side.      Heart sounds: Normal heart sounds, S1 normal and S2 normal.   Pulmonary:      Breath sounds: Normal breath sounds.   Abdominal:      General: Bowel sounds are normal. There is no distension.      Palpations: Abdomen is soft. Abdomen is not rigid.      Tenderness: There is no abdominal tenderness. There is no guarding or rebound.          Comments: scar amy of prior surgeries are noted including scars of nephrectomy .   Musculoskeletal:         General: No tenderness or deformity.      Right foot: Normal range of motion. No deformity.      Left foot: Normal range of motion. No deformity.   Feet:      Right foot:      Protective Sensation: 5 sites tested. 4 sites sensed.      Skin integrity: No ulcer, blister or skin breakdown.      Left foot:      Protective Sensation: 5 sites tested. 4 sites sensed.      Skin integrity: No ulcer, blister or skin breakdown.   Lymphadenopathy:      Cervical: No cervical adenopathy.   Skin:     General: Skin is warm and dry.    Neurological:      Mental Status: She is alert.      Comments: Numbness in fingers- Carpel tunnel    She has diminished feelings and monofilament sensations in the toes.   Psychiatric:         Behavior: Behavior normal. Behavior is cooperative.           Assessment:       1. Type 2 diabetes mellitus with diabetic polyneuropathy, with long-term current use of insulin    2. Essential hypertension    3. Mixed dyslipidemia    4. CKD stage 3 due to type 2 diabetes mellitus    5. Chronic midline low back pain without sciatica    6. Asymptomatic menopause    7. Vitamin D deficiency           Lab Visit on 09/17/2020   Component Date Value Ref Range Status    Hemoglobin A1C 09/17/2020 6.7* 4.5 - 6.2 % Final    Estimated Avg Glucose 09/17/2020 146* 68 - 131 mg/dL Final   Lab Visit on 08/13/2020   Component Date Value Ref Range Status    Glucose 08/13/2020 122* 70 - 110 mg/dL Final    Sodium 08/13/2020 139  136 - 145 mmol/L Final    Potassium 08/13/2020 4.4  3.5 - 5.1 mmol/L Final    Chloride 08/13/2020 102  95 - 110 mmol/L Final    CO2 08/13/2020 28  23 - 29 mmol/L Final    BUN, Bld 08/13/2020 24* 8 - 23 mg/dL Final    Calcium 08/13/2020 10.2  8.7 - 10.5 mg/dL Final    Creatinine 08/13/2020 1.2  0.5 - 1.4 mg/dL Final    Albumin 08/13/2020 4.1  3.5 - 5.2 g/dL Final    Phosphorus 08/13/2020 4.6* 2.7 - 4.5 mg/dL Final    eGFR if  08/13/2020 54.8* >60 mL/min/1.73 m^2 Final    eGFR if non African American 08/13/2020 47.5* >60 mL/min/1.73 m^2 Final    Anion Gap 08/13/2020 9  8 - 16 mmol/L Final    Protein, Urine Random 08/13/2020 60* 6 - 15 mg/dL Final    Creatinine, Random Ur 08/13/2020 116.0  15.0 - 325.0 mg/dL Final    Prot/Creat Ratio, Ur 08/13/2020 0.52* 0.00 - 0.20 Final         Plan:           Type 2 diabetes mellitus with diabetic polyneuropathy, with long-term current use of insulin  -     Hemoglobin A1C; Future; Expected date: 01/04/2021    Essential hypertension  Comments:  Blood  pressure is borderline stable.    Mixed dyslipidemia  Comments:  Currently not on any statin medications.  Total cholesterol is low.  Triglycerides are slightly elevated.    CKD stage 3 due to type 2 diabetes mellitus    Chronic midline low back pain without sciatica  -     pregabalin (LYRICA) 75 MG capsule; Take 1 capsule (75 mg total) by mouth 3 (three) times daily. Please do not drive on this medication at least for the 1st month till your sure about its affect on your cognition and thinking.  Dispense: 270 capsule; Refill: 1  -     Ambulatory referral/consult to Physical/Occupational Therapy; Future; Expected date: 10/01/2020    Asymptomatic menopause  -     DXA Bone Density Appendicular Skeleton; Future; Expected date: 09/24/2020    Vitamin D deficiency  -     Vitamin D; Future; Expected date: 01/04/2021       Patient's multiple medical issues have been reviewed again.    Patient requests increase of Lyrica to 75 mg t.i.d..  Long-term uses discussed.  Referral has been given for physical therapy.    Her kidney functions are borderline and slightly worse as compared to past and it has risen from 1.1-1.2 creatinine.    I reviewed lab done last year by Dr. Tavarez and did not show any evidence of anemia.    Patient has a diagnosis of vitamin-D deficiency and will like to repeat the labs again.    Offer has been given for osteoporosis screening and patient wants to pursue and order has been given for bone DEXA.    At this point her lipid panel seems to be okay but will try to pursue this issue and see if she agrees to take a small dose of statin medication.    Follow-up in 4 months.  Earlier if needed.    Please utilize precautions for cIf urrent COVID-19 pandemic.  Try to avoid crowds or close contact with multiple people.  Minimize outside interaction.  Wash hands with soap for  frequently upon contact.Use face mask or cover.        Spent jhonatan 25 minutes with patient which involved review of pts medical  "conditions, labs, medications and with 50% of time face-to-face discussion about medical problems, management and any applicable changes.    Current Outpatient Medications:     amLODIPine (NORVASC) 2.5 MG tablet, Take 1 tablet by mouth once daily., Disp: , Rfl:     atorvastatin (LIPITOR) 40 MG tablet, Take 1 tablet (40 mg total) by mouth every evening., Disp: 90 tablet, Rfl: 1    cetirizine (ZYRTEC) 10 MG tablet, Take 10 mg by mouth., Disp: , Rfl:     fluticasone propionate (FLONASE) 50 mcg/actuation nasal spray, USE 2 SPRAYS IN EACH NOSTRIL ONCE DAILY, Disp: 48 g, Rfl: 3    FREESTYLE LANCETS MISC, by Misc.(Non-Drug; Combo Route) route 3 (three) times daily., Disp: , Rfl:     FREESTYLE BANG 14 DAY SENSOR Kit, USE 1 DEVICE BY MISC ROUTE EVERY 14 DAYS, Disp: 6 kit, Rfl: 2    insulin aspart U-100 (NOVOLOG FLEXPEN U-100 INSULIN) 100 unit/mL (3 mL) InPn pen, Inject 20 Units into the skin 3 (three) times daily with meals., Disp: 18 Syringe, Rfl: 3    insulin glargine (LANTUS U-100 INSULIN) 100 unit/mL injection, Inject 70 Units into the skin every evening., Disp: 21 vial, Rfl: 3    insulin syringe-needle U-100 0.3 mL 30 Syrg, by Misc.(Non-Drug; Combo Route) route 3 (three) times daily., Disp: , Rfl:     insulin syringe-needle U-100 1 mL 28 gauge Syrg, 1 Syringe by Misc.(Non-Drug; Combo Route) route once daily., Disp: 100 each, Rfl: 3    losartan (COZAAR) 50 MG tablet, TAKE 1 TABLET DAILY, Disp: 90 tablet, Rfl: 3    metoprolol succinate (TOPROL XL) 50 MG 24 hr tablet, Take 1 tablet (50 mg total) by mouth 2 (two) times daily., Disp: 60 tablet, Rfl: 5    pen needle, diabetic (ADVOCATE PEN NEEDLE) 31 gauge x 5/16" Ndle, 1 Device by Misc.(Non-Drug; Combo Route) route 3 (three) times daily., Disp: 100 each, Rfl: 3    pregabalin (LYRICA) 75 MG capsule, Take 1 capsule (75 mg total) by mouth 3 (three) times daily. Please do not drive on this medication at least for the 1st month till your sure about its affect on " your cognition and thinking., Disp: 270 capsule, Rfl: 1    solifenacin (VESICARE) 5 MG tablet, Take 5 mg by mouth., Disp: , Rfl:

## 2020-09-29 ENCOUNTER — HOSPITAL ENCOUNTER (OUTPATIENT)
Dept: RADIOLOGY | Facility: HOSPITAL | Age: 65
Discharge: HOME | End: 2020-09-29
Attending: INTERNAL MEDICINE
Payer: MEDICARE

## 2020-09-29 DIAGNOSIS — Z78.0 ASYMPTOMATIC MENOPAUSE: ICD-10-CM

## 2020-09-29 PROCEDURE — 77080 DXA BONE DENSITY AXIAL: CPT | Mod: TC,PO

## 2020-11-12 ENCOUNTER — TELEPHONE (OUTPATIENT)
Dept: HEMATOLOGY/ONCOLOGY | Facility: CLINIC | Age: 65
End: 2020-11-12

## 2020-11-12 DIAGNOSIS — D63.1 ANEMIA, CHRONIC RENAL FAILURE, STAGE 3 (MODERATE): ICD-10-CM

## 2020-11-12 DIAGNOSIS — Z85.528 HX OF RENAL CELL CANCER: Primary | ICD-10-CM

## 2020-11-12 DIAGNOSIS — N18.30 ANEMIA, CHRONIC RENAL FAILURE, STAGE 3 (MODERATE): ICD-10-CM

## 2020-11-12 NOTE — TELEPHONE ENCOUNTER
----- Message from Vikki Berkowitz, Patient Care Assistant sent at 11/12/2020 10:51 AM CST -----  Regarding: Lab Orders  Patient called in stating she would like her Lab orders sent to [facility] Out patient lab . She can be reached at [phone]

## 2020-11-12 NOTE — TELEPHONE ENCOUNTER
Called the patient and instructed her that I placed her orders for labs at Outpatient lab at [facility].

## 2020-11-16 ENCOUNTER — LAB VISIT (OUTPATIENT)
Dept: LAB | Facility: HOSPITAL | Age: 65
End: 2020-11-16
Attending: INTERNAL MEDICINE
Payer: MEDICARE

## 2020-11-16 DIAGNOSIS — N18.30 ANEMIA, CHRONIC RENAL FAILURE, STAGE 3 (MODERATE): ICD-10-CM

## 2020-11-16 DIAGNOSIS — D63.1 ANEMIA, CHRONIC RENAL FAILURE, STAGE 3 (MODERATE): ICD-10-CM

## 2020-11-16 DIAGNOSIS — Z85.528 HX OF RENAL CELL CANCER: ICD-10-CM

## 2020-11-16 LAB
ALBUMIN SERPL BCP-MCNC: 4.2 G/DL (ref 3.5–5.2)
ALP SERPL-CCNC: 55 U/L (ref 55–135)
ALT SERPL W/O P-5'-P-CCNC: 59 U/L (ref 10–44)
ANION GAP SERPL CALC-SCNC: 10 MMOL/L (ref 8–16)
AST SERPL-CCNC: 35 U/L (ref 10–40)
BASOPHILS # BLD AUTO: 0.05 K/UL (ref 0–0.2)
BASOPHILS NFR BLD: 0.7 % (ref 0–1.9)
BILIRUB SERPL-MCNC: 0.7 MG/DL (ref 0.1–1)
BUN SERPL-MCNC: 35 MG/DL (ref 8–23)
CALCIUM SERPL-MCNC: 10.3 MG/DL (ref 8.7–10.5)
CHLORIDE SERPL-SCNC: 104 MMOL/L (ref 95–110)
CO2 SERPL-SCNC: 27 MMOL/L (ref 23–29)
CREAT SERPL-MCNC: 1.4 MG/DL (ref 0.5–1.4)
DIFFERENTIAL METHOD: ABNORMAL
EOSINOPHIL # BLD AUTO: 0.1 K/UL (ref 0–0.5)
EOSINOPHIL NFR BLD: 1.9 % (ref 0–8)
ERYTHROCYTE [DISTWIDTH] IN BLOOD BY AUTOMATED COUNT: 13.1 % (ref 11.5–14.5)
EST. GFR  (AFRICAN AMERICAN): 45.5 ML/MIN/1.73 M^2
EST. GFR  (NON AFRICAN AMERICAN): 39.5 ML/MIN/1.73 M^2
GLUCOSE SERPL-MCNC: 82 MG/DL (ref 70–110)
HCT VFR BLD AUTO: 41.4 % (ref 37–48.5)
HGB BLD-MCNC: 13 G/DL (ref 12–16)
IMM GRANULOCYTES # BLD AUTO: 0.02 K/UL (ref 0–0.04)
IMM GRANULOCYTES NFR BLD AUTO: 0.3 % (ref 0–0.5)
LYMPHOCYTES # BLD AUTO: 2.6 K/UL (ref 1–4.8)
LYMPHOCYTES NFR BLD: 38.2 % (ref 18–48)
MCH RBC QN AUTO: 29.6 PG (ref 27–31)
MCHC RBC AUTO-ENTMCNC: 31.4 G/DL (ref 32–36)
MCV RBC AUTO: 94 FL (ref 82–98)
MONOCYTES # BLD AUTO: 0.7 K/UL (ref 0.3–1)
MONOCYTES NFR BLD: 10 % (ref 4–15)
NEUTROPHILS # BLD AUTO: 3.3 K/UL (ref 1.8–7.7)
NEUTROPHILS NFR BLD: 48.9 % (ref 38–73)
NRBC BLD-RTO: 0 /100 WBC
PLATELET # BLD AUTO: 329 K/UL (ref 150–350)
PMV BLD AUTO: 9 FL (ref 9.2–12.9)
POTASSIUM SERPL-SCNC: 4.9 MMOL/L (ref 3.5–5.1)
PROT SERPL-MCNC: 7.8 G/DL (ref 6–8.4)
RBC # BLD AUTO: 4.39 M/UL (ref 4–5.4)
SODIUM SERPL-SCNC: 141 MMOL/L (ref 136–145)
WBC # BLD AUTO: 6.73 K/UL (ref 3.9–12.7)

## 2020-11-16 PROCEDURE — 80053 COMPREHEN METABOLIC PANEL: CPT

## 2020-11-16 PROCEDURE — 85025 COMPLETE CBC W/AUTO DIFF WBC: CPT

## 2020-11-16 PROCEDURE — 36415 COLL VENOUS BLD VENIPUNCTURE: CPT

## 2020-11-23 NOTE — PROGRESS NOTES
[facility] Hematology/Oncology  PROGRESS NOTE      Subjective:       Patient ID:   NAME: Paytno Castillo : 1955     65 y.o. female    Referring Doc: An (new PCP)  Other Physicians: Corona Haro; Mahesh (Hand Surg); [place]    Chief Complaint:  Anemia/renal ca f/u    History of Present Illness:     Patient returns today for a regularly scheduled follow-up visit.  She is here by herself. She is doing ok. She has chronic aches with sciatica. She denies any CP, SOB, HA's or N/V. Her shoulder has been doing better and she has some chronic back issues.     She saw Dr Nolan in 2020; she is seeing urology this coming Monday    Discussed covid19 precautions        ROS:   GEN: normal without any fever, night sweats or weight loss  HEENT: normal with no HA's, sore throat, stiff neck, changes in vision  CV: normal with no CP, SOB, PND, PALMA or orthopnea  PULM: normal with no SOB, cough, hemoptysis, sputum or pleuritic pain  GI: normal with no abdominal pain, nausea, vomiting, constipation, diarrhea, melanotic stools, BRBPR, or hematemesis  : normal with no hematuria, dysuria  BREAST: normal with no mass, discharge, pain  SKIN: normal with no rash, erythema, bruising, or swelling    Allergies:  Review of patient's allergies indicates:   Allergen Reactions    Aspartame Other (See Comments)     Headache      Fructose Other (See Comments)     headache    Monosodium glutamate Other (See Comments)     Headache      Pcn [penicillins]      Rash         Medications:    Current Outpatient Medications:     amLODIPine (NORVASC) 2.5 MG tablet, Take 1 tablet by mouth once daily., Disp: , Rfl:     atorvastatin (LIPITOR) 40 MG tablet, Take 1 tablet (40 mg total) by mouth every evening., Disp: 90 tablet, Rfl: 1    cetirizine (ZYRTEC) 10 MG tablet, Take 10 mg by mouth., Disp: , Rfl:     fluticasone propionate (FLONASE) 50 mcg/actuation nasal spray, USE 2 SPRAYS IN EACH NOSTRIL ONCE DAILY, Disp: 48 g, Rfl: 3     "FREESTYLE LANCETS MISC, by Misc.(Non-Drug; Combo Route) route 3 (three) times daily., Disp: , Rfl:     FREESTYLE BANG 14 DAY SENSOR Kit, USE 1 DEVICE BY MISC ROUTE EVERY 14 DAYS, Disp: 6 kit, Rfl: 2    insulin aspart U-100 (NOVOLOG FLEXPEN U-100 INSULIN) 100 unit/mL (3 mL) InPn pen, Inject 20 Units into the skin 3 (three) times daily with meals., Disp: 18 Syringe, Rfl: 3    insulin glargine (LANTUS U-100 INSULIN) 100 unit/mL injection, Inject 70 Units into the skin every evening., Disp: 21 vial, Rfl: 3    insulin syringe-needle U-100 0.3 mL 30 Syrg, by Misc.(Non-Drug; Combo Route) route 3 (three) times daily., Disp: , Rfl:     insulin syringe-needle U-100 1 mL 28 gauge Syrg, 1 Syringe by Misc.(Non-Drug; Combo Route) route once daily., Disp: 100 each, Rfl: 3    losartan (COZAAR) 50 MG tablet, TAKE 1 TABLET DAILY, Disp: 90 tablet, Rfl: 3    metoprolol succinate (TOPROL XL) 50 MG 24 hr tablet, Take 1 tablet (50 mg total) by mouth 2 (two) times daily., Disp: 60 tablet, Rfl: 5    pen needle, diabetic (ADVOCATE PEN NEEDLE) 31 gauge x 5/16" Ndle, 1 Device by Misc.(Non-Drug; Combo Route) route 3 (three) times daily., Disp: 100 each, Rfl: 3    pregabalin (LYRICA) 75 MG capsule, Take 1 capsule (75 mg total) by mouth 3 (three) times daily. Please do not drive on this medication at least for the 1st month till your sure about its affect on your cognition and thinking., Disp: 270 capsule, Rfl: 1    solifenacin (VESICARE) 5 MG tablet, Take 5 mg by mouth., Disp: , Rfl:     PMHx/PSHx Updates:  See patient's last visit with me on 11/26/2019  See H&P on 10/11/2006        Pathology:  See path Aug 2014          Objective:     Vitals:  Blood pressure (!) 140/82, pulse 71, temperature 97.9 °F (36.6 °C), resp. rate 18, weight 78.4 kg (172 lb 14.4 oz).    Physical Examination:   GEN: no apparent distress, comfortable; AAOx3  HEAD: atraumatic and normocephalic  EYES: no pallor, no icterus, PERRLA  ENT: OMM, no pharyngeal " erythema, external ears WNL; no nasal discharge; no thrush  NECK: no masses, thyroid normal, trachea midline, no LAD/LN's, supple  CV: RRR with no murmur; normal pulse; normal S1 and S2; no pedal edema  CHEST: Normal respiratory effort; CTAB; normal breath sounds; no wheeze or crackles  ABDOM: nontender and nondistended; soft; normal bowel sounds; no rebound/guarding  MUSC/Skeletal: ROM normal; no crepitus; joints normal; no deformities or arthropathy  EXTREM: no clubbing, cyanosis, inflammation or swelling  SKIN: no rashes, lesions, ulcers, petechiae or subcutaneous nodules  : no bright  NEURO: grossly intact; motor/sensory WNL; AAOx3; no tremors  PSYCH: normal mood, affect and behavior  LYMPH: normal cervical, supraclavicular, axillary and groin LN's            Labs:        Lab Results   Component Value Date    WBC 6.73 11/16/2020    HGB 13.0 11/16/2020    HCT 41.4 11/16/2020    MCV 94 11/16/2020     11/16/2020     BMP  Lab Results   Component Value Date     11/16/2020    K 4.9 11/16/2020     11/16/2020    CO2 27 11/16/2020    BUN 35 (H) 11/16/2020    CREATININE 1.4 11/16/2020    CALCIUM 10.3 11/16/2020    ANIONGAP 10 11/16/2020    ESTGFRAFRICA 45.5 (A) 11/16/2020    EGFRNONAA 39.5 (A) 11/16/2020     Lab Results   Component Value Date    ALT 59 (H) 11/16/2020    AST 35 11/16/2020    ALKPHOS 55 11/16/2020    BILITOT 0.7 11/16/2020             Radiology/Diagnostic Studies:    US  11/12/2020:    Impression:     Borderline mildly elevated bilateral renal resistive indices which can be seen with renal dysfunction.         CT 11/1/2017    I have reviewed all available lab results and radiology reports.    Assessment/Plan:   (1) 65 y.o. female with diagnosis of chronic anemia and thrombocytopenia in the past  - multifactorial anemia process with underlying anemia of chronic disorders and anemia of chronic renal  - latest labs wnl - no residual anemia or tcp    (2) prior left nephrectomy in Aug 2014  -  Stage I renal cell carcinoma  - followed by Dr Hill with  - she is seeing him this coming Monday  - CT scans on 11/1/2017 negative  - recent US of kidneys - negative 11/12/2020    (3) CRI - followed by Dr Tommy Haro with nephrology    (4) Chronic joint/hip issues - followed by Dr Faye and plans to see Dr Aragon in near future    1. Anemia, chronic renal failure, stage 3 (moderate)     2. Anemia of chronic disorder     3. Hx of renal cell cancer - left nephrectomy Aug 2014           PLAN:  1. F/u with PCP,  and Neph  2. Check labs again in one year  3. F/u with urology this coming Monday  4. Routine CXR    RTC in 12 months    Fax note to Estrellita Nolan Kreiger and Leonid Hill    Discussion:       COVID-19 Discussion:    I had long discussion with patient and any applicable family about the COVID-19 coronavirus epidemic and the recommended precautions with regard to cancer and/or hematology patients. I have re-iterated the CDC recommendations for adequate hand washing, use of hand -like products, and coughing into elbow, etc. In addition, especially for our patients who are on chemotherapy and/or our otherwise immunocompromised patients, I have recommended avoidance of crowds, including movie theaters, restaurants, churches, etc. I have recommended avoidance of any sick or symptomatic family members and/or friends. I have also recommended avoidance of any raw and unwashed food products, and general avoidance of food items that have not been prepared by themselves. The patient has been asked to call us immediately with any symptom developments, issues, questions or other general concerns.         I have explained all of the above in detail and the patient understands all of the current recommendation(s). I have answered all of their questions to the best of my ability and to their complete satisfaction.   The patient is to continue with the current management  plan.            Electronically signed by Sebastien Tavarez MD

## 2020-11-24 ENCOUNTER — OFFICE VISIT (OUTPATIENT)
Dept: HEMATOLOGY/ONCOLOGY | Facility: CLINIC | Age: 65
End: 2020-11-24
Payer: MEDICARE

## 2020-11-24 VITALS
SYSTOLIC BLOOD PRESSURE: 140 MMHG | DIASTOLIC BLOOD PRESSURE: 82 MMHG | TEMPERATURE: 98 F | RESPIRATION RATE: 18 BRPM | BODY MASS INDEX: 27.91 KG/M2 | WEIGHT: 172.88 LBS | HEART RATE: 71 BPM

## 2020-11-24 DIAGNOSIS — D63.1 ANEMIA, CHRONIC RENAL FAILURE, STAGE 3 (MODERATE): Primary | ICD-10-CM

## 2020-11-24 DIAGNOSIS — D63.8 ANEMIA OF CHRONIC DISORDER: ICD-10-CM

## 2020-11-24 DIAGNOSIS — N18.30 ANEMIA, CHRONIC RENAL FAILURE, STAGE 3 (MODERATE): Primary | ICD-10-CM

## 2020-11-24 DIAGNOSIS — Z85.528 HX OF RENAL CELL CANCER: ICD-10-CM

## 2020-11-24 PROCEDURE — 99213 OFFICE O/P EST LOW 20 MIN: CPT | Mod: S$GLB,,, | Performed by: INTERNAL MEDICINE

## 2020-11-24 PROCEDURE — 99213 PR OFFICE/OUTPT VISIT, EST, LEVL III, 20-29 MIN: ICD-10-PCS | Mod: S$GLB,,, | Performed by: INTERNAL MEDICINE

## 2021-01-12 ENCOUNTER — HOSPITAL ENCOUNTER (OUTPATIENT)
Dept: RADIOLOGY | Facility: HOSPITAL | Age: 66
Discharge: HOME | End: 2021-01-12
Attending: INTERNAL MEDICINE
Payer: MEDICARE

## 2021-01-12 DIAGNOSIS — Z85.528 HX OF RENAL CELL CANCER: ICD-10-CM

## 2021-01-12 PROCEDURE — 71046 X-RAY EXAM CHEST 2 VIEWS: CPT | Mod: TC

## 2021-01-26 ENCOUNTER — OFFICE VISIT (OUTPATIENT)
Dept: FAMILY MEDICINE | Facility: CLINIC | Age: 66
End: 2021-01-26
Payer: MEDICARE

## 2021-01-26 VITALS
HEIGHT: 66 IN | DIASTOLIC BLOOD PRESSURE: 72 MMHG | WEIGHT: 175 LBS | HEART RATE: 68 BPM | SYSTOLIC BLOOD PRESSURE: 121 MMHG | TEMPERATURE: 97 F | BODY MASS INDEX: 28.12 KG/M2

## 2021-01-26 DIAGNOSIS — Z79.4 TYPE 2 DIABETES MELLITUS WITH DIABETIC POLYNEUROPATHY, WITH LONG-TERM CURRENT USE OF INSULIN: ICD-10-CM

## 2021-01-26 DIAGNOSIS — I10 ESSENTIAL HYPERTENSION: Primary | Chronic | ICD-10-CM

## 2021-01-26 DIAGNOSIS — Z12.31 ENCOUNTER FOR SCREENING MAMMOGRAM FOR BREAST CANCER: ICD-10-CM

## 2021-01-26 DIAGNOSIS — E78.2 MIXED DYSLIPIDEMIA: ICD-10-CM

## 2021-01-26 DIAGNOSIS — N18.30 CKD STAGE 3 DUE TO TYPE 2 DIABETES MELLITUS: ICD-10-CM

## 2021-01-26 DIAGNOSIS — E11.42 TYPE 2 DIABETES MELLITUS WITH DIABETIC POLYNEUROPATHY, WITH LONG-TERM CURRENT USE OF INSULIN: ICD-10-CM

## 2021-01-26 DIAGNOSIS — E11.22 CKD STAGE 3 DUE TO TYPE 2 DIABETES MELLITUS: ICD-10-CM

## 2021-01-26 PROCEDURE — 99214 OFFICE O/P EST MOD 30 MIN: CPT | Mod: S$PBB,,, | Performed by: INTERNAL MEDICINE

## 2021-01-26 PROCEDURE — 99214 OFFICE O/P EST MOD 30 MIN: CPT | Performed by: INTERNAL MEDICINE

## 2021-01-26 PROCEDURE — 99214 PR OFFICE/OUTPT VISIT, EST, LEVL IV, 30-39 MIN: ICD-10-PCS | Mod: S$PBB,,, | Performed by: INTERNAL MEDICINE

## 2021-01-26 RX ORDER — SYRINGE-NEEDLE,INSULIN,0.5 ML 28 GAUGE
1 SYRINGE, EMPTY DISPOSABLE MISCELLANEOUS DAILY
Qty: 100 EACH | Refills: 3 | Status: SHIPPED | OUTPATIENT
Start: 2021-01-26 | End: 2022-01-21

## 2021-02-05 ENCOUNTER — HOSPITAL ENCOUNTER (OUTPATIENT)
Dept: RADIOLOGY | Facility: HOSPITAL | Age: 66
Discharge: HOME | End: 2021-02-05
Attending: INTERNAL MEDICINE
Payer: MEDICARE

## 2021-02-05 DIAGNOSIS — Z12.31 ENCOUNTER FOR SCREENING MAMMOGRAM FOR BREAST CANCER: ICD-10-CM

## 2021-02-05 PROCEDURE — 77067 SCR MAMMO BI INCL CAD: CPT | Mod: TC,PO

## 2021-02-12 ENCOUNTER — HOSPITAL ENCOUNTER (OUTPATIENT)
Dept: RADIOLOGY | Facility: HOSPITAL | Age: 66
Discharge: HOME | End: 2021-02-12
Attending: INTERNAL MEDICINE
Payer: MEDICARE

## 2021-02-12 ENCOUNTER — OFFICE VISIT (OUTPATIENT)
Dept: FAMILY MEDICINE | Facility: CLINIC | Age: 66
End: 2021-02-12
Payer: MEDICARE

## 2021-02-12 VITALS
DIASTOLIC BLOOD PRESSURE: 74 MMHG | TEMPERATURE: 98 F | SYSTOLIC BLOOD PRESSURE: 143 MMHG | WEIGHT: 176 LBS | HEART RATE: 79 BPM | HEIGHT: 66 IN | BODY MASS INDEX: 28.28 KG/M2

## 2021-02-12 DIAGNOSIS — M25.552 ACUTE HIP PAIN, LEFT: ICD-10-CM

## 2021-02-12 DIAGNOSIS — R10.32 LEFT LOWER QUADRANT ABDOMINAL PAIN: ICD-10-CM

## 2021-02-12 DIAGNOSIS — M25.552 ACUTE HIP PAIN, LEFT: Primary | ICD-10-CM

## 2021-02-12 DIAGNOSIS — R26.2 DIFFICULTY WALKING: ICD-10-CM

## 2021-02-12 PROCEDURE — 99214 PR OFFICE/OUTPT VISIT, EST, LEVL IV, 30-39 MIN: ICD-10-PCS | Mod: S$PBB,,, | Performed by: INTERNAL MEDICINE

## 2021-02-12 PROCEDURE — 73502 X-RAY EXAM HIP UNI 2-3 VIEWS: CPT | Mod: TC,PO,LT

## 2021-02-12 PROCEDURE — 99214 OFFICE O/P EST MOD 30 MIN: CPT | Mod: 25 | Performed by: INTERNAL MEDICINE

## 2021-02-12 PROCEDURE — 99214 OFFICE O/P EST MOD 30 MIN: CPT | Mod: S$PBB,,, | Performed by: INTERNAL MEDICINE

## 2021-02-12 RX ORDER — HYDROCODONE BITARTRATE AND ACETAMINOPHEN 7.5; 325 MG/1; MG/1
1 TABLET ORAL EVERY 8 HOURS PRN
Qty: 20 TABLET | Refills: 0 | Status: SHIPPED | OUTPATIENT
Start: 2021-02-12 | End: 2022-04-06

## 2021-04-08 ENCOUNTER — LAB VISIT (OUTPATIENT)
Dept: LAB | Facility: HOSPITAL | Age: 66
End: 2021-04-08
Attending: INTERNAL MEDICINE
Payer: MEDICARE

## 2021-04-08 DIAGNOSIS — D63.1 ANEMIA OF CHRONIC RENAL FAILURE: ICD-10-CM

## 2021-04-08 DIAGNOSIS — N18.9 ANEMIA OF CHRONIC RENAL FAILURE: ICD-10-CM

## 2021-04-08 DIAGNOSIS — J42 UNSPECIFIED CHRONIC BRONCHITIS: ICD-10-CM

## 2021-04-08 DIAGNOSIS — N18.2 CHRONIC KIDNEY DISEASE, STAGE II (MILD): Primary | ICD-10-CM

## 2021-04-08 DIAGNOSIS — M17.9 OSTEOARTHRITIS OF KNEE, UNSPECIFIED: ICD-10-CM

## 2021-04-08 LAB
ALBUMIN SERPL BCP-MCNC: 4.2 G/DL (ref 3.5–5.2)
ANION GAP SERPL CALC-SCNC: 9 MMOL/L (ref 8–16)
BACTERIA #/AREA URNS HPF: NEGATIVE /HPF
BILIRUB UR QL STRIP: NEGATIVE
BUN SERPL-MCNC: 24 MG/DL (ref 8–23)
CALCIUM SERPL-MCNC: 9.7 MG/DL (ref 8.7–10.5)
CHLORIDE SERPL-SCNC: 102 MMOL/L (ref 95–110)
CLARITY UR: CLEAR
CO2 SERPL-SCNC: 30 MMOL/L (ref 23–29)
COLOR UR: YELLOW
CREAT SERPL-MCNC: 1.2 MG/DL (ref 0.5–1.4)
CREAT UR-MCNC: 121 MG/DL (ref 15–325)
EST. GFR  (AFRICAN AMERICAN): 54.4 ML/MIN/1.73 M^2
EST. GFR  (NON AFRICAN AMERICAN): 47.2 ML/MIN/1.73 M^2
GLUCOSE SERPL-MCNC: 90 MG/DL (ref 70–110)
GLUCOSE UR QL STRIP: NEGATIVE
HGB UR QL STRIP: NEGATIVE
HYALINE CASTS #/AREA URNS LPF: 3 /LPF
IRON SERPL-MCNC: 92 UG/DL (ref 30–160)
KETONES UR QL STRIP: NEGATIVE
LEUKOCYTE ESTERASE UR QL STRIP: NEGATIVE
MAGNESIUM SERPL-MCNC: 1.9 MG/DL (ref 1.6–2.6)
MICROSCOPIC COMMENT: ABNORMAL
NITRITE UR QL STRIP: NEGATIVE
PH UR STRIP: 6 [PH] (ref 5–8)
PHOSPHATE SERPL-MCNC: 4.1 MG/DL (ref 2.7–4.5)
POTASSIUM SERPL-SCNC: 4.6 MMOL/L (ref 3.5–5.1)
PROT UR QL STRIP: ABNORMAL
PROT UR-MCNC: 168 MG/DL (ref 6–15)
PROT/CREAT UR: 1.39 MG/G{CREAT} (ref 0–0.2)
PTH-INTACT SERPL-MCNC: 98.7 PG/ML (ref 9–77)
RBC #/AREA URNS HPF: 0 /HPF (ref 0–4)
RETICS/RBC NFR AUTO: 2.3 % (ref 0.5–2.5)
SATURATED IRON: 28 % (ref 20–50)
SODIUM SERPL-SCNC: 141 MMOL/L (ref 136–145)
SP GR UR STRIP: 1.01 (ref 1–1.03)
SQUAMOUS #/AREA URNS HPF: 2 /HPF
TOTAL IRON BINDING CAPACITY: 328 UG/DL (ref 250–450)
TRANSFERRIN SERPL-MCNC: 234 MG/DL (ref 200–375)
URATE SERPL-MCNC: 6.9 MG/DL (ref 2.4–5.7)
URN SPEC COLLECT METH UR: ABNORMAL
UROBILINOGEN UR STRIP-ACNC: NEGATIVE EU/DL
WBC #/AREA URNS HPF: 1 /HPF (ref 0–5)

## 2021-04-08 PROCEDURE — 83735 ASSAY OF MAGNESIUM: CPT | Performed by: INTERNAL MEDICINE

## 2021-04-08 PROCEDURE — 83540 ASSAY OF IRON: CPT | Performed by: INTERNAL MEDICINE

## 2021-04-08 PROCEDURE — 80069 RENAL FUNCTION PANEL: CPT | Performed by: INTERNAL MEDICINE

## 2021-04-08 PROCEDURE — 83970 ASSAY OF PARATHORMONE: CPT | Performed by: INTERNAL MEDICINE

## 2021-04-08 PROCEDURE — 81001 URINALYSIS AUTO W/SCOPE: CPT | Performed by: INTERNAL MEDICINE

## 2021-04-08 PROCEDURE — 84550 ASSAY OF BLOOD/URIC ACID: CPT | Performed by: INTERNAL MEDICINE

## 2021-04-08 PROCEDURE — 36415 COLL VENOUS BLD VENIPUNCTURE: CPT | Performed by: INTERNAL MEDICINE

## 2021-04-08 PROCEDURE — 85045 AUTOMATED RETICULOCYTE COUNT: CPT | Performed by: INTERNAL MEDICINE

## 2021-04-08 PROCEDURE — 82570 ASSAY OF URINE CREATININE: CPT | Performed by: INTERNAL MEDICINE

## 2021-04-29 ENCOUNTER — PATIENT MESSAGE (OUTPATIENT)
Dept: RESEARCH | Facility: HOSPITAL | Age: 66
End: 2021-04-29

## 2021-05-19 ENCOUNTER — LAB VISIT (OUTPATIENT)
Dept: LAB | Facility: HOSPITAL | Age: 66
End: 2021-05-19
Attending: INTERNAL MEDICINE
Payer: MEDICARE

## 2021-05-19 DIAGNOSIS — Z79.4 TYPE 2 DIABETES MELLITUS WITH DIABETIC POLYNEUROPATHY, WITH LONG-TERM CURRENT USE OF INSULIN: ICD-10-CM

## 2021-05-19 DIAGNOSIS — I10 ESSENTIAL HYPERTENSION: Chronic | ICD-10-CM

## 2021-05-19 DIAGNOSIS — E78.2 MIXED DYSLIPIDEMIA: ICD-10-CM

## 2021-05-19 DIAGNOSIS — E11.42 TYPE 2 DIABETES MELLITUS WITH DIABETIC POLYNEUROPATHY, WITH LONG-TERM CURRENT USE OF INSULIN: ICD-10-CM

## 2021-05-19 LAB
ALBUMIN SERPL BCP-MCNC: 3.9 G/DL (ref 3.5–5.2)
ALBUMIN/CREAT UR: 390.1 UG/MG (ref 0–30)
ALP SERPL-CCNC: 65 U/L (ref 55–135)
ALT SERPL W/O P-5'-P-CCNC: 42 U/L (ref 10–44)
ANION GAP SERPL CALC-SCNC: 11 MMOL/L (ref 8–16)
AST SERPL-CCNC: 25 U/L (ref 10–40)
BILIRUB SERPL-MCNC: 0.8 MG/DL (ref 0.1–1)
BUN SERPL-MCNC: 31 MG/DL (ref 8–23)
CALCIUM SERPL-MCNC: 9 MG/DL (ref 8.7–10.5)
CHLORIDE SERPL-SCNC: 103 MMOL/L (ref 95–110)
CHOLEST SERPL-MCNC: 162 MG/DL (ref 120–199)
CHOLEST/HDLC SERPL: 3.3 {RATIO} (ref 2–5)
CO2 SERPL-SCNC: 27 MMOL/L (ref 23–29)
CREAT SERPL-MCNC: 1.4 MG/DL (ref 0.5–1.4)
CREAT UR-MCNC: 169 MG/DL (ref 15–325)
EST. GFR  (AFRICAN AMERICAN): 45.2 ML/MIN/1.73 M^2
EST. GFR  (NON AFRICAN AMERICAN): 39.2 ML/MIN/1.73 M^2
ESTIMATED AVG GLUCOSE: 171 MG/DL (ref 68–131)
GLUCOSE SERPL-MCNC: 170 MG/DL (ref 70–110)
HBA1C MFR BLD: 7.6 % (ref 4.5–6.2)
HDLC SERPL-MCNC: 49 MG/DL (ref 40–75)
HDLC SERPL: 30.2 % (ref 20–50)
LDLC SERPL CALC-MCNC: 80 MG/DL (ref 63–159)
MICROALBUMIN UR DL<=1MG/L-MCNC: 659.3 UG/ML
NONHDLC SERPL-MCNC: 113 MG/DL
POTASSIUM SERPL-SCNC: 4.8 MMOL/L (ref 3.5–5.1)
PROT SERPL-MCNC: 7.8 G/DL (ref 6–8.4)
SODIUM SERPL-SCNC: 141 MMOL/L (ref 136–145)
TRIGL SERPL-MCNC: 165 MG/DL (ref 30–150)

## 2021-05-19 PROCEDURE — 80061 LIPID PANEL: CPT | Performed by: INTERNAL MEDICINE

## 2021-05-19 PROCEDURE — 80053 COMPREHEN METABOLIC PANEL: CPT | Performed by: INTERNAL MEDICINE

## 2021-05-19 PROCEDURE — 82570 ASSAY OF URINE CREATININE: CPT | Performed by: INTERNAL MEDICINE

## 2021-05-19 PROCEDURE — 36415 COLL VENOUS BLD VENIPUNCTURE: CPT | Performed by: INTERNAL MEDICINE

## 2021-05-19 PROCEDURE — 82043 UR ALBUMIN QUANTITATIVE: CPT | Performed by: INTERNAL MEDICINE

## 2021-05-19 PROCEDURE — 83036 HEMOGLOBIN GLYCOSYLATED A1C: CPT | Performed by: INTERNAL MEDICINE

## 2021-05-26 ENCOUNTER — OFFICE VISIT (OUTPATIENT)
Dept: FAMILY MEDICINE | Facility: CLINIC | Age: 66
End: 2021-05-26
Payer: MEDICARE

## 2021-05-26 VITALS
WEIGHT: 175 LBS | DIASTOLIC BLOOD PRESSURE: 61 MMHG | SYSTOLIC BLOOD PRESSURE: 124 MMHG | HEIGHT: 66 IN | BODY MASS INDEX: 28.12 KG/M2 | HEART RATE: 84 BPM

## 2021-05-26 DIAGNOSIS — N18.30 CKD STAGE 3 DUE TO TYPE 2 DIABETES MELLITUS: ICD-10-CM

## 2021-05-26 DIAGNOSIS — R11.0 NAUSEA: Primary | ICD-10-CM

## 2021-05-26 DIAGNOSIS — I10 ESSENTIAL HYPERTENSION: ICD-10-CM

## 2021-05-26 DIAGNOSIS — E78.2 MIXED DYSLIPIDEMIA: ICD-10-CM

## 2021-05-26 DIAGNOSIS — E11.42 TYPE 2 DIABETES MELLITUS WITH DIABETIC POLYNEUROPATHY, WITH LONG-TERM CURRENT USE OF INSULIN: ICD-10-CM

## 2021-05-26 DIAGNOSIS — Z79.4 TYPE 2 DIABETES MELLITUS WITH DIABETIC POLYNEUROPATHY, WITH LONG-TERM CURRENT USE OF INSULIN: ICD-10-CM

## 2021-05-26 DIAGNOSIS — E11.22 CKD STAGE 3 DUE TO TYPE 2 DIABETES MELLITUS: ICD-10-CM

## 2021-05-26 PROCEDURE — 99214 OFFICE O/P EST MOD 30 MIN: CPT | Mod: S$PBB,,, | Performed by: INTERNAL MEDICINE

## 2021-05-26 PROCEDURE — 99214 PR OFFICE/OUTPT VISIT, EST, LEVL IV, 30-39 MIN: ICD-10-PCS | Mod: S$PBB,,, | Performed by: INTERNAL MEDICINE

## 2021-05-26 PROCEDURE — 99213 OFFICE O/P EST LOW 20 MIN: CPT | Performed by: INTERNAL MEDICINE

## 2021-05-26 RX ORDER — ONDANSETRON 4 MG/1
4 TABLET, FILM COATED ORAL EVERY 8 HOURS PRN
Qty: 20 TABLET | Refills: 1 | Status: SHIPPED | OUTPATIENT
Start: 2021-05-26 | End: 2022-04-06 | Stop reason: ALTCHOICE

## 2021-05-31 ENCOUNTER — PATIENT MESSAGE (OUTPATIENT)
Dept: FAMILY MEDICINE | Facility: CLINIC | Age: 66
End: 2021-05-31

## 2021-06-01 ENCOUNTER — TELEPHONE (OUTPATIENT)
Dept: FAMILY MEDICINE | Facility: CLINIC | Age: 66
End: 2021-06-01

## 2021-06-01 DIAGNOSIS — R10.9 ABDOMINAL CRAMPS: ICD-10-CM

## 2021-06-01 DIAGNOSIS — R11.0 NAUSEA: Primary | ICD-10-CM

## 2021-06-15 DIAGNOSIS — R10.32 LEFT LOWER QUADRANT PAIN: Primary | ICD-10-CM

## 2021-06-16 DIAGNOSIS — R10.32 ABDOMINAL PAIN, LEFT LOWER QUADRANT: Primary | ICD-10-CM

## 2021-06-25 ENCOUNTER — HOSPITAL ENCOUNTER (OUTPATIENT)
Dept: RADIOLOGY | Facility: HOSPITAL | Age: 66
Discharge: HOME | End: 2021-06-25
Attending: INTERNAL MEDICINE
Payer: MEDICARE

## 2021-06-25 DIAGNOSIS — R10.32 ABDOMINAL PAIN, LEFT LOWER QUADRANT: ICD-10-CM

## 2021-06-25 PROCEDURE — 74176 CT ABD & PELVIS W/O CONTRAST: CPT | Mod: TC,PO

## 2021-07-14 DIAGNOSIS — G56.02 CARPAL TUNNEL SYNDROME ON LEFT: Primary | ICD-10-CM

## 2021-07-19 ENCOUNTER — CLINICAL SUPPORT (OUTPATIENT)
Dept: REHABILITATION | Facility: HOSPITAL | Age: 66
End: 2021-07-19
Payer: MEDICARE

## 2021-07-19 ENCOUNTER — DOCUMENTATION ONLY (OUTPATIENT)
Dept: REHABILITATION | Facility: HOSPITAL | Age: 66
End: 2021-07-19

## 2021-07-19 DIAGNOSIS — M25.432 PAIN AND SWELLING OF LEFT WRIST: Primary | ICD-10-CM

## 2021-07-19 DIAGNOSIS — M25.532 PAIN AND SWELLING OF LEFT WRIST: Primary | ICD-10-CM

## 2021-07-19 PROCEDURE — 97530 THERAPEUTIC ACTIVITIES: CPT

## 2021-07-19 PROCEDURE — 97165 OT EVAL LOW COMPLEX 30 MIN: CPT

## 2021-07-22 ENCOUNTER — CLINICAL SUPPORT (OUTPATIENT)
Dept: REHABILITATION | Facility: HOSPITAL | Age: 66
End: 2021-07-22
Payer: MEDICARE

## 2021-07-22 DIAGNOSIS — M25.432 PAIN AND SWELLING OF LEFT WRIST: Primary | ICD-10-CM

## 2021-07-22 DIAGNOSIS — M25.532 PAIN AND SWELLING OF LEFT WRIST: Primary | ICD-10-CM

## 2021-07-22 PROCEDURE — 97110 THERAPEUTIC EXERCISES: CPT

## 2021-07-22 PROCEDURE — 97140 MANUAL THERAPY 1/> REGIONS: CPT

## 2021-07-22 PROCEDURE — 97022 WHIRLPOOL THERAPY: CPT

## 2021-07-27 ENCOUNTER — DOCUMENTATION ONLY (OUTPATIENT)
Dept: REHABILITATION | Facility: HOSPITAL | Age: 66
End: 2021-07-27

## 2021-08-05 ENCOUNTER — CLINICAL SUPPORT (OUTPATIENT)
Dept: REHABILITATION | Facility: HOSPITAL | Age: 66
End: 2021-08-05
Payer: MEDICARE

## 2021-08-05 DIAGNOSIS — M25.432 PAIN AND SWELLING OF LEFT WRIST: Primary | ICD-10-CM

## 2021-08-05 DIAGNOSIS — M25.532 PAIN AND SWELLING OF LEFT WRIST: Primary | ICD-10-CM

## 2021-08-05 PROCEDURE — 97035 APP MDLTY 1+ULTRASOUND EA 15: CPT

## 2021-08-05 PROCEDURE — 97022 WHIRLPOOL THERAPY: CPT

## 2021-08-05 PROCEDURE — 97110 THERAPEUTIC EXERCISES: CPT

## 2021-08-09 DIAGNOSIS — G89.29 CHRONIC MIDLINE LOW BACK PAIN WITHOUT SCIATICA: ICD-10-CM

## 2021-08-09 DIAGNOSIS — M54.50 CHRONIC MIDLINE LOW BACK PAIN WITHOUT SCIATICA: ICD-10-CM

## 2021-08-09 RX ORDER — PREGABALIN 75 MG/1
75 CAPSULE ORAL 3 TIMES DAILY
Qty: 270 CAPSULE | Refills: 1 | Status: CANCELLED | OUTPATIENT
Start: 2021-08-09 | End: 2022-02-07

## 2021-08-17 ENCOUNTER — CLINICAL SUPPORT (OUTPATIENT)
Dept: REHABILITATION | Facility: HOSPITAL | Age: 66
End: 2021-08-17
Payer: MEDICARE

## 2021-08-17 DIAGNOSIS — M25.432 PAIN AND SWELLING OF LEFT WRIST: Primary | ICD-10-CM

## 2021-08-17 DIAGNOSIS — M25.532 PAIN AND SWELLING OF LEFT WRIST: Primary | ICD-10-CM

## 2021-08-17 PROCEDURE — 97022 WHIRLPOOL THERAPY: CPT

## 2021-08-17 PROCEDURE — 97110 THERAPEUTIC EXERCISES: CPT

## 2021-08-19 ENCOUNTER — CLINICAL SUPPORT (OUTPATIENT)
Dept: REHABILITATION | Facility: HOSPITAL | Age: 66
End: 2021-08-19
Payer: MEDICARE

## 2021-08-19 DIAGNOSIS — M25.532 PAIN AND SWELLING OF LEFT WRIST: Primary | ICD-10-CM

## 2021-08-19 DIAGNOSIS — M25.432 PAIN AND SWELLING OF LEFT WRIST: Primary | ICD-10-CM

## 2021-08-19 PROCEDURE — 97022 WHIRLPOOL THERAPY: CPT

## 2021-08-19 PROCEDURE — 97110 THERAPEUTIC EXERCISES: CPT

## 2021-08-19 PROCEDURE — 97530 THERAPEUTIC ACTIVITIES: CPT

## 2021-08-25 ENCOUNTER — CLINICAL SUPPORT (OUTPATIENT)
Dept: REHABILITATION | Facility: HOSPITAL | Age: 66
End: 2021-08-25
Payer: MEDICARE

## 2021-08-25 DIAGNOSIS — M25.532 PAIN AND SWELLING OF LEFT WRIST: Primary | ICD-10-CM

## 2021-08-25 DIAGNOSIS — M25.432 PAIN AND SWELLING OF LEFT WRIST: Primary | ICD-10-CM

## 2021-08-25 PROCEDURE — 97530 THERAPEUTIC ACTIVITIES: CPT

## 2021-08-25 PROCEDURE — 97110 THERAPEUTIC EXERCISES: CPT

## 2021-08-25 PROCEDURE — 97022 WHIRLPOOL THERAPY: CPT

## 2021-08-27 ENCOUNTER — CLINICAL SUPPORT (OUTPATIENT)
Dept: REHABILITATION | Facility: HOSPITAL | Age: 66
End: 2021-08-27
Payer: MEDICARE

## 2021-08-27 DIAGNOSIS — M25.432 PAIN AND SWELLING OF LEFT WRIST: Primary | ICD-10-CM

## 2021-08-27 DIAGNOSIS — M25.532 PAIN AND SWELLING OF LEFT WRIST: Primary | ICD-10-CM

## 2021-08-27 PROCEDURE — 97530 THERAPEUTIC ACTIVITIES: CPT

## 2021-08-27 PROCEDURE — 97022 WHIRLPOOL THERAPY: CPT

## 2021-08-27 PROCEDURE — 97110 THERAPEUTIC EXERCISES: CPT

## 2021-08-27 PROCEDURE — 97140 MANUAL THERAPY 1/> REGIONS: CPT

## 2021-09-07 ENCOUNTER — CLINICAL SUPPORT (OUTPATIENT)
Dept: REHABILITATION | Facility: HOSPITAL | Age: 66
End: 2021-09-07
Payer: MEDICARE

## 2021-09-07 DIAGNOSIS — M25.432 PAIN AND SWELLING OF LEFT WRIST: Primary | ICD-10-CM

## 2021-09-07 DIAGNOSIS — M25.532 PAIN AND SWELLING OF LEFT WRIST: Primary | ICD-10-CM

## 2021-09-07 PROCEDURE — 97022 WHIRLPOOL THERAPY: CPT | Mod: KX

## 2021-09-07 PROCEDURE — 97530 THERAPEUTIC ACTIVITIES: CPT | Mod: KX

## 2021-09-07 PROCEDURE — 97140 MANUAL THERAPY 1/> REGIONS: CPT | Mod: KX

## 2021-09-08 ENCOUNTER — LAB VISIT (OUTPATIENT)
Dept: LAB | Facility: HOSPITAL | Age: 66
End: 2021-09-08
Attending: INTERNAL MEDICINE
Payer: MEDICARE

## 2021-09-08 DIAGNOSIS — N39.0 URINARY TRACT INFECTION, SITE NOT SPECIFIED: Primary | ICD-10-CM

## 2021-09-08 DIAGNOSIS — R80.8 NEPHROGENOUS PROTEINURIA: ICD-10-CM

## 2021-09-08 DIAGNOSIS — N18.31 CHRONIC KIDNEY DISEASE (CKD) STAGE G3A/A1, MODERATELY DECREASED GLOMERULAR FILTRATION RATE (GFR) BETWEEN 45-59 ML/MIN/1.73 SQUARE METER AND ALBUMINURIA CREATININE RATIO LESS THAN 30 MG/G: ICD-10-CM

## 2021-09-08 LAB
ALBUMIN SERPL BCP-MCNC: 4 G/DL (ref 3.5–5.2)
ALBUMIN/CREAT UR: 179.4 UG/MG (ref 0–30)
ANION GAP SERPL CALC-SCNC: 11 MMOL/L (ref 8–16)
BUN SERPL-MCNC: 29 MG/DL (ref 8–23)
CALCIUM SERPL-MCNC: 9.8 MG/DL (ref 8.7–10.5)
CHLORIDE SERPL-SCNC: 107 MMOL/L (ref 95–110)
CO2 SERPL-SCNC: 26 MMOL/L (ref 23–29)
CREAT SERPL-MCNC: 1.2 MG/DL (ref 0.5–1.4)
CREAT UR-MCNC: 135 MG/DL (ref 15–325)
CREAT UR-MCNC: 135 MG/DL (ref 15–325)
ERYTHROCYTE [DISTWIDTH] IN BLOOD BY AUTOMATED COUNT: 13.1 % (ref 11.5–14.5)
EST. GFR  (AFRICAN AMERICAN): 54.4 ML/MIN/1.73 M^2
EST. GFR  (NON AFRICAN AMERICAN): 47.2 ML/MIN/1.73 M^2
GLUCOSE SERPL-MCNC: 91 MG/DL (ref 70–110)
HCT VFR BLD AUTO: 39.4 % (ref 37–48.5)
HGB BLD-MCNC: 12.9 G/DL (ref 12–16)
MAGNESIUM SERPL-MCNC: 2.1 MG/DL (ref 1.6–2.6)
MCH RBC QN AUTO: 30.7 PG (ref 27–31)
MCHC RBC AUTO-ENTMCNC: 32.7 G/DL (ref 32–36)
MCV RBC AUTO: 94 FL (ref 82–98)
MICROALBUMIN UR DL<=1MG/L-MCNC: 242.2 UG/ML
PHOSPHATE SERPL-MCNC: 3.7 MG/DL (ref 2.7–4.5)
PLATELET # BLD AUTO: 305 K/UL (ref 150–450)
PMV BLD AUTO: 8.1 FL (ref 9.2–12.9)
POTASSIUM SERPL-SCNC: 4.4 MMOL/L (ref 3.5–5.1)
PROT UR-MCNC: 53 MG/DL (ref 6–15)
PROT/CREAT UR: 0.39 MG/G{CREAT} (ref 0–0.2)
PTH-INTACT SERPL-MCNC: 99.3 PG/ML (ref 9–77)
RBC # BLD AUTO: 4.2 M/UL (ref 4–5.4)
SODIUM SERPL-SCNC: 144 MMOL/L (ref 136–145)
URATE SERPL-MCNC: 6 MG/DL (ref 2.4–5.7)
WBC # BLD AUTO: 5.52 K/UL (ref 3.9–12.7)

## 2021-09-08 PROCEDURE — 84156 ASSAY OF PROTEIN URINE: CPT | Performed by: INTERNAL MEDICINE

## 2021-09-08 PROCEDURE — 82043 UR ALBUMIN QUANTITATIVE: CPT | Performed by: INTERNAL MEDICINE

## 2021-09-08 PROCEDURE — 83970 ASSAY OF PARATHORMONE: CPT | Performed by: INTERNAL MEDICINE

## 2021-09-08 PROCEDURE — 85027 COMPLETE CBC AUTOMATED: CPT | Performed by: INTERNAL MEDICINE

## 2021-09-08 PROCEDURE — 80069 RENAL FUNCTION PANEL: CPT | Performed by: INTERNAL MEDICINE

## 2021-09-08 PROCEDURE — 36415 COLL VENOUS BLD VENIPUNCTURE: CPT | Performed by: INTERNAL MEDICINE

## 2021-09-08 PROCEDURE — 83735 ASSAY OF MAGNESIUM: CPT | Performed by: INTERNAL MEDICINE

## 2021-09-08 PROCEDURE — 84550 ASSAY OF BLOOD/URIC ACID: CPT | Performed by: INTERNAL MEDICINE

## 2021-09-09 ENCOUNTER — TELEPHONE (OUTPATIENT)
Dept: FAMILY MEDICINE | Facility: CLINIC | Age: 66
End: 2021-09-09

## 2021-09-09 ENCOUNTER — OFFICE VISIT (OUTPATIENT)
Dept: FAMILY MEDICINE | Facility: CLINIC | Age: 66
End: 2021-09-09
Payer: MEDICARE

## 2021-09-09 ENCOUNTER — PATIENT MESSAGE (OUTPATIENT)
Dept: FAMILY MEDICINE | Facility: CLINIC | Age: 66
End: 2021-09-09

## 2021-09-09 ENCOUNTER — CLINICAL SUPPORT (OUTPATIENT)
Dept: REHABILITATION | Facility: HOSPITAL | Age: 66
End: 2021-09-09
Payer: MEDICARE

## 2021-09-09 VITALS
BODY MASS INDEX: 26.84 KG/M2 | WEIGHT: 167 LBS | SYSTOLIC BLOOD PRESSURE: 149 MMHG | HEART RATE: 61 BPM | DIASTOLIC BLOOD PRESSURE: 69 MMHG | HEIGHT: 66 IN

## 2021-09-09 DIAGNOSIS — N18.30 CKD STAGE 3 DUE TO TYPE 2 DIABETES MELLITUS: Chronic | ICD-10-CM

## 2021-09-09 DIAGNOSIS — M54.50 CHRONIC MIDLINE LOW BACK PAIN WITHOUT SCIATICA: Chronic | ICD-10-CM

## 2021-09-09 DIAGNOSIS — M25.432 PAIN AND SWELLING OF LEFT WRIST: Primary | ICD-10-CM

## 2021-09-09 DIAGNOSIS — E11.22 CKD STAGE 3 DUE TO TYPE 2 DIABETES MELLITUS: Chronic | ICD-10-CM

## 2021-09-09 DIAGNOSIS — E78.2 MIXED DYSLIPIDEMIA: ICD-10-CM

## 2021-09-09 DIAGNOSIS — M25.532 PAIN AND SWELLING OF LEFT WRIST: Primary | ICD-10-CM

## 2021-09-09 DIAGNOSIS — E11.42 TYPE 2 DIABETES MELLITUS WITH DIABETIC POLYNEUROPATHY, WITH LONG-TERM CURRENT USE OF INSULIN: Chronic | ICD-10-CM

## 2021-09-09 DIAGNOSIS — G89.29 CHRONIC MIDLINE LOW BACK PAIN WITHOUT SCIATICA: Chronic | ICD-10-CM

## 2021-09-09 DIAGNOSIS — G89.29 OTHER CHRONIC PAIN: ICD-10-CM

## 2021-09-09 DIAGNOSIS — I10 ESSENTIAL HYPERTENSION: Primary | Chronic | ICD-10-CM

## 2021-09-09 DIAGNOSIS — Z23 NEEDS FLU SHOT: ICD-10-CM

## 2021-09-09 DIAGNOSIS — Z79.4 TYPE 2 DIABETES MELLITUS WITH DIABETIC POLYNEUROPATHY, WITH LONG-TERM CURRENT USE OF INSULIN: Chronic | ICD-10-CM

## 2021-09-09 PROCEDURE — 97035 APP MDLTY 1+ULTRASOUND EA 15: CPT | Mod: KX

## 2021-09-09 PROCEDURE — 97110 THERAPEUTIC EXERCISES: CPT | Mod: KX

## 2021-09-09 PROCEDURE — 99214 OFFICE O/P EST MOD 30 MIN: CPT | Mod: S$PBB,,, | Performed by: INTERNAL MEDICINE

## 2021-09-09 PROCEDURE — 99214 OFFICE O/P EST MOD 30 MIN: CPT | Mod: 25 | Performed by: INTERNAL MEDICINE

## 2021-09-09 PROCEDURE — 90662 IIV NO PRSV INCREASED AG IM: CPT | Mod: PBBFAC | Performed by: INTERNAL MEDICINE

## 2021-09-09 PROCEDURE — 99214 PR OFFICE/OUTPT VISIT, EST, LEVL IV, 30-39 MIN: ICD-10-PCS | Mod: S$PBB,,, | Performed by: INTERNAL MEDICINE

## 2021-09-09 PROCEDURE — 97530 THERAPEUTIC ACTIVITIES: CPT | Mod: KX

## 2021-09-09 PROCEDURE — G0008 ADMIN INFLUENZA VIRUS VAC: HCPCS | Mod: PBBFAC | Performed by: INTERNAL MEDICINE

## 2021-09-12 RX ORDER — GABAPENTIN 600 MG/1
600 TABLET ORAL 3 TIMES DAILY PRN
Qty: 90 TABLET | Refills: 2 | Status: SHIPPED | OUTPATIENT
Start: 2021-09-12 | End: 2021-09-23 | Stop reason: SDUPTHER

## 2021-09-14 ENCOUNTER — CLINICAL SUPPORT (OUTPATIENT)
Dept: REHABILITATION | Facility: HOSPITAL | Age: 66
End: 2021-09-14
Payer: MEDICARE

## 2021-09-14 DIAGNOSIS — M25.532 PAIN AND SWELLING OF LEFT WRIST: Primary | ICD-10-CM

## 2021-09-14 DIAGNOSIS — M25.432 PAIN AND SWELLING OF LEFT WRIST: Primary | ICD-10-CM

## 2021-09-14 PROCEDURE — 97110 THERAPEUTIC EXERCISES: CPT | Mod: KX

## 2021-09-16 ENCOUNTER — CLINICAL SUPPORT (OUTPATIENT)
Dept: REHABILITATION | Facility: HOSPITAL | Age: 66
End: 2021-09-16
Payer: MEDICARE

## 2021-09-16 DIAGNOSIS — M25.432 PAIN AND SWELLING OF LEFT WRIST: Primary | ICD-10-CM

## 2021-09-16 DIAGNOSIS — M25.532 PAIN AND SWELLING OF LEFT WRIST: Primary | ICD-10-CM

## 2021-09-16 PROCEDURE — 97035 APP MDLTY 1+ULTRASOUND EA 15: CPT

## 2021-09-16 PROCEDURE — 97110 THERAPEUTIC EXERCISES: CPT

## 2021-09-21 ENCOUNTER — CLINICAL SUPPORT (OUTPATIENT)
Dept: REHABILITATION | Facility: HOSPITAL | Age: 66
End: 2021-09-21
Payer: MEDICARE

## 2021-09-21 DIAGNOSIS — M25.532 PAIN AND SWELLING OF LEFT WRIST: Primary | ICD-10-CM

## 2021-09-21 DIAGNOSIS — M25.432 PAIN AND SWELLING OF LEFT WRIST: Primary | ICD-10-CM

## 2021-09-21 PROCEDURE — 97110 THERAPEUTIC EXERCISES: CPT | Mod: KX

## 2021-09-21 PROCEDURE — 97035 APP MDLTY 1+ULTRASOUND EA 15: CPT | Mod: KX

## 2021-09-21 PROCEDURE — 97530 THERAPEUTIC ACTIVITIES: CPT | Mod: KX

## 2021-09-23 ENCOUNTER — CLINICAL SUPPORT (OUTPATIENT)
Dept: REHABILITATION | Facility: HOSPITAL | Age: 66
End: 2021-09-23
Payer: MEDICARE

## 2021-09-23 ENCOUNTER — PATIENT MESSAGE (OUTPATIENT)
Dept: FAMILY MEDICINE | Facility: CLINIC | Age: 66
End: 2021-09-23

## 2021-09-23 DIAGNOSIS — G89.29 CHRONIC MIDLINE LOW BACK PAIN WITHOUT SCIATICA: Chronic | ICD-10-CM

## 2021-09-23 DIAGNOSIS — M25.532 PAIN AND SWELLING OF LEFT WRIST: Primary | ICD-10-CM

## 2021-09-23 DIAGNOSIS — M54.50 CHRONIC MIDLINE LOW BACK PAIN WITHOUT SCIATICA: Chronic | ICD-10-CM

## 2021-09-23 DIAGNOSIS — M25.432 PAIN AND SWELLING OF LEFT WRIST: Primary | ICD-10-CM

## 2021-09-23 PROCEDURE — 97035 APP MDLTY 1+ULTRASOUND EA 15: CPT | Mod: KX

## 2021-09-23 PROCEDURE — 97110 THERAPEUTIC EXERCISES: CPT

## 2021-09-23 PROCEDURE — 97112 NEUROMUSCULAR REEDUCATION: CPT

## 2021-09-23 PROCEDURE — 97140 MANUAL THERAPY 1/> REGIONS: CPT | Mod: KX

## 2021-09-23 RX ORDER — GABAPENTIN 600 MG/1
600 TABLET ORAL 3 TIMES DAILY PRN
Qty: 270 TABLET | Refills: 1 | Status: SHIPPED | OUTPATIENT
Start: 2021-09-23 | End: 2021-12-16 | Stop reason: SDUPTHER

## 2021-09-27 ENCOUNTER — PATIENT MESSAGE (OUTPATIENT)
Dept: FAMILY MEDICINE | Facility: CLINIC | Age: 66
End: 2021-09-27

## 2021-09-28 ENCOUNTER — CLINICAL SUPPORT (OUTPATIENT)
Dept: REHABILITATION | Facility: HOSPITAL | Age: 66
End: 2021-09-28
Payer: MEDICARE

## 2021-09-28 DIAGNOSIS — M25.532 PAIN AND SWELLING OF LEFT WRIST: Primary | ICD-10-CM

## 2021-09-28 DIAGNOSIS — M25.432 PAIN AND SWELLING OF LEFT WRIST: Primary | ICD-10-CM

## 2021-09-28 PROCEDURE — 97530 THERAPEUTIC ACTIVITIES: CPT | Mod: KX

## 2021-09-28 PROCEDURE — 97110 THERAPEUTIC EXERCISES: CPT | Mod: KX

## 2021-09-28 PROCEDURE — 97035 APP MDLTY 1+ULTRASOUND EA 15: CPT | Mod: KX

## 2021-09-30 ENCOUNTER — CLINICAL SUPPORT (OUTPATIENT)
Dept: REHABILITATION | Facility: HOSPITAL | Age: 66
End: 2021-09-30
Payer: MEDICARE

## 2021-09-30 DIAGNOSIS — M25.532 PAIN AND SWELLING OF LEFT WRIST: Primary | ICD-10-CM

## 2021-09-30 DIAGNOSIS — M25.432 PAIN AND SWELLING OF LEFT WRIST: Primary | ICD-10-CM

## 2021-09-30 PROCEDURE — 97110 THERAPEUTIC EXERCISES: CPT

## 2021-09-30 PROCEDURE — 97035 APP MDLTY 1+ULTRASOUND EA 15: CPT

## 2021-10-05 ENCOUNTER — CLINICAL SUPPORT (OUTPATIENT)
Dept: REHABILITATION | Facility: HOSPITAL | Age: 66
End: 2021-10-05
Payer: MEDICARE

## 2021-10-05 ENCOUNTER — DOCUMENTATION ONLY (OUTPATIENT)
Dept: REHABILITATION | Facility: HOSPITAL | Age: 66
End: 2021-10-05

## 2021-10-05 DIAGNOSIS — M25.432 PAIN AND SWELLING OF LEFT WRIST: Primary | ICD-10-CM

## 2021-10-05 DIAGNOSIS — M25.532 PAIN AND SWELLING OF LEFT WRIST: Primary | ICD-10-CM

## 2021-10-05 PROCEDURE — 97110 THERAPEUTIC EXERCISES: CPT | Mod: KX

## 2021-11-03 ENCOUNTER — IMMUNIZATION (OUTPATIENT)
Dept: PRIMARY CARE CLINIC | Facility: CLINIC | Age: 66
End: 2021-11-03
Payer: MEDICARE

## 2021-11-03 DIAGNOSIS — Z23 NEED FOR VACCINATION: Primary | ICD-10-CM

## 2021-11-03 PROCEDURE — 91303 COVID-19,VECTOR-NR,RS-AD26,PF,0.5 ML DOSE VACCINE (JANSSEN): ICD-10-PCS | Mod: S$GLB,,, | Performed by: FAMILY MEDICINE

## 2021-11-03 PROCEDURE — 0034A COVID-19,VECTOR-NR,RS-AD26,PF,0.5 ML DOSE VACCINE (JANSSEN): CPT | Mod: S$GLB,,, | Performed by: FAMILY MEDICINE

## 2021-11-03 PROCEDURE — 0034A COVID-19,VECTOR-NR,RS-AD26,PF,0.5 ML DOSE VACCINE (JANSSEN): ICD-10-PCS | Mod: S$GLB,,, | Performed by: FAMILY MEDICINE

## 2021-11-03 PROCEDURE — 91303 COVID-19,VECTOR-NR,RS-AD26,PF,0.5 ML DOSE VACCINE (JANSSEN): CPT | Mod: S$GLB,,, | Performed by: FAMILY MEDICINE

## 2021-11-19 ENCOUNTER — LAB VISIT (OUTPATIENT)
Dept: LAB | Facility: HOSPITAL | Age: 66
End: 2021-11-19
Attending: INTERNAL MEDICINE
Payer: MEDICARE

## 2021-11-19 ENCOUNTER — TELEPHONE (OUTPATIENT)
Dept: HEMATOLOGY/ONCOLOGY | Facility: CLINIC | Age: 66
End: 2021-11-19
Payer: MEDICARE

## 2021-11-19 ENCOUNTER — PATIENT MESSAGE (OUTPATIENT)
Dept: FAMILY MEDICINE | Facility: CLINIC | Age: 66
End: 2021-11-19
Payer: MEDICARE

## 2021-11-19 DIAGNOSIS — Z79.4 TYPE 2 DIABETES MELLITUS WITH DIABETIC POLYNEUROPATHY, WITH LONG-TERM CURRENT USE OF INSULIN: ICD-10-CM

## 2021-11-19 DIAGNOSIS — D63.1 ANEMIA, CHRONIC RENAL FAILURE, STAGE 3 (MODERATE): ICD-10-CM

## 2021-11-19 DIAGNOSIS — E11.42 TYPE 2 DIABETES MELLITUS WITH DIABETIC POLYNEUROPATHY, WITH LONG-TERM CURRENT USE OF INSULIN: ICD-10-CM

## 2021-11-19 DIAGNOSIS — N18.30 ANEMIA, CHRONIC RENAL FAILURE, STAGE 3 (MODERATE): Primary | ICD-10-CM

## 2021-11-19 DIAGNOSIS — D63.1 ANEMIA, CHRONIC RENAL FAILURE, STAGE 3 (MODERATE): Primary | ICD-10-CM

## 2021-11-19 DIAGNOSIS — N18.30 ANEMIA, CHRONIC RENAL FAILURE, STAGE 3 (MODERATE): ICD-10-CM

## 2021-11-19 LAB
ALBUMIN SERPL BCP-MCNC: 4.4 G/DL (ref 3.5–5.2)
ALP SERPL-CCNC: 71 U/L (ref 55–135)
ALT SERPL W/O P-5'-P-CCNC: 40 U/L (ref 10–44)
ANION GAP SERPL CALC-SCNC: 10 MMOL/L (ref 8–16)
AST SERPL-CCNC: 26 U/L (ref 10–40)
BASOPHILS # BLD AUTO: 0.08 K/UL (ref 0–0.2)
BASOPHILS NFR BLD: 0.7 % (ref 0–1.9)
BILIRUB SERPL-MCNC: 0.8 MG/DL (ref 0.1–1)
BUN SERPL-MCNC: 32 MG/DL (ref 8–23)
CALCIUM SERPL-MCNC: 9.7 MG/DL (ref 8.7–10.5)
CHLORIDE SERPL-SCNC: 104 MMOL/L (ref 95–110)
CO2 SERPL-SCNC: 25 MMOL/L (ref 23–29)
CREAT SERPL-MCNC: 1.3 MG/DL (ref 0.5–1.4)
DIFFERENTIAL METHOD: ABNORMAL
EOSINOPHIL # BLD AUTO: 0.2 K/UL (ref 0–0.5)
EOSINOPHIL NFR BLD: 2 % (ref 0–8)
ERYTHROCYTE [DISTWIDTH] IN BLOOD BY AUTOMATED COUNT: 12.7 % (ref 11.5–14.5)
EST. GFR  (AFRICAN AMERICAN): 49.4 ML/MIN/1.73 M^2
EST. GFR  (NON AFRICAN AMERICAN): 42.9 ML/MIN/1.73 M^2
GLUCOSE SERPL-MCNC: 74 MG/DL (ref 70–110)
HCT VFR BLD AUTO: 40.9 % (ref 37–48.5)
HGB BLD-MCNC: 13.4 G/DL (ref 12–16)
IMM GRANULOCYTES # BLD AUTO: 0.03 K/UL (ref 0–0.04)
IMM GRANULOCYTES NFR BLD AUTO: 0.3 % (ref 0–0.5)
LYMPHOCYTES # BLD AUTO: 6.3 K/UL (ref 1–4.8)
LYMPHOCYTES NFR BLD: 54.2 % (ref 18–48)
MCH RBC QN AUTO: 31.2 PG (ref 27–31)
MCHC RBC AUTO-ENTMCNC: 32.8 G/DL (ref 32–36)
MCV RBC AUTO: 95 FL (ref 82–98)
MONOCYTES # BLD AUTO: 0.9 K/UL (ref 0.3–1)
MONOCYTES NFR BLD: 8 % (ref 4–15)
NEUTROPHILS # BLD AUTO: 4 K/UL (ref 1.8–7.7)
NEUTROPHILS NFR BLD: 34.8 % (ref 38–73)
NRBC BLD-RTO: 0 /100 WBC
PLATELET # BLD AUTO: 376 K/UL (ref 150–450)
PMV BLD AUTO: 8.5 FL (ref 9.2–12.9)
POTASSIUM SERPL-SCNC: 3.7 MMOL/L (ref 3.5–5.1)
PROT SERPL-MCNC: 8.3 G/DL (ref 6–8.4)
RBC # BLD AUTO: 4.3 M/UL (ref 4–5.4)
SODIUM SERPL-SCNC: 139 MMOL/L (ref 136–145)
WBC # BLD AUTO: 11.56 K/UL (ref 3.9–12.7)

## 2021-11-19 PROCEDURE — 80053 COMPREHEN METABOLIC PANEL: CPT | Performed by: INTERNAL MEDICINE

## 2021-11-19 PROCEDURE — 36415 COLL VENOUS BLD VENIPUNCTURE: CPT | Performed by: INTERNAL MEDICINE

## 2021-11-19 PROCEDURE — 85025 COMPLETE CBC W/AUTO DIFF WBC: CPT | Performed by: INTERNAL MEDICINE

## 2021-11-19 RX ORDER — INSULIN ASPART 100 [IU]/ML
INJECTION, SOLUTION INTRAVENOUS; SUBCUTANEOUS
Qty: 60 ML | Refills: 3 | Status: SHIPPED | OUTPATIENT
Start: 2021-11-19 | End: 2022-03-30 | Stop reason: SDUPTHER

## 2021-11-23 ENCOUNTER — OFFICE VISIT (OUTPATIENT)
Dept: HEMATOLOGY/ONCOLOGY | Facility: CLINIC | Age: 66
End: 2021-11-23
Payer: MEDICARE

## 2021-11-23 VITALS
HEART RATE: 75 BPM | DIASTOLIC BLOOD PRESSURE: 77 MMHG | WEIGHT: 173 LBS | BODY MASS INDEX: 27.8 KG/M2 | HEIGHT: 66 IN | SYSTOLIC BLOOD PRESSURE: 134 MMHG | RESPIRATION RATE: 18 BRPM

## 2021-11-23 DIAGNOSIS — Z85.528 HX OF RENAL CELL CANCER: Primary | ICD-10-CM

## 2021-11-23 DIAGNOSIS — D63.1 ANEMIA, CHRONIC RENAL FAILURE, STAGE 3 (MODERATE): ICD-10-CM

## 2021-11-23 DIAGNOSIS — Z12.31 ENCOUNTER FOR SCREENING MAMMOGRAM FOR MALIGNANT NEOPLASM OF BREAST: ICD-10-CM

## 2021-11-23 DIAGNOSIS — N18.30 ANEMIA, CHRONIC RENAL FAILURE, STAGE 3 (MODERATE): ICD-10-CM

## 2021-11-23 DIAGNOSIS — D63.8 ANEMIA OF CHRONIC DISORDER: ICD-10-CM

## 2021-11-23 PROCEDURE — 99213 OFFICE O/P EST LOW 20 MIN: CPT | Mod: S$GLB,,, | Performed by: INTERNAL MEDICINE

## 2021-11-23 PROCEDURE — 99213 PR OFFICE/OUTPT VISIT, EST, LEVL III, 20-29 MIN: ICD-10-PCS | Mod: S$GLB,,, | Performed by: INTERNAL MEDICINE

## 2021-12-13 ENCOUNTER — HOSPITAL ENCOUNTER (OUTPATIENT)
Dept: RADIOLOGY | Facility: HOSPITAL | Age: 66
Discharge: HOME | End: 2021-12-13
Attending: SPECIALIST
Payer: MEDICARE

## 2021-12-13 DIAGNOSIS — C64.2 MALIGNANT NEOPLASM OF LEFT KIDNEY, EXCEPT RENAL PELVIS: Primary | ICD-10-CM

## 2021-12-13 DIAGNOSIS — Z12.31 SCREENING MAMMOGRAM FOR HIGH-RISK PATIENT: Primary | ICD-10-CM

## 2021-12-13 DIAGNOSIS — C64.2 MALIGNANT NEOPLASM OF LEFT KIDNEY, EXCEPT RENAL PELVIS: ICD-10-CM

## 2021-12-13 PROCEDURE — 74176 CT ABD & PELVIS W/O CONTRAST: CPT | Mod: TC,PO

## 2021-12-16 ENCOUNTER — OFFICE VISIT (OUTPATIENT)
Dept: FAMILY MEDICINE | Facility: CLINIC | Age: 66
End: 2021-12-16
Payer: MEDICARE

## 2021-12-16 VITALS
DIASTOLIC BLOOD PRESSURE: 73 MMHG | HEIGHT: 66 IN | WEIGHT: 169 LBS | BODY MASS INDEX: 27.16 KG/M2 | HEART RATE: 97 BPM | SYSTOLIC BLOOD PRESSURE: 131 MMHG

## 2021-12-16 DIAGNOSIS — M54.50 CHRONIC MIDLINE LOW BACK PAIN WITHOUT SCIATICA: Chronic | ICD-10-CM

## 2021-12-16 DIAGNOSIS — M54.50 CHRONIC MIDLINE LOW BACK PAIN WITHOUT SCIATICA: ICD-10-CM

## 2021-12-16 DIAGNOSIS — I10 ESSENTIAL HYPERTENSION: ICD-10-CM

## 2021-12-16 DIAGNOSIS — E11.42 TYPE 2 DIABETES MELLITUS WITH DIABETIC POLYNEUROPATHY, WITH LONG-TERM CURRENT USE OF INSULIN: Primary | ICD-10-CM

## 2021-12-16 DIAGNOSIS — Z79.4 TYPE 2 DIABETES MELLITUS WITH DIABETIC POLYNEUROPATHY, WITH LONG-TERM CURRENT USE OF INSULIN: Primary | ICD-10-CM

## 2021-12-16 DIAGNOSIS — G89.29 CHRONIC LEFT SHOULDER PAIN: ICD-10-CM

## 2021-12-16 DIAGNOSIS — M25.512 CHRONIC LEFT SHOULDER PAIN: ICD-10-CM

## 2021-12-16 DIAGNOSIS — E11.22 CKD STAGE 3 DUE TO TYPE 2 DIABETES MELLITUS: ICD-10-CM

## 2021-12-16 DIAGNOSIS — G89.29 CHRONIC MIDLINE LOW BACK PAIN WITHOUT SCIATICA: ICD-10-CM

## 2021-12-16 DIAGNOSIS — G89.29 CHRONIC MIDLINE LOW BACK PAIN WITHOUT SCIATICA: Chronic | ICD-10-CM

## 2021-12-16 DIAGNOSIS — N18.30 CKD STAGE 3 DUE TO TYPE 2 DIABETES MELLITUS: ICD-10-CM

## 2021-12-16 DIAGNOSIS — E78.2 MIXED DYSLIPIDEMIA: ICD-10-CM

## 2021-12-16 PROCEDURE — 99214 PR OFFICE/OUTPT VISIT, EST, LEVL IV, 30-39 MIN: ICD-10-PCS | Mod: S$PBB,,, | Performed by: INTERNAL MEDICINE

## 2021-12-16 PROCEDURE — 99215 OFFICE O/P EST HI 40 MIN: CPT | Performed by: INTERNAL MEDICINE

## 2021-12-16 PROCEDURE — 99214 OFFICE O/P EST MOD 30 MIN: CPT | Mod: S$PBB,,, | Performed by: INTERNAL MEDICINE

## 2021-12-16 RX ORDER — GABAPENTIN 800 MG/1
800 TABLET ORAL 3 TIMES DAILY PRN
Qty: 270 TABLET | Refills: 1 | Status: ON HOLD | OUTPATIENT
Start: 2021-12-16 | End: 2022-10-07 | Stop reason: ALTCHOICE

## 2021-12-17 DIAGNOSIS — C64.2 MALIGNANT TUMOR OF KIDNEY, LEFT: Primary | ICD-10-CM

## 2022-01-05 ENCOUNTER — LAB VISIT (OUTPATIENT)
Dept: LAB | Facility: HOSPITAL | Age: 67
End: 2022-01-05
Attending: STUDENT IN AN ORGANIZED HEALTH CARE EDUCATION/TRAINING PROGRAM
Payer: MEDICARE

## 2022-01-05 DIAGNOSIS — I12.9 PARENCHYMAL RENAL HYPERTENSION: ICD-10-CM

## 2022-01-05 DIAGNOSIS — N18.30 CHRONIC KIDNEY DISEASE, STAGE III (MODERATE): Primary | ICD-10-CM

## 2022-01-05 DIAGNOSIS — N39.0 URINARY TRACT INFECTION, SITE NOT SPECIFIED: ICD-10-CM

## 2022-01-05 DIAGNOSIS — E78.5 HYPERLIPEMIA: ICD-10-CM

## 2022-01-05 LAB
ALBUMIN SERPL BCP-MCNC: 4.2 G/DL (ref 3.5–5.2)
ALBUMIN/CREAT UR: 420.6 UG/MG (ref 0–30)
ANION GAP SERPL CALC-SCNC: 11 MMOL/L (ref 8–16)
BUN SERPL-MCNC: 28 MG/DL (ref 8–23)
CALCIUM SERPL-MCNC: 9.8 MG/DL (ref 8.7–10.5)
CHLORIDE SERPL-SCNC: 102 MMOL/L (ref 95–110)
CHOLEST SERPL-MCNC: 163 MG/DL (ref 120–199)
CHOLEST/HDLC SERPL: 3 {RATIO} (ref 2–5)
CO2 SERPL-SCNC: 27 MMOL/L (ref 23–29)
CREAT SERPL-MCNC: 1.3 MG/DL (ref 0.5–1.4)
CREAT UR-MCNC: 110 MG/DL (ref 15–325)
EST. GFR  (AFRICAN AMERICAN): 49.4 ML/MIN/1.73 M^2
EST. GFR  (NON AFRICAN AMERICAN): 42.9 ML/MIN/1.73 M^2
GLUCOSE SERPL-MCNC: 134 MG/DL (ref 70–110)
HDLC SERPL-MCNC: 55 MG/DL (ref 40–75)
HDLC SERPL: 33.7 % (ref 20–50)
LDLC SERPL CALC-MCNC: 73.8 MG/DL (ref 63–159)
MICROALBUMIN UR DL<=1MG/L-MCNC: 462.7 UG/ML
NONHDLC SERPL-MCNC: 108 MG/DL
PHOSPHATE SERPL-MCNC: 4.1 MG/DL (ref 2.7–4.5)
POTASSIUM SERPL-SCNC: 4.3 MMOL/L (ref 3.5–5.1)
SODIUM SERPL-SCNC: 140 MMOL/L (ref 136–145)
TRIGL SERPL-MCNC: 171 MG/DL (ref 30–150)

## 2022-01-05 PROCEDURE — 80069 RENAL FUNCTION PANEL: CPT | Performed by: STUDENT IN AN ORGANIZED HEALTH CARE EDUCATION/TRAINING PROGRAM

## 2022-01-05 PROCEDURE — 36415 COLL VENOUS BLD VENIPUNCTURE: CPT | Performed by: STUDENT IN AN ORGANIZED HEALTH CARE EDUCATION/TRAINING PROGRAM

## 2022-01-05 PROCEDURE — 80061 LIPID PANEL: CPT | Performed by: STUDENT IN AN ORGANIZED HEALTH CARE EDUCATION/TRAINING PROGRAM

## 2022-01-05 PROCEDURE — 82043 UR ALBUMIN QUANTITATIVE: CPT | Performed by: STUDENT IN AN ORGANIZED HEALTH CARE EDUCATION/TRAINING PROGRAM

## 2022-01-11 ENCOUNTER — OFFICE VISIT (OUTPATIENT)
Dept: ORTHOPEDICS | Facility: CLINIC | Age: 67
End: 2022-01-11
Payer: MEDICARE

## 2022-01-11 VITALS — WEIGHT: 174 LBS | BODY MASS INDEX: 27.97 KG/M2 | HEIGHT: 66 IN

## 2022-01-11 DIAGNOSIS — M75.42 IMPINGEMENT SYNDROME OF SHOULDER, LEFT: Primary | ICD-10-CM

## 2022-01-11 PROCEDURE — 99213 OFFICE O/P EST LOW 20 MIN: CPT | Mod: 25,S$GLB,, | Performed by: ORTHOPAEDIC SURGERY

## 2022-01-11 PROCEDURE — 20610 DRAIN/INJ JOINT/BURSA W/O US: CPT | Mod: LT,S$GLB,, | Performed by: ORTHOPAEDIC SURGERY

## 2022-01-11 PROCEDURE — 99213 PR OFFICE/OUTPT VISIT, EST, LEVL III, 20-29 MIN: ICD-10-PCS | Mod: 25,S$GLB,, | Performed by: ORTHOPAEDIC SURGERY

## 2022-01-11 PROCEDURE — 20610 LARGE JOINT ASPIRATION/INJECTION: L SUBACROMIAL BURSA: ICD-10-PCS | Mod: LT,S$GLB,, | Performed by: ORTHOPAEDIC SURGERY

## 2022-01-11 RX ORDER — FINERENONE 10 MG/1
1 TABLET, FILM COATED ORAL NIGHTLY
COMMUNITY
Start: 2021-12-16

## 2022-01-11 RX ADMIN — TRIAMCINOLONE ACETONIDE 40 MG: 40 INJECTION, SUSPENSION INTRA-ARTICULAR; INTRAMUSCULAR at 12:01

## 2022-01-11 NOTE — PROGRESS NOTES
[facility]    Subjective:     Chief Complaint:   Chief Complaint   Patient presents with    Right Shoulder - Pain     Right shoulder x couple months, no accident/injury, saw Dr Goff, diagnosed with bursitis, painful and limited ROM, unable to reach back       Past Medical History:   Diagnosis Date    Allergy     aspartame,fructose monosodium glutamate and penicillins.    Anemia of chronic disorder 11/9/2017    Anemia, chronic renal failure, stage 2 (mild) 11/9/2017    Anemia, chronic renal failure, stage 3 (moderate) 11/9/2017    Asthma     Cancer     kidney    Cataract of right eye     CKD (chronic kidney disease), stage III     Diabetes mellitus     type II    Fibromyalgia     GERD (gastroesophageal reflux disease)     Hx of renal cell cancer - left nephrectomy Aug 2014 11/9/2017    Hypercholesteremia     Hypertension     IDDM (insulin dependent diabetes mellitus)     Lumbar spondylosis     Lumbosacral radiculopathy     Osteopenia     Palpitations     Plantar fasciitis, right     Syncope 2/2012    Tachycardia     Thoracic radiculopathy     TMJ (dislocation of temporomandibular joint)        Past Surgical History:   Procedure Laterality Date    CATARACT EXTRACTION Left 03/2016    JOINT REPLACEMENT Right     knee    SHOULDER OPEN ROTATOR CUFF REPAIR Right 02/2005    labral repair    TOTAL KNEE ARTHROPLASTY Left 2014    TUBAL LIGATION  1990    TUMOR REMOVAL  2014    from kidney       Current Outpatient Medications   Medication Sig    amLODIPine (NORVASC) 2.5 MG tablet Take 5 mg by mouth once daily.    atorvastatin (LIPITOR) 40 MG tablet TAKE 1 TABLET EVERY EVENING    cetirizine (ZYRTEC) 10 MG tablet Take 10 mg by mouth.    fluticasone propionate (FLONASE) 50 mcg/actuation nasal spray USE 2 SPRAYS IN EACH NOSTRIL ONCE DAILY    FREESTYLE LANCETS MISC by Misc.(Non-Drug; Combo Route) route 3 (three) times daily.    FREESTYLE BANG 14 DAY SENSOR Kit USE ONE SENSOR EVERY  "14 DAYS    gabapentin (NEURONTIN) 800 MG tablet Take 1 tablet (800 mg total) by mouth 3 (three) times daily as needed (pain in low back).    insulin aspart U-100 (NOVOLOG FLEXPEN U-100 INSULIN) 100 unit/mL (3 mL) InPn pen INJECT 20 UNITS UNDER THE SKIN THREE TIMES A DAY WITH MEALS ( INSTEAD OF HUMALOG )    insulin syringe-needle U-100 0.3 mL 30 Syrg by Misc.(Non-Drug; Combo Route) route 3 (three) times daily.    insulin syringe-needle U-100 1 mL 28 gauge Syrg 1 Syringe by Misc.(Non-Drug; Combo Route) route once daily.    KERENDIA 10 mg Tab Take 1 tablet by mouth once daily.    LANTUS U-100 INSULIN 100 unit/mL injection INJECT 70 UNITS UNDER THE SKIN EVERY EVENING    losartan (COZAAR) 50 MG tablet TAKE 1 TABLET DAILY (Patient taking differently: 100 mg.)    metoprolol succinate (TOPROL-XL) 50 MG 24 hr tablet TAKE 1 TABLET DAILY    pen needle, diabetic (ADVOCATE PEN NEEDLE) 31 gauge x 5/16" Ndle 1 Device by Misc.(Non-Drug; Combo Route) route 3 (three) times daily.    solifenacin (VESICARE) 5 MG tablet Take 5 mg by mouth.    HYDROcodone-acetaminophen (NORCO) 7.5-325 mg per tablet Take 1 tablet by mouth every 8 (eight) hours as needed for Pain. (Patient not taking: No sig reported)    ondansetron (ZOFRAN) 4 MG tablet Take 1 tablet (4 mg total) by mouth every 8 (eight) hours as needed for Nausea. (Patient not taking: No sig reported)     No current facility-administered medications for this visit.       Review of patient's allergies indicates:   Allergen Reactions    Aspartame Other (See Comments)     headache    Other reaction(s): Other (See Comments)  Headache  migraine  Headache  Other reaction(s): Other (See Comments)  Headache  migraine  headache    Penicillins Itching, Rash, Other (See Comments) and Hives     Rash  Rash  Rash  Rash  Rash    Stevioside (bulk) Other (See Comments)    Fructose Other (See Comments)     headache    Other reaction(s): Other (See " Comments)  headache  migraine  headache  Other reaction(s): Other (See Comments)  headache  migraine  headache    Monosodium glutamate Other (See Comments)     Headache    Other reaction(s): Other (See Comments)  Headache  headache  Headache  Other reaction(s): Other (See Comments)  Headache  headache  Headache       Family History   Problem Relation Age of Onset    Stomach cancer Mother     Breast cancer Mother     Diabetes Mother     Cancer Father        Social History     Socioeconomic History    Marital status:      Spouse name: Owen Castillo    Number of children: 2   Occupational History    Occupation: Part - member support [employer]     Employer: [employer]   Tobacco Use    Smoking status: Never Smoker    Smokeless tobacco: Never Used   Substance and Sexual Activity    Alcohol use: No    Drug use: No    Sexual activity: Yes     Partners: Male     Social Determinants of Health     Food Insecurity: No Food Insecurity    Worried About Running Out of Food in the Last Year: Never true    Ran Out of Food in the Last Year: Never true   Transportation Needs: Unmet Transportation Needs    Lack of Transportation (Medical): Yes    Lack of Transportation (Non-Medical): Yes   Physical Activity: Inactive    Days of Exercise per Week: 0 days    Minutes of Exercise per Session: 0 min   Stress: Stress Concern Present    Feeling of Stress : To some extent   Social Connections: Moderately Integrated    Frequency of Communication with Friends and Family: Three times a week    Frequency of Social Gatherings with Friends and Family: Three times a week    Attends Gnosticism Services: 1 to 4 times per year    Active Member of Clubs or Organizations: No    Attends Club or Organization Meetings: Never    Marital Status:    Housing Stability: Low Risk     Unable to Pay for Housing in the Last Year: No    Number of Places Lived in the Last Year: 1    Unstable Housing in the Last Year: No        History of present illness:  Patient comes in today for the left shoulder.  She has had left shoulder pain for several months.  Maybe even over a year.  She is not sure how it started.  The pain is severe at times.  Wakes her.  Denies any trauma.      Review of Systems:    Constitution: Negative for chills, fever, and sweats.  Negative for unexplained weight loss.    HENT:  Negative for headaches and blurry vision.    Cardiovascular:Negative for chest pain or irregular heart beat. Negative for hypertension.    Respiratory:  Negative for cough and shortness of breath.    Gastrointestinal: Negative for abdominal pain, heartburn, melena, nausea, and vomitting.    Genitourinary:  Negative bladder incontinence and dysuria.    Musculoskeletal:  See HPI for details.     Neurological: Negative for numbness.    Psychiatric/Behavioral: Negative for depression.  The patient is not nervous/anxious.      Endocrine: Negative for polyuria    Hematologic/Lymphatic: Negative for bleeding problem.  Does not bruise/bleed easily.    Skin: Negative for poor would healing and rash    Objective:      Physical Examination:    Vital Signs:  There were no vitals filed for this visit.    Body mass index is 28.08 kg/m².    This a well-developed, well nourished patient in no acute distress.  They are alert and oriented and cooperative to examination.        Patient has full range of motion of the right shoulder.  Positive impingement.  Positive crossover.  Her rotator cuff is grossly intact.  Spurling sign is negative.  Pertinent New Results:    XRAY Report / Interpretation:   Two views of the left shoulder demonstrate a type 2 acromion no fracture subluxations or dislocations    Assessment/Plan:      Left shoulder impingement syndrome.  I injected the left subacromial space with Kenalog and lidocaine.  I started her on physical therapy.  Follow-up in 6 weeks      This note was created using Dragon voice recognition software that  occasionally misinterpreted phrases or words.

## 2022-02-07 ENCOUNTER — HOSPITAL ENCOUNTER (OUTPATIENT)
Dept: RADIOLOGY | Facility: HOSPITAL | Age: 67
Discharge: HOME | End: 2022-02-07
Attending: INTERNAL MEDICINE
Payer: MEDICARE

## 2022-02-07 VITALS — HEIGHT: 66 IN | WEIGHT: 173.94 LBS | BODY MASS INDEX: 27.95 KG/M2

## 2022-02-07 DIAGNOSIS — Z85.528 HX OF RENAL CELL CANCER: ICD-10-CM

## 2022-02-07 DIAGNOSIS — Z12.31 SCREENING MAMMOGRAM FOR HIGH-RISK PATIENT: ICD-10-CM

## 2022-02-07 PROCEDURE — 71046 X-RAY EXAM CHEST 2 VIEWS: CPT | Mod: TC,PO

## 2022-02-07 PROCEDURE — 77067 SCR MAMMO BI INCL CAD: CPT | Mod: TC,PO

## 2022-02-22 RX ORDER — TRIAMCINOLONE ACETONIDE 40 MG/ML
40 INJECTION, SUSPENSION INTRA-ARTICULAR; INTRAMUSCULAR
Status: DISCONTINUED | OUTPATIENT
Start: 2022-01-11 | End: 2022-04-06

## 2022-02-22 NOTE — PROCEDURES
Large Joint Aspiration/Injection: L subacromial bursa    Date/Time: 1/11/2022 12:15 PM  Performed by: Jasson Rosas MD  Authorized by: Jasson Rosas MD     Consent Done?:  Yes (Verbal)  Indications:  Pain  Site marked: the procedure site was marked    Timeout: prior to procedure the correct patient, procedure, and site was verified    Prep: patient was prepped and draped in usual sterile fashion      Local anesthesia used?: Yes    Local anesthetic:  Lidocaine 1% without epinephrine  Ultrasonic Guidance for needle placement?: No    Location:  Shoulder  Site:  L subacromial bursa  Medications:  40 mg triamcinolone acetonide 40 mg/mL  Patient tolerance:  Patient tolerated the procedure well with no immediate complications

## 2022-03-11 ENCOUNTER — LAB VISIT (OUTPATIENT)
Dept: LAB | Facility: HOSPITAL | Age: 67
End: 2022-03-11
Attending: INTERNAL MEDICINE
Payer: MEDICARE

## 2022-03-11 DIAGNOSIS — E11.42 TYPE 2 DIABETES MELLITUS WITH DIABETIC POLYNEUROPATHY, WITH LONG-TERM CURRENT USE OF INSULIN: ICD-10-CM

## 2022-03-11 DIAGNOSIS — Z79.4 TYPE 2 DIABETES MELLITUS WITH DIABETIC POLYNEUROPATHY, WITH LONG-TERM CURRENT USE OF INSULIN: ICD-10-CM

## 2022-03-11 DIAGNOSIS — E78.2 MIXED DYSLIPIDEMIA: ICD-10-CM

## 2022-03-11 LAB
CHOLEST SERPL-MCNC: 157 MG/DL (ref 120–199)
CHOLEST/HDLC SERPL: 2.6 {RATIO} (ref 2–5)
ESTIMATED AVG GLUCOSE: 154 MG/DL (ref 68–131)
HBA1C MFR BLD: 7 % (ref 4.5–6.2)
HDLC SERPL-MCNC: 60 MG/DL (ref 40–75)
HDLC SERPL: 38.2 % (ref 20–50)
LDLC SERPL CALC-MCNC: 66 MG/DL (ref 63–159)
NONHDLC SERPL-MCNC: 97 MG/DL
TRIGL SERPL-MCNC: 155 MG/DL (ref 30–150)

## 2022-03-11 PROCEDURE — 80061 LIPID PANEL: CPT | Performed by: INTERNAL MEDICINE

## 2022-03-11 PROCEDURE — 83036 HEMOGLOBIN GLYCOSYLATED A1C: CPT | Performed by: INTERNAL MEDICINE

## 2022-03-11 PROCEDURE — 36415 COLL VENOUS BLD VENIPUNCTURE: CPT | Performed by: INTERNAL MEDICINE

## 2022-03-28 ENCOUNTER — PATIENT MESSAGE (OUTPATIENT)
Dept: FAMILY MEDICINE | Facility: CLINIC | Age: 67
End: 2022-03-28
Payer: MEDICARE

## 2022-03-30 DIAGNOSIS — Z79.4 TYPE 2 DIABETES MELLITUS WITH DIABETIC POLYNEUROPATHY, WITH LONG-TERM CURRENT USE OF INSULIN: ICD-10-CM

## 2022-03-30 DIAGNOSIS — E11.42 TYPE 2 DIABETES MELLITUS WITH DIABETIC POLYNEUROPATHY, WITH LONG-TERM CURRENT USE OF INSULIN: ICD-10-CM

## 2022-03-31 RX ORDER — INSULIN ASPART 100 [IU]/ML
INJECTION, SOLUTION INTRAVENOUS; SUBCUTANEOUS
Qty: 60 ML | Refills: 3 | Status: SHIPPED | OUTPATIENT
Start: 2022-03-31 | End: 2023-02-01 | Stop reason: SDUPTHER

## 2022-04-01 ENCOUNTER — LAB VISIT (OUTPATIENT)
Dept: LAB | Facility: HOSPITAL | Age: 67
End: 2022-04-01
Attending: NURSE PRACTITIONER
Payer: MEDICARE

## 2022-04-01 DIAGNOSIS — I10 ESSENTIAL HYPERTENSION, MALIGNANT: Primary | ICD-10-CM

## 2022-04-01 DIAGNOSIS — E78.5 HYPERLIPEMIA: ICD-10-CM

## 2022-04-01 DIAGNOSIS — N18.32 CHRONIC KIDNEY DISEASE (CKD) STAGE G3B/A1, MODERATELY DECREASED GLOMERULAR FILTRATION RATE (GFR) BETWEEN 30-44 ML/MIN/1.73 SQUARE METER AND ALBUMINURIA CREATININE RATIO LESS THAN 30 MG/G: ICD-10-CM

## 2022-04-01 LAB
ALBUMIN SERPL BCP-MCNC: 4.2 G/DL (ref 3.5–5.2)
ALBUMIN/CREAT UR: 1289 UG/MG (ref 0–30)
ANION GAP SERPL CALC-SCNC: 11 MMOL/L (ref 8–16)
BACTERIA #/AREA URNS HPF: NEGATIVE /HPF
BILIRUB UR QL STRIP: NEGATIVE
BUN SERPL-MCNC: 24 MG/DL (ref 8–23)
CALCIUM SERPL-MCNC: 9.9 MG/DL (ref 8.7–10.5)
CHLORIDE SERPL-SCNC: 101 MMOL/L (ref 95–110)
CLARITY UR: CLEAR
CO2 SERPL-SCNC: 26 MMOL/L (ref 23–29)
COLOR UR: YELLOW
CREAT SERPL-MCNC: 1.2 MG/DL (ref 0.5–1.4)
CREAT UR-MCNC: 49 MG/DL (ref 15–325)
CREAT UR-MCNC: 49 MG/DL (ref 15–325)
ERYTHROCYTE [DISTWIDTH] IN BLOOD BY AUTOMATED COUNT: 12.6 % (ref 11.5–14.5)
EST. GFR  (AFRICAN AMERICAN): 54 ML/MIN/1.73 M^2
EST. GFR  (NON AFRICAN AMERICAN): 46.9 ML/MIN/1.73 M^2
GLUCOSE SERPL-MCNC: 131 MG/DL (ref 70–110)
GLUCOSE UR QL STRIP: NEGATIVE
HCT VFR BLD AUTO: 41 % (ref 37–48.5)
HGB BLD-MCNC: 13.4 G/DL (ref 12–16)
HGB UR QL STRIP: NEGATIVE
HYALINE CASTS #/AREA URNS LPF: 4 /LPF
IRON SERPL-MCNC: 80 UG/DL (ref 30–160)
KETONES UR QL STRIP: NEGATIVE
LEUKOCYTE ESTERASE UR QL STRIP: ABNORMAL
MAGNESIUM SERPL-MCNC: 1.8 MG/DL (ref 1.6–2.6)
MCH RBC QN AUTO: 30.7 PG (ref 27–31)
MCHC RBC AUTO-ENTMCNC: 32.7 G/DL (ref 32–36)
MCV RBC AUTO: 94 FL (ref 82–98)
MICROALBUMIN UR DL<=1MG/L-MCNC: 631.6 UG/ML
MICROSCOPIC COMMENT: ABNORMAL
NITRITE UR QL STRIP: NEGATIVE
PH UR STRIP: 7 [PH] (ref 5–8)
PHOSPHATE SERPL-MCNC: 3.6 MG/DL (ref 2.7–4.5)
PLATELET # BLD AUTO: 347 K/UL (ref 150–450)
PMV BLD AUTO: 8.4 FL (ref 9.2–12.9)
POTASSIUM SERPL-SCNC: 4.7 MMOL/L (ref 3.5–5.1)
PROT UR QL STRIP: ABNORMAL
PROT UR-MCNC: 96 MG/DL (ref 6–15)
PROT/CREAT UR: 1.96 MG/G{CREAT} (ref 0–0.2)
PTH-INTACT SERPL-MCNC: 75.6 PG/ML (ref 9–77)
RBC # BLD AUTO: 4.37 M/UL (ref 4–5.4)
RBC #/AREA URNS HPF: 1 /HPF (ref 0–4)
SATURATED IRON: 24 % (ref 20–50)
SODIUM SERPL-SCNC: 138 MMOL/L (ref 136–145)
SP GR UR STRIP: 1.01 (ref 1–1.03)
SQUAMOUS #/AREA URNS HPF: 3 /HPF
TOTAL IRON BINDING CAPACITY: 339 UG/DL (ref 250–450)
TRANSFERRIN SERPL-MCNC: 242 MG/DL (ref 200–375)
URATE SERPL-MCNC: 6.6 MG/DL (ref 2.4–5.7)
URN SPEC COLLECT METH UR: ABNORMAL
UROBILINOGEN UR STRIP-ACNC: NEGATIVE EU/DL
WBC # BLD AUTO: 8.22 K/UL (ref 3.9–12.7)
WBC #/AREA URNS HPF: 5 /HPF (ref 0–5)

## 2022-04-01 PROCEDURE — 83970 ASSAY OF PARATHORMONE: CPT | Performed by: NURSE PRACTITIONER

## 2022-04-01 PROCEDURE — 82043 UR ALBUMIN QUANTITATIVE: CPT | Performed by: NURSE PRACTITIONER

## 2022-04-01 PROCEDURE — 36415 COLL VENOUS BLD VENIPUNCTURE: CPT | Performed by: NURSE PRACTITIONER

## 2022-04-01 PROCEDURE — 83735 ASSAY OF MAGNESIUM: CPT | Performed by: NURSE PRACTITIONER

## 2022-04-01 PROCEDURE — 84466 ASSAY OF TRANSFERRIN: CPT | Performed by: NURSE PRACTITIONER

## 2022-04-01 PROCEDURE — 84156 ASSAY OF PROTEIN URINE: CPT | Performed by: NURSE PRACTITIONER

## 2022-04-01 PROCEDURE — 84550 ASSAY OF BLOOD/URIC ACID: CPT | Performed by: NURSE PRACTITIONER

## 2022-04-01 PROCEDURE — 85027 COMPLETE CBC AUTOMATED: CPT | Performed by: NURSE PRACTITIONER

## 2022-04-01 PROCEDURE — 81001 URINALYSIS AUTO W/SCOPE: CPT | Performed by: NURSE PRACTITIONER

## 2022-04-01 PROCEDURE — 80069 RENAL FUNCTION PANEL: CPT | Performed by: NURSE PRACTITIONER

## 2022-04-01 PROCEDURE — 82570 ASSAY OF URINE CREATININE: CPT | Performed by: NURSE PRACTITIONER

## 2022-04-06 ENCOUNTER — OFFICE VISIT (OUTPATIENT)
Dept: FAMILY MEDICINE | Facility: CLINIC | Age: 67
End: 2022-04-06
Payer: MEDICARE

## 2022-04-06 VITALS
BODY MASS INDEX: 27.48 KG/M2 | DIASTOLIC BLOOD PRESSURE: 73 MMHG | WEIGHT: 171 LBS | HEART RATE: 78 BPM | HEIGHT: 66 IN | SYSTOLIC BLOOD PRESSURE: 127 MMHG

## 2022-04-06 DIAGNOSIS — M54.50 CHRONIC MIDLINE LOW BACK PAIN WITHOUT SCIATICA: Chronic | ICD-10-CM

## 2022-04-06 DIAGNOSIS — I10 ESSENTIAL HYPERTENSION: Chronic | ICD-10-CM

## 2022-04-06 DIAGNOSIS — E11.22 CKD STAGE 3 DUE TO TYPE 2 DIABETES MELLITUS: Chronic | ICD-10-CM

## 2022-04-06 DIAGNOSIS — E78.2 MIXED DYSLIPIDEMIA: Chronic | ICD-10-CM

## 2022-04-06 DIAGNOSIS — G89.29 CHRONIC MIDLINE LOW BACK PAIN WITHOUT SCIATICA: Chronic | ICD-10-CM

## 2022-04-06 DIAGNOSIS — E11.42 TYPE 2 DIABETES MELLITUS WITH DIABETIC POLYNEUROPATHY, WITH LONG-TERM CURRENT USE OF INSULIN: Primary | Chronic | ICD-10-CM

## 2022-04-06 DIAGNOSIS — Z79.4 TYPE 2 DIABETES MELLITUS WITH DIABETIC POLYNEUROPATHY, WITH LONG-TERM CURRENT USE OF INSULIN: Primary | Chronic | ICD-10-CM

## 2022-04-06 DIAGNOSIS — N18.30 CKD STAGE 3 DUE TO TYPE 2 DIABETES MELLITUS: Chronic | ICD-10-CM

## 2022-04-06 PROCEDURE — 99213 OFFICE O/P EST LOW 20 MIN: CPT | Performed by: INTERNAL MEDICINE

## 2022-04-06 PROCEDURE — 99214 PR OFFICE/OUTPT VISIT, EST, LEVL IV, 30-39 MIN: ICD-10-PCS | Mod: S$PBB,AQ,, | Performed by: INTERNAL MEDICINE

## 2022-04-06 PROCEDURE — 99214 OFFICE O/P EST MOD 30 MIN: CPT | Mod: S$PBB,AQ,, | Performed by: INTERNAL MEDICINE

## 2022-04-06 RX ORDER — LOSARTAN POTASSIUM 100 MG/1
100 TABLET ORAL DAILY
Qty: 90 TABLET | Refills: 3 | Status: SHIPPED | OUTPATIENT
Start: 2022-04-06 | End: 2023-06-12

## 2022-04-06 RX ORDER — LOSARTAN POTASSIUM 100 MG/1
1 TABLET ORAL DAILY
COMMUNITY
Start: 2021-05-04 | End: 2022-04-06 | Stop reason: SDUPTHER

## 2022-04-06 NOTE — PROGRESS NOTES
Subjective:       Patient ID: Payton Castillo is a 67 y.o. female.    Chief Complaint: Diabetes, Hyperlipidemia, Hypertension, Back Pain, and Chronic Kidney Disease    Type 2 diabetes mellitus with diabetic polyneuropathy, with long-term current use of insulin  Chronic midline low back pain without sciatica  Essential hypertension  Mixed dyslipidemia  CKD stage 3 due to type 2 diabetes mellitus        Diabetes  She presents for her follow-up diabetic visit. She has type 2 diabetes mellitus. No MedicAlert identification noted. Her disease course has been stable. Pertinent negatives for hypoglycemia include no confusion, dizziness, mood changes, nervousness/anxiousness, pallor, seizures or tremors. Associated symptoms include fatigue. Pertinent negatives for diabetes include no chest pain, no polydipsia and no polyphagia. There are no hypoglycemic complications. Risk factors for coronary artery disease include hypertension, sedentary lifestyle, post-menopausal, dyslipidemia and diabetes mellitus. Current diabetic treatment includes insulin injections. She is compliant with treatment most of the time. She has not had a previous visit with a dietitian. She rarely participates in exercise. Her home blood glucose trend is increasing steadily. Her breakfast blood glucose range is generally 130-140 mg/dl. An ACE inhibitor/angiotensin II receptor blocker is being taken. She does not see a podiatrist.Eye exam is not current.   Hyperlipidemia  This is a chronic problem. The current episode started more than 1 year ago. The problem is controlled. Exacerbating diseases include obesity. Pertinent negatives include no chest pain or shortness of breath. Current antihyperlipidemic treatment includes statins. The current treatment provides moderate improvement of lipids. Risk factors for coronary artery disease include post-menopausal, obesity, dyslipidemia and diabetes mellitus.   Hypertension  This is a chronic problem. The current  episode started more than 1 year ago. The problem has been waxing and waning since onset. The problem is uncontrolled. Associated symptoms include malaise/fatigue. Pertinent negatives include no chest pain, palpitations or shortness of breath. Risk factors for coronary artery disease include sedentary lifestyle, dyslipidemia, diabetes mellitus and post-menopausal state. Past treatments include beta blockers, angiotensin blockers and calcium channel blockers. The current treatment provides moderate improvement. Compliance problems include psychosocial issues.  There is no history of heart failure or left ventricular hypertrophy.       Past Medical History:   Diagnosis Date    Allergy     aspartame,fructose monosodium glutamate and penicillins.    Anemia of chronic disorder 11/9/2017    Anemia, chronic renal failure, stage 2 (mild) 11/9/2017    Anemia, chronic renal failure, stage 3 (moderate) 11/9/2017    Asthma     Cancer     kidney    Cataract of right eye     CKD (chronic kidney disease), stage III     Diabetes mellitus     type II    Fibromyalgia     GERD (gastroesophageal reflux disease)     Hx of renal cell cancer - left nephrectomy Aug 2014 11/9/2017    Hypercholesteremia     Hypertension     IDDM (insulin dependent diabetes mellitus)     Lumbar spondylosis     Lumbosacral radiculopathy     Osteopenia     Palpitations     Plantar fasciitis, right     Syncope 2/2012    Tachycardia     Thoracic radiculopathy     TMJ (dislocation of temporomandibular joint)      Social History     Socioeconomic History    Marital status:      Spouse name: Owen Castillo    Number of children: 2   Occupational History    Occupation: Part - member support [employer]     Employer: [employer]   Tobacco Use    Smoking status: Never Smoker    Smokeless tobacco: Never Used   Substance and Sexual Activity    Alcohol use: No    Drug use: No    Sexual activity: Yes     Partners: Male     Social  Determinants of Health     Financial Resource Strain: Low Risk     Difficulty of Paying Living Expenses: Not very hard   Food Insecurity: No Food Insecurity    Worried About Running Out of Food in the Last Year: Never true    Ran Out of Food in the Last Year: Never true   Transportation Needs: No Transportation Needs    Lack of Transportation (Medical): No    Lack of Transportation (Non-Medical): No   Physical Activity: Inactive    Days of Exercise per Week: 0 days    Minutes of Exercise per Session: 0 min   Stress: Stress Concern Present    Feeling of Stress : To some extent   Social Connections: Moderately Integrated    Frequency of Communication with Friends and Family: Three times a week    Frequency of Social Gatherings with Friends and Family: Three times a week    Attends Zoroastrian Services: 1 to 4 times per year    Active Member of Clubs or Organizations: No    Attends Club or Organization Meetings: Never    Marital Status:    Housing Stability: Low Risk     Unable to Pay for Housing in the Last Year: No    Number of Places Lived in the Last Year: 1    Unstable Housing in the Last Year: No     Past Surgical History:   Procedure Laterality Date    CATARACT EXTRACTION Left 03/2016    JOINT REPLACEMENT Right     knee    SHOULDER OPEN ROTATOR CUFF REPAIR Right 02/2005    labral repair    TOTAL KNEE ARTHROPLASTY Left 2014    TUBAL LIGATION  1990    TUMOR REMOVAL  2014    from kidney     Family History   Problem Relation Age of Onset    Stomach cancer Mother     Breast cancer Mother     Diabetes Mother     Cancer Father        Review of Systems   Constitutional: Positive for fatigue and malaise/fatigue.   Respiratory: Negative for shortness of breath.    Cardiovascular: Negative for chest pain and palpitations.   Endocrine: Negative for polydipsia and polyphagia.   Skin: Negative for pallor.   Neurological: Negative for dizziness, tremors and seizures.   Psychiatric/Behavioral:  "Negative for confusion. The patient is not nervous/anxious.          Objective:      Blood pressure 127/73, pulse 78, height 5' 6" (1.676 m), weight 77.6 kg (171 lb). Body mass index is 27.6 kg/m².  Physical Exam  Vitals and nursing note reviewed.   Constitutional:       General: She is not in acute distress.     Appearance: She is well-developed. She is not ill-appearing, toxic-appearing or diaphoretic.      Comments: BMI is 27.60   HENT:      Head: Normocephalic and atraumatic.      Mouth/Throat:      Tonsils: No tonsillar abscesses.   Eyes:      General: No scleral icterus.     Conjunctiva/sclera: Conjunctivae normal.      Comments: Chronically puffy eyelids.   Neck:      Thyroid: No thyromegaly.      Trachea: Trachea normal. No tracheal deviation.   Cardiovascular:      Rate and Rhythm: Normal rate and regular rhythm.      Heart sounds: Normal heart sounds, S1 normal and S2 normal.   Pulmonary:      Breath sounds: Normal breath sounds.   Abdominal:      General: There is no distension.      Palpations: Abdomen is soft. Abdomen is not rigid.      Tenderness: There is no abdominal tenderness. There is no guarding or rebound.          Comments: scar amy of prior surgeries are noted including scars of nephrectomy .   Musculoskeletal:         General: No tenderness or deformity.      Cervical back: Neck supple.      Right foot: Normal range of motion. No deformity or bunion.      Left foot: Normal range of motion. No deformity or bunion.   Feet:      Right foot:      Protective Sensation: 5 sites tested. 5 sites sensed.      Toenail Condition: Right toenails are normal.      Left foot:      Protective Sensation: 5 sites tested. 5 sites sensed.      Toenail Condition: Left toenails are normal.   Lymphadenopathy:      Cervical: No cervical adenopathy.   Skin:     General: Skin is warm and dry.      Findings: No erythema, lesion or rash.   Neurological:      Mental Status: She is alert.      Comments: l   "   Psychiatric:         Behavior: Behavior normal. Behavior is cooperative.           Assessment:       1. Type 2 diabetes mellitus with diabetic polyneuropathy, with long-term current use of insulin    2. Chronic midline low back pain without sciatica    3. Essential hypertension    4. Mixed dyslipidemia    5. CKD stage 3 due to type 2 diabetes mellitus           Lab Visit on 04/01/2022   Component Date Value Ref Range Status    Glucose 04/01/2022 131 (A) 70 - 110 mg/dL Final    Sodium 04/01/2022 138  136 - 145 mmol/L Final    Potassium 04/01/2022 4.7  3.5 - 5.1 mmol/L Final    Chloride 04/01/2022 101  95 - 110 mmol/L Final    CO2 04/01/2022 26  23 - 29 mmol/L Final    BUN 04/01/2022 24 (A) 8 - 23 mg/dL Final    Calcium 04/01/2022 9.9  8.7 - 10.5 mg/dL Final    Creatinine 04/01/2022 1.2  0.5 - 1.4 mg/dL Final    Albumin 04/01/2022 4.2  3.5 - 5.2 g/dL Final    Phosphorus 04/01/2022 3.6  2.7 - 4.5 mg/dL Final    eGFR if  04/01/2022 54.0 (A) >60 mL/min/1.73 m^2 Final    eGFR if non African American 04/01/2022 46.9 (A) >60 mL/min/1.73 m^2 Final    Anion Gap 04/01/2022 11  8 - 16 mmol/L Final    PTH, Intact 04/01/2022 75.6  9.0 - 77.0 pg/mL Final    Uric Acid 04/01/2022 6.6 (A) 2.4 - 5.7 mg/dL Final    WBC 04/01/2022 8.22  3.90 - 12.70 K/uL Final    RBC 04/01/2022 4.37  4.00 - 5.40 M/uL Final    Hemoglobin 04/01/2022 13.4  12.0 - 16.0 g/dL Final    Hematocrit 04/01/2022 41.0  37.0 - 48.5 % Final    MCV 04/01/2022 94  82 - 98 fL Final    MCH 04/01/2022 30.7  27.0 - 31.0 pg Final    MCHC 04/01/2022 32.7  32.0 - 36.0 g/dL Final    RDW 04/01/2022 12.6  11.5 - 14.5 % Final    Platelets 04/01/2022 347  150 - 450 K/uL Final    MPV 04/01/2022 8.4 (A) 9.2 - 12.9 fL Final    Specimen UA 04/01/2022 Urine, Clean Catch   Final    Color, UA 04/01/2022 Yellow  Yellow, Straw, Nimisha Final    Appearance, UA 04/01/2022 Clear  Clear Final    pH, UA 04/01/2022 7.0  5.0 - 8.0 Final    Specific  Gravity, UA 04/01/2022 1.015  1.005 - 1.030 Final    Protein, UA 04/01/2022 2+ (A) Negative Final    Glucose, UA 04/01/2022 Negative  Negative Final    Ketones, UA 04/01/2022 Negative  Negative Final    Bilirubin (UA) 04/01/2022 Negative  Negative Final    Occult Blood UA 04/01/2022 Negative  Negative Final    Nitrite, UA 04/01/2022 Negative  Negative Final    Urobilinogen, UA 04/01/2022 Negative  Negative EU/dL Final    Leukocytes, UA 04/01/2022 Trace (A) Negative Final    Microalbumin, Urine 04/01/2022 631.6 (A) <19.9 ug/mL Final    Creatinine, Urine 04/01/2022 49.0  15.0 - 325.0 mg/dL Final    Microalb/Creat Ratio 04/01/2022 1289.0 (A) 0.0 - 30.0 ug/mg Final    Protein, Urine Random 04/01/2022 96 (A) 6 - 15 mg/dL Final    Creatinine, Urine 04/01/2022 49.0  15.0 - 325.0 mg/dL Final    Prot/Creat Ratio, Urine 04/01/2022 1.96 (A) 0.00 - 0.20 Final    Iron 04/01/2022 80  30 - 160 ug/dL Final    Transferrin 04/01/2022 242  200 - 375 mg/dL Final    TIBC 04/01/2022 339  250 - 450 ug/dL Final    Saturated Iron 04/01/2022 24  20 - 50 % Final    Magnesium 04/01/2022 1.8  1.6 - 2.6 mg/dL Final    RBC, UA 04/01/2022 1  0 - 4 /hpf Final    WBC, UA 04/01/2022 5  0 - 5 /hpf Final    Bacteria 04/01/2022 Negative  None-Occ /hpf Final    Squam Epithel, UA 04/01/2022 3  /hpf Final    Hyaline Casts, UA 04/01/2022 4 (A) 0-1/lpf /lpf Final    Microscopic Comment 04/01/2022 SEE COMMENT   Final   Lab Visit on 03/11/2022   Component Date Value Ref Range Status    Hemoglobin A1C 03/11/2022 7.0 (A) 4.5 - 6.2 % Final    Estimated Avg Glucose 03/11/2022 154 (A) 68 - 131 mg/dL Final    Cholesterol 03/11/2022 157  120 - 199 mg/dL Final    Triglycerides 03/11/2022 155 (A) 30 - 150 mg/dL Final    HDL 03/11/2022 60  40 - 75 mg/dL Final    LDL Cholesterol 03/11/2022 66.0  63.0 - 159.0 mg/dL Final    HDL/Cholesterol Ratio 03/11/2022 38.2  20.0 - 50.0 % Final    Total Cholesterol/HDL Ratio 03/11/2022 2.6  2.0 - 5.0  Final    Non-HDL Cholesterol 03/11/2022 97  mg/dL Final         Plan:           Type 2 diabetes mellitus with diabetic polyneuropathy, with long-term current use of insulin  Comments:  Currently patient is on long-acting insulin 70 units and short-acting insulin.  Hemoglobin A1c stable at 7.0.    Chronic midline low back pain without sciatica  Comments:  Chronic low back pain continues with degenerative disc disease.  Currently on gabapentin with not much relief.  Lyrica did not give relief in past.    Essential hypertension  Comments:  Currently on losartan and metoprolol.  Blood pressure seems to be stable.  Orders:  -     losartan (COZAAR) 100 MG tablet; Take 1 tablet (100 mg total) by mouth Daily.  Dispense: 90 tablet; Refill: 3    Mixed dyslipidemia  Comments:  Currently patient is on atorvastatin 40 mg per day.    CKD stage 3 due to type 2 diabetes mellitus  Comments:  Currently on Kerendia.  She had hyperparathyroidism.  She is not on any calcitriol at this point    Chronic kidney stage 3 has been noted.  She has been prescribed Kerendia recently.  Mineral or corticoid receptor antagonists    Continue to use renal precautions with adequate hydration, avoidance of unnecessary antibiotics or test involving dyes and contrast.  Avoid NSAIDs.  Try to minimize use of sulfa antibiotics.    Chronic back pain continues and gabapentin now at 800 mg 3 times a day does not seem to help her either.    She might have tried Lyrica in past but probably did not help her and also it was expensive at that point.  A generic version of Lyrica-pregabalin has come out but at this point she is not very confident about the benefit from that medication.    She is going to try an over-the-counter supplement called relief Factor which is supposed to reduce the inflammation in the body and hence it may indirectly help her with back pain and knee pains.  This medication seems to be a combination of Omega 3 fatty acids,  "Cucumurin,resvrtrol and Icarlin.  After review of review of these elements, it seems they have anti-inflammatory properties    While if follow-up has been set for 4 months for her usual medical issues, early follow-up may be warranted in case her back pain continues with no relief from above supplement therapy.    Continue with COVID 19 precautions.    Advised Pt about age and season appropriate immunizations/ cancer screenings.  Also seasonal influenza vaccine, update on tetanus diphtheria vaccination every 10 years.  Patient has been advised to watch diet and exercise. Avoid fried and fatty food. Compliance to medications and follow up urged.  Advised Pt. to monitor Blood sugars at home and record them.    keep a close eye on feet and keep them clean. Annual eye examination. Annual influenza vaccine.  Monitor HgbA1c every 3 to 6 months. Monitor urine microalbumin every year.keep LDL less than 100. Monitor blood pressure and target blood pressure 120/70.  Please utilize precautions for current COVID-19 pandemic.  Try to avoid crowds or close contact with multiple people.  Minimize outside interaction.  Wash hands with soap for  frequently upon contact.Use face mask or cover.    Fup -4 months    Spent jhonatan 30 minutes with patient which involved review of pts medical conditions, labs, medications and with 50% of time face-to-face discussion about medical problems, management and any applicable changes.            Current Outpatient Medications:     amLODIPine (NORVASC) 2.5 MG tablet, Take 5 mg by mouth once daily., Disp: , Rfl:     atorvastatin (LIPITOR) 40 MG tablet, TAKE 1 TABLET EVERY EVENING, Disp: 90 tablet, Rfl: 3    BD INSULIN SYRINGE 1 mL 28 gauge x 1/2" Syrg, USE 1 SYRINGE ONCE DAILY, Disp: 100 each, Rfl: 3    cetirizine (ZYRTEC) 10 MG tablet, Take 10 mg by mouth., Disp: , Rfl:     fluticasone propionate (FLONASE) 50 mcg/actuation nasal spray, USE 2 SPRAYS IN EACH NOSTRIL ONCE DAILY, Disp: 48 g, " "Rfl: 3    FREESTYLE LANCETS MISC, by Misc.(Non-Drug; Combo Route) route 3 (three) times daily., Disp: , Rfl:     FREESTYLE BANG 14 DAY SENSOR Kit, USE ONE SENSOR EVERY 14 DAYS, Disp: 6 kit, Rfl: 3    gabapentin (NEURONTIN) 800 MG tablet, Take 1 tablet (800 mg total) by mouth 3 (three) times daily as needed (pain in low back)., Disp: 270 tablet, Rfl: 1    insulin aspart U-100 (NOVOLOG FLEXPEN U-100 INSULIN) 100 unit/mL (3 mL) InPn pen, INJECT 20 UNITS UNDER THE SKIN THREE TIMES A DAY WITH MEALS ( INSTEAD OF HUMALOG ), Disp: 60 mL, Rfl: 3    insulin syringe-needle U-100 0.3 mL 30 Syrg, by Misc.(Non-Drug; Combo Route) route 3 (three) times daily., Disp: , Rfl:     KERENDIA 10 mg Tab, Take 1 tablet by mouth once daily., Disp: , Rfl:     LANTUS U-100 INSULIN 100 unit/mL injection, INJECT 70 UNITS UNDER THE SKIN EVERY EVENING, Disp: 210 mL, Rfl: 3    metoprolol succinate (TOPROL-XL) 50 MG 24 hr tablet, TAKE 1 TABLET DAILY, Disp: 90 tablet, Rfl: 3    pen needle, diabetic (ADVOCATE PEN NEEDLE) 31 gauge x 5/16" Ndle, 1 Device by Misc.(Non-Drug; Combo Route) route 3 (three) times daily., Disp: 100 each, Rfl: 3    solifenacin (VESICARE) 5 MG tablet, Take 5 mg by mouth., Disp: , Rfl:     losartan (COZAAR) 100 MG tablet, Take 1 tablet (100 mg total) by mouth Daily., Disp: 90 tablet, Rfl: 3  No current facility-administered medications for this visit.    "

## 2022-06-24 ENCOUNTER — PATIENT MESSAGE (OUTPATIENT)
Dept: FAMILY MEDICINE | Facility: CLINIC | Age: 67
End: 2022-06-24

## 2022-07-17 ENCOUNTER — PATIENT MESSAGE (OUTPATIENT)
Dept: FAMILY MEDICINE | Facility: CLINIC | Age: 67
End: 2022-07-17

## 2022-07-20 ENCOUNTER — LAB VISIT (OUTPATIENT)
Dept: LAB | Facility: HOSPITAL | Age: 67
End: 2022-07-20
Attending: NURSE PRACTITIONER
Payer: MEDICARE

## 2022-07-20 DIAGNOSIS — R80.1 PERSISTENT PROTEINURIA: Primary | ICD-10-CM

## 2022-07-20 DIAGNOSIS — Z90.5 ACQUIRED ABSENCE OF KIDNEY: ICD-10-CM

## 2022-07-20 DIAGNOSIS — N18.32 CHRONIC KIDNEY DISEASE (CKD) STAGE G3B/A1, MODERATELY DECREASED GLOMERULAR FILTRATION RATE (GFR) BETWEEN 30-44 ML/MIN/1.73 SQUARE METER AND ALBUMINURIA CREATININE RATIO LESS THAN 30 MG/G: ICD-10-CM

## 2022-07-20 DIAGNOSIS — C64.9 ADENOCARCINOMA, RENAL CELL: ICD-10-CM

## 2022-07-20 DIAGNOSIS — I10 ESSENTIAL HYPERTENSION, MALIGNANT: ICD-10-CM

## 2022-07-20 DIAGNOSIS — E78.5 HYPERLIPEMIA: ICD-10-CM

## 2022-07-20 LAB
ALBUMIN SERPL BCP-MCNC: 4.1 G/DL (ref 3.5–5.2)
ALBUMIN/CREAT UR: 89.6 UG/MG (ref 0–30)
ANION GAP SERPL CALC-SCNC: 9 MMOL/L (ref 8–16)
BACTERIA #/AREA URNS HPF: NEGATIVE /HPF
BILIRUB UR QL STRIP: NEGATIVE
BUN SERPL-MCNC: 32 MG/DL (ref 8–23)
CALCIUM SERPL-MCNC: 8.9 MG/DL (ref 8.7–10.5)
CHLORIDE SERPL-SCNC: 100 MMOL/L (ref 95–110)
CLARITY UR: CLEAR
CO2 SERPL-SCNC: 27 MMOL/L (ref 23–29)
COLOR UR: YELLOW
CREAT SERPL-MCNC: 1.4 MG/DL (ref 0.5–1.4)
CREAT UR-MCNC: 115 MG/DL (ref 15–325)
CREAT UR-MCNC: 115 MG/DL (ref 15–325)
ERYTHROCYTE [DISTWIDTH] IN BLOOD BY AUTOMATED COUNT: 12.7 % (ref 11.5–14.5)
EST. GFR  (AFRICAN AMERICAN): 44.9 ML/MIN/1.73 M^2
EST. GFR  (NON AFRICAN AMERICAN): 38.9 ML/MIN/1.73 M^2
GLUCOSE SERPL-MCNC: 118 MG/DL (ref 70–110)
GLUCOSE UR QL STRIP: NEGATIVE
HCT VFR BLD AUTO: 41.1 % (ref 37–48.5)
HGB BLD-MCNC: 13.2 G/DL (ref 12–16)
HGB UR QL STRIP: NEGATIVE
HYALINE CASTS #/AREA URNS LPF: 3 /LPF
KETONES UR QL STRIP: NEGATIVE
LEUKOCYTE ESTERASE UR QL STRIP: ABNORMAL
MAGNESIUM SERPL-MCNC: 1.8 MG/DL (ref 1.6–2.6)
MCH RBC QN AUTO: 31.1 PG (ref 27–31)
MCHC RBC AUTO-ENTMCNC: 32.1 G/DL (ref 32–36)
MCV RBC AUTO: 97 FL (ref 82–98)
MICROALBUMIN UR DL<=1MG/L-MCNC: 103 UG/ML
MICROSCOPIC COMMENT: ABNORMAL
NITRITE UR QL STRIP: NEGATIVE
PH UR STRIP: 6 [PH] (ref 5–8)
PHOSPHATE SERPL-MCNC: 3.3 MG/DL (ref 2.7–4.5)
PLATELET # BLD AUTO: 298 K/UL (ref 150–450)
PMV BLD AUTO: 8.2 FL (ref 9.2–12.9)
POTASSIUM SERPL-SCNC: 4.6 MMOL/L (ref 3.5–5.1)
PROT UR QL STRIP: ABNORMAL
PROT UR-MCNC: 23 MG/DL (ref 6–15)
PROT/CREAT UR: 0.2 MG/G{CREAT} (ref 0–0.2)
PTH-INTACT SERPL-MCNC: 169.9 PG/ML (ref 9–77)
RBC # BLD AUTO: 4.25 M/UL (ref 4–5.4)
RBC #/AREA URNS HPF: 0 /HPF (ref 0–4)
SODIUM SERPL-SCNC: 136 MMOL/L (ref 136–145)
SP GR UR STRIP: 1.02 (ref 1–1.03)
SQUAMOUS #/AREA URNS HPF: 3 /HPF
URATE SERPL-MCNC: 7.3 MG/DL (ref 2.4–5.7)
URN SPEC COLLECT METH UR: ABNORMAL
UROBILINOGEN UR STRIP-ACNC: NEGATIVE EU/DL
WBC # BLD AUTO: 8.99 K/UL (ref 3.9–12.7)
WBC #/AREA URNS HPF: 8 /HPF (ref 0–5)

## 2022-07-20 PROCEDURE — 82043 UR ALBUMIN QUANTITATIVE: CPT | Performed by: NURSE PRACTITIONER

## 2022-07-20 PROCEDURE — 85027 COMPLETE CBC AUTOMATED: CPT | Performed by: NURSE PRACTITIONER

## 2022-07-20 PROCEDURE — 81001 URINALYSIS AUTO W/SCOPE: CPT | Performed by: NURSE PRACTITIONER

## 2022-07-20 PROCEDURE — 80069 RENAL FUNCTION PANEL: CPT | Performed by: NURSE PRACTITIONER

## 2022-07-20 PROCEDURE — 82570 ASSAY OF URINE CREATININE: CPT | Performed by: NURSE PRACTITIONER

## 2022-07-20 PROCEDURE — 36415 COLL VENOUS BLD VENIPUNCTURE: CPT | Performed by: NURSE PRACTITIONER

## 2022-07-20 PROCEDURE — 83970 ASSAY OF PARATHORMONE: CPT | Performed by: NURSE PRACTITIONER

## 2022-07-20 PROCEDURE — 83735 ASSAY OF MAGNESIUM: CPT | Performed by: NURSE PRACTITIONER

## 2022-07-20 PROCEDURE — 84550 ASSAY OF BLOOD/URIC ACID: CPT | Performed by: NURSE PRACTITIONER

## 2022-07-20 PROCEDURE — 84156 ASSAY OF PROTEIN URINE: CPT | Performed by: NURSE PRACTITIONER

## 2022-07-29 ENCOUNTER — PATIENT MESSAGE (OUTPATIENT)
Dept: FAMILY MEDICINE | Facility: CLINIC | Age: 67
End: 2022-07-29

## 2022-08-02 ENCOUNTER — TELEPHONE (OUTPATIENT)
Dept: PAIN MEDICINE | Facility: CLINIC | Age: 67
End: 2022-08-02
Payer: MEDICARE

## 2022-08-02 NOTE — TELEPHONE ENCOUNTER
----- Message from Cory Ruggiero MD sent at 7/29/2022  4:02 PM CDT -----  Per Dr. Nolan, can you reach out and schedule this patient for a consultation with me? Thank you.

## 2022-08-06 NOTE — PROGRESS NOTES
Subjective:       Patient ID: Payton Castillo is a 67 y.o. female.    Chief Complaint: Diabetes, Hypertension, Hyperlipidemia, Chronic pain, and Back Pain    Patient is a 67-year-old female who comes for follow-up.    Underlying medical issues are as below:-    Type 2 diabetes mellitus with diabetic polyneuropathy, with long-term current use of insulin  Chronic midline low back pain without sciatica  Essential hypertension  Mixed dyslipidemia  CKD stage 3 due to type 2 diabetes mellitus he\    Patricia Balderas NP [facility].( Dr Nicol MD Nephrology)    She was found to have some degree of protein urea.  She has also been put on Kerendia.    Pain issues continue and the pain lack of control makes her blood pressures go up and also probably blood sugars go also go up.    Blood sugars are going in 200s.  Her last hemoglobin A1c in March 22 was 7.0.    Chronic kidney disease stage 3 also has been noted.  Her last creatinine was 1.4.    Diabetes  She presents for her follow-up diabetic visit. She has type 2 diabetes mellitus. No MedicAlert identification noted. Her disease course has been stable. Pertinent negatives for hypoglycemia include no confusion, dizziness, mood changes, nervousness/anxiousness, pallor, seizures or tremors. Associated symptoms include fatigue. Pertinent negatives for diabetes include no polydipsia and no polyphagia. There are no hypoglycemic complications. Risk factors for coronary artery disease include hypertension, sedentary lifestyle, post-menopausal, dyslipidemia and diabetes mellitus. Current diabetic treatment includes insulin injections. She is compliant with treatment most of the time. She has not had a previous visit with a dietitian. She rarely participates in exercise. Her home blood glucose trend is increasing steadily. Her breakfast blood glucose range is generally 130-140 mg/dl. An ACE inhibitor/angiotensin II receptor blocker is being taken. She does not see a  podiatrist.Eye exam is not current.   Hyperlipidemia  This is a chronic problem. The current episode started more than 1 year ago. The problem is controlled. Exacerbating diseases include obesity. Pertinent negatives include no shortness of breath. Current antihyperlipidemic treatment includes statins. The current treatment provides moderate improvement of lipids. Risk factors for coronary artery disease include post-menopausal, obesity, dyslipidemia and diabetes mellitus.   Hypertension  This is a chronic problem. The current episode started more than 1 year ago. The problem has been waxing and waning since onset. The problem is uncontrolled. Associated symptoms include malaise/fatigue. Pertinent negatives include no palpitations or shortness of breath. Risk factors for coronary artery disease include sedentary lifestyle, dyslipidemia, diabetes mellitus and post-menopausal state. Past treatments include beta blockers, angiotensin blockers and calcium channel blockers. The current treatment provides moderate improvement. Compliance problems include psychosocial issues.  There is no history of heart failure or left ventricular hypertrophy.       Past Medical History:   Diagnosis Date    Allergy     aspartame,fructose monosodium glutamate and penicillins.    Anemia of chronic disorder 11/9/2017    Anemia, chronic renal failure, stage 2 (mild) 11/9/2017    Anemia, chronic renal failure, stage 3 (moderate) 11/9/2017    Asthma     Cancer     kidney    Cataract of right eye     CKD (chronic kidney disease), stage III     Diabetes mellitus     type II    Fibromyalgia     GERD (gastroesophageal reflux disease)     Hx of renal cell cancer - left nephrectomy Aug 2014 11/9/2017    Hypercholesteremia     Hypertension     IDDM (insulin dependent diabetes mellitus)     Lumbar spondylosis     Lumbosacral radiculopathy     Osteopenia     Palpitations     Plantar fasciitis, right     Syncope 2/2012     Tachycardia     Thoracic radiculopathy     TMJ (dislocation of temporomandibular joint)      Social History     Socioeconomic History    Marital status:      Spouse name: Owen Castillo    Number of children: 2   Occupational History    Occupation: Part - member support [employer]     Employer: [employer]   Tobacco Use    Smoking status: Never Smoker    Smokeless tobacco: Never Used   Substance and Sexual Activity    Alcohol use: No    Drug use: No    Sexual activity: Yes     Partners: Male     Social Determinants of Health     Financial Resource Strain: Low Risk     Difficulty of Paying Living Expenses: Not very hard   Food Insecurity: No Food Insecurity    Worried About Running Out of Food in the Last Year: Never true    Ran Out of Food in the Last Year: Never true   Transportation Needs: No Transportation Needs    Lack of Transportation (Medical): No    Lack of Transportation (Non-Medical): No   Physical Activity: Inactive    Days of Exercise per Week: 0 days    Minutes of Exercise per Session: 0 min   Stress: Stress Concern Present    Feeling of Stress : To some extent   Social Connections: Socially Integrated    Frequency of Communication with Friends and Family: Three times a week    Frequency of Social Gatherings with Friends and Family: Twice a week    Attends Christianity Services: More than 4 times per year    Active Member of Clubs or Organizations: Yes    Attends Club or Organization Meetings: 1 to 4 times per year    Marital Status:    Housing Stability: Low Risk     Unable to Pay for Housing in the Last Year: No    Number of Places Lived in the Last Year: 1    Unstable Housing in the Last Year: No     Past Surgical History:   Procedure Laterality Date    CATARACT EXTRACTION Left 03/2016    JOINT REPLACEMENT Right     knee    SHOULDER OPEN ROTATOR CUFF REPAIR Right 02/2005    labral repair    TOTAL KNEE ARTHROPLASTY Left 2014    TUBAL LIGATION  1990    TUMOR  REMOVAL  2014    from kidney     Family History   Problem Relation Age of Onset    Stomach cancer Mother     Breast cancer Mother     Diabetes Mother     Cancer Father        Review of Systems   Constitutional: Positive for fatigue and malaise/fatigue. Negative for activity change (Gradually diminishing activities because of pain related issues.), chills and unexpected weight change (Gained 19 lb in 6-7 years.).   HENT: Negative for dental problem and postnasal drip.         Dry mouth due to VESIcare   Eyes: Negative for pain, discharge and visual disturbance.   Respiratory: Positive for apnea. Negative for chest tightness, shortness of breath and stridor.         Patient does have obstructive sleep apnea and uses a CPAP device.   Cardiovascular: Negative for palpitations and leg swelling.        Hypertension.  Chronic kidney disease stage 3.   Gastrointestinal: Negative for abdominal distention, anal bleeding, constipation and diarrhea.   Endocrine: Negative for polydipsia and polyphagia.        Type 2 diabetes mellitus dyslipidemia   Genitourinary: Negative for difficulty urinating, flank pain and frequency.        Patient has an intact uterus.  Pap smear is pending at this point.  She has chronic kidney disease stage 3 and follows Dr. Camarena for the same.  Bladder incontinence symptoms-Dr. Membreno, urology prescribes her Solifenacin   Musculoskeletal: Negative for arthralgias and joint swelling.        Chronic pain-leg cramps   Skin: Negative for color change, pallor and rash.   Allergic/Immunologic: Negative for environmental allergies, food allergies and immunocompromised state.   Neurological: Negative for dizziness, tremors, seizures, syncope and light-headedness.        Neuropathy symptoms due to diabetes.  Patient takes gabapentin 600 mg 3 times a day.   Hematological: Negative for adenopathy. Does not bruise/bleed easily.   Psychiatric/Behavioral: Negative for agitation, confusion and dysphoric  "mood. The patient is not nervous/anxious.          Objective:      Blood pressure 111/66, pulse 70, height 5' 6" (1.676 m), weight 77.6 kg (171 lb). Body mass index is 27.6 kg/m².  Physical Exam  Vitals and nursing note reviewed.   Constitutional:       General: She is not in acute distress.     Appearance: She is well-developed. She is not ill-appearing, toxic-appearing or diaphoretic.      Comments: BMI is 27.60   HENT:      Head: Normocephalic and atraumatic.      Mouth/Throat:      Tonsils: No tonsillar abscesses.   Eyes:      General: No scleral icterus.     Conjunctiva/sclera: Conjunctivae normal.      Comments: Chronically puffy eyelids.   Neck:      Thyroid: No thyromegaly.      Trachea: Trachea normal. No tracheal deviation.   Cardiovascular:      Rate and Rhythm: Normal rate and regular rhythm.      Heart sounds: Normal heart sounds, S1 normal and S2 normal.   Pulmonary:      Breath sounds: Normal breath sounds.   Abdominal:      General: There is no distension.      Palpations: Abdomen is soft. Abdomen is not rigid.      Tenderness: There is no abdominal tenderness. There is no guarding or rebound.          Comments: scar amy of prior surgeries are noted including scars of nephrectomy .   Musculoskeletal:         General: No tenderness or deformity.      Cervical back: Neck supple.      Right foot: Normal range of motion. No deformity or bunion.      Left foot: Normal range of motion. No deformity or bunion.   Feet:      Right foot:      Protective Sensation: 5 sites tested. 5 sites sensed.      Toenail Condition: Right toenails are normal.      Left foot:      Protective Sensation: 5 sites tested. 5 sites sensed.      Toenail Condition: Left toenails are normal.      Comments: Temporary numbness felt by patient today when she woke up in the morning.  Will keep an eye on it.  Lymphadenopathy:      Cervical: No cervical adenopathy.   Skin:     General: Skin is warm and dry.      Findings: No erythema, " lesion or rash.   Neurological:      Mental Status: She is alert.      Comments: l     Psychiatric:         Behavior: Behavior normal. Behavior is cooperative.           Assessment:       1. Type 2 diabetes mellitus with diabetic polyneuropathy, with long-term current use of insulin    2. Chronic midline low back pain without sciatica    3. Essential hypertension    4. Mixed dyslipidemia    5. CKD stage 3 due to type 2 diabetes mellitus           Lab Visit on 07/20/2022   Component Date Value Ref Range Status    Glucose 07/20/2022 118 (A) 70 - 110 mg/dL Final    Sodium 07/20/2022 136  136 - 145 mmol/L Final    Potassium 07/20/2022 4.6  3.5 - 5.1 mmol/L Final    Chloride 07/20/2022 100  95 - 110 mmol/L Final    CO2 07/20/2022 27  23 - 29 mmol/L Final    BUN 07/20/2022 32 (A) 8 - 23 mg/dL Final    Calcium 07/20/2022 8.9  8.7 - 10.5 mg/dL Final    Creatinine 07/20/2022 1.4  0.5 - 1.4 mg/dL Final    Albumin 07/20/2022 4.1  3.5 - 5.2 g/dL Final    Phosphorus 07/20/2022 3.3  2.7 - 4.5 mg/dL Final    eGFR if  07/20/2022 44.9 (A) >60 mL/min/1.73 m^2 Final    eGFR if non African American 07/20/2022 38.9 (A) >60 mL/min/1.73 m^2 Final    Anion Gap 07/20/2022 9  8 - 16 mmol/L Final    PTH, Intact 07/20/2022 169.9 (A) 9.0 - 77.0 pg/mL Final    Uric Acid 07/20/2022 7.3 (A) 2.4 - 5.7 mg/dL Final    WBC 07/20/2022 8.99  3.90 - 12.70 K/uL Final    RBC 07/20/2022 4.25  4.00 - 5.40 M/uL Final    Hemoglobin 07/20/2022 13.2  12.0 - 16.0 g/dL Final    Hematocrit 07/20/2022 41.1  37.0 - 48.5 % Final    MCV 07/20/2022 97  82 - 98 fL Final    MCH 07/20/2022 31.1 (A) 27.0 - 31.0 pg Final    MCHC 07/20/2022 32.1  32.0 - 36.0 g/dL Final    RDW 07/20/2022 12.7  11.5 - 14.5 % Final    Platelets 07/20/2022 298  150 - 450 K/uL Final    MPV 07/20/2022 8.2 (A) 9.2 - 12.9 fL Final    Specimen UA 07/20/2022 Urine, Clean Catch   Final    Color, UA 07/20/2022 Yellow  Yellow, Straw, Nimisha Final    Appearance,  UA 07/20/2022 Clear  Clear Final    pH, UA 07/20/2022 6.0  5.0 - 8.0 Final    Specific Gravity, UA 07/20/2022 1.020  1.005 - 1.030 Final    Protein, UA 07/20/2022 Trace (A) Negative Final    Glucose, UA 07/20/2022 Negative  Negative Final    Ketones, UA 07/20/2022 Negative  Negative Final    Bilirubin (UA) 07/20/2022 Negative  Negative Final    Occult Blood UA 07/20/2022 Negative  Negative Final    Nitrite, UA 07/20/2022 Negative  Negative Final    Urobilinogen, UA 07/20/2022 Negative  Negative EU/dL Final    Leukocytes, UA 07/20/2022 1+ (A) Negative Final    Microalbumin, Urine 07/20/2022 103.0 (A) <19.9 ug/mL Final    Creatinine, Urine 07/20/2022 115.0  15.0 - 325.0 mg/dL Final    Microalb/Creat Ratio 07/20/2022 89.6 (A) 0.0 - 30.0 ug/mg Final    Protein, Urine Random 07/20/2022 23 (A) 6 - 15 mg/dL Final    Creatinine, Urine 07/20/2022 115.0  15.0 - 325.0 mg/dL Final    Prot/Creat Ratio, Urine 07/20/2022 0.20  0.00 - 0.20 Final    Magnesium 07/20/2022 1.8  1.6 - 2.6 mg/dL Final    RBC, UA 07/20/2022 0  0 - 4 /hpf Final    WBC, UA 07/20/2022 8 (A) 0 - 5 /hpf Final    Bacteria 07/20/2022 Negative  None-Occ /hpf Final    Squam Epithel, UA 07/20/2022 3  /hpf Final    Hyaline Casts, UA 07/20/2022 3 (A) 0-1/lpf /lpf Final    Microscopic Comment 07/20/2022 SEE COMMENT   Final         Plan:           Type 2 diabetes mellitus with diabetic polyneuropathy, with long-term current use of insulin  -     Hemoglobin A1C; Future; Expected date: 08/08/2022    Chronic midline low back pain without sciatica    Essential hypertension    Mixed dyslipidemia    CKD stage 3 due to type 2 diabetes mellitus      Patient's medical issues have been noted.    Last hemoglobin A1c was in the month of March and it was 7.0.  Off late she finds that her blood sugars are slightly more high in 200s.  I will check another hemoglobin A1c again.    Chronic kidney disease stage 3 has been noted again as she has been put on  "Kerendia.    Blood pressure control seems to be okay.     Neuropathy pain also persists.  Referral to Dr. Kimi Ruggiero.  Her previous pain management specialist was Dr. Dalton who has discontinued his practice.    Advised Pt about age and season appropriate immunizations/ cancer screenings.  Also seasonal influenza vaccine, update on tetanus diphtheria vaccination every 10 years.  Patient has been advised to watch diet and exercise. Avoid fried and fatty food. Compliance to medications and follow up urged.  Advised Pt. to monitor Blood sugars at home and record them.  Advised Pt  for Anti reflux measures like small feequent meals, avoid spicy and greasy food. Head end up at night.  keep a close eye on feet and keep them clean. Annual eye examination. Annual influenza vaccine.  Monitor HgbA1c every 3 to 6 months. Monitor urine microalbumin every year.keep LDL less than 100. Monitor blood pressure and target blood pressure 120/70.  Please utilize precautions for current COVID-19 pandemic.  Try to avoid crowds or close contact with multiple people.  Minimize outside interaction.  Wash hands with soap for  frequently upon contact.Use face mask or cover.    Fup-3-4 months    Spent jhonatan 30 minutes with patient which involved review of pts medical conditions, labs, medications and with 50% of time face-to-face discussion about medical problems, management and any applicable changes.      Current Outpatient Medications:     amLODIPine (NORVASC) 2.5 MG tablet, Take 5 mg by mouth once daily., Disp: , Rfl:     atorvastatin (LIPITOR) 40 MG tablet, TAKE 1 TABLET EVERY EVENING, Disp: 90 tablet, Rfl: 3    BD INSULIN SYRINGE 1 mL 28 gauge x 1/2" Syrg, USE 1 SYRINGE ONCE DAILY, Disp: 100 each, Rfl: 3    cetirizine (ZYRTEC) 10 MG tablet, Take 10 mg by mouth., Disp: , Rfl:     fluticasone propionate (FLONASE) 50 mcg/actuation nasal spray, USE 2 SPRAYS IN EACH NOSTRIL ONCE DAILY, Disp: 48 g, Rfl: 3    FREESTYLE LANCETS MISC, " "by Misc.(Non-Drug; Combo Route) route 3 (three) times daily., Disp: , Rfl:     FREESTYLE BANG 14 DAY SENSOR Kit, USE ONE SENSOR EVERY 14 DAYS, Disp: 6 kit, Rfl: 3    gabapentin (NEURONTIN) 800 MG tablet, Take 1 tablet (800 mg total) by mouth 3 (three) times daily as needed (pain in low back)., Disp: 270 tablet, Rfl: 1    insulin aspart U-100 (NOVOLOG FLEXPEN U-100 INSULIN) 100 unit/mL (3 mL) InPn pen, INJECT 20 UNITS UNDER THE SKIN THREE TIMES A DAY WITH MEALS ( INSTEAD OF HUMALOG ), Disp: 60 mL, Rfl: 3    insulin syringe-needle U-100 0.3 mL 30 Syrg, by Misc.(Non-Drug; Combo Route) route 3 (three) times daily., Disp: , Rfl:     KERENDIA 10 mg Tab, Take 1 tablet by mouth once daily., Disp: , Rfl:     LANTUS U-100 INSULIN 100 unit/mL injection, INJECT 70 UNITS UNDER THE SKIN EVERY EVENING, Disp: 210 mL, Rfl: 3    losartan (COZAAR) 100 MG tablet, Take 1 tablet (100 mg total) by mouth Daily., Disp: 90 tablet, Rfl: 3    metoprolol succinate (TOPROL-XL) 50 MG 24 hr tablet, TAKE 1 TABLET DAILY, Disp: 90 tablet, Rfl: 3    pen needle, diabetic (ADVOCATE PEN NEEDLE) 31 gauge x 5/16" Ndle, 1 Device by Misc.(Non-Drug; Combo Route) route 3 (three) times daily., Disp: 100 each, Rfl: 3    solifenacin (VESICARE) 5 MG tablet, Take 5 mg by mouth., Disp: , Rfl:     "

## 2022-08-08 ENCOUNTER — OFFICE VISIT (OUTPATIENT)
Dept: FAMILY MEDICINE | Facility: CLINIC | Age: 67
End: 2022-08-08
Payer: MEDICARE

## 2022-08-08 VITALS
SYSTOLIC BLOOD PRESSURE: 111 MMHG | DIASTOLIC BLOOD PRESSURE: 66 MMHG | BODY MASS INDEX: 27.48 KG/M2 | HEIGHT: 66 IN | HEART RATE: 70 BPM | WEIGHT: 171 LBS

## 2022-08-08 DIAGNOSIS — E11.42 TYPE 2 DIABETES MELLITUS WITH DIABETIC POLYNEUROPATHY, WITH LONG-TERM CURRENT USE OF INSULIN: Primary | ICD-10-CM

## 2022-08-08 DIAGNOSIS — Z79.4 TYPE 2 DIABETES MELLITUS WITH DIABETIC POLYNEUROPATHY, WITH LONG-TERM CURRENT USE OF INSULIN: Primary | ICD-10-CM

## 2022-08-08 DIAGNOSIS — N18.30 CKD STAGE 3 DUE TO TYPE 2 DIABETES MELLITUS: ICD-10-CM

## 2022-08-08 DIAGNOSIS — M54.50 CHRONIC MIDLINE LOW BACK PAIN WITHOUT SCIATICA: ICD-10-CM

## 2022-08-08 DIAGNOSIS — E11.22 CKD STAGE 3 DUE TO TYPE 2 DIABETES MELLITUS: ICD-10-CM

## 2022-08-08 DIAGNOSIS — I10 ESSENTIAL HYPERTENSION: ICD-10-CM

## 2022-08-08 DIAGNOSIS — G89.29 CHRONIC MIDLINE LOW BACK PAIN WITHOUT SCIATICA: ICD-10-CM

## 2022-08-08 DIAGNOSIS — E78.2 MIXED DYSLIPIDEMIA: ICD-10-CM

## 2022-08-08 PROCEDURE — 99214 PR OFFICE/OUTPT VISIT, EST, LEVL IV, 30-39 MIN: ICD-10-PCS | Mod: S$PBB,AQ,, | Performed by: INTERNAL MEDICINE

## 2022-08-08 PROCEDURE — 99214 OFFICE O/P EST MOD 30 MIN: CPT | Mod: S$PBB,AQ,, | Performed by: INTERNAL MEDICINE

## 2022-08-08 PROCEDURE — 99213 OFFICE O/P EST LOW 20 MIN: CPT | Performed by: INTERNAL MEDICINE

## 2022-08-26 ENCOUNTER — HOSPITAL ENCOUNTER (OUTPATIENT)
Dept: RADIOLOGY | Facility: HOSPITAL | Age: 67
Discharge: HOME | End: 2022-08-26
Attending: ANESTHESIOLOGY
Payer: MEDICARE

## 2022-08-26 ENCOUNTER — OFFICE VISIT (OUTPATIENT)
Dept: PAIN MEDICINE | Facility: CLINIC | Age: 67
End: 2022-08-26
Payer: MEDICARE

## 2022-08-26 VITALS
RESPIRATION RATE: 18 BRPM | HEIGHT: 66 IN | DIASTOLIC BLOOD PRESSURE: 68 MMHG | WEIGHT: 171 LBS | BODY MASS INDEX: 27.48 KG/M2 | SYSTOLIC BLOOD PRESSURE: 133 MMHG | HEART RATE: 69 BPM

## 2022-08-26 DIAGNOSIS — M47.896 OTHER SPONDYLOSIS, LUMBAR REGION: ICD-10-CM

## 2022-08-26 DIAGNOSIS — M53.3 SACROILIAC JOINT DYSFUNCTION OF BOTH SIDES: Primary | ICD-10-CM

## 2022-08-26 DIAGNOSIS — M25.552 CHRONIC PAIN OF BOTH HIPS: ICD-10-CM

## 2022-08-26 DIAGNOSIS — G89.29 CHRONIC PAIN OF BOTH HIPS: ICD-10-CM

## 2022-08-26 DIAGNOSIS — M54.16 LUMBAR RADICULOPATHY: ICD-10-CM

## 2022-08-26 DIAGNOSIS — M25.551 CHRONIC PAIN OF BOTH HIPS: ICD-10-CM

## 2022-08-26 DIAGNOSIS — M53.3 SACROILIAC JOINT DYSFUNCTION OF BOTH SIDES: ICD-10-CM

## 2022-08-26 DIAGNOSIS — M51.36 DDD (DEGENERATIVE DISC DISEASE), LUMBAR: ICD-10-CM

## 2022-08-26 DIAGNOSIS — M54.9 DORSALGIA, UNSPECIFIED: ICD-10-CM

## 2022-08-26 PROCEDURE — 99214 OFFICE O/P EST MOD 30 MIN: CPT | Mod: PBBFAC,PN | Performed by: ANESTHESIOLOGY

## 2022-08-26 PROCEDURE — 99999 PR PBB SHADOW E&M-EST. PATIENT-LVL IV: ICD-10-PCS | Mod: PBBFAC,,, | Performed by: ANESTHESIOLOGY

## 2022-08-26 PROCEDURE — 73521 X-RAY EXAM HIPS BI 2 VIEWS: CPT | Mod: TC,PO

## 2022-08-26 PROCEDURE — 99204 OFFICE O/P NEW MOD 45 MIN: CPT | Mod: S$PBB,,, | Performed by: ANESTHESIOLOGY

## 2022-08-26 PROCEDURE — 99204 PR OFFICE/OUTPT VISIT, NEW, LEVL IV, 45-59 MIN: ICD-10-PCS | Mod: S$PBB,,, | Performed by: ANESTHESIOLOGY

## 2022-08-26 PROCEDURE — 99999 PR PBB SHADOW E&M-EST. PATIENT-LVL IV: CPT | Mod: PBBFAC,,, | Performed by: ANESTHESIOLOGY

## 2022-08-26 PROCEDURE — 72202 X-RAY EXAM SI JOINTS 3/> VWS: CPT | Mod: TC,PO

## 2022-08-26 RX ORDER — ACETAMINOPHEN AND CODEINE PHOSPHATE 300; 30 MG/1; MG/1
1 TABLET ORAL EVERY 6 HOURS PRN
Qty: 30 TABLET | Refills: 0 | Status: SHIPPED | OUTPATIENT
Start: 2022-08-26 | End: 2022-09-02

## 2022-08-26 NOTE — PROGRESS NOTES
Referring Physician: John Nolan MD    PCP: John Nolan MD    CC: back and hip pain    HPI:   Payton Castillo is a 67 y.o. female with PMH significant for HTN, DM II on insulin, CKD, and hx of bilateral knee arthroplasties presents as a referral for the evaluation of back and hip pain. The patient reports that her pain began approximately decades ago after no inciting incident or trauma. The patient reports of worsening severity over time. Hip pain is currently her worst pain. The patient believes her low back and hip pain are related. The patient localizes her pain to the area across her lower back (L > R). The patient reports of radiation to her hips b/l (L > R) and down the lateral aspect of her BLE's to her ankles. The patient describes her pain as a throbbing and sharp type of pain and a burning type of pain in her legs. The patient reports of tingling in her BLE's and her feet. The patient reports that her pain is a 6/10 with prolonged standing. Patient denies of any urinary/fecal incontinence, saddle anesthesia, or weakness.      Aggravating factors: bending, standing, walking    Mitigating factors: sitting, heat, massage     Relevant Surgeries: no    Interventional Therapies: yes; saw Dr. Dane Dalton - did back injections with some benefit     : Reviewed and consistent with medication use as prescribed.      Non-pharmacologic Treatment:     · Physical Therapy: yes; continues to perform physician directed home exercise and stretching regimen at home daily. Reports of significant benefit with dry needling  · Ice/Heat: yes; heat with benefit  · TENS: yes; tried without much benefit   · Massage: yes  · Chiropractic care: yes; temporary relief  · Acupuncture: no         Pain Medications:         · Currently taking: Biofreeze, OTC Tylenol, gabapentin 800 mg PO TID    · Has tried in the past:    · Opioids: yes  · NSAIDS: no; avoids secondary to CKD  · Tylenol: yes  · Muscle relaxants: yes; tried with some  benefit; tried robaxin - was instructed to discontinue secondary to her kidney issues  · TCAs: no  · SNRIs: no  · Anticonvulsants: yes  · topical creams: yes    Anticoagulation: no    ROS:  Review of Systems   Constitutional: Negative for chills and fever.   HENT: Negative for sore throat.    Eyes: Negative for visual disturbance.   Respiratory: Negative for shortness of breath.    Cardiovascular: Negative for chest pain.   Gastrointestinal: Negative for nausea and vomiting.   Genitourinary: Negative for difficulty urinating.   Musculoskeletal: Positive for arthralgias and back pain.   Skin: Negative for rash.   Allergic/Immunologic: Negative for immunocompromised state.   Neurological: Positive for numbness. Negative for syncope.   Hematological: Does not bruise/bleed easily.   Psychiatric/Behavioral: Negative for suicidal ideas.        Past Medical History:   Diagnosis Date    Allergy     aspartame,fructose monosodium glutamate and penicillins.    Anemia of chronic disorder 11/9/2017    Anemia, chronic renal failure, stage 2 (mild) 11/9/2017    Anemia, chronic renal failure, stage 3 (moderate) 11/9/2017    Asthma     Cancer     kidney    Cataract of right eye     CKD (chronic kidney disease), stage III     Diabetes mellitus     type II    Fibromyalgia     GERD (gastroesophageal reflux disease)     Hx of renal cell cancer - left nephrectomy Aug 2014 11/9/2017    Hypercholesteremia     Hypertension     IDDM (insulin dependent diabetes mellitus)     Lumbar spondylosis     Lumbosacral radiculopathy     Osteopenia     Palpitations     Plantar fasciitis, right     Syncope 2/2012    Tachycardia     Thoracic radiculopathy     TMJ (dislocation of temporomandibular joint)      Past Surgical History:   Procedure Laterality Date    CATARACT EXTRACTION Left 03/2016    JOINT REPLACEMENT Right     knee    SHOULDER OPEN ROTATOR CUFF REPAIR Right 02/2005    labral repair    TOTAL KNEE ARTHROPLASTY Left  "2014    TUBAL LIGATION  1990    TUMOR REMOVAL  2014    from kidney     Family History   Problem Relation Age of Onset    Stomach cancer Mother     Breast cancer Mother     Diabetes Mother     Cancer Father      Social History     Socioeconomic History    Marital status:      Spouse name: Owen Castillo    Number of children: 2   Occupational History    Occupation: Part - member support [employer]     Employer: [employer]   Tobacco Use    Smoking status: Never Smoker    Smokeless tobacco: Never Used   Substance and Sexual Activity    Alcohol use: No    Drug use: No    Sexual activity: Yes     Partners: Male     Social Determinants of Health     Financial Resource Strain: Low Risk     Difficulty of Paying Living Expenses: Not very hard   Food Insecurity: No Food Insecurity    Worried About Running Out of Food in the Last Year: Never true    Ran Out of Food in the Last Year: Never true   Transportation Needs: No Transportation Needs    Lack of Transportation (Medical): No    Lack of Transportation (Non-Medical): No   Physical Activity: Inactive    Days of Exercise per Week: 0 days    Minutes of Exercise per Session: 0 min   Stress: Stress Concern Present    Feeling of Stress : To some extent   Social Connections: Socially Integrated    Frequency of Communication with Friends and Family: Three times a week    Frequency of Social Gatherings with Friends and Family: Twice a week    Attends Confucianist Services: More than 4 times per year    Active Member of Clubs or Organizations: Yes    Attends Club or Organization Meetings: 1 to 4 times per year    Marital Status:    Housing Stability: Low Risk     Unable to Pay for Housing in the Last Year: No    Number of Places Lived in the Last Year: 1    Unstable Housing in the Last Year: No         Allergies: See med card    Vitals:    08/26/22 1059   BP: 133/68   Pulse: 69   Resp: 18   Weight: 77.6 kg (171 lb)   Height: 5' 6" (1.676 m) "   PainSc:   4   PainLoc: Back         Physical exam:  Physical Exam  Vitals and nursing note reviewed.   Constitutional:       Appearance: She is not diaphoretic.   HENT:      Head: Normocephalic and atraumatic.   Eyes:      General:         Right eye: No discharge.         Left eye: No discharge.      Conjunctiva/sclera: Conjunctivae normal.   Cardiovascular:      Rate and Rhythm: Normal rate.   Pulmonary:      Effort: Pulmonary effort is normal. No respiratory distress.      Breath sounds: Normal breath sounds.   Abdominal:      Palpations: Abdomen is soft.   Skin:     General: Skin is warm and dry.      Findings: No rash.   Neurological:      Mental Status: She is alert and oriented to person, place, and time.   Psychiatric:         Mood and Affect: Mood and affect normal.         Cognition and Memory: Memory normal.         Judgment: Judgment normal.          UPPER EXTREMITIES: Normal alignment, normal range of motion, no atrophy, no skin changes,  hair growth and nail growth normal and equal bilaterally. No swelling, no tenderness.    LOWER EXTREMITIES:  Normal alignment, normal range of motion, no atrophy, no skin changes,  hair growth and nail growth normal and equal bilaterally. No swelling, no tenderness.    LUMBAR SPINE  Lumbar spine: ROM is limited with flexion extension and oblique extension with increased pain with passive ROM    ((--)) Supine straight leg raise    ((+)) Facet loading   Internal and external rotation of the hip causes no increased pain on either side.  Myofascial exam: Tenderness to palpation across lumbar paraspinous muscles.    ((+)) TTP at the SI joint  ((+)) MANDO's test  ((+)) One leg stand    ((+)) Distraction test  ((+)) Thigh thrust    CRANIAL NERVES:  II:  PERRL bilaterally,   III,IV,VI: EOMI.    V:  Facial sensation equal bilaterally  VII:  Facial motor function normal.  VIII:  Hearing equal to finger rub bilaterally  IX/X: Gag normal, palate symmetric  XI:  Shoulder shrug  equal, head turn equal  XII:  Tongue midline without fasciculations      MOTOR: Tone and bulk: normal     MUSCLE STRENGTH:  Hip Flexion: Right 5/5, Left 5/5  Hip Extension: Right 5/5, Left 5/5  Leg Flexion: Right 5/5, Left 5/5  Leg Extension: Right 5/5, Left 5/5  Plantar Flexion: Right 5/5, Left 5/5  Dorsiflexion: Right 5/5, Left 5/5    Delt Bi Tri     Right: 5/5 5/5 5/5 5/5   Left: 5/5 5/5 5/5 5/5     SENSATION: Light touch and pinprick intact bilaterally  REFLEXES: normal, symmetric, nonbrisk.  Toes down, no clonus. Negative woody's sign bilaterally.  GAIT: normal rise, base, steps, and arm swing.        Imaging:  X-ray left hip (2/12/2021):  The hip joints and SI joints are symmetrical. There is left facet  hypertrophy at L5-S1.     There are no fractures, dislocations or acute osseous abnormalities.  Soft tissues are normal.     IMPRESSION: Negative left hip     With facet hypertrophy at L5-S1    Assessment: Payton Castillo is a 67 y.o. female with PMH significant for HTN, DM II on insulin, CKD, and hx of bilateral knee arthroplasties presents as a referral for the evaluation of back and hip pain. No recent dedicated imaging of the hip or spine to review. I believe her current pain is multi-factorial which includes but is not limited to: sacroiliac joint dysfunction, DDD, facet arthropathy, lumbar radiculopathy, and painful diabetic neuropathy. Treatment plan outlined below.     Plan:  - Ordered MRI of the lumbar spine without contrast for further evaluation   - Ordered x-rays of the sacroiliac joints and bilateral hips for further evaluation  - Avoid NSAIDS given history of CKD  - I have stressed the importance of physical activity and a home exercise plan to help with chronic pain and improve health.  - Prescribed Tylenol #3 PO q 6 hr PRN for breakthrough pain; # 30 tablets; 0 refills.  reviewed without discrepancies.   - RTC s/p imaging to discuss results and interventions as appropriate.    Thank you  for referring this interesting patient, and I look forward to continuing to collaborate in her care.    Cory Ruggiero MD  Pain Management

## 2022-09-16 ENCOUNTER — HOSPITAL ENCOUNTER (OUTPATIENT)
Dept: RADIOLOGY | Facility: HOSPITAL | Age: 67
Discharge: HOME | End: 2022-09-16
Attending: ANESTHESIOLOGY
Payer: MEDICARE

## 2022-09-16 DIAGNOSIS — M54.16 LUMBAR RADICULOPATHY: ICD-10-CM

## 2022-09-16 DIAGNOSIS — M51.36 DDD (DEGENERATIVE DISC DISEASE), LUMBAR: ICD-10-CM

## 2022-09-16 DIAGNOSIS — M54.9 DORSALGIA, UNSPECIFIED: ICD-10-CM

## 2022-09-16 DIAGNOSIS — M47.896 OTHER SPONDYLOSIS, LUMBAR REGION: ICD-10-CM

## 2022-09-16 PROCEDURE — 72148 MRI LUMBAR SPINE W/O DYE: CPT | Mod: TC,PO

## 2022-09-20 ENCOUNTER — TELEPHONE (OUTPATIENT)
Dept: PAIN MEDICINE | Facility: CLINIC | Age: 67
End: 2022-09-20

## 2022-09-20 ENCOUNTER — PATIENT MESSAGE (OUTPATIENT)
Dept: PAIN MEDICINE | Facility: CLINIC | Age: 67
End: 2022-09-20

## 2022-09-20 ENCOUNTER — OFFICE VISIT (OUTPATIENT)
Dept: PAIN MEDICINE | Facility: CLINIC | Age: 67
End: 2022-09-20
Payer: MEDICARE

## 2022-09-20 VITALS
HEIGHT: 66 IN | WEIGHT: 171 LBS | BODY MASS INDEX: 27.48 KG/M2 | SYSTOLIC BLOOD PRESSURE: 124 MMHG | HEART RATE: 70 BPM | DIASTOLIC BLOOD PRESSURE: 79 MMHG

## 2022-09-20 DIAGNOSIS — M53.3 SACROILIAC JOINT DYSFUNCTION OF BOTH SIDES: Primary | ICD-10-CM

## 2022-09-20 DIAGNOSIS — M51.36 DDD (DEGENERATIVE DISC DISEASE), LUMBAR: ICD-10-CM

## 2022-09-20 DIAGNOSIS — M54.16 LUMBAR RADICULOPATHY: ICD-10-CM

## 2022-09-20 DIAGNOSIS — M47.896 OTHER SPONDYLOSIS, LUMBAR REGION: ICD-10-CM

## 2022-09-20 PROCEDURE — 99214 PR OFFICE/OUTPT VISIT, EST, LEVL IV, 30-39 MIN: ICD-10-PCS | Mod: S$PBB,,, | Performed by: ANESTHESIOLOGY

## 2022-09-20 PROCEDURE — 99212 OFFICE O/P EST SF 10 MIN: CPT | Mod: PBBFAC,PN | Performed by: ANESTHESIOLOGY

## 2022-09-20 PROCEDURE — 99999 PR PBB SHADOW E&M-EST. PATIENT-LVL II: ICD-10-PCS | Mod: PBBFAC,,, | Performed by: ANESTHESIOLOGY

## 2022-09-20 PROCEDURE — 99999 PR PBB SHADOW E&M-EST. PATIENT-LVL II: CPT | Mod: PBBFAC,,, | Performed by: ANESTHESIOLOGY

## 2022-09-20 PROCEDURE — 99214 OFFICE O/P EST MOD 30 MIN: CPT | Mod: S$PBB,,, | Performed by: ANESTHESIOLOGY

## 2022-09-20 RX ORDER — ACETAMINOPHEN AND CODEINE PHOSPHATE 300; 30 MG/1; MG/1
1 TABLET ORAL EVERY 12 HOURS PRN
Qty: 60 TABLET | Refills: 0 | Status: SHIPPED | OUTPATIENT
Start: 2022-09-20 | End: 2022-10-20

## 2022-09-20 RX ORDER — ACETAMINOPHEN AND CODEINE PHOSPHATE 300; 30 MG/1; MG/1
TABLET ORAL
Status: ON HOLD | COMMUNITY
Start: 2022-08-26 | End: 2022-12-15 | Stop reason: HOSPADM

## 2022-09-20 NOTE — H&P (VIEW-ONLY)
FOLLOW UP NOTE:     CHIEF COMPLAINT: left sided back and hip pain    INITIAL HISTORY OF PRESENT ILLNESS: Payton Castillo is a 67 y.o. female with PMH significant for HTN, DM II on insulin, CKD, and hx of bilateral knee arthroplasties presents as a referral for the evaluation of back and hip pain. The patient reports that her pain began approximately decades ago after no inciting incident or trauma. The patient reports of worsening severity over time. Hip pain is currently her worst pain. The patient believes her low back and hip pain are related. The patient localizes her pain to the area across her lower back (L > R). The patient reports of radiation to her hips b/l (L > R) and down the lateral aspect of her BLE's to her ankles. The patient describes her pain as a throbbing and sharp type of pain and a burning type of pain in her legs. The patient reports of tingling in her BLE's and her feet. The patient reports that her pain is a 6/10 with prolonged standing. Patient denies of any urinary/fecal incontinence, saddle anesthesia, or weakness.       Aggravating factors: bending, standing, walking     Mitigating factors: sitting, heat, massage      Relevant Surgeries: no     Interventional Therapies: yes; saw Dr. Dane Dlaton - did back injections with some benefit     INTERVAL HISTORY OF PRESENT ILLNESS: Payton Castillo is a 67 y.o. female with PMH significant for HTN, DM II on insulin, CKD, and hx of bilateral knee arthroplasties presents as an established patient for the continued management of back and hip pain. Today, the patient localizes her pain to the left side of her lower back/hip area. She reports of radiation to her left buttock and intermittently down to the back of her left knee. The patient denies of associated numbness. The patient reports of excellent benefit with taking Tylenol #3 PO BID. The patient reports of worsened pain with sleeping on her left side and with activities such as bending. The  "patient denies of any significant changes in her health since her last appointment. The patient also denies of any changes in the character of her pain since her last appointment. The patient reports that her current pain is a 4/10. Patient denies of any urinary/fecal incontinence, saddle anesthesia, or weakness.     INTERVENTIONAL PAIN HISTORY: N/A    CURRENT PAIN MEDICATIONS:   Currently taking: Biofreeze, OTC Tylenol, gabapentin 800 mg PO TID, Tylenol #3 PO BID     Has tried in the past:    Opioids: yes  NSAIDS: no; avoids secondary to CKD  Tylenol: yes  Muscle relaxants: yes; tried with some benefit; tried robaxin - was instructed to discontinue secondary to her kidney issues  TCAs: no  SNRIs: no  Anticonvulsants: yes  topical creams: yes      ROS:  Review of Systems   Constitutional:  Negative for chills and fever.   HENT:  Negative for sore throat.    Eyes:  Negative for visual disturbance.   Respiratory:  Negative for shortness of breath.    Cardiovascular:  Negative for chest pain.   Gastrointestinal:  Negative for nausea and vomiting.   Genitourinary:  Negative for difficulty urinating.   Musculoskeletal:  Positive for arthralgias and back pain.   Skin:  Negative for rash.   Allergic/Immunologic: Negative for immunocompromised state.   Neurological:  Negative for syncope.   Hematological:  Does not bruise/bleed easily.   Psychiatric/Behavioral:  Negative for suicidal ideas.       MEDICAL, SURGICAL, FAMILY, SOCIAL HX: reviewed    MEDICATIONS/ALLERGIES: reviewed    PHYSICAL EXAM:    VITALS: Vitals reviewed.   Vitals:    09/20/22 0958   BP: 124/79   Pulse: 70   Weight: 77.6 kg (171 lb)   Height: 5' 6" (1.676 m)   PainSc:   4       Physical Exam  Vitals and nursing note reviewed.   Constitutional:       Appearance: Normal appearance. She is not toxic-appearing or diaphoretic.   HENT:      Head: Normocephalic and atraumatic.   Eyes:      General:         Right eye: No discharge.         Left eye: No discharge. "      Extraocular Movements: Extraocular movements intact.      Conjunctiva/sclera: Conjunctivae normal.   Cardiovascular:      Rate and Rhythm: Normal rate.   Pulmonary:      Effort: Pulmonary effort is normal. No respiratory distress.      Breath sounds: Normal breath sounds.   Abdominal:      Palpations: Abdomen is soft.   Skin:     General: Skin is warm and dry.      Findings: No rash.   Neurological:      Mental Status: She is alert and oriented to person, place, and time.   Psychiatric:         Mood and Affect: Mood and affect normal.         Cognition and Memory: Memory normal.         Judgment: Judgment normal.        UPPER EXTREMITIES: Normal alignment, normal range of motion, no atrophy, no skin changes,  hair growth and nail growth normal and equal bilaterally. No swelling, no tenderness.    LOWER EXTREMITIES:  Normal alignment, normal range of motion, no atrophy, no skin changes,  hair growth and nail growth normal and equal bilaterally. No swelling, no tenderness.     LUMBAR SPINE  Lumbar spine: ROM is limited with flexion extension and oblique extension with increased pain with passive ROM    ((--)) Supine straight leg raise    ((+)) Facet loading   Internal and external rotation of the hip causes no increased pain on either side.  Myofascial exam: Tenderness to palpation across lumbar paraspinous muscles.     ((+)) TTP at the SI joint  ((+)) MANDO's test  ((+)) One leg stand    ((+)) Distraction test  ((+)) Thigh thrust     MOTOR: Tone and bulk: normal      MUSCLE STRENGTH:  Hip Flexion: Right 5/5, Left 5/5  Hip Extension: Right 5/5, Left 5/5  Leg Flexion: Right 5/5, Left 5/5  Leg Extension: Right 5/5, Left 5/5  Plantar Flexion: Right 5/5, Left 5/5  Dorsiflexion: Right 5/5, Left 5/5     Delt      Bi         Tri                         Right:   5/5       5/5       5/5       5/5         Left:     5/5       5/5       5/5       5/5            SENSATION: Light touch and pinprick intact  bilaterally  REFLEXES: normal, symmetric, nonbrisk.  Toes down, no clonus. Negative woody's sign bilaterally.  GAIT: normal rise, base, steps, and arm swing.      IMAGING:    MRI Lumbar Spine without contrast (9/16/2022):  FINDINGS: Comparison to multiple prior exams. The lumbar spine has normal lordotic curvature and vertebral body alignment, with normal vertebral heights, and no acute fractures or evidence of a diffuse marrow replacement process. Mild-to-moderate degenerative loss of disc height and signal involves multiple levels. The conus medullaris is normal in signal and terminates at L1-L2.     At T12-L1, L1-L2, L2-L3, L3-L4, and L4-L5, there is mild broad-based disc bulging, without focal disc herniation or spinal canal stenosis. There is mild multilevel facet arthropathy, without significant neural foraminal narrowing or evidence of nerve impingement..     At L5-S1, there is broad-based disc bulging with T2 hyperintense right paracentral partial-thickness annular fissure. There is no focal disc herniation or spinal canal stenosis, with bilateral facet hypertrophy and T2 hyperintense synovial cyst formation. There is no significant foraminal narrowing or evidence of nerve impingement.     There are no signal abnormalities of the paraspinal soft tissues or the paraspinal ligaments. The visualized retroperitoneum is unremarkable.     IMPRESSION: Multilevel lumbar degenerative disc disease and facet osteoarthritis, with no focal disc herniation, spinal canal stenosis, or evidence of nerve impingement at any level.    X-ray bilateral hips (8/26/2022):  There is mild osteoarthritic changes of the left hip with spurring of the acetabulum. The hip joints are symmetric in size. There are no fractures or acute osseous abnormalities. The SI joints are symmetrical. There is facet hypertrophy at L5-S1. The soft tissues are normal    X-ray sacroiliac joints (8/26/2022):  The SI joints are symmetric in size. There are  no erosions or ankylosis. There are mild degenerative changes of the left hip. There is facet hypertrophy at L5-S1. There are no acute osseous anomalies.     IMPRESSION: Facet hypertrophy at L5-S1     Normal SI joints     Mild degenerative changes of the left hip    ASSESSMENT: Payton Castillo is a 67 y.o. female with PMH significant for HTN, DM II on insulin, CKD, and hx of bilateral knee arthroplasties presents as an established patient for the continued management of back and hip pain. Today, the patient localizes her pain to the left side of her lower back/hip area with radiation to her left buttock and intermittently down to the back of her left knee. Reviewed MRI  and x-ray imaging with the patient personally. Imaging significant for DDD and facet arthropathy which I believe is contributing to her current pain to some degree. Patient also with reproducible pain with SI joint provocation. Treatment plan outlined below.     PLAN:  Schedule for left sacroiliac joint injection   Avoid NSAIDS given history of CKD  I have stressed the importance of physical activity and a home exercise plan to help with chronic pain and improve health.  Prescribed Tylenol #3 PO q 12 hr PRN for breakthrough pain; # 60 tablets; 0 refills.  reviewed without discrepancies.   RTC for the procedure as outlined above    Cory Ruggiero MD  Pain Management

## 2022-09-20 NOTE — TELEPHONE ENCOUNTER
Sacroiliac Injection (Specify laterality) Cmt: Left sacroiliac              joint injection       Emailed pt  to schedule for 10/07/2022 OR Dr Ruggiero

## 2022-09-20 NOTE — PROGRESS NOTES
FOLLOW UP NOTE:     CHIEF COMPLAINT: left sided back and hip pain    INITIAL HISTORY OF PRESENT ILLNESS: Payton Castillo is a 67 y.o. female with PMH significant for HTN, DM II on insulin, CKD, and hx of bilateral knee arthroplasties presents as a referral for the evaluation of back and hip pain. The patient reports that her pain began approximately decades ago after no inciting incident or trauma. The patient reports of worsening severity over time. Hip pain is currently her worst pain. The patient believes her low back and hip pain are related. The patient localizes her pain to the area across her lower back (L > R). The patient reports of radiation to her hips b/l (L > R) and down the lateral aspect of her BLE's to her ankles. The patient describes her pain as a throbbing and sharp type of pain and a burning type of pain in her legs. The patient reports of tingling in her BLE's and her feet. The patient reports that her pain is a 6/10 with prolonged standing. Patient denies of any urinary/fecal incontinence, saddle anesthesia, or weakness.       Aggravating factors: bending, standing, walking     Mitigating factors: sitting, heat, massage      Relevant Surgeries: no     Interventional Therapies: yes; saw Dr. Dane Dalton - did back injections with some benefit     INTERVAL HISTORY OF PRESENT ILLNESS: Payton Castillo is a 67 y.o. female with PMH significant for HTN, DM II on insulin, CKD, and hx of bilateral knee arthroplasties presents as an established patient for the continued management of back and hip pain. Today, the patient localizes her pain to the left side of her lower back/hip area. She reports of radiation to her left buttock and intermittently down to the back of her left knee. The patient denies of associated numbness. The patient reports of excellent benefit with taking Tylenol #3 PO BID. The patient reports of worsened pain with sleeping on her left side and with activities such as bending. The  "patient denies of any significant changes in her health since her last appointment. The patient also denies of any changes in the character of her pain since her last appointment. The patient reports that her current pain is a 4/10. Patient denies of any urinary/fecal incontinence, saddle anesthesia, or weakness.     INTERVENTIONAL PAIN HISTORY: N/A    CURRENT PAIN MEDICATIONS:   Currently taking: Biofreeze, OTC Tylenol, gabapentin 800 mg PO TID, Tylenol #3 PO BID     Has tried in the past:    Opioids: yes  NSAIDS: no; avoids secondary to CKD  Tylenol: yes  Muscle relaxants: yes; tried with some benefit; tried robaxin - was instructed to discontinue secondary to her kidney issues  TCAs: no  SNRIs: no  Anticonvulsants: yes  topical creams: yes      ROS:  Review of Systems   Constitutional:  Negative for chills and fever.   HENT:  Negative for sore throat.    Eyes:  Negative for visual disturbance.   Respiratory:  Negative for shortness of breath.    Cardiovascular:  Negative for chest pain.   Gastrointestinal:  Negative for nausea and vomiting.   Genitourinary:  Negative for difficulty urinating.   Musculoskeletal:  Positive for arthralgias and back pain.   Skin:  Negative for rash.   Allergic/Immunologic: Negative for immunocompromised state.   Neurological:  Negative for syncope.   Hematological:  Does not bruise/bleed easily.   Psychiatric/Behavioral:  Negative for suicidal ideas.       MEDICAL, SURGICAL, FAMILY, SOCIAL HX: reviewed    MEDICATIONS/ALLERGIES: reviewed    PHYSICAL EXAM:    VITALS: Vitals reviewed.   Vitals:    09/20/22 0958   BP: 124/79   Pulse: 70   Weight: 77.6 kg (171 lb)   Height: 5' 6" (1.676 m)   PainSc:   4       Physical Exam  Vitals and nursing note reviewed.   Constitutional:       Appearance: Normal appearance. She is not toxic-appearing or diaphoretic.   HENT:      Head: Normocephalic and atraumatic.   Eyes:      General:         Right eye: No discharge.         Left eye: No discharge. "      Extraocular Movements: Extraocular movements intact.      Conjunctiva/sclera: Conjunctivae normal.   Cardiovascular:      Rate and Rhythm: Normal rate.   Pulmonary:      Effort: Pulmonary effort is normal. No respiratory distress.      Breath sounds: Normal breath sounds.   Abdominal:      Palpations: Abdomen is soft.   Skin:     General: Skin is warm and dry.      Findings: No rash.   Neurological:      Mental Status: She is alert and oriented to person, place, and time.   Psychiatric:         Mood and Affect: Mood and affect normal.         Cognition and Memory: Memory normal.         Judgment: Judgment normal.        UPPER EXTREMITIES: Normal alignment, normal range of motion, no atrophy, no skin changes,  hair growth and nail growth normal and equal bilaterally. No swelling, no tenderness.    LOWER EXTREMITIES:  Normal alignment, normal range of motion, no atrophy, no skin changes,  hair growth and nail growth normal and equal bilaterally. No swelling, no tenderness.     LUMBAR SPINE  Lumbar spine: ROM is limited with flexion extension and oblique extension with increased pain with passive ROM    ((--)) Supine straight leg raise    ((+)) Facet loading   Internal and external rotation of the hip causes no increased pain on either side.  Myofascial exam: Tenderness to palpation across lumbar paraspinous muscles.     ((+)) TTP at the SI joint  ((+)) MANDO's test  ((+)) One leg stand    ((+)) Distraction test  ((+)) Thigh thrust     MOTOR: Tone and bulk: normal      MUSCLE STRENGTH:  Hip Flexion: Right 5/5, Left 5/5  Hip Extension: Right 5/5, Left 5/5  Leg Flexion: Right 5/5, Left 5/5  Leg Extension: Right 5/5, Left 5/5  Plantar Flexion: Right 5/5, Left 5/5  Dorsiflexion: Right 5/5, Left 5/5     Delt      Bi         Tri                         Right:   5/5       5/5       5/5       5/5         Left:     5/5       5/5       5/5       5/5            SENSATION: Light touch and pinprick intact  bilaterally  REFLEXES: normal, symmetric, nonbrisk.  Toes down, no clonus. Negative woody's sign bilaterally.  GAIT: normal rise, base, steps, and arm swing.      IMAGING:    MRI Lumbar Spine without contrast (9/16/2022):  FINDINGS: Comparison to multiple prior exams. The lumbar spine has normal lordotic curvature and vertebral body alignment, with normal vertebral heights, and no acute fractures or evidence of a diffuse marrow replacement process. Mild-to-moderate degenerative loss of disc height and signal involves multiple levels. The conus medullaris is normal in signal and terminates at L1-L2.     At T12-L1, L1-L2, L2-L3, L3-L4, and L4-L5, there is mild broad-based disc bulging, without focal disc herniation or spinal canal stenosis. There is mild multilevel facet arthropathy, without significant neural foraminal narrowing or evidence of nerve impingement..     At L5-S1, there is broad-based disc bulging with T2 hyperintense right paracentral partial-thickness annular fissure. There is no focal disc herniation or spinal canal stenosis, with bilateral facet hypertrophy and T2 hyperintense synovial cyst formation. There is no significant foraminal narrowing or evidence of nerve impingement.     There are no signal abnormalities of the paraspinal soft tissues or the paraspinal ligaments. The visualized retroperitoneum is unremarkable.     IMPRESSION: Multilevel lumbar degenerative disc disease and facet osteoarthritis, with no focal disc herniation, spinal canal stenosis, or evidence of nerve impingement at any level.    X-ray bilateral hips (8/26/2022):  There is mild osteoarthritic changes of the left hip with spurring of the acetabulum. The hip joints are symmetric in size. There are no fractures or acute osseous abnormalities. The SI joints are symmetrical. There is facet hypertrophy at L5-S1. The soft tissues are normal    X-ray sacroiliac joints (8/26/2022):  The SI joints are symmetric in size. There are  no erosions or ankylosis. There are mild degenerative changes of the left hip. There is facet hypertrophy at L5-S1. There are no acute osseous anomalies.     IMPRESSION: Facet hypertrophy at L5-S1     Normal SI joints     Mild degenerative changes of the left hip    ASSESSMENT: Payton Castillo is a 67 y.o. female with PMH significant for HTN, DM II on insulin, CKD, and hx of bilateral knee arthroplasties presents as an established patient for the continued management of back and hip pain. Today, the patient localizes her pain to the left side of her lower back/hip area with radiation to her left buttock and intermittently down to the back of her left knee. Reviewed MRI  and x-ray imaging with the patient personally. Imaging significant for DDD and facet arthropathy which I believe is contributing to her current pain to some degree. Patient also with reproducible pain with SI joint provocation. Treatment plan outlined below.     PLAN:  Schedule for left sacroiliac joint injection   Avoid NSAIDS given history of CKD  I have stressed the importance of physical activity and a home exercise plan to help with chronic pain and improve health.  Prescribed Tylenol #3 PO q 12 hr PRN for breakthrough pain; # 60 tablets; 0 refills.  reviewed without discrepancies.   RTC for the procedure as outlined above    Cory Ruggiero MD  Pain Management

## 2022-10-07 ENCOUNTER — HOSPITAL ENCOUNTER (OUTPATIENT)
Facility: HOSPITAL | Age: 67
Discharge: HOME | End: 2022-10-07
Attending: ANESTHESIOLOGY | Admitting: ANESTHESIOLOGY
Payer: MEDICARE

## 2022-10-07 VITALS
RESPIRATION RATE: 18 BRPM | BODY MASS INDEX: 27.48 KG/M2 | DIASTOLIC BLOOD PRESSURE: 59 MMHG | OXYGEN SATURATION: 97 % | HEART RATE: 57 BPM | HEIGHT: 66 IN | SYSTOLIC BLOOD PRESSURE: 130 MMHG | WEIGHT: 171 LBS | TEMPERATURE: 98 F

## 2022-10-07 DIAGNOSIS — M53.3 SACROILIAC JOINT DYSFUNCTION: Primary | ICD-10-CM

## 2022-10-07 LAB — POCT GLUCOSE: 91 MG/DL (ref 70–110)

## 2022-10-07 PROCEDURE — 36000705 HC OR TIME LEV I EA ADD 15 MIN: Performed by: ANESTHESIOLOGY

## 2022-10-07 PROCEDURE — 25000003 PHARM REV CODE 250: Performed by: ANESTHESIOLOGY

## 2022-10-07 PROCEDURE — 27096 INJECT SACROILIAC JOINT: CPT | Mod: LT,,, | Performed by: ANESTHESIOLOGY

## 2022-10-07 PROCEDURE — 99900103 DSU ONLY-NO CHARGE-INITIAL HR (STAT): Performed by: ANESTHESIOLOGY

## 2022-10-07 PROCEDURE — 99900104 DSU ONLY-NO CHARGE-EA ADD'L HR (STAT): Performed by: ANESTHESIOLOGY

## 2022-10-07 PROCEDURE — 25500020 PHARM REV CODE 255: Performed by: ANESTHESIOLOGY

## 2022-10-07 PROCEDURE — 27096 PR INJECTION,SACROILIAC JOINT: ICD-10-PCS | Mod: LT,,, | Performed by: ANESTHESIOLOGY

## 2022-10-07 PROCEDURE — 63600175 PHARM REV CODE 636 W HCPCS: Performed by: ANESTHESIOLOGY

## 2022-10-07 PROCEDURE — 36000704 HC OR TIME LEV I 1ST 15 MIN: Performed by: ANESTHESIOLOGY

## 2022-10-07 PROCEDURE — 71000015 HC POSTOP RECOV 1ST HR: Performed by: ANESTHESIOLOGY

## 2022-10-07 RX ORDER — SODIUM CHLORIDE, SODIUM LACTATE, POTASSIUM CHLORIDE, CALCIUM CHLORIDE 600; 310; 30; 20 MG/100ML; MG/100ML; MG/100ML; MG/100ML
INJECTION, SOLUTION INTRAVENOUS ONCE AS NEEDED
Status: COMPLETED | OUTPATIENT
Start: 2022-10-07 | End: 2022-10-07

## 2022-10-07 RX ORDER — LIDOCAINE HYDROCHLORIDE 10 MG/ML
INJECTION, SOLUTION EPIDURAL; INFILTRATION; INTRACAUDAL; PERINEURAL
Status: DISCONTINUED | OUTPATIENT
Start: 2022-10-07 | End: 2022-10-07 | Stop reason: HOSPADM

## 2022-10-07 RX ORDER — MIDAZOLAM HYDROCHLORIDE 1 MG/ML
INJECTION INTRAMUSCULAR; INTRAVENOUS CODE/TRAUMA/SEDATION MEDICATION
Status: DISCONTINUED | OUTPATIENT
Start: 2022-10-07 | End: 2022-10-07 | Stop reason: HOSPADM

## 2022-10-07 RX ORDER — BUPIVACAINE HYDROCHLORIDE 2.5 MG/ML
INJECTION, SOLUTION EPIDURAL; INFILTRATION; INTRACAUDAL
Status: DISCONTINUED | OUTPATIENT
Start: 2022-10-07 | End: 2022-10-07 | Stop reason: HOSPADM

## 2022-10-07 RX ORDER — FENTANYL CITRATE 50 UG/ML
INJECTION, SOLUTION INTRAMUSCULAR; INTRAVENOUS CODE/TRAUMA/SEDATION MEDICATION
Status: DISCONTINUED | OUTPATIENT
Start: 2022-10-07 | End: 2022-10-07 | Stop reason: HOSPADM

## 2022-10-07 RX ORDER — METHYLPREDNISOLONE ACETATE 80 MG/ML
INJECTION, SUSPENSION INTRA-ARTICULAR; INTRALESIONAL; INTRAMUSCULAR; SOFT TISSUE
Status: DISCONTINUED | OUTPATIENT
Start: 2022-10-07 | End: 2022-10-07 | Stop reason: HOSPADM

## 2022-10-07 RX ADMIN — SODIUM CHLORIDE, SODIUM LACTATE, POTASSIUM CHLORIDE, AND CALCIUM CHLORIDE: .6; .31; .03; .02 INJECTION, SOLUTION INTRAVENOUS at 07:10

## 2022-10-07 NOTE — PLAN OF CARE
Has met unit/department guidelines for discharge from each phase of the post procedure continuum. Leaving floor per w/c. AAO x3. Resp even and unlabored room air. No distress noted. Tolerating Po fluids without c/o nausea/vomiting. All personal belongings returned to pt.

## 2022-10-07 NOTE — OP NOTE
Procedure Date: 10/7/2022    PROCEDURE:  Left  sacroiliac joint injection utilizing fluoroscopy.    DIAGNOSIS: Left sided sacroiliitis.  Post op diagnosis: same    PHYSICIAN: Cory Ruggiero MD    MEDICATIONS INJECTED:  Methylprednisone 80 mg and 1.5ml Bupivacaine 0.25%.    LOCAL ANESTHETIC USED: Lidocaine 1%,3 ml total.     SEDATION MEDICATIONS: RN IV sedation    ESTIMATED BLOOD LOSS: none    COMPLICATIONS: none    TECHNIQUE:   Time-out taken to identify patient and procedure side prior to starting the procedure. After placing the patient in the prone position, the patient was prepped and draped in the usual sterile fashion using ChloraPrep and sterile towels.  The area was determined under fluoroscopy in the AP view.  Lidocaine was injected by raising a wheal and going down to the periosteum using a 25-gauge 1.5 inch needle.  The 3.5 inch 22-gauge spinal needle was introduced into inferior opening of the left sacroiliac joint.  Negative pressure applied to confirm no intravascular placement.  2 ml of contrast dye was injected to confirm placement and to confirm that there was no vascular uptake. The medication was then injected slowly. The patient tolerated the procedure well.    The patient was monitored after the procedure.  The patient was given post procedure and discharge instructions to follow at home. The patient was discharged in a stable condition

## 2022-10-07 NOTE — DISCHARGE INSTRUCTIONS
We hope your stay was comfortable as you heal now, mend and rest.    For we have enjoyed taking care of you by giving your our best.    And as you get better, by regaining your health and strength;   We count it as a privilege to have served you and hope your time at [facility] was well spent.      Thank  You!!!   General Information:    1.  Do not drink alcoholic beverages including beer for 24 hours or as long as you are on pain medication..  2.  Do not drive a motor vehicle, operate machinery or power tools, or signs legal papers for 24 hours or as long as you are on pain medication.   3.  You may experience light-headedness, dizziness, and sleepiness following surgery. Please do not stay alone. A responsible adult should be with you for this 24 hour period.  4.  Go home and rest.    5. Progress slowly to a normal diet unless instructed.  Otherwise, begin with liquids such as soft drinks, then soup and crackers working up to solid foods. Drink plenty of nonalcoholic fluids.  6.  Certain anesthetics and pain medications produce nausea and vomiting in certain       individuals. If nausea becomes a problem at home, call you doctor.    7. A nurse will be calling you sometime after surgery. Do not be alarmed. This is our way of finding out how you are doing.    8. Several times every hour while you are awake, take 2-3 deep breaths and cough. If you had stomach surgery hold a pillow or rolled towel firmly against your stomach before you cough. This will help with any pain the cough might cause.  9. Several times every hour while you are awake, pump and flex your feet 5-6 times and do foot circles. This will help prevent blood clots.    10.Call your doctor for severe pain, bleeding, fever, or signs or symptoms of infection (pain, swelling, redness, foul odor, drainage).   Post op instructions for prevention of DVT  What is deep vein thrombosis?  Deep vein thrombosis (DVT) is the medical term for blood clots in the deep  veins of the leg.  These blood clots can be dangerous.  A DVT can block a blood vessel and keep blood from getting where it needs to go.  Another problem is that the clot can travel to other parts of the body such as the lungs.  A clot that travels to the lungs is called a pulmonary embolus (PE) and can cause serious problems with breathing which can lead to death.  Am I at risk for DVT/PE?  If you are not very active, you are at risk of DVT.  Anyone confined to bed, sitting for long periods of time, recovering from surgery, etc. increases the risk of DVT.  Other risk factors are cancer diagnosis, certain medications, estrogen replacement in any form,older age, obesity, pregnancy, smoking, history of clotting disorders, and dehydration.  How will I know if I have a DVT?  Swelling in the lower leg  Pain  Warmth, redness, hardness or bulging of the vein  If you have any of these symptoms, call your doctors office right away.  Some people will not have any symptoms until the clot moves to the lungs.  What are the symptoms of a PE?  Panting, shortness of breath, or trouble breathing  Sharp, knife-like chest pain when you breathe  Coughing or coughing up blood  Rapid heartbeat  If you have any of these symptoms or get worse quickly, call 911 for emergency treatment.  How can I prevent a DVT?  Avoid long periods of inactivity and dont cross your legs--get up and walk around every hour or so.  Stay active--walking after surgery is highly encouraged.  This means you should get out of the house and walk in the neighborhood.  Going up and down stairs will not impair healing (unless advised against such activity by your doctor).    Drink plenty of noncaffeinated, nonalcoholic fluids each day to prevent dehydration.  Wear special support stockings, if they have been advised by your doctor.  If you travel, stop at least once an hour and walk around.  Avoid smoking (assistance with stopping is available through your healthcare  provider)  Always notify your doctor if you are not able to follow the post operative instructions that are given to you at the time of discharge.  It may be necessary to prescribe one of the medications available to prevent DVT.

## 2022-11-04 ENCOUNTER — OFFICE VISIT (OUTPATIENT)
Dept: PAIN MEDICINE | Facility: CLINIC | Age: 67
End: 2022-11-04
Payer: MEDICARE

## 2022-11-04 VITALS
BODY MASS INDEX: 27.48 KG/M2 | HEIGHT: 66 IN | HEART RATE: 58 BPM | DIASTOLIC BLOOD PRESSURE: 72 MMHG | SYSTOLIC BLOOD PRESSURE: 150 MMHG | WEIGHT: 171 LBS

## 2022-11-04 DIAGNOSIS — M25.551 CHRONIC PAIN OF BOTH HIPS: ICD-10-CM

## 2022-11-04 DIAGNOSIS — M51.36 DDD (DEGENERATIVE DISC DISEASE), LUMBAR: Primary | ICD-10-CM

## 2022-11-04 DIAGNOSIS — M25.552 CHRONIC PAIN OF BOTH HIPS: ICD-10-CM

## 2022-11-04 DIAGNOSIS — M47.896 OTHER SPONDYLOSIS, LUMBAR REGION: ICD-10-CM

## 2022-11-04 DIAGNOSIS — G89.29 CHRONIC PAIN OF BOTH HIPS: ICD-10-CM

## 2022-11-04 DIAGNOSIS — M53.3 SACROILIAC JOINT DYSFUNCTION OF BOTH SIDES: ICD-10-CM

## 2022-11-04 PROCEDURE — 99999 PR PBB SHADOW E&M-EST. PATIENT-LVL III: ICD-10-PCS | Mod: PBBFAC,,, | Performed by: PHYSICIAN ASSISTANT

## 2022-11-04 PROCEDURE — 99214 PR OFFICE/OUTPT VISIT, EST, LEVL IV, 30-39 MIN: ICD-10-PCS | Mod: S$PBB,,, | Performed by: PHYSICIAN ASSISTANT

## 2022-11-04 PROCEDURE — 99214 OFFICE O/P EST MOD 30 MIN: CPT | Mod: S$PBB,,, | Performed by: PHYSICIAN ASSISTANT

## 2022-11-04 PROCEDURE — 99999 PR PBB SHADOW E&M-EST. PATIENT-LVL III: CPT | Mod: PBBFAC,,, | Performed by: PHYSICIAN ASSISTANT

## 2022-11-04 PROCEDURE — 99213 OFFICE O/P EST LOW 20 MIN: CPT | Mod: PBBFAC,PN | Performed by: PHYSICIAN ASSISTANT

## 2022-11-04 RX ORDER — GABAPENTIN 800 MG/1
800 TABLET ORAL 3 TIMES DAILY
Qty: 270 TABLET | Refills: 0 | Status: SHIPPED | OUTPATIENT
Start: 2022-11-04 | End: 2022-11-07 | Stop reason: SDUPTHER

## 2022-11-04 NOTE — PROGRESS NOTES
FOLLOW UP NOTE:     CHIEF COMPLAINT: left sided back and hip pain    INITIAL HISTORY OF PRESENT ILLNESS: Payton Castillo is a 67 y.o. female with PMH significant for HTN, DM II on insulin, CKD, and hx of bilateral knee arthroplasties presents as a referral for the evaluation of back and hip pain. The patient reports that her pain began approximately decades ago after no inciting incident or trauma. The patient reports of worsening severity over time. Hip pain is currently her worst pain. The patient believes her low back and hip pain are related. The patient localizes her pain to the area across her lower back (L > R). The patient reports of radiation to her hips b/l (L > R) and down the lateral aspect of her BLE's to her ankles. The patient describes her pain as a throbbing and sharp type of pain and a burning type of pain in her legs. The patient reports of tingling in her BLE's and her feet. The patient reports that her pain is a 6/10 with prolonged standing. Patient denies of any urinary/fecal incontinence, saddle anesthesia, or weakness.       Aggravating factors: bending, standing, walking     Mitigating factors: sitting, heat, massage      Relevant Surgeries: no     Interventional Therapies: yes; saw Dr. Dane Dalton - did back injections with some benefit   History above per Dr. Ruggiero. Patient new to me  INTERVAL HISTORY OF PRESENT ILLNESS: Payton Castillo is a 67 y.o. female with PMH significant for HTN, DM II on insulin, CKD, and hx of bilateral knee arthroplasties presents as an established patient for the continued management of back and hip pain. She is s/p left SIJ injection with 40% improvement. CC today is non radiating low back pain Pain is constant but worsens with extension.   She discontinued Tylenol #3 PO BID. She is taking Gabapentin 800 mg TID. Requests a refill. Previous prescribed by PCP.   The patient denies of any significant changes in her health since her last appointment. The patient also  "denies of any changes in the character of her pain since her last appointment. The patient reports that her current pain is a 3/10. Patient denies of any urinary/fecal incontinence, saddle anesthesia, or weakness.     INTERVENTIONAL PAIN HISTORY: N/A    CURRENT PAIN MEDICATIONS:   Currently taking: Biofreeze, OTC Tylenol, gabapentin 800 mg PO TID, Tylenol #3 PO BID     Has tried in the past:    Opioids: yes  NSAIDS: no; avoids secondary to CKD  Tylenol: yes  Muscle relaxants: yes; tried with some benefit; tried robaxin - was instructed to discontinue secondary to her kidney issues  TCAs: no  SNRIs: no  Anticonvulsants: yes  topical creams: yes      ROS:  Review of Systems   Constitutional:  Negative for chills and fever.   HENT:  Negative for sore throat.    Eyes:  Negative for visual disturbance.   Respiratory:  Negative for shortness of breath.    Cardiovascular:  Negative for chest pain.   Gastrointestinal:  Negative for nausea and vomiting.   Genitourinary:  Negative for difficulty urinating.   Musculoskeletal:  Positive for arthralgias and back pain.   Skin:  Negative for rash.   Allergic/Immunologic: Negative for immunocompromised state.   Neurological:  Negative for syncope.   Hematological:  Does not bruise/bleed easily.   Psychiatric/Behavioral:  Negative for suicidal ideas.       MEDICAL, SURGICAL, FAMILY, SOCIAL HX: reviewed    MEDICATIONS/ALLERGIES: reviewed    PHYSICAL EXAM:    VITALS: Vitals reviewed.   Vitals:    11/04/22 0914   BP: (!) 150/72   Pulse: (!) 58   Weight: 77.6 kg (171 lb)   Height: 5' 6" (1.676 m)   PainSc:   3   PainLoc: Hip       Physical Exam  Vitals and nursing note reviewed.   Constitutional:       Appearance: Normal appearance. She is not toxic-appearing or diaphoretic.   HENT:      Head: Normocephalic and atraumatic.   Eyes:      General:         Right eye: No discharge.         Left eye: No discharge.      Extraocular Movements: Extraocular movements intact.      " Conjunctiva/sclera: Conjunctivae normal.   Cardiovascular:      Rate and Rhythm: Normal rate.   Pulmonary:      Effort: Pulmonary effort is normal. No respiratory distress.      Breath sounds: Normal breath sounds.   Abdominal:      Palpations: Abdomen is soft.   Skin:     General: Skin is warm and dry.      Findings: No rash.   Neurological:      Mental Status: She is alert and oriented to person, place, and time.   Psychiatric:         Mood and Affect: Mood and affect normal.         Cognition and Memory: Memory normal.         Judgment: Judgment normal.        UPPER EXTREMITIES: Normal alignment, normal range of motion, no atrophy, no skin changes,  hair growth and nail growth normal and equal bilaterally. No swelling, no tenderness.    LOWER EXTREMITIES:  Normal alignment, normal range of motion, no atrophy, no skin changes,  hair growth and nail growth normal and equal bilaterally. No swelling, no tenderness.     LUMBAR SPINE  Lumbar spine: ROM is limited with flexion extension and oblique extension with increased pain with passive ROM    ((--)) Supine straight leg raise    ((+)) Facet loading   Internal and external rotation of the hip causes no increased pain on either side.  Myofascial exam: Tenderness to palpation across lumbar paraspinous muscles.     ((+)) TTP at the SI joint  ((+)) MANDO's test  ((+)) One leg stand    ((+)) Distraction test  ((+)) Thigh thrust     MOTOR: Tone and bulk: normal      MUSCLE STRENGTH:  Hip Flexion: Right 5/5, Left 5/5  Hip Extension: Right 5/5, Left 5/5  Leg Flexion: Right 5/5, Left 5/5  Leg Extension: Right 5/5, Left 5/5  Plantar Flexion: Right 5/5, Left 5/5  Dorsiflexion: Right 5/5, Left 5/5     Delt      Bi         Tri                         Right:   5/5       5/5       5/5       5/5         Left:     5/5       5/5       5/5       5/5            SENSATION: Light touch and pinprick intact bilaterally  REFLEXES: normal, symmetric, nonbrisk.  Toes down, no clonus.  Negative woody's sign bilaterally.  GAIT: normal rise, base, steps, and arm swing.      IMAGING:    MRI Lumbar Spine without contrast (9/16/2022):  FINDINGS: Comparison to multiple prior exams. The lumbar spine has normal lordotic curvature and vertebral body alignment, with normal vertebral heights, and no acute fractures or evidence of a diffuse marrow replacement process. Mild-to-moderate degenerative loss of disc height and signal involves multiple levels. The conus medullaris is normal in signal and terminates at L1-L2.     At T12-L1, L1-L2, L2-L3, L3-L4, and L4-L5, there is mild broad-based disc bulging, without focal disc herniation or spinal canal stenosis. There is mild multilevel facet arthropathy, without significant neural foraminal narrowing or evidence of nerve impingement..     At L5-S1, there is broad-based disc bulging with T2 hyperintense right paracentral partial-thickness annular fissure. There is no focal disc herniation or spinal canal stenosis, with bilateral facet hypertrophy and T2 hyperintense synovial cyst formation. There is no significant foraminal narrowing or evidence of nerve impingement.     There are no signal abnormalities of the paraspinal soft tissues or the paraspinal ligaments. The visualized retroperitoneum is unremarkable.     IMPRESSION: Multilevel lumbar degenerative disc disease and facet osteoarthritis, with no focal disc herniation, spinal canal stenosis, or evidence of nerve impingement at any level.    X-ray bilateral hips (8/26/2022):  There is mild osteoarthritic changes of the left hip with spurring of the acetabulum. The hip joints are symmetric in size. There are no fractures or acute osseous abnormalities. The SI joints are symmetrical. There is facet hypertrophy at L5-S1. The soft tissues are normal    X-ray sacroiliac joints (8/26/2022):  The SI joints are symmetric in size. There are no erosions or ankylosis. There are mild degenerative changes of the left  hip. There is facet hypertrophy at L5-S1. There are no acute osseous anomalies.     IMPRESSION: Facet hypertrophy at L5-S1     Normal SI joints     Mild degenerative changes of the left hip    ASSESSMENT: Payton Castillo is a 67 y.o. female with PMH significant for HTN, DM II on insulin, CKD, and hx of bilateral knee arthroplasties presents as an established patient for the continued management of back and hip pain. Today, the patient localizes her pain to her low back. Reviewed MRI  and x-ray imaging with the patient personally. Imaging significant for DDD and facet arthropathy which I believe is contributing to her current pain to some degree. Treatment plan outlined below.     PLAN:  Schedule for left sacroiliac joint injection   Avoid NSAIDS given history of CKD  I have stressed the importance of physical activity and a home exercise plan to help with chronic pain and improve health.  Continue Gabapentin 800 mg TID. Discussed decreased clearance due to CKD. Do not recommend further dose escalation   Schedule lumbar MBB workup at L3, 4, 5 bilaterally. Will complete workup and RFA if indicated.

## 2022-11-07 ENCOUNTER — PATIENT MESSAGE (OUTPATIENT)
Dept: PAIN MEDICINE | Facility: CLINIC | Age: 67
End: 2022-11-07
Payer: MEDICARE

## 2022-11-07 DIAGNOSIS — M47.896 OTHER SPONDYLOSIS, LUMBAR REGION: Primary | ICD-10-CM

## 2022-11-08 RX ORDER — GABAPENTIN 800 MG/1
800 TABLET ORAL 3 TIMES DAILY
Qty: 270 TABLET | Refills: 0 | Status: SHIPPED | OUTPATIENT
Start: 2022-11-08 | End: 2023-01-23 | Stop reason: SDUPTHER

## 2022-11-10 ENCOUNTER — TELEPHONE (OUTPATIENT)
Dept: HEMATOLOGY/ONCOLOGY | Facility: CLINIC | Age: 67
End: 2022-11-10

## 2022-11-10 ENCOUNTER — LAB VISIT (OUTPATIENT)
Dept: LAB | Facility: HOSPITAL | Age: 67
End: 2022-11-10
Attending: INTERNAL MEDICINE
Payer: MEDICARE

## 2022-11-10 DIAGNOSIS — Z85.528 HX OF RENAL CELL CANCER: Primary | ICD-10-CM

## 2022-11-10 DIAGNOSIS — Z85.528 HX OF RENAL CELL CANCER: ICD-10-CM

## 2022-11-10 LAB
ALBUMIN SERPL BCP-MCNC: 4.2 G/DL (ref 3.5–5.2)
ALP SERPL-CCNC: 64 U/L (ref 55–135)
ALT SERPL W/O P-5'-P-CCNC: 47 U/L (ref 10–44)
ANION GAP SERPL CALC-SCNC: 11 MMOL/L (ref 8–16)
AST SERPL-CCNC: 30 U/L (ref 10–40)
BASOPHILS # BLD AUTO: 0.04 K/UL (ref 0–0.2)
BASOPHILS NFR BLD: 0.6 % (ref 0–1.9)
BILIRUB SERPL-MCNC: 0.6 MG/DL (ref 0.1–1)
BUN SERPL-MCNC: 23 MG/DL (ref 8–23)
CALCIUM SERPL-MCNC: 10.2 MG/DL (ref 8.7–10.5)
CHLORIDE SERPL-SCNC: 106 MMOL/L (ref 95–110)
CO2 SERPL-SCNC: 28 MMOL/L (ref 23–29)
CREAT SERPL-MCNC: 1.2 MG/DL (ref 0.5–1.4)
DIFFERENTIAL METHOD: ABNORMAL
EOSINOPHIL # BLD AUTO: 0.1 K/UL (ref 0–0.5)
EOSINOPHIL NFR BLD: 1.8 % (ref 0–8)
ERYTHROCYTE [DISTWIDTH] IN BLOOD BY AUTOMATED COUNT: 12.4 % (ref 11.5–14.5)
EST. GFR  (NO RACE VARIABLE): 49.6 ML/MIN/1.73 M^2
GLUCOSE SERPL-MCNC: 71 MG/DL (ref 70–110)
HCT VFR BLD AUTO: 40.7 % (ref 37–48.5)
HGB BLD-MCNC: 13.2 G/DL (ref 12–16)
IMM GRANULOCYTES # BLD AUTO: 0.04 K/UL (ref 0–0.04)
IMM GRANULOCYTES NFR BLD AUTO: 0.6 % (ref 0–0.5)
LYMPHOCYTES # BLD AUTO: 2.8 K/UL (ref 1–4.8)
LYMPHOCYTES NFR BLD: 38.9 % (ref 18–48)
MCH RBC QN AUTO: 31.1 PG (ref 27–31)
MCHC RBC AUTO-ENTMCNC: 32.4 G/DL (ref 32–36)
MCV RBC AUTO: 96 FL (ref 82–98)
MONOCYTES # BLD AUTO: 0.5 K/UL (ref 0.3–1)
MONOCYTES NFR BLD: 7.4 % (ref 4–15)
NEUTROPHILS # BLD AUTO: 3.6 K/UL (ref 1.8–7.7)
NEUTROPHILS NFR BLD: 50.7 % (ref 38–73)
NRBC BLD-RTO: 0 /100 WBC
PLATELET # BLD AUTO: 361 K/UL (ref 150–450)
PMV BLD AUTO: 8.5 FL (ref 9.2–12.9)
POTASSIUM SERPL-SCNC: 4.1 MMOL/L (ref 3.5–5.1)
PROT SERPL-MCNC: 8.7 G/DL (ref 6–8.4)
RBC # BLD AUTO: 4.25 M/UL (ref 4–5.4)
SODIUM SERPL-SCNC: 145 MMOL/L (ref 136–145)
WBC # BLD AUTO: 7.15 K/UL (ref 3.9–12.7)

## 2022-11-10 PROCEDURE — 85025 COMPLETE CBC W/AUTO DIFF WBC: CPT | Performed by: INTERNAL MEDICINE

## 2022-11-10 PROCEDURE — 80053 COMPREHEN METABOLIC PANEL: CPT | Performed by: INTERNAL MEDICINE

## 2022-11-16 NOTE — PROGRESS NOTES
November 16, 2022    To Whom It May Concern:         This is confirmation that Hiwot Archibald attended her scheduled appointment with NGUYỄN Mars on 11/16/22. Please excuse for missed work. Please excuse through 11/18/22.          If you have any questions please do not hesitate to call me at the phone number listed below.    Sincerely,          ISHAN Mars.  [phone]                   "                            Occupational Therapy Daily Treatment Note       Date: 6/11/2020  Name: Payton Castillo  Clinic Number: [id number]    Therapy Diagnosis:   Encounter Diagnosis   Name Primary?    Right hand pain Yes     Physician: Leonid Aragon MD    Physician Orders: (R) carpal tunnel syndrome;  (R) OA of 1st metacarpal;  Evaluate and treat;  ROM, US, Paraffin, massage, deep heat, slow progressive strengthening  Medical Diagnosis: (R) hand / wrist pain;  (R) carpal tunnel syndrome;  (R) OA of 1st metacarpal  Surgical Procedure and Date: Excision of bony osteophyte of (R) thumb IP joint radial and ulnar border;  Arthrotomy IP (R) thumb; Excision of mucosynovial cyst (R) thumb IP joint; (R) thumb flexor tenosynovectomy; Median nerve decompression at (R) wrist;  Ulnar nerve decompression at elbow with submuscular transposition; (R) carpal trapeziectomy;  01/28/2020  Insurance Authorization Period Expiration: 03/03/2020-12/31/2020  Plan of Care Certification Period: 05/26/2020-08/26/2020  Date of Return to MD: 07/13/2020    Visit # / Visits authorized: 26 / 104  Time In: 11:00  Time Out: 11:45  Total Billable Time: 45 minutes    Precautions:  Standard;  Kidney CA ( approx. 2014)      Post Op:   01/28/2020      Subjective     Pt reports: "I felt a little stiff in my hand yesterday but after I stretched it.  It was better."    1/10 pain (R) hand/ wrist    Objective    Patient seen by OT this session. Tx consisted of:      Supervised modalities after being cleared for contradictions  x  10 minutes:  -Fluidotherapy to  (R)  hand to increase blood flow, circulation, tissue elasticity, desensitization, sensory re-education and for pain management  X 10 minutes.         Direct contact modalities after being cleared for contraindications x   0 minutes:  -Ultrasound applied to radial aspect of (R) hand  on   3.3 Mhz with 0.7 w/cm2 @ 50 % duty cycle to decrease pain and inflammation  /////  to remold scar tissue and " "increase tissue elasticity x 0 minutes.  (not completed today)        Manual therapy techniques to increase joint mobilization /// scar massage/// soft tissue mobilization   x   10 minutes:     -Scar Massage to volar aspect of (R) wrist and dorsal aspect of (R) thumb region with mini therapeutic vibrator and scar extractor on volar aspect of incision sites to decrease dense scar adhesions and improve  tensile glide  x 3 minutes.  -Manual Therapy techniques to (R) hand/ wrist  Including light joint mobilizations, stretching, and PROM to increase joint mobility, ROM and for pain management  x  5 minutes   -Soft Tissue Mobilization to (R) hand / wrist from distal to proximal to decrease edema and increase ROM and functional abilities  x  2 minutes      Therapeutic Exercises to improve functional performance while increasing strength, endurance, ROM,  and flexibility  x   25 minutes:      · - Joint blocking and reverse joint blocking for IP of (R) thumb for EXT/FLEX x 10 reps    - 2# DF/VF (SUP/PRON), RD/UD  x 10 reps   - Colored bead stringing with thumb to 5th digit DIP x 20 beads   - YELLOW Theraputty for intrinsic pulling and thumb raking for FLEX;  RED Theraputty for  medicine cap x 10 reps   - RED Theraputty with PVC for "cookie cutting" x 20 reps   - RED Theraputty for rolling and  pulling x 10 reps   - RED Flexbar for SUP/PRON and DF/VF x 10 reps   - Mini colored pegboard for FMC and in-hand manipulation  (5 in-hand) x 40 pegs   - 35#PHG (black spring) for composite  x 20 reps   - Rice bucket with (3) Nuts, (3) LARGE washers and (3) glass beads to increase sensitization ( vision occluded) x 1 rep  (not completed this session)   - Wooden pegboard with GREEN clothespins with 3PT pinch x 2 reps      - RED digiflex for isolated x 10 reps GREEN digiflex for composite x 20 reps   - RED Digiweb for intrinsics x 10 reps   - 3 YELLOW rubber band gripper for thumb FLEX x 10 reps   - [place] Slot with (10) LARGE washers " "and  (10) pennies ( 5 in hand) for FMC x 1 rep    Initiate in future therapy sessions:      · - Cotton balls thumb tip to palm x 2 reps  x 10 balls  · - Silver balls in CW/CCW motion x 3min   - ### Wrist exerstick in standing for DF/VF x 3 reps           Assessment     Pt. will continue to benefit from skilled OT intervention to increase functional abilities, ROM, and strength and pain control.  Pt. demonstrated proper understanding of each exercise.  Pt continues to require verbal and tactile cues for throughout therapy session to maintain position and prevent compensation.   Pt continues to be limited in functional and leisurely pursuits. Pain limits patient's participation in ADL's. Pt is not able to carryout necessary vocational tasks.  Pt's spiritual, cultural and educational needs considered and pt agreeable to plan of care and goals.  Patient is making good progress towards established goals.  Pt. tolerated exercises within pain tolerance.   Reviewed HEP with pt., see EMR under "patient instructions" for provided exercises, activity modifications and limitations, modalities for home pain management.  Pt. demo understanding of above.      Pt. tolerated  Fluidotherapy with no irritation. Pt. Provided with Dycem and silicone scar pads (night time use) for scar management.   Provided pt. With YELLOW theraputty for rolling, pinching and composite ; cotton ball for thumb IP rolling on palm/5th digit.  Pt. Demo understanding of above.      Opposition DPC to thumb tip : 2.3cm ( measured with wrist goni.    Pt. Unable to hold thumb in opposition during "place and hold".   Pt. Tolerated addition of coin slot with no reported pain but demo limited ROM and coordination ( dropped items several times).     New/Revised Goals: Cont POC  1)   Pt. to be IND with HEP and modalities for pain management by 1 week.  (MET)  2)   Pt. will report   0/10 pain on average with activity to A with exercises by 6-8 weeks.  (In " Progress)  3)   Pt. will increase ROM 3-5 degrees to increase functional use for ADL's by 6-8 weeks. ( in progress)  4)   Pt. will decrease edema 0.1-0.3 mm to increase joint mobility /flexibility by 6-8 weeks.  ( in progress)  5)   Pt. will increase MMT by a grade to A with lifting items by 6-8 weeks.    6)   Pt. will increase  strength 3-5 lbs. to open containers by 6-8 weeks.  ( in progress)  7)   Pt. will increase pinch 1-3 lbs for buttoning by 6-8 weeks. ( in progress)  8)   Pt. Will decrease time during 9 hole peg test by 3 seconds to A with FMC by 6-8 weeks.      Plan      Continue 2-3x week x 90 days during the certification period    05/26/2020   to 08/26/2020   with established POC in pursuit of OT goals.      Toshia Fisher, OT

## 2022-11-18 ENCOUNTER — PATIENT MESSAGE (OUTPATIENT)
Dept: PAIN MEDICINE | Facility: CLINIC | Age: 67
End: 2022-11-18
Payer: MEDICARE

## 2022-11-21 NOTE — PROGRESS NOTES
[facility] Hematology/Oncology  PROGRESS NOTE      Subjective:       Patient ID:   NAME: Payton Castillo : 1955     67 y.o. female    Referring Doc: An (new PCP)  Other Physicians: Corona Haro; Mahesh (Hand Surg); [place]    Chief Complaint:  Anemia/renal ca f/u    History of Present Illness:     Patient returns today for a regularly scheduled follow-up visit.  She is here by herself. She is doing ok. She has chronic aches with sciatica. She denies any CP, SOB, HA's or N/V.     She had steroid injection in the SI joint and plans to have lower back injection in near future with [facility]    Her shoulder has been stable     She saw Dr Nolan in Aug 2022 and sees him again in 2023;           Discussed covid19 precautions - she had her vaccinations and booster        ROS:   GEN: normal without any fever, night sweats or weight loss; chronic arthritis issues  HEENT: normal with no HA's, sore throat, stiff neck, changes in vision  CV: normal with no CP, SOB, PND, PALMA or orthopnea  PULM: normal with no SOB, cough, hemoptysis, sputum or pleuritic pain  GI: normal with no abdominal pain, nausea, vomiting, constipation, diarrhea, melanotic stools, BRBPR, or hematemesis  : normal with no hematuria, dysuria  BREAST: normal with no mass, discharge, pain  SKIN: normal with no rash, erythema, bruising, or swelling    Allergies:  Review of patient's allergies indicates:   Allergen Reactions    Aspartame Other (See Comments)     Headache      Fructose Other (See Comments)     headache    Monosodium glutamate Other (See Comments)     Headache      Pcn [penicillins]      Rash         Medications:    Current Outpatient Medications:     acetaminophen-codeine 300-30mg (TYLENOL #3) 300-30 mg Tab, , Disp: , Rfl:     amLODIPine (NORVASC) 2.5 MG tablet, Take 5 mg by mouth once daily., Disp: , Rfl:     atorvastatin (LIPITOR) 40 MG tablet, TAKE 1 TABLET EVERY EVENING, Disp: 90 tablet, Rfl: 3    BD INSULIN SYRINGE 1 mL 28 gauge x  "1/2" Syrg, USE 1 SYRINGE ONCE DAILY, Disp: 100 each, Rfl: 3    cetirizine (ZYRTEC) 10 MG tablet, Take 10 mg by mouth., Disp: , Rfl:     flash glucose sensor (FREESTYLE BANG 14 DAY SENSOR) Kit, USE ONE SENSOR EVERY 14 DAYS, Disp: 6 kit, Rfl: 10    fluticasone propionate (FLONASE) 50 mcg/actuation nasal spray, USE 2 SPRAYS IN EACH NOSTRIL ONCE DAILY, Disp: 48 g, Rfl: 3    FREESTYLE LANCETS MISC, by Misc.(Non-Drug; Combo Route) route 3 (three) times daily., Disp: , Rfl:     gabapentin (NEURONTIN) 800 MG tablet, Take 1 tablet (800 mg total) by mouth 3 (three) times daily., Disp: 270 tablet, Rfl: 0    insulin aspart U-100 (NOVOLOG FLEXPEN U-100 INSULIN) 100 unit/mL (3 mL) InPn pen, INJECT 20 UNITS UNDER THE SKIN THREE TIMES A DAY WITH MEALS ( INSTEAD OF HUMALOG ), Disp: 60 mL, Rfl: 3    insulin syringe-needle U-100 0.3 mL 30 Syrg, by Misc.(Non-Drug; Combo Route) route 3 (three) times daily., Disp: , Rfl:     KERENDIA 10 mg Tab, Take 1 tablet by mouth once daily., Disp: , Rfl:     LANTUS U-100 INSULIN 100 unit/mL injection, INJECT 70 UNITS UNDER THE SKIN EVERY EVENING, Disp: 210 mL, Rfl: 3    losartan (COZAAR) 100 MG tablet, Take 1 tablet (100 mg total) by mouth Daily., Disp: 90 tablet, Rfl: 3    metoprolol succinate (TOPROL-XL) 50 MG 24 hr tablet, TAKE 1 TABLET DAILY, Disp: 90 tablet, Rfl: 3    pen needle, diabetic (ADVOCATE PEN NEEDLE) 31 gauge x 5/16" Ndle, 1 Device by Misc.(Non-Drug; Combo Route) route 3 (three) times daily., Disp: 100 each, Rfl: 3    solifenacin (VESICARE) 5 MG tablet, Take 5 mg by mouth., Disp: , Rfl:     PMHx/PSHx Updates:  See patient's last visit with me on 11/23/2021  See H&P on 10/11/2006        Pathology:  See path Aug 2014          Objective:     Vitals:  Blood pressure 124/82, pulse 77, temperature 97 °F (36.1 °C), resp. rate 16, height 5' 6" (1.676 m), weight 76.6 kg (168 lb 12.8 oz).    Physical Examination:   GEN: no apparent distress, comfortable; AAOx3  HEAD: atraumatic and " normocephalic  EYES: no pallor, no icterus, PERRLA  ENT: OMM, no pharyngeal erythema, external ears WNL; no nasal discharge; no thrush  NECK: no masses, thyroid normal, trachea midline, no LAD/LN's, supple  CV: RRR with no murmur; normal pulse; normal S1 and S2; no pedal edema  CHEST: Normal respiratory effort; CTAB; normal breath sounds; no wheeze or crackles  ABDOM: nontender and nondistended; soft; normal bowel sounds; no rebound/guarding  MUSC/Skeletal: ROM normal; no crepitus; joints normal; no deformities or arthropathy  EXTREM: no clubbing, cyanosis, inflammation or swelling  SKIN: no rashes, lesions, ulcers, petechiae or subcutaneous nodules  : no bright  NEURO: grossly intact; motor/sensory WNL; AAOx3; no tremors  PSYCH: normal mood, affect and behavior  LYMPH: normal cervical, supraclavicular, axillary and groin LN's            Labs:        Lab Results   Component Value Date    WBC 7.15 11/10/2022    HGB 13.2 11/10/2022    HCT 40.7 11/10/2022    MCV 96 11/10/2022     11/10/2022     BMP  Lab Results   Component Value Date     11/10/2022    K 4.1 11/10/2022     11/10/2022    CO2 28 11/10/2022    BUN 23 11/10/2022    CREATININE 1.2 11/10/2022    CALCIUM 10.2 11/10/2022    ANIONGAP 11 11/10/2022    ESTGFRAFRICA 44.9 (A) 07/20/2022    EGFRNONAA 38.9 (A) 07/20/2022     Lab Results   Component Value Date    ALT 47 (H) 11/10/2022    AST 30 11/10/2022    ALKPHOS 64 11/10/2022    BILITOT 0.6 11/10/2022             Radiology/Diagnostic Studies:    Mammo  2/7/2022:  Impression:     No mammographic findings of malignancy or significant detrimental interval change.     BI-RADS CATEGORY 1: NEGATIVE.         Mammo  2/5/2021:    Impression:     Negative mammogram.     Recommendation: Annual screening mammography.     BI-RADS CATEGORY: 1  NEGATIVE    CXR 1/12/2021:    IMPRESSION:     No acute cardiopulmonary abnormality.      CT Abdom/pelvis  6/25/2021:    IMPRESSION: Mild wall thickening of the distal  sigmoid colon and rectum suggestive of colitis most likely infectious or inflammatory etiology.     Small hepatic cysts     Fat-containing left inguinal hernia       US  11/12/2020:    Impression:     Borderline mildly elevated bilateral renal resistive indices which can be seen with renal dysfunction.         CT 11/1/2017    I have reviewed all available lab results and radiology reports.    Assessment/Plan:   (1) 67 y.o. female with diagnosis of chronic anemia and thrombocytopenia in the past  - multifactorial anemia process with underlying anemia of chronic disorders and anemia of chronic renal  - latest labs wnl - no residual anemia or tcp    11/23/2021:  - latest labs are adequate    11/22/2022:  - no current anemia    (2) prior left nephrectomy in Aug 2014  - Stage I renal cell carcinoma  - followed by Dr Hill with  - she is seeing him this coming Monday  - CT scans on 11/1/2017 negative  - recent US of kidneys - negative 11/12/2020 11/23/2021:  - she sees Dr Jeter again in Dec 2021  - she had CT scans in June 2021 11/22/2022:  - she has US planned on Dec 1st with     (3) CRI - followed by Dr Tommy Haro with nephrology    (4) Chronic joint/hip issues - followed by Dr Faye and plans to see Dr Aragon in near future    1. Hx of renal cell cancer - left nephrectomy Aug 2014        2. Anemia, chronic renal failure, stage 3 (moderate)        3. Anemia of chronic disorder              PLAN:  1. F/u with PCP,  and Neph  2. Check labs again in one year  3. F/u with urology this coming Dec 2022 with planned US  4. Routine Mammo due in Feb 2023    RTC in 12 months    Fax note to Estrellita Nolan Kreiger and Leonid Hill    Discussion:       COVID-19 Discussion:    I had long discussion with patient and any applicable family about the COVID-19 coronavirus epidemic and the recommended precautions with regard to cancer and/or hematology patients. I have re-iterated the CDC  recommendations for adequate hand washing, use of hand -like products, and coughing into elbow, etc. In addition, especially for our patients who are on chemotherapy and/or our otherwise immunocompromised patients, I have recommended avoidance of crowds, including movie theaters, restaurants, churches, etc. I have recommended avoidance of any sick or symptomatic family members and/or friends. I have also recommended avoidance of any raw and unwashed food products, and general avoidance of food items that have not been prepared by themselves. The patient has been asked to call us immediately with any symptom developments, issues, questions or other general concerns.         I have explained all of the above in detail and the patient understands all of the current recommendation(s). I have answered all of their questions to the best of my ability and to their complete satisfaction.   The patient is to continue with the current management plan.            Electronically signed by Sebastien Tavarez MD

## 2022-11-21 NOTE — H&P (VIEW-ONLY)
[facility] Hematology/Oncology  PROGRESS NOTE      Subjective:       Patient ID:   NAME: Payton Castillo : 1955     67 y.o. female    Referring Doc: An (new PCP)  Other Physicians: Corona Haro; Mahesh (Hand Surg); [place]    Chief Complaint:  Anemia/renal ca f/u    History of Present Illness:     Patient returns today for a regularly scheduled follow-up visit.  She is here by herself. She is doing ok. She has chronic aches with sciatica. She denies any CP, SOB, HA's or N/V.     She had steroid injection in the SI joint and plans to have lower back injection in near future with Pierre    Her shoulder has been stable     She saw Dr Nolan in Aug 2022 and sees him again in 2023;           Discussed covid19 precautions - she had her vaccinations and booster        ROS:   GEN: normal without any fever, night sweats or weight loss; chronic arthritis issues  HEENT: normal with no HA's, sore throat, stiff neck, changes in vision  CV: normal with no CP, SOB, PND, PALMA or orthopnea  PULM: normal with no SOB, cough, hemoptysis, sputum or pleuritic pain  GI: normal with no abdominal pain, nausea, vomiting, constipation, diarrhea, melanotic stools, BRBPR, or hematemesis  : normal with no hematuria, dysuria  BREAST: normal with no mass, discharge, pain  SKIN: normal with no rash, erythema, bruising, or swelling    Allergies:  Review of patient's allergies indicates:   Allergen Reactions    Aspartame Other (See Comments)     Headache      Fructose Other (See Comments)     headache    Monosodium glutamate Other (See Comments)     Headache      Pcn [penicillins]      Rash         Medications:    Current Outpatient Medications:     acetaminophen-codeine 300-30mg (TYLENOL #3) 300-30 mg Tab, , Disp: , Rfl:     amLODIPine (NORVASC) 2.5 MG tablet, Take 5 mg by mouth once daily., Disp: , Rfl:     atorvastatin (LIPITOR) 40 MG tablet, TAKE 1 TABLET EVERY EVENING, Disp: 90 tablet, Rfl: 3    BD INSULIN SYRINGE 1 mL 28 gauge x  "1/2" Syrg, USE 1 SYRINGE ONCE DAILY, Disp: 100 each, Rfl: 3    cetirizine (ZYRTEC) 10 MG tablet, Take 10 mg by mouth., Disp: , Rfl:     flash glucose sensor (FREESTYLE BANG 14 DAY SENSOR) Kit, USE ONE SENSOR EVERY 14 DAYS, Disp: 6 kit, Rfl: 10    fluticasone propionate (FLONASE) 50 mcg/actuation nasal spray, USE 2 SPRAYS IN EACH NOSTRIL ONCE DAILY, Disp: 48 g, Rfl: 3    FREESTYLE LANCETS MISC, by Misc.(Non-Drug; Combo Route) route 3 (three) times daily., Disp: , Rfl:     gabapentin (NEURONTIN) 800 MG tablet, Take 1 tablet (800 mg total) by mouth 3 (three) times daily., Disp: 270 tablet, Rfl: 0    insulin aspart U-100 (NOVOLOG FLEXPEN U-100 INSULIN) 100 unit/mL (3 mL) InPn pen, INJECT 20 UNITS UNDER THE SKIN THREE TIMES A DAY WITH MEALS ( INSTEAD OF HUMALOG ), Disp: 60 mL, Rfl: 3    insulin syringe-needle U-100 0.3 mL 30 Syrg, by Misc.(Non-Drug; Combo Route) route 3 (three) times daily., Disp: , Rfl:     KERENDIA 10 mg Tab, Take 1 tablet by mouth once daily., Disp: , Rfl:     LANTUS U-100 INSULIN 100 unit/mL injection, INJECT 70 UNITS UNDER THE SKIN EVERY EVENING, Disp: 210 mL, Rfl: 3    losartan (COZAAR) 100 MG tablet, Take 1 tablet (100 mg total) by mouth Daily., Disp: 90 tablet, Rfl: 3    metoprolol succinate (TOPROL-XL) 50 MG 24 hr tablet, TAKE 1 TABLET DAILY, Disp: 90 tablet, Rfl: 3    pen needle, diabetic (ADVOCATE PEN NEEDLE) 31 gauge x 5/16" Ndle, 1 Device by Misc.(Non-Drug; Combo Route) route 3 (three) times daily., Disp: 100 each, Rfl: 3    solifenacin (VESICARE) 5 MG tablet, Take 5 mg by mouth., Disp: , Rfl:     PMHx/PSHx Updates:  See patient's last visit with me on 11/23/2021  See H&P on 10/11/2006        Pathology:  See path Aug 2014          Objective:     Vitals:  Blood pressure 124/82, pulse 77, temperature 97 °F (36.1 °C), resp. rate 16, height 5' 6" (1.676 m), weight 76.6 kg (168 lb 12.8 oz).    Physical Examination:   GEN: no apparent distress, comfortable; AAOx3  HEAD: atraumatic and " normocephalic  EYES: no pallor, no icterus, PERRLA  ENT: OMM, no pharyngeal erythema, external ears WNL; no nasal discharge; no thrush  NECK: no masses, thyroid normal, trachea midline, no LAD/LN's, supple  CV: RRR with no murmur; normal pulse; normal S1 and S2; no pedal edema  CHEST: Normal respiratory effort; CTAB; normal breath sounds; no wheeze or crackles  ABDOM: nontender and nondistended; soft; normal bowel sounds; no rebound/guarding  MUSC/Skeletal: ROM normal; no crepitus; joints normal; no deformities or arthropathy  EXTREM: no clubbing, cyanosis, inflammation or swelling  SKIN: no rashes, lesions, ulcers, petechiae or subcutaneous nodules  : no bright  NEURO: grossly intact; motor/sensory WNL; AAOx3; no tremors  PSYCH: normal mood, affect and behavior  LYMPH: normal cervical, supraclavicular, axillary and groin LN's            Labs:        Lab Results   Component Value Date    WBC 7.15 11/10/2022    HGB 13.2 11/10/2022    HCT 40.7 11/10/2022    MCV 96 11/10/2022     11/10/2022     BMP  Lab Results   Component Value Date     11/10/2022    K 4.1 11/10/2022     11/10/2022    CO2 28 11/10/2022    BUN 23 11/10/2022    CREATININE 1.2 11/10/2022    CALCIUM 10.2 11/10/2022    ANIONGAP 11 11/10/2022    ESTGFRAFRICA 44.9 (A) 07/20/2022    EGFRNONAA 38.9 (A) 07/20/2022     Lab Results   Component Value Date    ALT 47 (H) 11/10/2022    AST 30 11/10/2022    ALKPHOS 64 11/10/2022    BILITOT 0.6 11/10/2022             Radiology/Diagnostic Studies:    Mammo  2/7/2022:  Impression:     No mammographic findings of malignancy or significant detrimental interval change.     BI-RADS CATEGORY 1: NEGATIVE.         Mammo  2/5/2021:    Impression:     Negative mammogram.     Recommendation: Annual screening mammography.     BI-RADS CATEGORY: 1  NEGATIVE    CXR 1/12/2021:    IMPRESSION:     No acute cardiopulmonary abnormality.      CT Abdom/pelvis  6/25/2021:    IMPRESSION: Mild wall thickening of the distal  sigmoid colon and rectum suggestive of colitis most likely infectious or inflammatory etiology.     Small hepatic cysts     Fat-containing left inguinal hernia       US  11/12/2020:    Impression:     Borderline mildly elevated bilateral renal resistive indices which can be seen with renal dysfunction.         CT 11/1/2017    I have reviewed all available lab results and radiology reports.    Assessment/Plan:   (1) 67 y.o. female with diagnosis of chronic anemia and thrombocytopenia in the past  - multifactorial anemia process with underlying anemia of chronic disorders and anemia of chronic renal  - latest labs wnl - no residual anemia or tcp    11/23/2021:  - latest labs are adequate    11/22/2022:  - no current anemia    (2) prior left nephrectomy in Aug 2014  - Stage I renal cell carcinoma  - followed by Dr Hill with  - she is seeing him this coming Monday  - CT scans on 11/1/2017 negative  - recent US of kidneys - negative 11/12/2020 11/23/2021:  - she sees Dr Jeter again in Dec 2021  - she had CT scans in June 2021 11/22/2022:  - she has US planned on Dec 1st with     (3) CRI - followed by Dr Tommy Haro with nephrology    (4) Chronic joint/hip issues - followed by Dr Faye and plans to see Dr Aragon in near future    1. Hx of renal cell cancer - left nephrectomy Aug 2014        2. Anemia, chronic renal failure, stage 3 (moderate)        3. Anemia of chronic disorder              PLAN:  1. F/u with PCP,  and Neph  2. Check labs again in one year  3. F/u with urology this coming Dec 2022 with planned US  4. Routine Mammo due in Feb 2023    RTC in 12 months    Fax note to Estrellita Nolan Kreiger and Leonid Hill    Discussion:       COVID-19 Discussion:    I had long discussion with patient and any applicable family about the COVID-19 coronavirus epidemic and the recommended precautions with regard to cancer and/or hematology patients. I have re-iterated the CDC  recommendations for adequate hand washing, use of hand -like products, and coughing into elbow, etc. In addition, especially for our patients who are on chemotherapy and/or our otherwise immunocompromised patients, I have recommended avoidance of crowds, including movie theaters, restaurants, churches, etc. I have recommended avoidance of any sick or symptomatic family members and/or friends. I have also recommended avoidance of any raw and unwashed food products, and general avoidance of food items that have not been prepared by themselves. The patient has been asked to call us immediately with any symptom developments, issues, questions or other general concerns.         I have explained all of the above in detail and the patient understands all of the current recommendation(s). I have answered all of their questions to the best of my ability and to their complete satisfaction.   The patient is to continue with the current management plan.            Electronically signed by Sebastien Tavarez MD

## 2022-11-22 ENCOUNTER — OFFICE VISIT (OUTPATIENT)
Dept: HEMATOLOGY/ONCOLOGY | Facility: CLINIC | Age: 67
End: 2022-11-22
Payer: MEDICARE

## 2022-11-22 VITALS
WEIGHT: 168.81 LBS | BODY MASS INDEX: 27.13 KG/M2 | TEMPERATURE: 97 F | RESPIRATION RATE: 16 BRPM | HEART RATE: 77 BPM | DIASTOLIC BLOOD PRESSURE: 82 MMHG | HEIGHT: 66 IN | SYSTOLIC BLOOD PRESSURE: 124 MMHG

## 2022-11-22 DIAGNOSIS — D63.8 ANEMIA OF CHRONIC DISORDER: ICD-10-CM

## 2022-11-22 DIAGNOSIS — N18.30 ANEMIA, CHRONIC RENAL FAILURE, STAGE 3 (MODERATE): ICD-10-CM

## 2022-11-22 DIAGNOSIS — D63.1 ANEMIA, CHRONIC RENAL FAILURE, STAGE 3 (MODERATE): ICD-10-CM

## 2022-11-22 DIAGNOSIS — M62.838 NECK MUSCLE SPASM: ICD-10-CM

## 2022-11-22 DIAGNOSIS — Z85.528 HX OF RENAL CELL CANCER: Primary | ICD-10-CM

## 2022-11-22 PROCEDURE — 99213 OFFICE O/P EST LOW 20 MIN: CPT | Mod: S$GLB,,, | Performed by: INTERNAL MEDICINE

## 2022-11-22 PROCEDURE — 99213 PR OFFICE/OUTPT VISIT, EST, LEVL III, 20-29 MIN: ICD-10-PCS | Mod: S$GLB,,, | Performed by: INTERNAL MEDICINE

## 2022-11-22 RX ORDER — CYCLOBENZAPRINE HCL 5 MG
5 TABLET ORAL NIGHTLY PRN
Qty: 30 TABLET | Refills: 0 | Status: SHIPPED | OUTPATIENT
Start: 2022-11-22 | End: 2022-12-02

## 2022-12-01 ENCOUNTER — HOSPITAL ENCOUNTER (OUTPATIENT)
Dept: RADIOLOGY | Facility: HOSPITAL | Age: 67
Discharge: HOME | End: 2022-12-01
Attending: SPECIALIST
Payer: MEDICARE

## 2022-12-01 DIAGNOSIS — C64.2 MALIGNANT TUMOR OF KIDNEY, LEFT: ICD-10-CM

## 2022-12-01 PROCEDURE — 76770 US EXAM ABDO BACK WALL COMP: CPT | Mod: TC,PO

## 2022-12-15 ENCOUNTER — HOSPITAL ENCOUNTER (OUTPATIENT)
Facility: AMBULARY SURGERY CENTER | Age: 67
Discharge: HOME | End: 2022-12-15
Attending: ANESTHESIOLOGY | Admitting: ANESTHESIOLOGY
Payer: MEDICARE

## 2022-12-15 ENCOUNTER — PATIENT MESSAGE (OUTPATIENT)
Dept: SURGERY | Facility: AMBULARY SURGERY CENTER | Age: 67
End: 2022-12-15
Payer: MEDICARE

## 2022-12-15 DIAGNOSIS — M47.896 OTHER SPONDYLOSIS, LUMBAR REGION: Primary | ICD-10-CM

## 2022-12-15 DIAGNOSIS — M47.896 OTHER SPONDYLOSIS, LUMBAR REGION: ICD-10-CM

## 2022-12-15 PROCEDURE — 64494 PR INJ DX/THER AGNT PARAVERT FACET JOINT,IMG GUIDE,LUMBAR/SAC, 2ND LEVEL: ICD-10-PCS | Mod: 50,,, | Performed by: ANESTHESIOLOGY

## 2022-12-15 PROCEDURE — 64493 INJ PARAVERT F JNT L/S 1 LEV: CPT | Mod: LT | Performed by: ANESTHESIOLOGY

## 2022-12-15 PROCEDURE — 64494 INJ PARAVERT F JNT L/S 2 LEV: CPT | Mod: RT | Performed by: ANESTHESIOLOGY

## 2022-12-15 PROCEDURE — 64493 INJ PARAVERT F JNT L/S 1 LEV: CPT | Mod: 50,,, | Performed by: ANESTHESIOLOGY

## 2022-12-15 PROCEDURE — 64493 PR INJ DX/THER AGNT PARAVERT FACET JOINT,IMG GUIDE,LUMBAR/SAC,1ST LVL: ICD-10-PCS | Mod: 50,,, | Performed by: ANESTHESIOLOGY

## 2022-12-15 PROCEDURE — 64494 INJ PARAVERT F JNT L/S 2 LEV: CPT | Mod: 50,,, | Performed by: ANESTHESIOLOGY

## 2022-12-15 RX ORDER — SODIUM CHLORIDE, SODIUM LACTATE, POTASSIUM CHLORIDE, CALCIUM CHLORIDE 600; 310; 30; 20 MG/100ML; MG/100ML; MG/100ML; MG/100ML
INJECTION, SOLUTION INTRAVENOUS ONCE AS NEEDED
Status: ACTIVE | OUTPATIENT
Start: 2022-12-15 | End: 2034-05-13

## 2022-12-15 RX ORDER — BUPIVACAINE HYDROCHLORIDE 5 MG/ML
INJECTION, SOLUTION EPIDURAL; INTRACAUDAL
Status: DISCONTINUED | OUTPATIENT
Start: 2022-12-15 | End: 2022-12-15 | Stop reason: HOSPADM

## 2022-12-15 NOTE — PLAN OF CARE
Discharge instructions given to pt/, verbalized understanding.  Tolerating fluids.  No c/o normal back pain.  Ambulating out with  in no distress.

## 2022-12-15 NOTE — OP NOTE
PROCEDURE DATE: 12/15/2022    PROCEDURE:  Bilateral L3,4,5 medial branch nerve blocks under fluoroscopy     DIAGNOSIS:  Other lumbar spondylosis    Post Op diagnosis: Same    PHYSICIAN: Howard Rivas MD    MEDICATIONS INJECTED: 0.5% bupivicaine, 0.5 ml at each level    SEDATION MEDICATIONS:RN IV Versed    LOCAL ANESTHETIC USED: None    ESTIMATED BLOOD LOSS:  None    COMPLICATIONS:  None    TECHNIQUE: A time out was taken to identify the patient, procedure and side of the procedure. The patient was placed in a prone position, then prepped and draped in the usual sterile fashion using ChloraPrep and sterile towels.  The levels were determined under fluoroscopic guidance and then marked.  A 25-gauge 3.5 inch needle was introduced to the anatomic location of the L3,4,5 medial branch nerves on the bilateral side. The above medication was then injected. The patient tolerated the procedure well.     The patient was monitored after the procedure. Patient was given pain diary to record pain levels at home.     If found to have greater than a 50% recovery and so will be scheduled for a radiofrequency ablation of the corresponding nerves.  Patient was given post procedure and discharge instructions to follow at home.  The patient was discharged in a stable condition.

## 2022-12-16 VITALS
DIASTOLIC BLOOD PRESSURE: 83 MMHG | HEART RATE: 72 BPM | OXYGEN SATURATION: 97 % | WEIGHT: 168.88 LBS | BODY MASS INDEX: 27.26 KG/M2 | RESPIRATION RATE: 20 BRPM | TEMPERATURE: 98 F | SYSTOLIC BLOOD PRESSURE: 157 MMHG

## 2023-01-03 ENCOUNTER — HOSPITAL ENCOUNTER (OUTPATIENT)
Facility: AMBULARY SURGERY CENTER | Age: 68
Discharge: HOME | End: 2023-01-03
Attending: ANESTHESIOLOGY | Admitting: ANESTHESIOLOGY
Payer: MEDICARE

## 2023-01-03 DIAGNOSIS — M47.896 OTHER SPONDYLOSIS, LUMBAR REGION: Primary | ICD-10-CM

## 2023-01-03 LAB — POCT GLUCOSE: 76 MG/DL (ref 70–110)

## 2023-01-03 PROCEDURE — 64493 PR INJ DX/THER AGNT PARAVERT FACET JOINT,IMG GUIDE,LUMBAR/SAC,1ST LVL: ICD-10-PCS | Mod: 50,,, | Performed by: ANESTHESIOLOGY

## 2023-01-03 PROCEDURE — 64493 INJ PARAVERT F JNT L/S 1 LEV: CPT | Mod: RT | Performed by: ANESTHESIOLOGY

## 2023-01-03 PROCEDURE — 64494 INJ PARAVERT F JNT L/S 2 LEV: CPT | Mod: RT | Performed by: ANESTHESIOLOGY

## 2023-01-03 PROCEDURE — 64493 INJ PARAVERT F JNT L/S 1 LEV: CPT | Mod: 50,,, | Performed by: ANESTHESIOLOGY

## 2023-01-03 PROCEDURE — 64494 PR INJ DX/THER AGNT PARAVERT FACET JOINT,IMG GUIDE,LUMBAR/SAC, 2ND LEVEL: ICD-10-PCS | Mod: 50,,, | Performed by: ANESTHESIOLOGY

## 2023-01-03 PROCEDURE — 64494 INJ PARAVERT F JNT L/S 2 LEV: CPT | Mod: 50,,, | Performed by: ANESTHESIOLOGY

## 2023-01-03 RX ORDER — MIDAZOLAM HYDROCHLORIDE 1 MG/ML
INJECTION INTRAMUSCULAR; INTRAVENOUS
Status: DISCONTINUED | OUTPATIENT
Start: 2023-01-03 | End: 2023-01-03 | Stop reason: HOSPADM

## 2023-01-03 RX ORDER — SODIUM CHLORIDE, SODIUM LACTATE, POTASSIUM CHLORIDE, CALCIUM CHLORIDE 600; 310; 30; 20 MG/100ML; MG/100ML; MG/100ML; MG/100ML
INJECTION, SOLUTION INTRAVENOUS ONCE AS NEEDED
Status: COMPLETED | OUTPATIENT
Start: 2023-01-03 | End: 2023-01-03

## 2023-01-03 RX ORDER — BUPIVACAINE HYDROCHLORIDE 5 MG/ML
INJECTION, SOLUTION EPIDURAL; INTRACAUDAL
Status: DISCONTINUED | OUTPATIENT
Start: 2023-01-03 | End: 2023-01-03 | Stop reason: HOSPADM

## 2023-01-03 RX ADMIN — SODIUM CHLORIDE, SODIUM LACTATE, POTASSIUM CHLORIDE, CALCIUM CHLORIDE: 600; 310; 30; 20 INJECTION, SOLUTION INTRAVENOUS at 09:01

## 2023-01-03 NOTE — OP NOTE
PROCEDURE DATE: 1/3/2023    PROCEDURE:  Bilateral L3,4,5 medial branch nerve blocks under fluoroscopy     DIAGNOSIS:  Other lumbar spondylosis    Post Op diagnosis: Same    PHYSICIAN: Howard Rivas MD    MEDICATIONS INJECTED: 0.5% bupivicaine, 0.5 ml at each level    SEDATION MEDICATIONS:RN IV Versed    LOCAL ANESTHETIC USED: None    ESTIMATED BLOOD LOSS:  NOne    COMPLICATIONS:  None    TECHNIQUE: A time out was taken to identify the patient, procedure and side of the procedure. The patient was placed in a prone position, then prepped and draped in the usual sterile fashion using ChloraPrep and sterile towels.  The levels were determined under fluoroscopic guidance and then marked.  A 25-gauge 3.5 inch needle was introduced to the anatomic location of the L3,4,5 medial branch nerves on the bilateral side. The above medication was then injected. The patient tolerated the procedure well.     The patient was monitored after the procedure. Patient was given pain diary to record pain levels at home.     If found to have greater than a 50% recovery and so will be scheduled for a radiofrequency ablation of the corresponding nerves.  Patient was given post procedure and discharge instructions to follow at home.  The patient was discharged in a stable condition.

## 2023-01-03 NOTE — H&P (VIEW-ONLY)
CC: low back pain    HPI: The patient is a 67 y.o. female with a history of low back pain here for MBB. There are no major changes in history and physical from 11/4/22 by Melisa.    Past Medical History:   Diagnosis Date    Allergy     aspartame,fructose monosodium glutamate and penicillins.    Anemia of chronic disorder 11/9/2017    Anemia, chronic renal failure, stage 2 (mild) 11/9/2017    Anemia, chronic renal failure, stage 3 (moderate) 11/9/2017    Asthma     Cancer     kidney    Cataract of right eye     CKD (chronic kidney disease), stage III     Diabetes mellitus     type II    Fibromyalgia     GERD (gastroesophageal reflux disease)     Hx of renal cell cancer - left nephrectomy Aug 2014 11/9/2017    Hypercholesteremia     Hypertension     IDDM (insulin dependent diabetes mellitus)     Lumbar spondylosis     Lumbosacral radiculopathy     Osteopenia     Palpitations     Plantar fasciitis, right     Syncope 2/2012    Tachycardia     Thoracic radiculopathy     TMJ (dislocation of temporomandibular joint)        Past Surgical History:   Procedure Laterality Date    CATARACT EXTRACTION Left 03/2016    INJECTION OF ANESTHETIC AGENT AROUND MEDIAL BRANCH NERVES INNERVATING LUMBAR FACET JOINT Bilateral 12/15/2022    Procedure: Block-nerve-medial branch-lumbar;  Surgeon: Howard Rivas MD;  Location: [facility] OR;  Service: Pain Management;  Laterality: Bilateral;  L3,4,5 MBB    INJECTION, SACROILIAC JOINT Left 10/7/2022    Procedure: INJECTION,SACROILIAC JOINT;  Surgeon: Cory Ruggiero MD;  Location: [facility] OR;  Service: Pain Management;  Laterality: Left;  sacroiliac injection    JOINT REPLACEMENT Right     knee    SHOULDER OPEN ROTATOR CUFF REPAIR Right 02/2005    labral repair    TOTAL KNEE ARTHROPLASTY Left 2014    TUBAL LIGATION  1990    TUMOR REMOVAL  2014    from kidney       Family History   Problem Relation Age of Onset    Stomach cancer Mother     Breast cancer Mother     Diabetes Mother     Cancer Father         Social History     Socioeconomic History    Marital status:      Spouse name: Owen Castillo    Number of children: 2   Occupational History    Occupation: Part - member support [email or web address]     Employer: [employer]   Tobacco Use    Smoking status: Never    Smokeless tobacco: Never   Substance and Sexual Activity    Alcohol use: No    Drug use: No    Sexual activity: Yes     Partners: Male     Social Determinants of Health     Financial Resource Strain: Low Risk     Difficulty of Paying Living Expenses: Not very hard   Food Insecurity: No Food Insecurity    Worried About Running Out of Food in the Last Year: Never true    Ran Out of Food in the Last Year: Never true   Transportation Needs: No Transportation Needs    Lack of Transportation (Medical): No    Lack of Transportation (Non-Medical): No   Physical Activity: Inactive    Days of Exercise per Week: 0 days    Minutes of Exercise per Session: 0 min   Stress: Stress Concern Present    Feeling of Stress : To some extent   Social Connections: Socially Integrated    Frequency of Communication with Friends and Family: Three times a week    Frequency of Social Gatherings with Friends and Family: Twice a week    Attends Hindu Services: More than 4 times per year    Active Member of Clubs or Organizations: Yes    Attends Club or Organization Meetings: 1 to 4 times per year    Marital Status:    Housing Stability: Low Risk     Unable to Pay for Housing in the Last Year: No    Number of Places Lived in the Last Year: 1    Unstable Housing in the Last Year: No       No current facility-administered medications for this encounter.     Facility-Administered Medications Ordered in Other Encounters   Medication Dose Route Frequency Provider Last Rate Last Admin    lactated ringers infusion   Intravenous Once PRN Howard Rivas MD           Review of patient's allergies indicates:   Allergen Reactions    Aspartame Other (See Comments)     headache    Other  reaction(s): Other (See Comments)  Headache  migraine  Headache  Other reaction(s): Other (See Comments)  Headache  migraine  headache    Penicillins Itching, Rash, Other (See Comments) and Hives     Rash  Rash  Rash  Rash  Rash    Stevioside (bulk) Other (See Comments)    Fructose Other (See Comments)     headache    Other reaction(s): Other (See Comments)  headache  migraine  headache  Other reaction(s): Other (See Comments)  headache  migraine  headache    Monosodium glutamate Other (See Comments)     Headache    Other reaction(s): Other (See Comments)  Headache  headache  Headache  Other reaction(s): Other (See Comments)  Headache  headache  Headache       Vitals:    12/28/22 0748   Weight: 76.6 kg (168 lb 14 oz)       REVIEW OF SYSTEMS:     GENERAL: No weight loss, malaise or fevers.  HEENT:  No recent changes in vision or hearing  NECK: Negative for lumps, no difficulty with swallowing.  RESPIRATORY: Negative for cough, wheezing or shortness of breath, patient denies any recent URI.  CARDIOVASCULAR: Negative for chest pain, leg swelling or palpitations.  GI: Negative for abdominal discomfort, blood in stools or black stools or change in bowel habits.  MUSCULOSKELETAL: See HPI.  SKIN: Negative for lesions, rash, and itching.  PSYCH: No suicidal or homicidal ideations, no current mood disturbances.  HEMATOLOGY/LYMPHOLOGY: Negative for prolonged bleeding, bruising easily or swollen nodes. Patient is not currently taking any anti-coagulants  ENDO: No history of diabetes or thyroid dysfunction  NEURO: No history of syncope, paralysis, seizures or tremors.All other reviewed and negative other than HPI.    Physical exam:  Gen: A and O x3, pleasant, well-groomed  Skin: No rashes or obvious lesions  HEENT: PERRLA, no obvious deformities on ears or in canals. No thyroid masses, trachea midline, no palpable lymph nodes in neck, axilla.  CVS: Regular rate and rhythm, normal S1 and S2, no murmurs.  Resp: Clear to  auscultation bilaterally.  Abdomen: Soft, NT/ND, normal bowel sounds present.  Musculoskeletal/Neuro: Moving all extremities    Assessment:  Other spondylosis, lumbar region  -     Case Request Operating Room: Block-nerve-medial branch-lumbar          PLAN: MBB      This patient has been cleared for surgery in an ambulatory surgical facility    ASA 3,  Mallampatti Score 3  No history of anesthetic complications  Plan for RN IV sedation

## 2023-01-03 NOTE — H&P
CC: low back pain    HPI: The patient is a 67 y.o. female with a history of low back pain here for MBB. There are no major changes in history and physical from 11/4/22 by Melisa.    Past Medical History:   Diagnosis Date    Allergy     aspartame,fructose monosodium glutamate and penicillins.    Anemia of chronic disorder 11/9/2017    Anemia, chronic renal failure, stage 2 (mild) 11/9/2017    Anemia, chronic renal failure, stage 3 (moderate) 11/9/2017    Asthma     Cancer     kidney    Cataract of right eye     CKD (chronic kidney disease), stage III     Diabetes mellitus     type II    Fibromyalgia     GERD (gastroesophageal reflux disease)     Hx of renal cell cancer - left nephrectomy Aug 2014 11/9/2017    Hypercholesteremia     Hypertension     IDDM (insulin dependent diabetes mellitus)     Lumbar spondylosis     Lumbosacral radiculopathy     Osteopenia     Palpitations     Plantar fasciitis, right     Syncope 2/2012    Tachycardia     Thoracic radiculopathy     TMJ (dislocation of temporomandibular joint)        Past Surgical History:   Procedure Laterality Date    CATARACT EXTRACTION Left 03/2016    INJECTION OF ANESTHETIC AGENT AROUND MEDIAL BRANCH NERVES INNERVATING LUMBAR FACET JOINT Bilateral 12/15/2022    Procedure: Block-nerve-medial branch-lumbar;  Surgeon: Howard Rivas MD;  Location: [facility] OR;  Service: Pain Management;  Laterality: Bilateral;  L3,4,5 MBB    INJECTION, SACROILIAC JOINT Left 10/7/2022    Procedure: INJECTION,SACROILIAC JOINT;  Surgeon: Cory Ruggiero MD;  Location: [facility] OR;  Service: Pain Management;  Laterality: Left;  sacroiliac injection    JOINT REPLACEMENT Right     knee    SHOULDER OPEN ROTATOR CUFF REPAIR Right 02/2005    labral repair    TOTAL KNEE ARTHROPLASTY Left 2014    TUBAL LIGATION  1990    TUMOR REMOVAL  2014    from kidney       Family History   Problem Relation Age of Onset    Stomach cancer Mother     Breast cancer Mother     Diabetes Mother     Cancer Father         Social History     Socioeconomic History    Marital status:      Spouse name: Owen Castillo    Number of children: 2   Occupational History    Occupation: Part - member support [employer]     Employer: [employer]   Tobacco Use    Smoking status: Never    Smokeless tobacco: Never   Substance and Sexual Activity    Alcohol use: No    Drug use: No    Sexual activity: Yes     Partners: Male     Social Determinants of Health     Financial Resource Strain: Low Risk     Difficulty of Paying Living Expenses: Not very hard   Food Insecurity: No Food Insecurity    Worried About Running Out of Food in the Last Year: Never true    Ran Out of Food in the Last Year: Never true   Transportation Needs: No Transportation Needs    Lack of Transportation (Medical): No    Lack of Transportation (Non-Medical): No   Physical Activity: Inactive    Days of Exercise per Week: 0 days    Minutes of Exercise per Session: 0 min   Stress: Stress Concern Present    Feeling of Stress : To some extent   Social Connections: Socially Integrated    Frequency of Communication with Friends and Family: Three times a week    Frequency of Social Gatherings with Friends and Family: Twice a week    Attends Zoroastrian Services: More than 4 times per year    Active Member of Clubs or Organizations: Yes    Attends Club or Organization Meetings: 1 to 4 times per year    Marital Status:    Housing Stability: Low Risk     Unable to Pay for Housing in the Last Year: No    Number of Places Lived in the Last Year: 1    Unstable Housing in the Last Year: No       No current facility-administered medications for this encounter.     Facility-Administered Medications Ordered in Other Encounters   Medication Dose Route Frequency Provider Last Rate Last Admin    lactated ringers infusion   Intravenous Once PRN Howard Rivas MD           Review of patient's allergies indicates:   Allergen Reactions    Aspartame Other (See Comments)     headache    Other  reaction(s): Other (See Comments)  Headache  migraine  Headache  Other reaction(s): Other (See Comments)  Headache  migraine  headache    Penicillins Itching, Rash, Other (See Comments) and Hives     Rash  Rash  Rash  Rash  Rash    Stevioside (bulk) Other (See Comments)    Fructose Other (See Comments)     headache    Other reaction(s): Other (See Comments)  headache  migraine  headache  Other reaction(s): Other (See Comments)  headache  migraine  headache    Monosodium glutamate Other (See Comments)     Headache    Other reaction(s): Other (See Comments)  Headache  headache  Headache  Other reaction(s): Other (See Comments)  Headache  headache  Headache       Vitals:    12/28/22 0748   Weight: 76.6 kg (168 lb 14 oz)       REVIEW OF SYSTEMS:     GENERAL: No weight loss, malaise or fevers.  HEENT:  No recent changes in vision or hearing  NECK: Negative for lumps, no difficulty with swallowing.  RESPIRATORY: Negative for cough, wheezing or shortness of breath, patient denies any recent URI.  CARDIOVASCULAR: Negative for chest pain, leg swelling or palpitations.  GI: Negative for abdominal discomfort, blood in stools or black stools or change in bowel habits.  MUSCULOSKELETAL: See HPI.  SKIN: Negative for lesions, rash, and itching.  PSYCH: No suicidal or homicidal ideations, no current mood disturbances.  HEMATOLOGY/LYMPHOLOGY: Negative for prolonged bleeding, bruising easily or swollen nodes. Patient is not currently taking any anti-coagulants  ENDO: No history of diabetes or thyroid dysfunction  NEURO: No history of syncope, paralysis, seizures or tremors.All other reviewed and negative other than HPI.    Physical exam:  Gen: A and O x3, pleasant, well-groomed  Skin: No rashes or obvious lesions  HEENT: PERRLA, no obvious deformities on ears or in canals. No thyroid masses, trachea midline, no palpable lymph nodes in neck, axilla.  CVS: Regular rate and rhythm, normal S1 and S2, no murmurs.  Resp: Clear to  auscultation bilaterally.  Abdomen: Soft, NT/ND, normal bowel sounds present.  Musculoskeletal/Neuro: Moving all extremities    Assessment:  Other spondylosis, lumbar region  -     Case Request Operating Room: Block-nerve-medial branch-lumbar          PLAN: MBB      This patient has been cleared for surgery in an ambulatory surgical facility    ASA 3,  Mallampatti Score 3  No history of anesthetic complications  Plan for RN IV sedation

## 2023-01-04 ENCOUNTER — TELEPHONE (OUTPATIENT)
Dept: PAIN MEDICINE | Facility: CLINIC | Age: 68
End: 2023-01-04
Payer: MEDICARE

## 2023-01-04 DIAGNOSIS — M47.896 OTHER SPONDYLOSIS, LUMBAR REGION: Primary | ICD-10-CM

## 2023-01-05 VITALS
DIASTOLIC BLOOD PRESSURE: 77 MMHG | TEMPERATURE: 98 F | RESPIRATION RATE: 17 BRPM | OXYGEN SATURATION: 94 % | SYSTOLIC BLOOD PRESSURE: 149 MMHG | HEART RATE: 63 BPM | BODY MASS INDEX: 27.26 KG/M2 | WEIGHT: 168.88 LBS

## 2023-01-05 NOTE — TELEPHONE ENCOUNTER
----- Message from Vita Dupont LPN sent at 1/5/2023 12:45 PM CST -----  Contact: patient    ----- Message -----  From: Tyra Gerard  Sent: 1/5/2023  12:17 PM CST  To: Rob Lawrence Staff    Type:  Patient Returning Call    Who Called:Patient    Who Left Message for Patient:Floresita    Does the patient know what this is regarding?:Unknown    Would the patient rather a call back or a response via [facility]? Call    Best Call Back Number:[phone] (home) [phone] (work)    Additional Information: Please return call

## 2023-01-13 ENCOUNTER — HOSPITAL ENCOUNTER (OUTPATIENT)
Facility: AMBULARY SURGERY CENTER | Age: 68
Discharge: HOME | End: 2023-01-13
Attending: ANESTHESIOLOGY | Admitting: ANESTHESIOLOGY
Payer: MEDICARE

## 2023-01-13 DIAGNOSIS — M47.896 OTHER SPONDYLOSIS, LUMBAR REGION: ICD-10-CM

## 2023-01-13 LAB — POCT GLUCOSE: 105 MG/DL (ref 70–110)

## 2023-01-13 PROCEDURE — 64635 DESTROY LUMB/SAC FACET JNT: CPT | Mod: 50,,, | Performed by: ANESTHESIOLOGY

## 2023-01-13 PROCEDURE — 64636 DESTROY L/S FACET JNT ADDL: CPT | Mod: 50,,, | Performed by: ANESTHESIOLOGY

## 2023-01-13 PROCEDURE — 64636 PR DESTROY L/S FACET JNT ADDL: ICD-10-PCS | Mod: 50,,, | Performed by: ANESTHESIOLOGY

## 2023-01-13 PROCEDURE — 64635 PR DESTROY LUMB/SAC FACET JNT: ICD-10-PCS | Mod: 50,,, | Performed by: ANESTHESIOLOGY

## 2023-01-13 PROCEDURE — 64636 DESTROY L/S FACET JNT ADDL: CPT | Mod: RT | Performed by: ANESTHESIOLOGY

## 2023-01-13 PROCEDURE — 64635 DESTROY LUMB/SAC FACET JNT: CPT | Mod: LT | Performed by: ANESTHESIOLOGY

## 2023-01-13 RX ORDER — SODIUM CHLORIDE, SODIUM LACTATE, POTASSIUM CHLORIDE, CALCIUM CHLORIDE 600; 310; 30; 20 MG/100ML; MG/100ML; MG/100ML; MG/100ML
INJECTION, SOLUTION INTRAVENOUS ONCE AS NEEDED
Status: ACTIVE | OUTPATIENT
Start: 2023-01-13 | End: 2034-06-11

## 2023-01-13 RX ORDER — LIDOCAINE HYDROCHLORIDE 20 MG/ML
INJECTION, SOLUTION EPIDURAL; INFILTRATION; INTRACAUDAL; PERINEURAL
Status: DISCONTINUED | OUTPATIENT
Start: 2023-01-13 | End: 2023-01-13 | Stop reason: HOSPADM

## 2023-01-13 RX ORDER — OMEPRAZOLE 40 MG/1
40 CAPSULE, DELAYED RELEASE ORAL NIGHTLY
COMMUNITY

## 2023-01-13 RX ORDER — BUPIVACAINE HYDROCHLORIDE 2.5 MG/ML
INJECTION, SOLUTION EPIDURAL; INFILTRATION; INTRACAUDAL
Status: DISCONTINUED | OUTPATIENT
Start: 2023-01-13 | End: 2023-01-13 | Stop reason: HOSPADM

## 2023-01-13 RX ORDER — MIDAZOLAM HYDROCHLORIDE 1 MG/ML
INJECTION INTRAMUSCULAR; INTRAVENOUS
Status: DISCONTINUED | OUTPATIENT
Start: 2023-01-13 | End: 2023-01-13 | Stop reason: HOSPADM

## 2023-01-13 RX ORDER — FENTANYL CITRATE 50 UG/ML
INJECTION, SOLUTION INTRAMUSCULAR; INTRAVENOUS
Status: DISCONTINUED | OUTPATIENT
Start: 2023-01-13 | End: 2023-01-13 | Stop reason: HOSPADM

## 2023-01-13 RX ORDER — LIDOCAINE HYDROCHLORIDE 10 MG/ML
INJECTION, SOLUTION EPIDURAL; INFILTRATION; INTRACAUDAL; PERINEURAL
Status: DISCONTINUED | OUTPATIENT
Start: 2023-01-13 | End: 2023-01-13 | Stop reason: HOSPADM

## 2023-01-13 RX ORDER — METHYLPREDNISOLONE ACETATE 80 MG/ML
INJECTION, SUSPENSION INTRA-ARTICULAR; INTRALESIONAL; INTRAMUSCULAR; SOFT TISSUE
Status: DISCONTINUED | OUTPATIENT
Start: 2023-01-13 | End: 2023-01-13 | Stop reason: HOSPADM

## 2023-01-13 NOTE — PLAN OF CARE
Discharge instructions given to pt, verbalized understanding.  Tolerating PO fluids.  IV removed.  No c/o pain.  Ambulating out to   per Rn in no distress.

## 2023-01-13 NOTE — DISCHARGE SUMMARY
[facility]  Discharge Note  Short Stay    Procedure(s) (LRB):  RADIOFREQUENCY THERMAL COAGULATION (Bilateral)      OUTCOME: Patient tolerated treatment/procedure well without complication and is now ready for discharge.    DISPOSITION: Home or Self Care    FINAL DIAGNOSIS:  <principal problem not specified>    FOLLOWUP: In clinic    DISCHARGE INSTRUCTIONS:    Discharge Procedure Orders   Notify your health care provider if you experience any of the following:  temperature >100.4     Notify your health care provider if you experience any of the following:  severe uncontrolled pain     Notify your health care provider if you experience any of the following:  redness, tenderness, or signs of infection (pain, swelling, redness, odor or green/yellow discharge around incision site)     Activity as tolerated        TIME SPENT ON DISCHARGE: 30 minutes

## 2023-01-13 NOTE — INTERVAL H&P NOTE
The patient has been examined and the H&P has been reviewed:    I concur with the findings and no changes have occurred since H&P was written.    Anesthesia risks, benefits and alternative options discussed and understood by patient/family.  This patient has been cleared for surgery in an ambulatory surgical facility    ASA 3,  Mallampatti Score 3  No history of anesthetic complications  Plan for RN IV sedation          There are no hospital problems to display for this patient.

## 2023-01-13 NOTE — OP NOTE
PROCEDURE DATE: 1/13/2023    PROCEDURE:  Radiofrequency ablation of the L3,4,5 medial branch nerves on the bilateral-side utilizing fluoroscopy    DIAGNOSIS:  Other lumbar spondylosis  Post op Diagnosis: Same    PHYSICIAN: Howard Rivas MD    MEDICATIONS INJECTED:  From a mixture of 6ml of 0.25% bupivicaine and 80mg of methylprednisone,  1ml of this solution was injected at each level.    LOCAL ANESTHETIC USED: Lidocaine 1%, 2 ml given at each site.    SEDATION MEDICATIONS: RN IV sedation    ESTIMATED BLOOD LOSS:  None    COMPLICATIONS:  NOne    TECHNIQUE:  A time out was taken to identify patient and procedure side prior to starting the procedure. Laying in a prone position, the patient was prepped and draped in the usual sterile fashion using ChloraPrep and sterile towels.  The levels were determined under fluoroscopic guidance and then marked.  Local anesthetic was given by raising a wheal at the skin over each site and then infiltrated approximately 2cm deeper.  A 20-gauge  100 mm RF needle was introduced to the anatomic location of the bilateral L3,4,5 medial branch nerves. Motor stimulation up to 2 Volts at each level confirmed no motor nerve involvement.  Impedance was less than 800 ohms at each level.  1ml of 2% lidocaine was instilled prior to lesioning.  Ablation was performed per level utilizing radiofrequency generator 80°C for 60 seconds.  Needles were then rotated 90° and a second lesion was performed.  The above noted medication was then injected slowly. The patient tolerated the procedure well.     The patient was monitored after the procedure.  Patient was given post procedure and discharge instructions to follow at home.  The patient was discharged in a stable condition

## 2023-01-19 VITALS
SYSTOLIC BLOOD PRESSURE: 150 MMHG | RESPIRATION RATE: 18 BRPM | DIASTOLIC BLOOD PRESSURE: 64 MMHG | BODY MASS INDEX: 27.26 KG/M2 | HEART RATE: 63 BPM | WEIGHT: 168.88 LBS | OXYGEN SATURATION: 95 % | TEMPERATURE: 98 F

## 2023-01-23 RX ORDER — GABAPENTIN 800 MG/1
800 TABLET ORAL 3 TIMES DAILY
Qty: 270 TABLET | Refills: 0 | Status: SHIPPED | OUTPATIENT
Start: 2023-01-23 | End: 2024-01-18

## 2023-02-01 ENCOUNTER — PATIENT MESSAGE (OUTPATIENT)
Dept: FAMILY MEDICINE | Facility: CLINIC | Age: 68
End: 2023-02-01

## 2023-02-01 ENCOUNTER — OFFICE VISIT (OUTPATIENT)
Dept: FAMILY MEDICINE | Facility: CLINIC | Age: 68
End: 2023-02-01
Payer: MEDICARE

## 2023-02-01 VITALS
DIASTOLIC BLOOD PRESSURE: 68 MMHG | BODY MASS INDEX: 27.32 KG/M2 | SYSTOLIC BLOOD PRESSURE: 134 MMHG | HEART RATE: 61 BPM | HEIGHT: 66 IN | WEIGHT: 170 LBS

## 2023-02-01 DIAGNOSIS — R79.0 LOW MAGNESIUM LEVEL: ICD-10-CM

## 2023-02-01 DIAGNOSIS — E78.2 MIXED DYSLIPIDEMIA: ICD-10-CM

## 2023-02-01 DIAGNOSIS — Z79.4 TYPE 2 DIABETES MELLITUS WITH DIABETIC POLYNEUROPATHY, WITH LONG-TERM CURRENT USE OF INSULIN: Primary | ICD-10-CM

## 2023-02-01 DIAGNOSIS — Z79.4 TYPE 2 DIABETES MELLITUS WITH DIABETIC POLYNEUROPATHY, WITH LONG-TERM CURRENT USE OF INSULIN: ICD-10-CM

## 2023-02-01 DIAGNOSIS — M54.50 CHRONIC MIDLINE LOW BACK PAIN WITHOUT SCIATICA: ICD-10-CM

## 2023-02-01 DIAGNOSIS — E11.42 TYPE 2 DIABETES MELLITUS WITH DIABETIC POLYNEUROPATHY, WITH LONG-TERM CURRENT USE OF INSULIN: ICD-10-CM

## 2023-02-01 DIAGNOSIS — Z12.31 SCREENING MAMMOGRAM FOR BREAST CANCER: ICD-10-CM

## 2023-02-01 DIAGNOSIS — N18.30 CKD STAGE 3 DUE TO TYPE 2 DIABETES MELLITUS: ICD-10-CM

## 2023-02-01 DIAGNOSIS — G89.29 CHRONIC MIDLINE LOW BACK PAIN WITHOUT SCIATICA: ICD-10-CM

## 2023-02-01 DIAGNOSIS — E11.22 CKD STAGE 3 DUE TO TYPE 2 DIABETES MELLITUS: ICD-10-CM

## 2023-02-01 DIAGNOSIS — I10 ESSENTIAL HYPERTENSION: ICD-10-CM

## 2023-02-01 DIAGNOSIS — E11.42 TYPE 2 DIABETES MELLITUS WITH DIABETIC POLYNEUROPATHY, WITH LONG-TERM CURRENT USE OF INSULIN: Primary | ICD-10-CM

## 2023-02-01 PROCEDURE — 99214 PR OFFICE/OUTPT VISIT, EST, LEVL IV, 30-39 MIN: ICD-10-PCS | Mod: S$PBB,AQ,, | Performed by: INTERNAL MEDICINE

## 2023-02-01 PROCEDURE — 99214 OFFICE O/P EST MOD 30 MIN: CPT | Mod: S$PBB,AQ,, | Performed by: INTERNAL MEDICINE

## 2023-02-01 PROCEDURE — 99214 OFFICE O/P EST MOD 30 MIN: CPT | Performed by: INTERNAL MEDICINE

## 2023-02-01 RX ORDER — INSULIN ASPART 100 [IU]/ML
INJECTION, SOLUTION INTRAVENOUS; SUBCUTANEOUS
Qty: 60 ML | Refills: 3 | Status: SHIPPED | OUTPATIENT
Start: 2023-02-01 | End: 2023-11-01 | Stop reason: SDUPTHER

## 2023-02-01 RX ORDER — FLASH GLUCOSE SENSOR
1 KIT MISCELLANEOUS CONTINUOUS
Qty: 6 KIT | Refills: 3 | Status: SHIPPED | OUTPATIENT
Start: 2023-02-01 | End: 2023-02-21

## 2023-02-01 NOTE — PROGRESS NOTES
Subjective:       Patient ID: Payton Castillo is a 67 y.o. female.    Chief Complaint: Hyperlipidemia, Diabetes, Hypertension, and Peripheral Neuropathy    Patient is a 67-year-old female who comes for follow-up.    Underlying medical issues are as below:-    Type 2 diabetes mellitus with diabetic polyneuropathy, with long-term current use of insulin  Chronic midline low back pain without sciatica  Essential hypertension  Mixed dyslipidemia  CKD stage 3 due to type 2 diabetes mellitus he\    Patricia Balderas NP [facility].( Dr Nicol MD Nephrology)    She was found to have some degree of protein urea.  She has also been put on Kerendia.    Pain issues continue and the pain lack of control makes her blood pressures go up and also probably blood sugars go also go up.    Blood sugars are going in 200s.  Her last hemoglobin A1c in March 22 was 7.0.    Chronic kidney disease stage 3 also has been noted.  Her last creatinine was 1.4.    Hyperlipidemia  This is a chronic problem. The current episode started more than 1 year ago. The problem is controlled. Exacerbating diseases include obesity. Pertinent negatives include no shortness of breath. Current antihyperlipidemic treatment includes statins. The current treatment provides moderate improvement of lipids. Risk factors for coronary artery disease include post-menopausal, obesity, dyslipidemia and diabetes mellitus.   Diabetes  She presents for her follow-up diabetic visit. She has type 2 diabetes mellitus. No MedicAlert identification noted. The initial diagnosis of diabetes was made 25 years (Diagnosed in 1990s.) ago. Her disease course has been stable. Pertinent negatives for hypoglycemia include no confusion, dizziness, mood changes, nervousness/anxiousness, pallor, seizures or tremors. Associated symptoms include fatigue. Pertinent negatives for diabetes include no polydipsia and no polyphagia. There are no hypoglycemic complications. Risk factors for  coronary artery disease include hypertension, sedentary lifestyle, post-menopausal, dyslipidemia and diabetes mellitus. Current diabetic treatment includes insulin injections (Both long-acting and short-acting insulins). She is compliant with treatment most of the time. She has not had a previous visit with a dietitian. She rarely participates in exercise. Her home blood glucose trend is increasing steadily. Her breakfast blood glucose range is generally 130-140 mg/dl. An ACE inhibitor/angiotensin II receptor blocker is being taken. She does not see a podiatrist.Eye exam is not current.   Hypertension  This is a chronic problem. The current episode started more than 1 year ago. The problem has been waxing and waning since onset. The problem is uncontrolled. Associated symptoms include malaise/fatigue. Pertinent negatives include no palpitations or shortness of breath. Risk factors for coronary artery disease include sedentary lifestyle, dyslipidemia, diabetes mellitus and post-menopausal state. Past treatments include beta blockers, angiotensin blockers and calcium channel blockers. The current treatment provides moderate improvement. Compliance problems include psychosocial issues.  There is no history of heart failure or left ventricular hypertrophy.     Past Medical History:   Diagnosis Date    Allergy     aspartame,fructose monosodium glutamate and penicillins.    Anemia of chronic disorder 11/09/2017    Anemia, chronic renal failure, stage 2 (mild) 11/09/2017    Anemia, chronic renal failure, stage 3 (moderate) 11/09/2017    Asthma     Cancer     kidney    Cataract of right eye     CKD (chronic kidney disease), stage III     Diabetes mellitus     type II    Fibromyalgia     GERD (gastroesophageal reflux disease)     Hx of renal cell cancer - left nephrectomy Aug 2014 11/09/2017    Hypercholesteremia     Hypertension     IDDM (insulin dependent diabetes mellitus)     Lumbar spondylosis     Lumbosacral  radiculopathy     Osteopenia     Palpitations     Plantar fasciitis, right     Sleep apnea     Syncope 02/2012    Tachycardia     Thoracic radiculopathy     TMJ (dislocation of temporomandibular joint)      Social History     Socioeconomic History    Marital status:      Spouse name: Owen Castillo    Number of children: 2   Occupational History    Occupation: Part - member support [employer]     Employer: [employer]   Tobacco Use    Smoking status: Never    Smokeless tobacco: Never   Substance and Sexual Activity    Alcohol use: No    Drug use: No    Sexual activity: Yes     Partners: Male     Social Determinants of Health     Financial Resource Strain: Low Risk     Difficulty of Paying Living Expenses: Not very hard   Food Insecurity: No Food Insecurity    Worried About Running Out of Food in the Last Year: Never true    Ran Out of Food in the Last Year: Never true   Transportation Needs: No Transportation Needs    Lack of Transportation (Medical): No    Lack of Transportation (Non-Medical): No   Physical Activity: Inactive    Days of Exercise per Week: 0 days    Minutes of Exercise per Session: 0 min   Stress: Stress Concern Present    Feeling of Stress : To some extent   Social Connections: Socially Integrated    Frequency of Communication with Friends and Family: Three times a week    Frequency of Social Gatherings with Friends and Family: Twice a week    Attends [employer] Services: More than 4 times per year    Active Member of Clubs or Organizations: Yes    Attends Club or Organization Meetings: 1 to 4 times per year    Marital Status:    Housing Stability: Low Risk     Unable to Pay for Housing in the Last Year: No    Number of Places Lived in the Last Year: 1    Unstable Housing in the Last Year: No     Past Surgical History:   Procedure Laterality Date    CATARACT EXTRACTION Left 03/2016    INJECTION OF ANESTHETIC AGENT AROUND MEDIAL BRANCH NERVES INNERVATING LUMBAR FACET JOINT Bilateral  "12/15/2022    Procedure: Block-nerve-medial branch-lumbar;  Surgeon: Howard Rivas MD;  Location: [facility] OR;  Service: Pain Management;  Laterality: Bilateral;  L3,4,5 MBB    INJECTION OF ANESTHETIC AGENT AROUND MEDIAL BRANCH NERVES INNERVATING LUMBAR FACET JOINT Bilateral 1/3/2023    Procedure: Block-nerve-medial branch-lumbar;  Surgeon: Howard Rivas MD;  Location: [facility] OR;  Service: Pain Management;  Laterality: Bilateral;  L3,4,5 MBB #2    INJECTION, SACROILIAC JOINT Left 10/7/2022    Procedure: INJECTION,SACROILIAC JOINT;  Surgeon: Cory Ruggiero MD;  Location: [facility] OR;  Service: Pain Management;  Laterality: Left;  sacroiliac injection    JOINT REPLACEMENT Right     knee    RADIOFREQUENCY THERMOCOAGULATION Bilateral 1/13/2023    Procedure: RADIOFREQUENCY THERMAL COAGULATION;  Surgeon: Howard Rivas MD;  Location: [facility] OR;  Service: Pain Management;  Laterality: Bilateral;  L3,4,5 Raul RFA    SHOULDER OPEN ROTATOR CUFF REPAIR Right 02/2005    labral repair    TOTAL KNEE ARTHROPLASTY Left 2014    TUBAL LIGATION  1990    TUMOR REMOVAL  2014    from kidney     Family History   Problem Relation Age of Onset    Stomach cancer Mother     Breast cancer Mother     Diabetes Mother     Cancer Father        Review of Systems   Constitutional:  Positive for fatigue and malaise/fatigue.   Respiratory:  Negative for shortness of breath.    Cardiovascular:  Negative for palpitations.   Endocrine: Negative for polydipsia and polyphagia.   Skin:  Negative for pallor.   Neurological:  Negative for dizziness, tremors and seizures.   Psychiatric/Behavioral:  Negative for confusion. The patient is not nervous/anxious.        Objective:      Blood pressure 134/68, pulse 61, height 5' 6" (1.676 m), weight 77.1 kg (170 lb). Body mass index is 27.44 kg/m².  Physical Exam  Vitals and nursing note reviewed.   Constitutional:       General: She is not in acute distress.     Appearance: She is well-developed. She is not ill-appearing, " toxic-appearing or diaphoretic.      Comments: BMI is 27.44.   HENT:      Head: Normocephalic and atraumatic.      Mouth/Throat:      Tonsils: No tonsillar abscesses.   Eyes:      General: No scleral icterus.     Conjunctiva/sclera: Conjunctivae normal.      Comments: Chronically puffy eyelids.   Neck:      Thyroid: No thyromegaly.      Trachea: Trachea normal. No tracheal deviation.   Cardiovascular:      Rate and Rhythm: Normal rate and regular rhythm.      Heart sounds: Normal heart sounds, S1 normal and S2 normal.   Pulmonary:      Breath sounds: Normal breath sounds.   Abdominal:      General: There is no distension.      Palpations: Abdomen is soft. Abdomen is not rigid.      Tenderness: There is no abdominal tenderness. There is no guarding or rebound.          Comments: scar amy of prior surgeries are noted including scars of nephrectomy .   Musculoskeletal:         General: No tenderness or deformity.      Cervical back: Neck supple.      Right foot: Normal range of motion. No deformity or bunion.      Left foot: Normal range of motion. No deformity or bunion.   Feet:      Right foot:      Protective Sensation: 5 sites tested.  5 sites sensed.      Skin integrity: No ulcer or blister.      Toenail Condition: Right toenails are long.      Left foot:      Protective Sensation: 5 sites tested.  5 sites sensed.      Skin integrity: No ulcer or blister.      Toenail Condition: Left toenails are long.      Comments: Last time she had felt numbness in her feet.  Today no numbness.  Lymphadenopathy:      Cervical: No cervical adenopathy.   Skin:     General: Skin is warm and dry.      Findings: No erythema, lesion or rash.   Neurological:      Mental Status: She is alert.      Comments: l     Psychiatric:         Behavior: Behavior normal. Behavior is cooperative.         Assessment:       1. Type 2 diabetes mellitus with diabetic polyneuropathy, with long-term current use of insulin    2. Essential hypertension     3. Mixed dyslipidemia    4. CKD stage 3 due to type 2 diabetes mellitus    5. Chronic midline low back pain without sciatica    6. Low magnesium level    7. Screening mammogram for breast cancer           Admission on 01/13/2023, Discharged on 01/13/2023   Component Date Value Ref Range Status    POCT Glucose 01/13/2023 105  70 - 110 mg/dL Final   Admission on 01/03/2023, Discharged on 01/03/2023   Component Date Value Ref Range Status    POCT Glucose 01/03/2023 76  70 - 110 mg/dL Final   Lab Visit on 12/01/2022   Component Date Value Ref Range Status    Sodium 12/01/2022 136  136 - 145 mmol/L Final    Potassium 12/01/2022 4.0  3.5 - 5.1 mmol/L Final    Chloride 12/01/2022 98  95 - 110 mmol/L Final    CO2 12/01/2022 26  23 - 29 mmol/L Final    Glucose 12/01/2022 64 (L)  70 - 110 mg/dL Final    BUN 12/01/2022 23  8 - 23 mg/dL Final    Creatinine 12/01/2022 1.2  0.5 - 1.4 mg/dL Final    Calcium 12/01/2022 9.3  8.7 - 10.5 mg/dL Final    Anion Gap 12/01/2022 12  8 - 16 mmol/L Final    eGFR 12/01/2022 49.6 (A)  >60 mL/min/1.73 m^2 Final    Glucose 12/01/2022 62 (L)  70 - 110 mg/dL Final    Sodium 12/01/2022 135 (L)  136 - 145 mmol/L Final    Potassium 12/01/2022 4.0  3.5 - 5.1 mmol/L Final    Chloride 12/01/2022 99  95 - 110 mmol/L Final    CO2 12/01/2022 28  23 - 29 mmol/L Final    BUN 12/01/2022 22  8 - 23 mg/dL Final    Calcium 12/01/2022 9.3  8.7 - 10.5 mg/dL Final    Creatinine 12/01/2022 1.2  0.5 - 1.4 mg/dL Final    Albumin 12/01/2022 4.0  3.5 - 5.2 g/dL Final    Phosphorus 12/01/2022 3.6  2.7 - 4.5 mg/dL Final    eGFR 12/01/2022 49.6 (A)  >60 mL/min/1.73 m^2 Final    Anion Gap 12/01/2022 8  8 - 16 mmol/L Final    Microalbumin, Urine 12/01/2022 79.3 (H)  <19.9 ug/mL Final    Creatinine, Urine 12/01/2022 38.0  15.0 - 325.0 mg/dL Final    Microalb/Creat Ratio 12/01/2022 208.7 (H)  0.0 - 30.0 ug/mg Final    Specimen UA 12/01/2022 Urine, Clean Catch   Final    Color, UA 12/01/2022 Straw  Yellow, Straw, Nimisha Final     Appearance, UA 12/01/2022 Clear  Clear Final    pH, UA 12/01/2022 6.0  5.0 - 8.0 Final    Specific Gravity, UA 12/01/2022 1.010  1.005 - 1.030 Final    Protein, UA 12/01/2022 Negative  Negative Final    Glucose, UA 12/01/2022 Negative  Negative Final    Ketones, UA 12/01/2022 Negative  Negative Final    Bilirubin (UA) 12/01/2022 Negative  Negative Final    Occult Blood UA 12/01/2022 Negative  Negative Final    Nitrite, UA 12/01/2022 Negative  Negative Final    Urobilinogen, UA 12/01/2022 Negative  Negative EU/dL Final    Leukocytes, UA 12/01/2022 Negative  Negative Final    Vit D, 25-Hydroxy 12/01/2022 34  30 - 96 ng/mL Final    Uric Acid 12/01/2022 6.3 (H)  2.4 - 5.7 mg/dL Final    Magnesium 12/01/2022 1.4 (L)  1.6 - 2.6 mg/dL Final   Lab Visit on 11/10/2022   Component Date Value Ref Range Status    Hemoglobin A1C 11/10/2022 6.7 (H)  4.5 - 6.2 % Final    Estimated Avg Glucose 11/10/2022 146 (H)  68 - 131 mg/dL Final   Lab Visit on 11/10/2022   Component Date Value Ref Range Status    WBC 11/10/2022 7.15  3.90 - 12.70 K/uL Final    RBC 11/10/2022 4.25  4.00 - 5.40 M/uL Final    Hemoglobin 11/10/2022 13.2  12.0 - 16.0 g/dL Final    Hematocrit 11/10/2022 40.7  37.0 - 48.5 % Final    MCV 11/10/2022 96  82 - 98 fL Final    MCH 11/10/2022 31.1 (H)  27.0 - 31.0 pg Final    MCHC 11/10/2022 32.4  32.0 - 36.0 g/dL Final    RDW 11/10/2022 12.4  11.5 - 14.5 % Final    Platelets 11/10/2022 361  150 - 450 K/uL Final    MPV 11/10/2022 8.5 (L)  9.2 - 12.9 fL Final    Immature Granulocytes 11/10/2022 0.6 (H)  0.0 - 0.5 % Final    Gran # (ANC) 11/10/2022 3.6  1.8 - 7.7 K/uL Final    Immature Grans (Abs) 11/10/2022 0.04  0.00 - 0.04 K/uL Final    Lymph # 11/10/2022 2.8  1.0 - 4.8 K/uL Final    Mono # 11/10/2022 0.5  0.3 - 1.0 K/uL Final    Eos # 11/10/2022 0.1  0.0 - 0.5 K/uL Final    Baso # 11/10/2022 0.04  0.00 - 0.20 K/uL Final    nRBC 11/10/2022 0  0 /100 WBC Final    Gran % 11/10/2022 50.7  38.0 - 73.0 % Final    Lymph %  11/10/2022 38.9  18.0 - 48.0 % Final    Mono % 11/10/2022 7.4  4.0 - 15.0 % Final    Eosinophil % 11/10/2022 1.8  0.0 - 8.0 % Final    Basophil % 11/10/2022 0.6  0.0 - 1.9 % Final    Differential Method 11/10/2022 Automated   Final    Sodium 11/10/2022 145  136 - 145 mmol/L Final    Potassium 11/10/2022 4.1  3.5 - 5.1 mmol/L Final    Chloride 11/10/2022 106  95 - 110 mmol/L Final    CO2 11/10/2022 28  23 - 29 mmol/L Final    Glucose 11/10/2022 71  70 - 110 mg/dL Final    BUN 11/10/2022 23  8 - 23 mg/dL Final    Creatinine 11/10/2022 1.2  0.5 - 1.4 mg/dL Final    Calcium 11/10/2022 10.2  8.7 - 10.5 mg/dL Final    Total Protein 11/10/2022 8.7 (H)  6.0 - 8.4 g/dL Final    Albumin 11/10/2022 4.2  3.5 - 5.2 g/dL Final    Total Bilirubin 11/10/2022 0.6  0.1 - 1.0 mg/dL Final    Alkaline Phosphatase 11/10/2022 64  55 - 135 U/L Final    AST 11/10/2022 30  10 - 40 U/L Final    ALT 11/10/2022 47 (H)  10 - 44 U/L Final    Anion Gap 11/10/2022 11  8 - 16 mmol/L Final    eGFR 11/10/2022 49.6 (A)  >60 mL/min/1.73 m^2 Final         Plan:           Type 2 diabetes mellitus with diabetic polyneuropathy, with long-term current use of insulin  -     flash glucose sensor (FREESTYLE BANG 14 DAY SENSOR) Kit; Inject 1 Device into the skin continuous.  Dispense: 6 kit; Refill: 3    Essential hypertension    Mixed dyslipidemia    CKD stage 3 due to type 2 diabetes mellitus    Chronic midline low back pain without sciatica    Low magnesium level    Screening mammogram for breast cancer  -     Mammo Digital Screening Bilat; Future; Expected date: 02/08/2023    Patient's last hemoglobin A1c was 6.7.  She is on short-acting and long-acting insulins.      Blood pressure control is reasonable and her lipid panel is also reasonable.      She has chronic kidney disease stage IIIA and has been started on Kerendia.  She is following Nephrology for that.      Chronic back pain is taken care of by gabapentin.      Her bladder incontinence she takes  solifenacin.      Did discuss about injection Ozempic or GLP 1 especially with benefits of weight loss.  This medication will really be helpful if able to get rid off 1 of the insulins either long-acting or short-acting.  But that might not be immediate and cost of this medication with other brand name medications might be somewhat prohibitive.    Order for mammogram has been given.      I have advised her to consider getting an update booster on Tdap or tetanus vaccination.  Evidently [facility] is not in a program with  and she will try [facility].      If all goes well, I would like to see her back in 4-6 months time.      I will check labs including hemoglobin A1c, lipid panel, chemistry before follow-up.    Advised Pt about age and season appropriate immunizations/ cancer screenings.  Also seasonal influenza vaccine, update on tetanus diphtheria vaccination every 10 years.  Patient has been advised to watch diet and exercise. Avoid fried and fatty food. Compliance to medications and follow up urged.  Advised Pt. to monitor Blood sugars at home and record them.  Advised Pt  for Anti reflux measures like small feequent meals, avoid spicy and greasy food. Head end up at night.  keep a close eye on feet and keep them clean. Annual eye examination. Annual influenza vaccine.  Monitor HgbA1c every 3 to 6 months. Monitor urine microalbumin every year.keep LDL less than 100. Monitor blood pressure and target blood pressure 120/70.  Please utilize precautions for current COVID-19 pandemic.  Try to avoid crowds or close contact with multiple people.  Minimize outside interaction.  Wash hands with soap for  frequently upon contact.Use face mask or cover.    Fup 4 - 6 months    Spent jhonatan 30 minutes with patient which involved review of pts medical conditions, labs, medications and with 50% of time face-to-face discussion about medical problems, management and any applicable changes.  Advised Pt about age and  "season appropriate immunizations/ cancer screenings.  Also seasonal influenza vaccine, update on tetanus diphtheria vaccination every 10 years.  Patient has been advised to watch diet and exercise. Avoid fried and fatty food. Compliance to medications and follow up urged.  Advised Pt. to monitor Blood sugars at home and record them.  Advised Pt  for Anti reflux measures like small feequent meals, avoid spicy and greasy food. Head end up at night.  keep a close eye on feet and keep them clean. Annual eye examination. Annual influenza vaccine.  Monitor HgbA1c every 3 to 6 months. Monitor urine microalbumin every year.keep LDL less than 100. Monitor blood pressure and target blood pressure 120/70.  Please utilize precautions for current COVID-19 pandemic.  Try to avoid crowds or close contact with multiple people.  Minimize outside interaction.  Wash hands with soap for  frequently upon contact.Use face mask or cover.    Fup 4 m    Spent jhonatan 30 minutes with patient which involved review of pts medical conditions, labs, medications and with 50% of time face-to-face discussion about medical problems, management and any applicable changes.        Current Outpatient Medications:     atorvastatin (LIPITOR) 40 MG tablet, TAKE 1 TABLET EVERY EVENING, Disp: 90 tablet, Rfl: 3    BD INSULIN SYRINGE 1 mL 28 gauge x 1/2" Syrg, USE 1 SYRINGE ONCE DAILY, Disp: 100 each, Rfl: 3    cetirizine (ZYRTEC) 10 MG tablet, Take 10 mg by mouth., Disp: , Rfl:     flash glucose sensor (FREESTYLE BANG 14 DAY SENSOR) Kit, Inject 1 Device into the skin continuous., Disp: 6 kit, Rfl: 3    fluticasone propionate (FLONASE) 50 mcg/actuation nasal spray, USE 2 SPRAYS IN EACH NOSTRIL ONCE DAILY, Disp: 48 g, Rfl: 3    gabapentin (NEURONTIN) 800 MG tablet, Take 1 tablet (800 mg total) by mouth 3 (three) times daily., Disp: 270 tablet, Rfl: 0    insulin aspart U-100 (NOVOLOG FLEXPEN U-100 INSULIN) 100 unit/mL (3 mL) InPn pen, INJECT 20 UNITS UNDER " "THE SKIN THREE TIMES A DAY WITH MEALS ( INSTEAD OF HUMALOG ), Disp: 60 mL, Rfl: 3    insulin syringe-needle U-100 0.3 mL 30 Syrg, by Misc.(Non-Drug; Combo Route) route 3 (three) times daily., Disp: , Rfl:     KERENDIA 10 mg Tab, Take 1 tablet by mouth once daily., Disp: , Rfl:     LANTUS U-100 INSULIN 100 unit/mL injection, INJECT 70 UNITS UNDER THE SKIN EVERY EVENING, Disp: 210 mL, Rfl: 3    losartan (COZAAR) 100 MG tablet, Take 1 tablet (100 mg total) by mouth Daily., Disp: 90 tablet, Rfl: 3    metoprolol succinate (TOPROL-XL) 50 MG 24 hr tablet, TAKE 1 TABLET DAILY, Disp: 90 tablet, Rfl: 3    omeprazole (PRILOSEC) 40 MG capsule, Take 40 mg by mouth once daily., Disp: , Rfl:     pen needle, diabetic (ADVOCATE PEN NEEDLE) 31 gauge x 5/16" Ndle, 1 Device by Misc.(Non-Drug; Combo Route) route 3 (three) times daily., Disp: 100 each, Rfl: 3    solifenacin (VESICARE) 5 MG tablet, Take 5 mg by mouth., Disp: , Rfl:   No current facility-administered medications for this visit.    Facility-Administered Medications Ordered in Other Visits:     lactated ringers infusion, , Intravenous, Once PRN, Howard Rivas MD    lactated ringers infusion, , Intravenous, Once PRN, Howard Rivas MD    " 09-Mar-2021

## 2023-02-03 ENCOUNTER — PATIENT MESSAGE (OUTPATIENT)
Dept: FAMILY MEDICINE | Facility: CLINIC | Age: 68
End: 2023-02-03

## 2023-02-20 ENCOUNTER — HOSPITAL ENCOUNTER (OUTPATIENT)
Dept: RADIOLOGY | Facility: HOSPITAL | Age: 68
Discharge: HOME | End: 2023-02-20
Attending: INTERNAL MEDICINE
Payer: MEDICARE

## 2023-02-20 DIAGNOSIS — Z12.31 SCREENING MAMMOGRAM FOR BREAST CANCER: ICD-10-CM

## 2023-02-20 PROCEDURE — 77067 SCR MAMMO BI INCL CAD: CPT | Mod: TC,PO

## 2023-03-03 ENCOUNTER — OFFICE VISIT (OUTPATIENT)
Dept: PAIN MEDICINE | Facility: CLINIC | Age: 68
End: 2023-03-03
Payer: MEDICARE

## 2023-03-03 ENCOUNTER — TELEPHONE (OUTPATIENT)
Dept: PAIN MEDICINE | Facility: CLINIC | Age: 68
End: 2023-03-03

## 2023-03-03 VITALS
HEIGHT: 66 IN | WEIGHT: 170 LBS | SYSTOLIC BLOOD PRESSURE: 131 MMHG | HEART RATE: 67 BPM | BODY MASS INDEX: 27.32 KG/M2 | DIASTOLIC BLOOD PRESSURE: 75 MMHG

## 2023-03-03 DIAGNOSIS — M46.1 SACROILIITIS: Primary | ICD-10-CM

## 2023-03-03 DIAGNOSIS — M46.1 SACROILIITIS: ICD-10-CM

## 2023-03-03 DIAGNOSIS — M53.3 SACROILIAC JOINT DYSFUNCTION OF BOTH SIDES: Primary | ICD-10-CM

## 2023-03-03 PROCEDURE — 99999 PR PBB SHADOW E&M-EST. PATIENT-LVL III: CPT | Mod: PBBFAC,,, | Performed by: PHYSICIAN ASSISTANT

## 2023-03-03 PROCEDURE — 99999 PR PBB SHADOW E&M-EST. PATIENT-LVL III: ICD-10-PCS | Mod: PBBFAC,,, | Performed by: PHYSICIAN ASSISTANT

## 2023-03-03 PROCEDURE — 99213 OFFICE O/P EST LOW 20 MIN: CPT | Mod: PBBFAC,PN | Performed by: PHYSICIAN ASSISTANT

## 2023-03-03 PROCEDURE — 99214 OFFICE O/P EST MOD 30 MIN: CPT | Mod: S$PBB,,, | Performed by: PHYSICIAN ASSISTANT

## 2023-03-03 PROCEDURE — 99214 PR OFFICE/OUTPT VISIT, EST, LEVL IV, 30-39 MIN: ICD-10-PCS | Mod: S$PBB,,, | Performed by: PHYSICIAN ASSISTANT

## 2023-03-03 NOTE — PROGRESS NOTES
FOLLOW UP NOTE:     CHIEF COMPLAINT: left sided back and hip pain    INITIAL HISTORY OF PRESENT ILLNESS: Payton Castillo is a 67 y.o. female with PMH significant for HTN, DM II on insulin, CKD, and hx of bilateral knee arthroplasties presents as a referral for the evaluation of back and hip pain. The patient reports that her pain began approximately decades ago after no inciting incident or trauma. The patient reports of worsening severity over time. Hip pain is currently her worst pain. The patient believes her low back and hip pain are related. The patient localizes her pain to the area across her lower back (L > R). The patient reports of radiation to her hips b/l (L > R) and down the lateral aspect of her BLE's to her ankles. The patient describes her pain as a throbbing and sharp type of pain and a burning type of pain in her legs. The patient reports of tingling in her BLE's and her feet. The patient reports that her pain is a 6/10 with prolonged standing. Patient denies of any urinary/fecal incontinence, saddle anesthesia, or weakness.       Aggravating factors: bending, standing, walking     Mitigating factors: sitting, heat, massage      Relevant Surgeries: no     Interventional Therapies: yes; saw Dr. Dane Dalton - did back injections with some benefit     INTERVAL HISTORY OF PRESENT ILLNESS: Payton Castillo is a 68 y.o. female with PMH significant for HTN, DM II on insulin, CKD, and hx of bilateral knee arthroplasties presents as an established patient for the continued management of back and hip pain. She is s/p lumbar MB RFA at L3, 4, 5 with 30% improvement. Sharp pain has resolved. She wishes to repeat left SIJ injection, previous provided  40% improvement.   She discontinued Tylenol #3 PO BID. She is taking Gabapentin 800 mg TID.   The patient denies of any significant changes in her health since her last appointment. The patient also denies of any changes in the character of her pain since her last  "appointment. The patient reports that her current pain is a 6/10. Patient denies of any urinary/fecal incontinence, saddle anesthesia, or weakness.       CURRENT PAIN MEDICATIONS:   Currently taking: Biofreeze, OTC Tylenol, gabapentin 800 mg PO TID, Tylenol #3 PO BID     Has tried in the past:    Opioids: yes  NSAIDS: no; avoids secondary to CKD  Tylenol: yes  Muscle relaxants: yes; tried with some benefit; tried robaxin - was instructed to discontinue secondary to her kidney issues  TCAs: no  SNRIs: no  Anticonvulsants: yes  topical creams: yes      ROS:  Review of Systems   Constitutional:  Negative for chills and fever.   HENT:  Negative for sore throat.    Eyes:  Negative for visual disturbance.   Respiratory:  Negative for shortness of breath.    Cardiovascular:  Negative for chest pain.   Gastrointestinal:  Negative for nausea and vomiting.   Genitourinary:  Negative for difficulty urinating.   Musculoskeletal:  Positive for arthralgias and back pain.   Skin:  Negative for rash.   Allergic/Immunologic: Negative for immunocompromised state.   Neurological:  Negative for syncope.   Hematological:  Does not bruise/bleed easily.   Psychiatric/Behavioral:  Negative for suicidal ideas.       MEDICAL, SURGICAL, FAMILY, SOCIAL HX: reviewed    MEDICATIONS/ALLERGIES: reviewed    PHYSICAL EXAM:    VITALS: Vitals reviewed.   Vitals:    03/03/23 0929   BP: 131/75   Pulse: 67   Weight: 77.1 kg (170 lb)   Height: 5' 6" (1.676 m)   PainSc:   6   PainLoc: Back       Physical Exam  Vitals and nursing note reviewed.   Constitutional:       Appearance: Normal appearance. She is not toxic-appearing or diaphoretic.   HENT:      Head: Normocephalic and atraumatic.   Eyes:      General:         Right eye: No discharge.         Left eye: No discharge.      Extraocular Movements: Extraocular movements intact.      Conjunctiva/sclera: Conjunctivae normal.   Cardiovascular:      Rate and Rhythm: Normal rate.   Pulmonary:      Effort: " Pulmonary effort is normal. No respiratory distress.      Breath sounds: Normal breath sounds.   Abdominal:      Palpations: Abdomen is soft.   Skin:     General: Skin is warm and dry.      Findings: No rash.   Neurological:      Mental Status: She is alert and oriented to person, place, and time.   Psychiatric:         Mood and Affect: Mood and affect normal.         Cognition and Memory: Memory normal.         Judgment: Judgment normal.        UPPER EXTREMITIES: Normal alignment, normal range of motion, no atrophy, no skin changes,  hair growth and nail growth normal and equal bilaterally. No swelling, no tenderness.    LOWER EXTREMITIES:  Normal alignment, normal range of motion, no atrophy, no skin changes,  hair growth and nail growth normal and equal bilaterally. No swelling, no tenderness.     LUMBAR SPINE  Lumbar spine: ROM is limited with flexion extension and oblique extension with increased pain with passive ROM    ((--)) Supine straight leg raise    ((-)) Facet loading   Internal and external rotation of the hip causes no increased pain on either side.  Myofascial exam: Tenderness to palpation across lumbar paraspinous muscles.     ((+)) TTP at the SI joint  ((+)) MANDO's test  ((+)) One leg stand    ((+)) Distraction test  ((+)) Thigh thrust     MOTOR: Tone and bulk: normal      MUSCLE STRENGTH:  Hip Flexion: Right 5/5, Left 5/5  Hip Extension: Right 5/5, Left 5/5  Leg Flexion: Right 5/5, Left 5/5  Leg Extension: Right 5/5, Left 5/5  Plantar Flexion: Right 5/5, Left 5/5  Dorsiflexion: Right 5/5, Left 5/5     Delt      Bi         Tri                         Right:   5/5       5/5       5/5       5/5         Left:     5/5       5/5       5/5       5/5            SENSATION: Light touch and pinprick intact bilaterally  REFLEXES: normal, symmetric, nonbrisk.  Toes down, no clonus. Negative woody's sign bilaterally.  GAIT: normal rise, base, steps, and arm swing.      IMAGING:    MRI Lumbar Spine  without contrast (9/16/2022):  FINDINGS: Comparison to multiple prior exams. The lumbar spine has normal lordotic curvature and vertebral body alignment, with normal vertebral heights, and no acute fractures or evidence of a diffuse marrow replacement process. Mild-to-moderate degenerative loss of disc height and signal involves multiple levels. The conus medullaris is normal in signal and terminates at L1-L2.     At T12-L1, L1-L2, L2-L3, L3-L4, and L4-L5, there is mild broad-based disc bulging, without focal disc herniation or spinal canal stenosis. There is mild multilevel facet arthropathy, without significant neural foraminal narrowing or evidence of nerve impingement..     At L5-S1, there is broad-based disc bulging with T2 hyperintense right paracentral partial-thickness annular fissure. There is no focal disc herniation or spinal canal stenosis, with bilateral facet hypertrophy and T2 hyperintense synovial cyst formation. There is no significant foraminal narrowing or evidence of nerve impingement.     There are no signal abnormalities of the paraspinal soft tissues or the paraspinal ligaments. The visualized retroperitoneum is unremarkable.     IMPRESSION: Multilevel lumbar degenerative disc disease and facet osteoarthritis, with no focal disc herniation, spinal canal stenosis, or evidence of nerve impingement at any level.    X-ray bilateral hips (8/26/2022):  There is mild osteoarthritic changes of the left hip with spurring of the acetabulum. The hip joints are symmetric in size. There are no fractures or acute osseous abnormalities. The SI joints are symmetrical. There is facet hypertrophy at L5-S1. The soft tissues are normal    X-ray sacroiliac joints (8/26/2022):  The SI joints are symmetric in size. There are no erosions or ankylosis. There are mild degenerative changes of the left hip. There is facet hypertrophy at L5-S1. There are no acute osseous anomalies.     IMPRESSION: Facet hypertrophy at  L5-S1     Normal SI joints     Mild degenerative changes of the left hip    ASSESSMENT: Payton Castillo is a 67 y.o. female with PMH significant for HTN, DM II on insulin, CKD, and hx of bilateral knee arthroplasties presents as an established patient for the continued management of back and hip pain. Treatment plan outlined below.     PLAN:  Schedule for left sacroiliac joint injection   Avoid NSAIDS given history of CKD  I have stressed the importance of physical activity and a home exercise plan to help with chronic pain and improve health.  Continue Gabapentin 800 mg TID. Discussed decreased clearance due to CKD. Do not recommend further dose escalation   Monitor progress from lumbar MB RFA at L3, 4, 5 bilaterally.   6. F/u s/p SIJ injection

## 2023-03-03 NOTE — TELEPHONE ENCOUNTER
Order Date:3/3/2023   Ordering User:ANASTASIA WINSTON [[id number]]   Encounter Provider:Anastasia Winston PA-C [[id number]]      Z1 Authorizing Provider: Anastasia Winston PA-C [[id number]]   Supervising Provider:ABELARDO LARSEN [[id number]]   Type of Supervision:Supervision Required   Department:[facility] PAIN MANAGEMENT[[id number]]      Common Order Information   Procedure -> Joint/Bursa Injection (Specify joint and laterality) Cmt: SIJ left      Pre-op Diagnosis -> Sacroiliitis       Order Specific Information   Order: Procedure Order to Pain Management [Custom: REF16   8]  Order #:           [id number]: 1 FUTURE     Priority: Routine  Class: Clinic Performed     Future Order Information       Expires on:03/03/2024            Expected by:03/03/2023                    Associated Diagnoses       M46.1 Sacroiliitis       Physician -> vu           Is patient on anti-coagulants? -> No           Facility Name: -> [facility]           Follow-up: -> 4 weeks               Priority: Routine     Class: Clinic Performed     Future Order Information       Expires on:03/03/2024            Expected by:03/03/2023                    Associated Diagnoses       M46.1 Sacroiliitis       Procedure -> Joint/Bursa Injection (Specify joint and laterality) Cmt: SIJ                  left          Physician -> vu           Is patient on anti-coagulants? -> No           Pre-op Diagnosis -> Sacroiliitis           Facility Name: -> [facility]              Follow-up: -> 4 weeks        Scheduled 4/5/23 OR , pt has

## 2023-03-17 ENCOUNTER — PATIENT MESSAGE (OUTPATIENT)
Dept: FAMILY MEDICINE | Facility: CLINIC | Age: 68
End: 2023-03-17

## 2023-03-17 DIAGNOSIS — Z79.4 TYPE 2 DIABETES MELLITUS WITH DIABETIC POLYNEUROPATHY, WITH LONG-TERM CURRENT USE OF INSULIN: Primary | ICD-10-CM

## 2023-03-17 DIAGNOSIS — E11.42 TYPE 2 DIABETES MELLITUS WITH DIABETIC POLYNEUROPATHY, WITH LONG-TERM CURRENT USE OF INSULIN: Primary | ICD-10-CM

## 2023-03-20 ENCOUNTER — LAB VISIT (OUTPATIENT)
Dept: LAB | Facility: HOSPITAL | Age: 68
End: 2023-03-20
Payer: MEDICARE

## 2023-03-20 DIAGNOSIS — E78.2 MIXED HYPERLIPIDEMIA: ICD-10-CM

## 2023-03-20 DIAGNOSIS — D63.1 ANEMIA OF CHRONIC RENAL FAILURE: ICD-10-CM

## 2023-03-20 DIAGNOSIS — N18.6 TYPE 2 DIABETES MELLITUS WITH END-STAGE RENAL DISEASE: ICD-10-CM

## 2023-03-20 DIAGNOSIS — I10 ESSENTIAL HYPERTENSION, MALIGNANT: ICD-10-CM

## 2023-03-20 DIAGNOSIS — R80.9 PROTEINURIA: ICD-10-CM

## 2023-03-20 DIAGNOSIS — C64.9 ADENOCARCINOMA, RENAL CELL: Primary | ICD-10-CM

## 2023-03-20 DIAGNOSIS — Z90.5 ACQUIRED ABSENCE OF KIDNEY: ICD-10-CM

## 2023-03-20 DIAGNOSIS — E79.0 URICACIDEMIA: ICD-10-CM

## 2023-03-20 DIAGNOSIS — N18.9 ANEMIA OF CHRONIC RENAL FAILURE: ICD-10-CM

## 2023-03-20 DIAGNOSIS — E11.22 TYPE 2 DIABETES MELLITUS WITH END-STAGE RENAL DISEASE: ICD-10-CM

## 2023-03-20 DIAGNOSIS — N18.31 CHRONIC KIDNEY DISEASE (CKD) STAGE G3A/A1, MODERATELY DECREASED GLOMERULAR FILTRATION RATE (GFR) BETWEEN 45-59 ML/MIN/1.73 SQUARE METER AND ALBUMINURIA CREATININE RATIO LESS THAN 30 MG/G: ICD-10-CM

## 2023-03-20 LAB
ALBUMIN SERPL BCP-MCNC: 4 G/DL (ref 3.5–5.2)
ALBUMIN/CREAT UR: 166.8 UG/MG (ref 0–30)
ANION GAP SERPL CALC-SCNC: 7 MMOL/L (ref 8–16)
BACTERIA #/AREA URNS HPF: NEGATIVE /HPF
BILIRUB UR QL STRIP: NEGATIVE
BUN SERPL-MCNC: 30 MG/DL (ref 8–23)
CALCIUM SERPL-MCNC: 9.7 MG/DL (ref 8.7–10.5)
CHLORIDE SERPL-SCNC: 103 MMOL/L (ref 95–110)
CLARITY UR: CLEAR
CO2 SERPL-SCNC: 27 MMOL/L (ref 23–29)
COLOR UR: YELLOW
CREAT SERPL-MCNC: 1.4 MG/DL (ref 0.5–1.4)
CREAT UR-MCNC: 111 MG/DL (ref 15–325)
EST. GFR  (NO RACE VARIABLE): 41 ML/MIN/1.73 M^2
GLUCOSE SERPL-MCNC: 131 MG/DL (ref 70–110)
GLUCOSE UR QL STRIP: NEGATIVE
HGB UR QL STRIP: NEGATIVE
HYALINE CASTS #/AREA URNS LPF: 2 /LPF
KETONES UR QL STRIP: NEGATIVE
LEUKOCYTE ESTERASE UR QL STRIP: NEGATIVE
MAGNESIUM SERPL-MCNC: 1.5 MG/DL (ref 1.6–2.6)
MICROALBUMIN UR DL<=1MG/L-MCNC: 185.1 UG/ML
MICROSCOPIC COMMENT: ABNORMAL
NITRITE UR QL STRIP: NEGATIVE
PH UR STRIP: 6 [PH] (ref 5–8)
PHOSPHATE SERPL-MCNC: 4.4 MG/DL (ref 2.7–4.5)
POTASSIUM SERPL-SCNC: 4.8 MMOL/L (ref 3.5–5.1)
PROT UR QL STRIP: ABNORMAL
RBC #/AREA URNS HPF: 0 /HPF (ref 0–4)
SODIUM SERPL-SCNC: 137 MMOL/L (ref 136–145)
SP GR UR STRIP: 1.02 (ref 1–1.03)
SQUAMOUS #/AREA URNS HPF: 1 /HPF
URATE SERPL-MCNC: 7 MG/DL (ref 2.4–5.7)
URN SPEC COLLECT METH UR: ABNORMAL
UROBILINOGEN UR STRIP-ACNC: NEGATIVE EU/DL
WBC #/AREA URNS HPF: 2 /HPF (ref 0–5)

## 2023-03-20 PROCEDURE — 80069 RENAL FUNCTION PANEL: CPT

## 2023-03-20 PROCEDURE — 84550 ASSAY OF BLOOD/URIC ACID: CPT

## 2023-03-20 PROCEDURE — 83735 ASSAY OF MAGNESIUM: CPT

## 2023-03-20 PROCEDURE — 82570 ASSAY OF URINE CREATININE: CPT

## 2023-03-20 PROCEDURE — 81001 URINALYSIS AUTO W/SCOPE: CPT

## 2023-04-05 ENCOUNTER — HOSPITAL ENCOUNTER (OUTPATIENT)
Facility: HOSPITAL | Age: 68
Discharge: HOME | End: 2023-04-05
Attending: ANESTHESIOLOGY | Admitting: ANESTHESIOLOGY
Payer: MEDICARE

## 2023-04-05 VITALS
HEART RATE: 66 BPM | SYSTOLIC BLOOD PRESSURE: 138 MMHG | DIASTOLIC BLOOD PRESSURE: 77 MMHG | BODY MASS INDEX: 27.48 KG/M2 | WEIGHT: 171 LBS | HEIGHT: 66 IN | OXYGEN SATURATION: 96 % | RESPIRATION RATE: 18 BRPM | TEMPERATURE: 98 F

## 2023-04-05 DIAGNOSIS — M53.3 SACROILIAC JOINT PAIN: ICD-10-CM

## 2023-04-05 LAB — POCT GLUCOSE: 131 MG/DL (ref 70–110)

## 2023-04-05 PROCEDURE — 99900103 DSU ONLY-NO CHARGE-INITIAL HR (STAT): Performed by: ANESTHESIOLOGY

## 2023-04-05 PROCEDURE — 71000015 HC POSTOP RECOV 1ST HR: Performed by: ANESTHESIOLOGY

## 2023-04-05 PROCEDURE — 99900104 DSU ONLY-NO CHARGE-EA ADD'L HR (STAT): Performed by: ANESTHESIOLOGY

## 2023-04-05 PROCEDURE — 27096 INJECT SACROILIAC JOINT: CPT | Mod: LT,,, | Performed by: ANESTHESIOLOGY

## 2023-04-05 PROCEDURE — 27096 PR INJECTION,SACROILIAC JOINT: ICD-10-PCS | Mod: LT,,, | Performed by: ANESTHESIOLOGY

## 2023-04-05 PROCEDURE — 36000705 HC OR TIME LEV I EA ADD 15 MIN: Performed by: ANESTHESIOLOGY

## 2023-04-05 PROCEDURE — 25000003 PHARM REV CODE 250: Performed by: ANESTHESIOLOGY

## 2023-04-05 PROCEDURE — 63600175 PHARM REV CODE 636 W HCPCS: Performed by: ANESTHESIOLOGY

## 2023-04-05 PROCEDURE — 36000704 HC OR TIME LEV I 1ST 15 MIN: Performed by: ANESTHESIOLOGY

## 2023-04-05 PROCEDURE — 25500020 PHARM REV CODE 255: Performed by: ANESTHESIOLOGY

## 2023-04-05 RX ORDER — SODIUM CHLORIDE, SODIUM LACTATE, POTASSIUM CHLORIDE, CALCIUM CHLORIDE 600; 310; 30; 20 MG/100ML; MG/100ML; MG/100ML; MG/100ML
INJECTION, SOLUTION INTRAVENOUS ONCE AS NEEDED
Status: COMPLETED | OUTPATIENT
Start: 2023-04-05 | End: 2023-04-05

## 2023-04-05 RX ORDER — TIRZEPATIDE 5 MG/.5ML
5 INJECTION, SOLUTION SUBCUTANEOUS
Qty: 4 PEN | Refills: 11 | Status: SHIPPED | OUTPATIENT
Start: 2023-04-05 | End: 2023-06-05

## 2023-04-05 RX ORDER — MIDAZOLAM HYDROCHLORIDE 1 MG/ML
INJECTION, SOLUTION INTRAMUSCULAR; INTRAVENOUS
Status: DISCONTINUED | OUTPATIENT
Start: 2023-04-05 | End: 2023-04-05 | Stop reason: HOSPADM

## 2023-04-05 RX ORDER — BUPIVACAINE HYDROCHLORIDE 2.5 MG/ML
INJECTION, SOLUTION EPIDURAL; INFILTRATION; INTRACAUDAL
Status: DISCONTINUED | OUTPATIENT
Start: 2023-04-05 | End: 2023-04-05 | Stop reason: HOSPADM

## 2023-04-05 RX ORDER — FENTANYL CITRATE 50 UG/ML
INJECTION, SOLUTION INTRAMUSCULAR; INTRAVENOUS
Status: DISCONTINUED | OUTPATIENT
Start: 2023-04-05 | End: 2023-04-05 | Stop reason: HOSPADM

## 2023-04-05 RX ORDER — METHYLPREDNISOLONE ACETATE 80 MG/ML
INJECTION, SUSPENSION INTRA-ARTICULAR; INTRALESIONAL; INTRAMUSCULAR; SOFT TISSUE
Status: DISCONTINUED | OUTPATIENT
Start: 2023-04-05 | End: 2023-04-05 | Stop reason: HOSPADM

## 2023-04-05 RX ADMIN — SODIUM CHLORIDE, POTASSIUM CHLORIDE, SODIUM LACTATE AND CALCIUM CHLORIDE: 600; 310; 30; 20 INJECTION, SOLUTION INTRAVENOUS at 07:04

## 2023-04-05 NOTE — H&P
CC: low back pain    HPI: The patient is a 68 y.o. female with a history of low back pain here for SI joint injection. There are no major changes in history and physical from 3/3/23 by Melisa.    Past Medical History:   Diagnosis Date    Allergy     aspartame,fructose monosodium glutamate and penicillins.    Anemia of chronic disorder 11/09/2017    Anemia, chronic renal failure, stage 2 (mild) 11/09/2017    Anemia, chronic renal failure, stage 3 (moderate) 11/09/2017    Asthma     Cancer     kidney    Cataract of right eye     CKD (chronic kidney disease), stage III     Diabetes mellitus     type II    Fibromyalgia     GERD (gastroesophageal reflux disease)     Hx of renal cell cancer - left nephrectomy Aug 2014 11/09/2017    Hypercholesteremia     Hypertension     IDDM (insulin dependent diabetes mellitus)     Lumbar spondylosis     Lumbosacral radiculopathy     Osteopenia     Palpitations     Plantar fasciitis, right     Sleep apnea     Syncope 02/2012    Tachycardia     Thoracic radiculopathy     TMJ (dislocation of temporomandibular joint)        Past Surgical History:   Procedure Laterality Date    CATARACT EXTRACTION Left 03/2016    INJECTION OF ANESTHETIC AGENT AROUND MEDIAL BRANCH NERVES INNERVATING LUMBAR FACET JOINT Bilateral 12/15/2022    Procedure: Block-nerve-medial branch-lumbar;  Surgeon: Howard Rivas MD;  Location: [facility] OR;  Service: Pain Management;  Laterality: Bilateral;  L3,4,5 MBB    INJECTION OF ANESTHETIC AGENT AROUND MEDIAL BRANCH NERVES INNERVATING LUMBAR FACET JOINT Bilateral 1/3/2023    Procedure: Block-nerve-medial branch-lumbar;  Surgeon: Howard Rivas MD;  Location: [facility] OR;  Service: Pain Management;  Laterality: Bilateral;  L3,4,5 MBB #2    INJECTION, SACROILIAC JOINT Left 10/7/2022    Procedure: INJECTION,SACROILIAC JOINT;  Surgeon: Cory Ruggiero MD;  Location: [facility] OR;  Service: Pain Management;  Laterality: Left;  sacroiliac injection    JOINT REPLACEMENT Right     knee     RADIOFREQUENCY THERMOCOAGULATION Bilateral 1/13/2023    Procedure: RADIOFREQUENCY THERMAL COAGULATION;  Surgeon: Howard Rivas MD;  Location: [facility] OR;  Service: Pain Management;  Laterality: Bilateral;  L3,4,5 Raul RFA    SHOULDER OPEN ROTATOR CUFF REPAIR Right 02/2005    labral repair    TOTAL KNEE ARTHROPLASTY Left 2014    TUBAL LIGATION  1990    TUMOR REMOVAL  2014    from kidney       Family History   Problem Relation Age of Onset    Stomach cancer Mother     Breast cancer Mother     Diabetes Mother     Cancer Father        Social History     Socioeconomic History    Marital status:      Spouse name: Owen Castillo    Number of children: 2   Occupational History    Occupation: Part - member support [employer]     Employer: [employer]   Tobacco Use    Smoking status: Never    Smokeless tobacco: Never   Substance and Sexual Activity    Alcohol use: No    Drug use: No    Sexual activity: Yes     Partners: Male     Social Determinants of Health     Financial Resource Strain: Low Risk     Difficulty of Paying Living Expenses: Not very hard   Food Insecurity: No Food Insecurity    Worried About Running Out of Food in the Last Year: Never true    Ran Out of Food in the Last Year: Never true   Transportation Needs: No Transportation Needs    Lack of Transportation (Medical): No    Lack of Transportation (Non-Medical): No   Physical Activity: Inactive    Days of Exercise per Week: 0 days    Minutes of Exercise per Session: 0 min   Stress: Stress Concern Present    Feeling of Stress : To some extent   Social Connections: Socially Integrated    Frequency of Communication with Friends and Family: Three times a week    Frequency of Social Gatherings with Friends and Family: Twice a week    Attends Alevism Services: More than 4 times per year    Active Member of Clubs or Organizations: Yes    Attends Club or Organization Meetings: 1 to 4 times per year    Marital Status:    Housing Stability: Low Risk     Unable to  "Pay for Housing in the Last Year: No    Number of Places Lived in the Last Year: 1    Unstable Housing in the Last Year: No       No current facility-administered medications for this encounter.     Facility-Administered Medications Ordered in Other Encounters   Medication Dose Route Frequency Provider Last Rate Last Admin    lactated ringers infusion   Intravenous Once PRN Howard Rivas MD        lactated ringers infusion   Intravenous Once PRN Howard Rivas MD           Review of patient's allergies indicates:   Allergen Reactions    Aspartame Other (See Comments)     headache    Other reaction(s): Other (See Comments)  Headache  migraine  Headache  Other reaction(s): Other (See Comments)  Headache  migraine  headache    Penicillins Itching, Rash, Other (See Comments) and Hives     Rash  Rash  Rash  Rash  Rash    Stevioside (bulk) Other (See Comments)    Fructose Other (See Comments)     headache    Other reaction(s): Other (See Comments)  headache  migraine  headache  Other reaction(s): Other (See Comments)  headache  migraine  headache    Monosodium glutamate Other (See Comments)     Headache    Other reaction(s): Other (See Comments)  Headache  headache  Headache  Other reaction(s): Other (See Comments)  Headache  headache  Headache       Vitals:    04/05/23 0729   BP: (!) 109/57   Pulse: 62   Resp: 18   Temp: 98.2 °F (36.8 °C)   TempSrc: Oral   SpO2: 95%   Weight: 77.6 kg (171 lb)   Height: 5' 6" (1.676 m)       REVIEW OF SYSTEMS:     GENERAL: No weight loss, malaise or fevers.  HEENT:  No recent changes in vision or hearing  NECK: Negative for lumps, no difficulty with swallowing.  RESPIRATORY: Negative for cough, wheezing or shortness of breath, patient denies any recent URI.  CARDIOVASCULAR: Negative for chest pain, leg swelling or palpitations.  GI: Negative for abdominal discomfort, blood in stools or black stools or change in bowel habits.  MUSCULOSKELETAL: See HPI.  SKIN: Negative for lesions, rash, and " itching.  PSYCH: No suicidal or homicidal ideations, no current mood disturbances.  HEMATOLOGY/LYMPHOLOGY: Negative for prolonged bleeding, bruising easily or swollen nodes. Patient is not currently taking any anti-coagulants  ENDO: No history of diabetes or thyroid dysfunction  NEURO: No history of syncope, paralysis, seizures or tremors.All other reviewed and negative other than HPI.    Physical exam:  Gen: A and O x3, pleasant, well-groomed  Skin: No rashes or obvious lesions  HEENT: PERRLA, no obvious deformities on ears or in canals. No thyroid masses, trachea midline, no palpable lymph nodes in neck, axilla.  CVS: Regular rate and rhythm, normal S1 and S2, no murmurs.  Resp: Clear to auscultation bilaterally.  Abdomen: Soft, NT/ND, normal bowel sounds present.  Musculoskeletal/Neuro: Moving all extremities    Assessment:  Sacroiliac joint pain    Other orders  -     SURG FL Surgery Fluoro Usage; Standing          PLAN: SI joint injection      This patient has been cleared for surgery in an ambulatory surgical facility    ASA 3,  Mallampatti Score 3  No history of anesthetic complications  Plan for RN IV sedation

## 2023-04-05 NOTE — DISCHARGE SUMMARY
[facility]  Discharge Note  Short Stay    Procedure(s) (LRB):  INJECTION,SACROILIAC JOINT (Left)      OUTCOME: Patient tolerated treatment/procedure well without complication and is now ready for discharge.    DISPOSITION: Home or Self Care    FINAL DIAGNOSIS:  <principal problem not specified>    FOLLOWUP: In clinic    DISCHARGE INSTRUCTIONS:    Discharge Procedure Orders   Notify your health care provider if you experience any of the following:  temperature >100.4     Notify your health care provider if you experience any of the following:  severe uncontrolled pain     Notify your health care provider if you experience any of the following:  redness, tenderness, or signs of infection (pain, swelling, redness, odor or green/yellow discharge around incision site)     Activity as tolerated        TIME SPENT ON DISCHARGE: 30 minutes

## 2023-04-05 NOTE — OP NOTE
Procedure Date: 4/5/2023    PROCEDURE:  Left sacroiliac joint injection utilizing fluoroscopy.    DIAGNOSIS: Left sided sacroiliitis.  Post op diagnosis: same    PHYSICIAN: Howard Rivas MD    MEDICATIONS INJECTED:  Methylprednisone 80mg and 1.5ml Bupivacaine 0.25%.    LOCAL ANESTHETIC USED: Lidocaine 1%,1ml total.     SEDATION MEDICATIONS: RN IV sedation    ESTIMATED BLOOD LOSS: None    COMPLICATIONS: None    TECHNIQUE:   Time-out taken to identify patient and procedure side prior to starting the procedure. After placing the patient in the prone position, the patient was prepped and draped in the usual sterile fashion using ChloraPrep and sterile towels.  The area was determined under fluoroscopy in the AP view.  Lidocaine was injected by raising a wheal and going down to the periosteum using a 25-gauge 1.5 inch needle.  The 3.5 inch 22-gauge spinal needle was introduced into inferior opening of the left sacroiliac joint.  Negative pressure applied to confirm no intravascular placement.  0.5 ml of contrast dye was injected to confirm placement and to confirm that there was no vascular uptake. The medication was then injected slowly. The patient tolerated the procedure well.    The patient was monitored after the procedure.  The patient was given post procedure and discharge instructions to follow at home. The patient was discharged in a stable condition

## 2023-04-05 NOTE — PLAN OF CARE
Pt ambulated in room with steady gait, denies pain or nausea.  States that she is ready for discharge.

## 2023-04-05 NOTE — DISCHARGE INSTRUCTIONS
Printed discharge instructions given also    General Information:    1.  Do not drink alcoholic beverages including beer for 24 hours or as long as you are on pain medication..  2.  Do not drive a motor vehicle, operate machinery or power tools, or signs legal papers for 24 hours or as long as you are on pain medication.   3.  You may experience light-headedness, dizziness, and sleepiness following surgery. Please do not stay alone. A responsible adult should be with you for this 24 hour period.  4.  Go home and rest.    5. Progress slowly to a normal diet unless instructed.  Otherwise, begin with liquids such as soft drinks, then soup and crackers working up to solid foods. Drink plenty of nonalcoholic fluids.  6.  Certain anesthetics and pain medications produce nausea and vomiting in certain       individuals. If nausea becomes a problem at home, call you doctor.    7. A nurse will be calling you sometime after surgery. Do not be alarmed. This is our way of finding out how you are doing.    8. Several times every hour while you are awake, take 2-3 deep breaths and cough. If you had stomach surgery hold a pillow or rolled towel firmly against your stomach before you cough. This will help with any pain the cough might cause.  9. Several times every hour while you are awake, pump and flex your feet 5-6 times and do foot circles. This will help prevent blood clots.    10.Call your doctor for severe pain, bleeding, fever, or signs or symptoms of infection (pain, swelling, redness, foul odor, drainage).

## 2023-04-06 ENCOUNTER — PATIENT MESSAGE (OUTPATIENT)
Dept: FAMILY MEDICINE | Facility: CLINIC | Age: 68
End: 2023-04-06

## 2023-04-06 DIAGNOSIS — E11.42 TYPE 2 DIABETES MELLITUS WITH DIABETIC POLYNEUROPATHY, WITH LONG-TERM CURRENT USE OF INSULIN: Primary | ICD-10-CM

## 2023-04-06 DIAGNOSIS — Z79.4 TYPE 2 DIABETES MELLITUS WITH DIABETIC POLYNEUROPATHY, WITH LONG-TERM CURRENT USE OF INSULIN: Primary | ICD-10-CM

## 2023-04-06 NOTE — TELEPHONE ENCOUNTER
I am sending a prescription for Jardiance at 10 mg to start of with.  Your also on short-acting, long-acting insulin and I am not sure about Inj. Mounjaro which I sent yesterday.  So you will be on for medication and watch out for sugar drops or hypoglycemic episodes.  You may have to cut down on insulins.      ===View-only below this line===      ----- Message -----       From:Payton Castillo       Sent:4/6/2023  5:49 PM CDT         To:Patient Medical Advice Request Message List    Subject:Freestyle Amy 3    Jardiance will be fine but it [facility]      ----- Message -----       From:John Nolan MD       Sent:4/6/2023  5:47 PM CDT         To:Payton Castillo    Subject:Freestyle Amy 3    I will be happy to send Jardiance.  One thing which surprises me always is that why do we have to give a prior authorization and why does not a simple prescription work.  If any good medication or state of the art medication works well, there should be no red tape for getting prior authorization.    Let me know if Jardiance is okay with you and I will send it to the pharmacy.  You are already on Kerendia which protects the kidneys.      ----- Message -----       From:Payton Castillo       Sent:4/6/2023  4:40 AM CDT         To:Patient Medical Advice Request Message List    Subject:Freestyle Amy 3    I appreciate your effort. I have spoken to Dr. Silver and he said that from research it was shown that Jardiance and Farziga was best for the kidneys. Called [facility]. They will cover it as long as you get prior authorization. That phone number was given to you in my last message to you.  Jardiance is the cheaper of the two so I will try that one. Also I need a 3 month supply for the Freesryle Amy 3 sent to [facility] as well, Again you will need prior authorization as well or they cannot fill the prescription. Thanks and God Bless.      ----- Message -----       From:John Nolan MD       Sent:4/5/2023  10:00 PM CDT         To:Payton Castillo    Subject:Freestyle Amy 3    Inj. Mounjaro is a new medication in the market and lot of pharmacies and insurance plans are still not covering it.  I have sent a prescription to your pharmacy and let us see what happens.  Generally getting a prior authorization for something which is not covered and alternatives like injection Trulicity a are available will be extremely difficult.    I will ask my staff to see if they can work on the Amy system.  In past I had a very difficult time spending 30-40 minutes waiting for somebody to talk to me and only to be rebuffed.        ----- Message -----       From:Payton Castillo       Sent:3/17/2023 10:17 PM CDT         To:John Nolan MD    Subject:Freestyle Amy 3    [facility] will pay for the sensors if you (the provider) get prior authorization before sending the prescription in. That phone number is . You also said you were thinking about putting me on Ozempic, could I get Mounjaro instead. I heard a lot of people are getting good results from it

## 2023-04-06 NOTE — TELEPHONE ENCOUNTER
Inj. Mounjaro is a new medication in the market and lot of pharmacies and insurance plans are still not covering it.  I have sent a prescription to your pharmacy and let us see what happens.  Generally getting a prior authorization for something which is not covered and alternatives like injection Trulicity a are available will be extremely difficult.    I will ask my staff to see if they can work on the Amy system.  In past I had a very difficult time spending 30-40 minutes waiting for somebody to talk to me and only to be rebuffed.      ===View-only below this line===      ----- Message -----       From:Payton Castillo       Sent:3/17/2023 10:17 PM CDT         To:John Nolan MD    Subject:Freestyle Amy 3    [facility] will pay for the sensors if you (the provider) get prior authorization before sending the prescription in. That phone number is . You also said you were thinking about putting me on Ozempic, could I get Mounjaro instead. I heard a lot of people are getting good results from it

## 2023-04-13 ENCOUNTER — TELEPHONE (OUTPATIENT)
Dept: FAMILY MEDICINE | Facility: CLINIC | Age: 68
End: 2023-04-13

## 2023-04-13 NOTE — TELEPHONE ENCOUNTER
The following medication needs a prior authorization:     Medication Name: Mounjaro       Dosage: 5 mg/0.5ml    Frequency: weekly     Directions for use: inject 5 mg into the skin every 7 days    Diagnosis: Type 2 diabetes mellitus with diabetic polyneuropathy, with long-term current use of insulin      Is the request for a reauthorization? No     Is the patient currently stable on therapy? N/a     Please list all therapeutic alternatives previously used with start/end dates and outcome:   Lantus 10/30/2017-02/22/23  Novolog flexpen 12/31/2020-11/19/2021  Lantus U 09/23/2019-02/22/2023  Glipizide 11/09/2017-12/27/2018  Humalog mix 10/05/2017-12/27/2018  Novolog flexpen 02/06/2020-02/01/2023

## 2023-04-18 ENCOUNTER — PATIENT MESSAGE (OUTPATIENT)
Dept: FAMILY MEDICINE | Facility: CLINIC | Age: 68
End: 2023-04-18

## 2023-04-18 NOTE — TELEPHONE ENCOUNTER
I had sent the prescription to [facility].  What happened?      ===View-only below this line===      ----- Message -----       From:Payton Castillo       Sent:4/18/2023  2:13 PM CDT         To:John Nolan MD    Subject:Freestyle nelida 3    Were you able to [facility] for the freestyle nelida 3?

## 2023-04-20 NOTE — TELEPHONE ENCOUNTER
Prescription will need to be sent to [facility]. They will send us the paperwork once it is sent.

## 2023-04-24 ENCOUNTER — TELEPHONE (OUTPATIENT)
Dept: FAMILY MEDICINE | Facility: CLINIC | Age: 68
End: 2023-04-24

## 2023-04-24 DIAGNOSIS — G89.29 CHRONIC PAIN OF LEFT THUMB: Primary | ICD-10-CM

## 2023-04-24 DIAGNOSIS — M79.645 CHRONIC PAIN OF LEFT THUMB: Primary | ICD-10-CM

## 2023-04-24 NOTE — TELEPHONE ENCOUNTER
Patient called and states her left hand/thumb is causing her a lot of pain. She has had previous surgery with Dr. Aragon and is requesting a referral to him for this issue.     Chronic pain of left thumb  -     Ambulatory referral/consult to Hand Surgery; Future; Expected date: 05/02/2023

## 2023-04-25 ENCOUNTER — PATIENT MESSAGE (OUTPATIENT)
Dept: FAMILY MEDICINE | Facility: CLINIC | Age: 68
End: 2023-04-25

## 2023-05-03 ENCOUNTER — OFFICE VISIT (OUTPATIENT)
Dept: PAIN MEDICINE | Facility: CLINIC | Age: 68
End: 2023-05-03
Payer: MEDICARE

## 2023-05-03 VITALS
DIASTOLIC BLOOD PRESSURE: 81 MMHG | WEIGHT: 171 LBS | SYSTOLIC BLOOD PRESSURE: 145 MMHG | HEART RATE: 93 BPM | BODY MASS INDEX: 27.48 KG/M2 | HEIGHT: 66 IN

## 2023-05-03 DIAGNOSIS — M46.1 SACROILIITIS: Primary | ICD-10-CM

## 2023-05-03 DIAGNOSIS — M47.896 OTHER SPONDYLOSIS, LUMBAR REGION: ICD-10-CM

## 2023-05-03 DIAGNOSIS — M51.36 DDD (DEGENERATIVE DISC DISEASE), LUMBAR: ICD-10-CM

## 2023-05-03 PROCEDURE — 99213 OFFICE O/P EST LOW 20 MIN: CPT | Mod: S$PBB,,, | Performed by: PHYSICIAN ASSISTANT

## 2023-05-03 PROCEDURE — 99213 PR OFFICE/OUTPT VISIT, EST, LEVL III, 20-29 MIN: ICD-10-PCS | Mod: S$PBB,,, | Performed by: PHYSICIAN ASSISTANT

## 2023-05-03 PROCEDURE — 99213 OFFICE O/P EST LOW 20 MIN: CPT | Mod: PBBFAC,PN | Performed by: PHYSICIAN ASSISTANT

## 2023-05-03 PROCEDURE — 99999 PR PBB SHADOW E&M-EST. PATIENT-LVL III: CPT | Mod: PBBFAC,,, | Performed by: PHYSICIAN ASSISTANT

## 2023-05-03 PROCEDURE — 99999 PR PBB SHADOW E&M-EST. PATIENT-LVL III: ICD-10-PCS | Mod: PBBFAC,,, | Performed by: PHYSICIAN ASSISTANT

## 2023-05-03 NOTE — PROGRESS NOTES
FOLLOW UP NOTE:     CHIEF COMPLAINT: left sided back and hip pain    INITIAL HISTORY OF PRESENT ILLNESS: Payton Castillo is a 67 y.o. female with PMH significant for HTN, DM II on insulin, CKD, and hx of bilateral knee arthroplasties presents as a referral for the evaluation of back and hip pain. The patient reports that her pain began approximately decades ago after no inciting incident or trauma. The patient reports of worsening severity over time. Hip pain is currently her worst pain. The patient believes her low back and hip pain are related. The patient localizes her pain to the area across her lower back (L > R). The patient reports of radiation to her hips b/l (L > R) and down the lateral aspect of her BLE's to her ankles. The patient describes her pain as a throbbing and sharp type of pain and a burning type of pain in her legs. The patient reports of tingling in her BLE's and her feet. The patient reports that her pain is a 6/10 with prolonged standing. Patient denies of any urinary/fecal incontinence, saddle anesthesia, or weakness.       Aggravating factors: bending, standing, walking     Mitigating factors: sitting, heat, massage      Relevant Surgeries: no     Interventional Therapies: yes; saw Dr. Dane Dalton - did back injections with some benefit     INTERVAL HISTORY OF PRESENT ILLNESS: Payton Castillo is a 68 y.o. female with PMH significant for HTN, DM II on insulin, CKD, and hx of bilateral knee arthroplasties presents as an established patient for the continued management of back and hip pain.  She is s/p left SIJ injection with improvement only 1 days. She is s/p lumbar MB RFA at L3, 4, 5 with 30% improvement. Sharp pain has resolved. She discontinued Tylenol #3 PO BID. She is taking Gabapentin 800 mg TID.   The patient denies of any significant changes in her health since her last appointment. The patient also denies of any changes in the character of her pain since her last appointment. The  "patient reports that her current pain is a 6/10. Patient denies of any urinary/fecal incontinence, saddle anesthesia, or weakness.       CURRENT PAIN MEDICATIONS:   Currently taking: Biofreeze, OTC Tylenol, gabapentin 800 mg PO TID, Tylenol #3 PO BID     Has tried in the past:    Opioids: yes  NSAIDS: no; avoids secondary to CKD  Tylenol: yes  Muscle relaxants: yes; tried with some benefit; tried robaxin - was instructed to discontinue secondary to her kidney issues  TCAs: no  SNRIs: no  Anticonvulsants: yes  topical creams: yes      ROS:  Review of Systems   Constitutional:  Negative for chills and fever.   HENT:  Negative for sore throat.    Eyes:  Negative for visual disturbance.   Respiratory:  Negative for shortness of breath.    Cardiovascular:  Negative for chest pain.   Gastrointestinal:  Negative for nausea and vomiting.   Genitourinary:  Negative for difficulty urinating.   Musculoskeletal:  Positive for arthralgias and back pain.   Skin:  Negative for rash.   Allergic/Immunologic: Negative for immunocompromised state.   Neurological:  Negative for syncope.   Hematological:  Does not bruise/bleed easily.   Psychiatric/Behavioral:  Negative for suicidal ideas.       MEDICAL, SURGICAL, FAMILY, SOCIAL HX: reviewed    MEDICATIONS/ALLERGIES: reviewed    PHYSICAL EXAM:    VITALS: Vitals reviewed.   Vitals:    05/03/23 1238   BP: (!) 145/81   Pulse: 93   Weight: 77.6 kg (171 lb)   Height: 5' 6" (1.676 m)   PainSc:   3   PainLoc: Back       Physical Exam  Vitals and nursing note reviewed.   Constitutional:       Appearance: Normal appearance. She is not toxic-appearing or diaphoretic.   HENT:      Head: Normocephalic and atraumatic.   Eyes:      General:         Right eye: No discharge.         Left eye: No discharge.      Extraocular Movements: Extraocular movements intact.      Conjunctiva/sclera: Conjunctivae normal.   Cardiovascular:      Rate and Rhythm: Normal rate.   Pulmonary:      Effort: Pulmonary effort " is normal. No respiratory distress.      Breath sounds: Normal breath sounds.   Abdominal:      Palpations: Abdomen is soft.   Skin:     General: Skin is warm and dry.      Findings: No rash.   Neurological:      Mental Status: She is alert and oriented to person, place, and time.   Psychiatric:         Mood and Affect: Mood and affect normal.         Cognition and Memory: Memory normal.         Judgment: Judgment normal.        UPPER EXTREMITIES: Normal alignment, normal range of motion, no atrophy, no skin changes,  hair growth and nail growth normal and equal bilaterally. No swelling, no tenderness.    LOWER EXTREMITIES:  Normal alignment, normal range of motion, no atrophy, no skin changes,  hair growth and nail growth normal and equal bilaterally. No swelling, no tenderness.     LUMBAR SPINE  Lumbar spine: ROM is limited with flexion extension and oblique extension with increased pain with passive ROM    ((--)) Supine straight leg raise    ((-)) Facet loading   Internal and external rotation of the hip causes no increased pain on either side.  Myofascial exam: Tenderness to palpation across lumbar paraspinous muscles.     ((+)) TTP at the SI joint  ((+)) MANDO's test  ((+)) One leg stand    ((+)) Distraction test  ((+)) Thigh thrust     MOTOR: Tone and bulk: normal      MUSCLE STRENGTH:  Hip Flexion: Right 5/5, Left 5/5  Hip Extension: Right 5/5, Left 5/5  Leg Flexion: Right 5/5, Left 5/5  Leg Extension: Right 5/5, Left 5/5  Plantar Flexion: Right 5/5, Left 5/5  Dorsiflexion: Right 5/5, Left 5/5     Delt      Bi         Tri                         Right:   5/5       5/5       5/5       5/5         Left:     5/5       5/5       5/5       5/5            SENSATION: Light touch and pinprick intact bilaterally  REFLEXES: normal, symmetric, nonbrisk.  Toes down, no clonus. Negative woody's sign bilaterally.  GAIT: normal rise, base, steps, and arm swing.      IMAGING:    MRI Lumbar Spine without contrast  (9/16/2022):  FINDINGS: Comparison to multiple prior exams. The lumbar spine has normal lordotic curvature and vertebral body alignment, with normal vertebral heights, and no acute fractures or evidence of a diffuse marrow replacement process. Mild-to-moderate degenerative loss of disc height and signal involves multiple levels. The conus medullaris is normal in signal and terminates at L1-L2.     At T12-L1, L1-L2, L2-L3, L3-L4, and L4-L5, there is mild broad-based disc bulging, without focal disc herniation or spinal canal stenosis. There is mild multilevel facet arthropathy, without significant neural foraminal narrowing or evidence of nerve impingement..     At L5-S1, there is broad-based disc bulging with T2 hyperintense right paracentral partial-thickness annular fissure. There is no focal disc herniation or spinal canal stenosis, with bilateral facet hypertrophy and T2 hyperintense synovial cyst formation. There is no significant foraminal narrowing or evidence of nerve impingement.     There are no signal abnormalities of the paraspinal soft tissues or the paraspinal ligaments. The visualized retroperitoneum is unremarkable.     IMPRESSION: Multilevel lumbar degenerative disc disease and facet osteoarthritis, with no focal disc herniation, spinal canal stenosis, or evidence of nerve impingement at any level.    X-ray bilateral hips (8/26/2022):  There is mild osteoarthritic changes of the left hip with spurring of the acetabulum. The hip joints are symmetric in size. There are no fractures or acute osseous abnormalities. The SI joints are symmetrical. There is facet hypertrophy at L5-S1. The soft tissues are normal    X-ray sacroiliac joints (8/26/2022):  The SI joints are symmetric in size. There are no erosions or ankylosis. There are mild degenerative changes of the left hip. There is facet hypertrophy at L5-S1. There are no acute osseous anomalies.     IMPRESSION: Facet hypertrophy at L5-S1     Normal SI  joints     Mild degenerative changes of the left hip    ASSESSMENT: Payton Castillo is a 67 y.o. female with PMH significant for HTN, DM II on insulin, CKD, and hx of bilateral knee arthroplasties presents as an established patient for the continued management of back and hip pain. Treatment plan outlined below.     PLAN:  Letter provided for travel  Avoid NSAIDS given history of CKD  I have stressed the importance of physical activity and a home exercise plan to help with chronic pain and improve health.  Continue Gabapentin 800 mg TID. Discussed decreased clearance due to CKD. Do not recommend further dose escalation   Monitor progress from lumbar MB RFA at L3, 4, 5 bilaterally.   6.  F/u prn

## 2023-05-04 ENCOUNTER — PATIENT MESSAGE (OUTPATIENT)
Dept: FAMILY MEDICINE | Facility: CLINIC | Age: 68
End: 2023-05-04

## 2023-05-18 ENCOUNTER — PATIENT MESSAGE (OUTPATIENT)
Dept: FAMILY MEDICINE | Facility: CLINIC | Age: 68
End: 2023-05-18

## 2023-05-24 DIAGNOSIS — Z79.4 TYPE 2 DIABETES MELLITUS WITH DIABETIC POLYNEUROPATHY, WITH LONG-TERM CURRENT USE OF INSULIN: ICD-10-CM

## 2023-05-24 DIAGNOSIS — E11.42 TYPE 2 DIABETES MELLITUS WITH DIABETIC POLYNEUROPATHY, WITH LONG-TERM CURRENT USE OF INSULIN: ICD-10-CM

## 2023-06-03 NOTE — PROGRESS NOTES
Dictation #1  MRN:[id number]  [facility]:[id number] Subjective:       Patient ID: Payton Castillo is a 68 y.o. female.    Chief Complaint: Diabetes, Hyperlipidemia, Hypertension, and Chronic Kidney Disease    Patient is a 68-year-old female who comes for follow-up.    Underlying medical issues are as below:-    Type 2 diabetes mellitus with diabetic polyneuropathy, with long-term current use of insulin  Chronic midline low back pain without sciatica  Essential hypertension  Mixed dyslipidemia  CKD stage 3 due to type 2 diabetes mellitus and she is following up with-[facility].( Dr Nicol MD Nephrology)    She was found to have some degree of proteinuria.  She has also been put on Kerendia.    Currently she is on Jardiance 10 mg and she request increasing the dosage to 25 mg in order to better control her blood sugars.  She is also taking long-acting insulin and I am not sure if she is taking short-acting insulin.  She is asking me to prescribe her Amy device and I have sent prescriptions girl or but I am not sure how to get it authorized.    Hyperlipidemia  This is a chronic problem. The current episode started more than 1 year ago. The problem is controlled. Exacerbating diseases include obesity. Pertinent negatives include no shortness of breath. Current antihyperlipidemic treatment includes statins. The current treatment provides moderate improvement of lipids. Risk factors for coronary artery disease include post-menopausal, obesity, dyslipidemia and diabetes mellitus.   Diabetes  She presents for her follow-up diabetic visit. She has type 2 diabetes mellitus. No MedicAlert identification noted. The initial diagnosis of diabetes was made 25 years (Diagnosed in 1990s.) ago. Her disease course has been fluctuating. Pertinent negatives for hypoglycemia include no confusion, dizziness, mood changes, nervousness/anxiousness, pallor, seizures or tremors. Associated symptoms include fatigue.  Pertinent negatives for diabetes include no polydipsia and no polyphagia. There are no hypoglycemic complications. Symptoms are worsening. Risk factors for coronary artery disease include hypertension, sedentary lifestyle, post-menopausal, dyslipidemia and diabetes mellitus. Current diabetic treatment includes insulin injections (Both long-acting and short-acting insulins-also Jardiance 10 mg). She is compliant with treatment most of the time. She has not had a previous visit with a dietitian. She rarely participates in exercise. Her home blood glucose trend is increasing steadily. Her breakfast blood glucose range is generally 130-140 mg/dl. An ACE inhibitor/angiotensin II receptor blocker is being taken. She does not see a podiatrist.Eye exam is not current.   Hypertension  This is a chronic problem. The current episode started more than 1 year ago. The problem has been waxing and waning since onset. The problem is uncontrolled. Associated symptoms include malaise/fatigue. Pertinent negatives include no palpitations or shortness of breath. Risk factors for coronary artery disease include sedentary lifestyle, dyslipidemia, diabetes mellitus and post-menopausal state. Past treatments include beta blockers, angiotensin blockers and calcium channel blockers. The current treatment provides moderate improvement. Compliance problems include psychosocial issues.  There is no history of heart failure or left ventricular hypertrophy.     Past Medical History:   Diagnosis Date    Allergy     aspartame,fructose monosodium glutamate and penicillins.    Anemia of chronic disorder 11/09/2017    Anemia, chronic renal failure, stage 2 (mild) 11/09/2017    Anemia, chronic renal failure, stage 3 (moderate) 11/09/2017    Asthma     Cancer     kidney    Cataract of right eye     CKD (chronic kidney disease), stage III     Diabetes mellitus     type II    Fibromyalgia     GERD (gastroesophageal reflux disease)     Hx of renal cell  cancer - left nephrectomy Aug 2014 11/09/2017    Hypercholesteremia     Hypertension     IDDM (insulin dependent diabetes mellitus)     Lumbar spondylosis     Lumbosacral radiculopathy     Osteopenia     Palpitations     Plantar fasciitis, right     Sleep apnea     Syncope 02/2012    Tachycardia     Thoracic radiculopathy     TMJ (dislocation of temporomandibular joint)      Social History     Socioeconomic History    Marital status:      Spouse name: Owen Castillo    Number of children: 2   Occupational History    Occupation: Part - member support [employer]     Employer: [employer]   Tobacco Use    Smoking status: Never    Smokeless tobacco: Never   Substance and Sexual Activity    Alcohol use: No    Drug use: No    Sexual activity: Yes     Partners: Male     Social Determinants of Health     Food Insecurity: No Food Insecurity    Worried About Running Out of Food in the Last Year: Never true    Ran Out of Food in the Last Year: Never true   Physical Activity: Inactive    Days of Exercise per Week: 0 days    Minutes of Exercise per Session: 0 min   Stress: Stress Concern Present    Feeling of Stress : To some extent   Social Connections: Socially Integrated    Frequency of Communication with Friends and Family: Three times a week    Frequency of Social Gatherings with Friends and Family: Twice a week    Attends Jewish Services: More than 4 times per year    Active Member of Clubs or Organizations: Yes    Attends Club or Organization Meetings: 1 to 4 times per year    Marital Status:    Housing Stability: Low Risk     Unable to Pay for Housing in the Last Year: No    Number of Places Lived in the Last Year: 1    Unstable Housing in the Last Year: No     Past Surgical History:   Procedure Laterality Date    CATARACT EXTRACTION Left 03/2016    INJECTION OF ANESTHETIC AGENT AROUND MEDIAL BRANCH NERVES INNERVATING LUMBAR FACET JOINT Bilateral 12/15/2022    Procedure: Block-nerve-medial branch-lumbar;   Surgeon: Howard Rivas MD;  Location: [facility] OR;  Service: Pain Management;  Laterality: Bilateral;  L3,4,5 MBB    INJECTION OF ANESTHETIC AGENT AROUND MEDIAL BRANCH NERVES INNERVATING LUMBAR FACET JOINT Bilateral 1/3/2023    Procedure: Block-nerve-medial branch-lumbar;  Surgeon: Howard Rivas MD;  Location: [facility] OR;  Service: Pain Management;  Laterality: Bilateral;  L3,4,5 MBB #2    INJECTION, SACROILIAC JOINT Left 10/7/2022    Procedure: INJECTION,SACROILIAC JOINT;  Surgeon: Cory Ruggiero MD;  Location: [facility] OR;  Service: Pain Management;  Laterality: Left;  sacroiliac injection    INJECTION, SACROILIAC JOINT Left 4/5/2023    Procedure: INJECTION,SACROILIAC JOINT;  Surgeon: Howard Rivas MD;  Location: [facility] OR;  Service: Pain Management;  Laterality: Left;  SIJ    JOINT REPLACEMENT Right     knee    RADIOFREQUENCY THERMOCOAGULATION Bilateral 1/13/2023    Procedure: RADIOFREQUENCY THERMAL COAGULATION;  Surgeon: Howard Rivas MD;  Location: [facility] OR;  Service: Pain Management;  Laterality: Bilateral;  L3,4,5 Raul RFA    SHOULDER OPEN ROTATOR CUFF REPAIR Right 02/2005    labral repair    TOTAL KNEE ARTHROPLASTY Left 2014    TUBAL LIGATION  1990    TUMOR REMOVAL  2014    from kidney     Family History   Problem Relation Age of Onset    Stomach cancer Mother     Breast cancer Mother     Diabetes Mother     Cancer Father        Review of Systems   Constitutional:  Positive for fatigue and malaise/fatigue. Negative for activity change (Gradually diminishing activities because of pain related issues.), chills and unexpected weight change (Gained 19 lb in 6-7 years.).   HENT:  Negative for dental problem and postnasal drip.         Dry mouth due to VESIcare   Eyes:  Negative for pain, discharge and visual disturbance.   Respiratory:  Positive for apnea. Negative for chest tightness, shortness of breath and stridor.         Patient does have obstructive sleep apnea and uses a CPAP device.   Cardiovascular:  Negative for  "palpitations and leg swelling.        Hypertension.  Chronic kidney disease stage 3.   Gastrointestinal:  Negative for abdominal distention, anal bleeding, constipation and diarrhea.   Endocrine: Negative for polydipsia and polyphagia.        Type 2 diabetes mellitus dyslipidemia   Genitourinary:  Negative for difficulty urinating, flank pain and frequency.        Patient has an intact uterus.  Pap smear is pending at this point.  She has chronic kidney disease stage 3 and follows with Nephrology group with Dr. Tommy Haro.  Multiple nephrologist start taking care of her on different occasions.  Bladder incontinence symptoms-Dr. Membreno, urology prescribes her Solifenacin   Musculoskeletal:  Positive for back pain. Negative for arthralgias and joint swelling.        Chronic pain-leg cramps   Skin:  Negative for color change, pallor and rash.   Allergic/Immunologic: Negative for environmental allergies, food allergies and immunocompromised state.   Neurological:  Negative for dizziness, tremors, seizures, syncope and light-headedness.        Neuropathy symptoms due to diabetes.  Patient takes gabapentin 600 mg 3 times a day.   Hematological:  Negative for adenopathy. Does not bruise/bleed easily.   Psychiatric/Behavioral:  Negative for agitation, confusion and dysphoric mood. The patient is not nervous/anxious.        Objective:      Blood pressure 127/66, pulse 69, height 5' 6" (1.676 m), weight 77.1 kg (170 lb). Body mass index is 27.44 kg/m².  Physical Exam  Vitals and nursing note reviewed.   Constitutional:       General: She is not in acute distress.     Appearance: She is well-developed. She is not ill-appearing, toxic-appearing or diaphoretic.      Comments: BMI is 27.44.   HENT:      Head: Normocephalic and atraumatic.      Mouth/Throat:      Tonsils: No tonsillar abscesses.   Eyes:      General: No scleral icterus.     Conjunctiva/sclera: Conjunctivae normal.      Comments: Chronically puffy eyelids. "   Neck:      Thyroid: No thyromegaly.      Trachea: Trachea normal. No tracheal deviation.   Cardiovascular:      Rate and Rhythm: Normal rate and regular rhythm.      Heart sounds: Normal heart sounds, S1 normal and S2 normal.   Pulmonary:      Breath sounds: Normal breath sounds.   Abdominal:      General: There is no distension.      Palpations: Abdomen is soft. Abdomen is not rigid.      Tenderness: There is no abdominal tenderness. There is no guarding or rebound.          Comments: scar amy of prior surgeries are noted including scars of nephrectomy .   Musculoskeletal:         General: No tenderness or deformity.      Cervical back: Neck supple.      Right foot: Normal range of motion. No deformity or bunion.      Left foot: Normal range of motion. No deformity or bunion.   Feet:      Right foot:      Protective Sensation: 5 sites tested.  5 sites sensed.      Skin integrity: No ulcer or blister.      Toenail Condition: Right toenails are long.      Left foot:      Protective Sensation: 5 sites tested.  5 sites sensed.      Skin integrity: No ulcer or blister.      Toenail Condition: Left toenails are long.      Comments: Last time she had felt numbness in her feet.  Today no numbness.  Lymphadenopathy:      Cervical: No cervical adenopathy.   Skin:     General: Skin is warm and dry.      Findings: No erythema, lesion or rash.   Neurological:      Mental Status: She is alert.      Comments: l     Psychiatric:         Behavior: Behavior normal. Behavior is cooperative.         Assessment:               Type 2 diabetes mellitus with diabetic polyneuropathy, with long-term current use of insulin  -     empagliflozin (JARDIANCE) 25 mg tablet; Take 1 tablet (25 mg total) by mouth once daily.  Dispense: 90 tablet; Refill: 3  -     Hemoglobin A1C; Future; Expected date: 06/05/2023  -     flash glucose sensor (FREESTYLE BANG 14 DAY SENSOR) Kit; USE ONE SENSOR EVERY 14 DAYS  Dispense: 6 kit; Refill: 3    Essential  hypertension    Mixed dyslipidemia    CKD stage 3 due to type 2 diabetes mellitus  -     Renal Function Panel; Future; Expected date: 06/05/2023    Low magnesium level       Admission on 04/05/2023, Discharged on 04/05/2023   Component Date Value Ref Range Status    POCT Glucose 04/05/2023 131 (H)  70 - 110 mg/dL Final   Lab Visit on 03/20/2023   Component Date Value Ref Range Status    Hemoglobin A1C 03/20/2023 6.7 (H)  4.5 - 6.2 % Final    Estimated Avg Glucose 03/20/2023 146 (H)  68 - 131 mg/dL Final    Cholesterol 03/20/2023 151  120 - 199 mg/dL Final    Triglycerides 03/20/2023 145  30 - 150 mg/dL Final    HDL 03/20/2023 49  40 - 75 mg/dL Final    LDL Cholesterol 03/20/2023 73.0  63.0 - 159.0 mg/dL Final    HDL/Cholesterol Ratio 03/20/2023 32.5  20.0 - 50.0 % Final    Total Cholesterol/HDL Ratio 03/20/2023 3.1  2.0 - 5.0 Final    Non-HDL Cholesterol 03/20/2023 102  mg/dL Final   Lab Visit on 03/20/2023   Component Date Value Ref Range Status    Glucose 03/20/2023 131 (H)  70 - 110 mg/dL Final    Sodium 03/20/2023 137  136 - 145 mmol/L Final    Potassium 03/20/2023 4.8  3.5 - 5.1 mmol/L Final    Chloride 03/20/2023 103  95 - 110 mmol/L Final    CO2 03/20/2023 27  23 - 29 mmol/L Final    BUN 03/20/2023 30 (H)  8 - 23 mg/dL Final    Calcium 03/20/2023 9.7  8.7 - 10.5 mg/dL Final    Creatinine 03/20/2023 1.4  0.5 - 1.4 mg/dL Final    Albumin 03/20/2023 4.0  3.5 - 5.2 g/dL Final    Phosphorus 03/20/2023 4.4  2.7 - 4.5 mg/dL Final    eGFR 03/20/2023 41.0 (A)  >60 mL/min/1.73 m^2 Final    Anion Gap 03/20/2023 7 (L)  8 - 16 mmol/L Final    Magnesium 03/20/2023 1.5 (L)  1.6 - 2.6 mg/dL Final    Uric Acid 03/20/2023 7.0 (H)  2.4 - 5.7 mg/dL Final    Specimen UA 03/20/2023 Urine, Clean Catch   Final    Color, UA 03/20/2023 Yellow  Yellow, Straw, Nimisha Final    Appearance, UA 03/20/2023 Clear  Clear Final    pH, UA 03/20/2023 6.0  5.0 - 8.0 Final    Specific Gravity, UA 03/20/2023 1.020  1.005 - 1.030 Final    Protein, UA  03/20/2023 1+ (A)  Negative Final    Glucose, UA 03/20/2023 Negative  Negative Final    Ketones, UA 03/20/2023 Negative  Negative Final    Bilirubin (UA) 03/20/2023 Negative  Negative Final    Occult Blood UA 03/20/2023 Negative  Negative Final    Nitrite, UA 03/20/2023 Negative  Negative Final    Urobilinogen, UA 03/20/2023 Negative  Negative EU/dL Final    Leukocytes, UA 03/20/2023 Negative  Negative Final    Microalbumin, Urine 03/20/2023 185.1 (H)  <19.9 ug/mL Final    Creatinine, Urine 03/20/2023 111.0  15.0 - 325.0 mg/dL Final    Microalb/Creat Ratio 03/20/2023 166.8 (H)  0.0 - 30.0 ug/mg Final    RBC, UA 03/20/2023 0  0 - 4 /hpf Final    WBC, UA 03/20/2023 2  0 - 5 /hpf Final    Bacteria 03/20/2023 Negative  None-Occ /hpf Final    Squam Epithel, UA 03/20/2023 1  /hpf Final    Hyaline Casts, UA 03/20/2023 2 (A)  0-1/lpf /lpf Final    Microscopic Comment 03/20/2023 SEE COMMENT   Final     Component Ref Range & Units 2 mo ago  (3/20/23) 6 mo ago  (11/10/22) 1 yr ago  (3/11/22) 1 yr ago  (11/19/21) 2 yr ago  (5/19/21) 2 yr ago  (1/12/21) 2 yr ago  (9/17/20)   Hemoglobin A1C 4.5 - 6.2 % 6.7 High   6.7 High  CM  7.0 High  CM  7.0 High  CM  7.6 High  CM  7.0 High  CM  6.7 High        Plan:           Type 2 diabetes mellitus with diabetic polyneuropathy, with long-term current use of insulin  -     empagliflozin (JARDIANCE) 25 mg tablet; Take 1 tablet (25 mg total) by mouth once daily.  Dispense: 90 tablet; Refill: 3  -     Hemoglobin A1C; Future; Expected date: 06/05/2023  -     flash glucose sensor (FREESTYLE BANG 14 DAY SENSOR) Kit; USE ONE SENSOR EVERY 14 DAYS  Dispense: 6 kit; Refill: 3    Essential hypertension    Mixed dyslipidemia    CKD stage 3 due to type 2 diabetes mellitus  -     Renal Function Panel; Future; Expected date: 06/05/2023    Low magnesium level      Advised Ms. Castillo to monitor Blood sugars at home and record them.  Exercise, watch diet and loose weight.  keep a close eye on feet and keep  "them clean. Annual eye examination. Annual influenza vaccine.  Monitor HgbA1c every 3 to 6 months. Monitor urine microalbumin every year.keep LDL less than 100. Monitor blood pressure and target blood pressure 120/70.    Increasing the dosage of Jardiance now to 25 mg.  I have advised her to watch for put preventing infections in the bladder.  Always keep the bladder empty whenever possible.  If she goes to public restrooms and toilets, use sanitary wipes for cleaning her private areas.  Take at least a shower or 2 every day to prevent any local infections in the skin.    Chronic kidney disease precautions have been discussed including remaining hydrated, avoiding unnecessary antibiotics and also unnecessary test involving contrast dyes and medications.    Currently she is on dual treatment with SGLT 2 as well as Kerendia which might be helpful.  Watch out for electrolytes and potassium levels.  Following up with Nephrology also.      Low magnesium level has been noted in past and this will also be monitored.    Follow-up in 3-6 months.      Spent jhonatan 30 minutes with patient which involved review of pts medical conditions, labs, medications and with 50% of time face-to-face discussion about medical problems, management and any applicable changes.    Dr Ribera/Dr Silver      Current Outpatient Medications:     atorvastatin (LIPITOR) 40 MG tablet, TAKE 1 TABLET EVERY EVENING, Disp: 90 tablet, Rfl: 3    BD INSULIN SYRINGE 1 mL 28 gauge x 1/2" Syrg, USE 1 SYRINGE ONCE DAILY, Disp: 100 each, Rfl: 3    cetirizine (ZYRTEC) 10 MG tablet, Take 10 mg by mouth., Disp: , Rfl:     flash glucose sensor (FREESTYLE BANG 14 DAY SENSOR) Kit, USE ONE SENSOR EVERY 14 DAYS, Disp: 6 kit, Rfl: 3    fluticasone propionate (FLONASE) 50 mcg/actuation nasal spray, USE 2 SPRAYS IN EACH NOSTRIL ONCE DAILY, Disp: 48 g, Rfl: 3    gabapentin (NEURONTIN) 800 MG tablet, Take 1 tablet (800 mg total) by mouth 3 (three) times daily., Disp: 270 tablet, " "Rfl: 0    insulin syringe-needle U-100 0.3 mL 30 Syrg, by Misc.(Non-Drug; Combo Route) route 3 (three) times daily., Disp: , Rfl:     KERENDIA 10 mg Tab, Take 1 tablet by mouth every evening., Disp: , Rfl:     LANTUS U-100 INSULIN 100 unit/mL injection, INJECT 70 UNITS UNDER THE SKIN EVERY EVENING, Disp: 210 mL, Rfl: 3    losartan (COZAAR) 100 MG tablet, Take 1 tablet (100 mg total) by mouth Daily. (Patient taking differently: Take 100 mg by mouth every evening.), Disp: 90 tablet, Rfl: 3    metoprolol succinate (TOPROL-XL) 50 MG 24 hr tablet, TAKE 1 TABLET DAILY (Patient taking differently: every evening.), Disp: 90 tablet, Rfl: 3    omeprazole (PRILOSEC) 40 MG capsule, Take 40 mg by mouth every evening., Disp: , Rfl:     pen needle, diabetic (ADVOCATE PEN NEEDLE) 31 gauge x 5/16" Ndle, 1 Device by Misc.(Non-Drug; Combo Route) route 3 (three) times daily., Disp: 100 each, Rfl: 3    solifenacin (VESICARE) 5 MG tablet, Take 5 mg by mouth every evening., Disp: , Rfl:     empagliflozin (JARDIANCE) 25 mg tablet, Take 1 tablet (25 mg total) by mouth once daily., Disp: 90 tablet, Rfl: 3    insulin aspart U-100 (NOVOLOG FLEXPEN U-100 INSULIN) 100 unit/mL (3 mL) InPn pen, INJECT 20 UNITS UNDER THE SKIN THREE TIMES A DAY WITH MEALS ( INSTEAD OF HUMALOG ) (Patient not taking: Reported on 6/5/2023), Disp: 60 mL, Rfl: 3  No current facility-administered medications for this visit.    Facility-Administered Medications Ordered in Other Visits:     lactated ringers infusion, , Intravenous, Once PRN, Howard Rivas MD    lactated ringers infusion, , Intravenous, Once PRN, Howard Rivas MD    "

## 2023-06-05 ENCOUNTER — OFFICE VISIT (OUTPATIENT)
Dept: FAMILY MEDICINE | Facility: CLINIC | Age: 68
End: 2023-06-05
Payer: MEDICARE

## 2023-06-05 VITALS
HEIGHT: 66 IN | WEIGHT: 170 LBS | DIASTOLIC BLOOD PRESSURE: 66 MMHG | SYSTOLIC BLOOD PRESSURE: 127 MMHG | BODY MASS INDEX: 27.32 KG/M2 | HEART RATE: 69 BPM

## 2023-06-05 DIAGNOSIS — I10 ESSENTIAL HYPERTENSION: ICD-10-CM

## 2023-06-05 DIAGNOSIS — R79.0 LOW MAGNESIUM LEVEL: ICD-10-CM

## 2023-06-05 DIAGNOSIS — E11.42 TYPE 2 DIABETES MELLITUS WITH DIABETIC POLYNEUROPATHY, WITH LONG-TERM CURRENT USE OF INSULIN: Primary | ICD-10-CM

## 2023-06-05 DIAGNOSIS — N18.30 CKD STAGE 3 DUE TO TYPE 2 DIABETES MELLITUS: ICD-10-CM

## 2023-06-05 DIAGNOSIS — Z79.4 TYPE 2 DIABETES MELLITUS WITH DIABETIC POLYNEUROPATHY, WITH LONG-TERM CURRENT USE OF INSULIN: Primary | ICD-10-CM

## 2023-06-05 DIAGNOSIS — E78.2 MIXED DYSLIPIDEMIA: ICD-10-CM

## 2023-06-05 DIAGNOSIS — E11.22 CKD STAGE 3 DUE TO TYPE 2 DIABETES MELLITUS: ICD-10-CM

## 2023-06-05 PROCEDURE — 99213 OFFICE O/P EST LOW 20 MIN: CPT | Performed by: INTERNAL MEDICINE

## 2023-06-05 PROCEDURE — 99214 OFFICE O/P EST MOD 30 MIN: CPT | Mod: S$PBB,AQ,, | Performed by: INTERNAL MEDICINE

## 2023-06-05 PROCEDURE — 99214 PR OFFICE/OUTPT VISIT, EST, LEVL IV, 30-39 MIN: ICD-10-PCS | Mod: S$PBB,AQ,, | Performed by: INTERNAL MEDICINE

## 2023-06-05 RX ORDER — FLASH GLUCOSE SENSOR
KIT MISCELLANEOUS
Qty: 6 KIT | Refills: 3 | Status: CANCELLED | OUTPATIENT
Start: 2023-06-05

## 2023-06-05 RX ORDER — FLASH GLUCOSE SENSOR
KIT MISCELLANEOUS
Qty: 6 KIT | Refills: 3 | Status: SHIPPED | OUTPATIENT
Start: 2023-06-05

## 2023-06-08 ENCOUNTER — PATIENT MESSAGE (OUTPATIENT)
Dept: FAMILY MEDICINE | Facility: CLINIC | Age: 68
End: 2023-06-08

## 2023-06-08 DIAGNOSIS — E11.42 TYPE 2 DIABETES MELLITUS WITH DIABETIC POLYNEUROPATHY, WITH LONG-TERM CURRENT USE OF INSULIN: Primary | ICD-10-CM

## 2023-06-08 DIAGNOSIS — Z79.4 TYPE 2 DIABETES MELLITUS WITH DIABETIC POLYNEUROPATHY, WITH LONG-TERM CURRENT USE OF INSULIN: Primary | ICD-10-CM

## 2023-06-10 RX ORDER — BLOOD-GLUCOSE SENSOR
1 EACH MISCELLANEOUS
Qty: 9 EACH | Refills: 3 | Status: SHIPPED | OUTPATIENT
Start: 2023-06-10 | End: 2024-01-08

## 2023-06-10 NOTE — TELEPHONE ENCOUNTER
I am sending the Dexcom G6 sensor which is used every 10 days and hence 9 sensors for 3 months.  Hopefully this works this time.      ===View-only below this line===      ----- Message -----       From:Payton Castillo       Sent:6/8/2023  4:36 PM CDT         To:John Nolan MD    Subject:Glucose sensor     [facility] does not cover the freestyle neilda 3.   They however do cover the dexcom G6 sensor. Can you please send a prescription to them for 90 days supply. I can get the dexcom  G6 without prior authorization. Thanks

## 2023-06-12 DIAGNOSIS — I10 ESSENTIAL HYPERTENSION: Chronic | ICD-10-CM

## 2023-06-12 RX ORDER — LOSARTAN POTASSIUM 100 MG/1
TABLET ORAL
Qty: 90 TABLET | Refills: 3 | Status: SHIPPED | OUTPATIENT
Start: 2023-06-12 | End: 2023-12-05

## 2023-06-13 ENCOUNTER — TELEPHONE (OUTPATIENT)
Dept: FAMILY MEDICINE | Facility: CLINIC | Age: 68
End: 2023-06-13

## 2023-06-13 NOTE — TELEPHONE ENCOUNTER
The following medication needs a prior authorization:     Medication Name: blood-glucose sensor (DEXCOM G6 SENSOR) Device    Dosage: 1 device    Frequency: every 10 days    Directions for use: 1 Device by Misc.(Non-Drug; Combo Route) route every 10 days    Diagnosis: Type 2 diabetes mellitus with diabetic polyneuropathy, with long-term current use of insulin      Is the request for a reauthorization? No     Is the patient currently stable on therapy? No     Please list all therapeutic alternatives previously used with start/end dates and outcome:

## 2023-06-17 ENCOUNTER — PATIENT MESSAGE (OUTPATIENT)
Dept: FAMILY MEDICINE | Facility: CLINIC | Age: 68
End: 2023-06-17

## 2023-07-03 ENCOUNTER — LAB VISIT (OUTPATIENT)
Dept: LAB | Facility: HOSPITAL | Age: 68
End: 2023-07-03
Attending: INTERNAL MEDICINE
Payer: MEDICARE

## 2023-07-03 DIAGNOSIS — Z79.4 TYPE 2 DIABETES MELLITUS WITH DIABETIC POLYNEUROPATHY, WITH LONG-TERM CURRENT USE OF INSULIN: ICD-10-CM

## 2023-07-03 DIAGNOSIS — E11.42 TYPE 2 DIABETES MELLITUS WITH DIABETIC POLYNEUROPATHY, WITH LONG-TERM CURRENT USE OF INSULIN: ICD-10-CM

## 2023-07-03 DIAGNOSIS — N18.30 CKD STAGE 3 DUE TO TYPE 2 DIABETES MELLITUS: ICD-10-CM

## 2023-07-03 DIAGNOSIS — E11.22 CKD STAGE 3 DUE TO TYPE 2 DIABETES MELLITUS: ICD-10-CM

## 2023-07-03 LAB
ALBUMIN SERPL BCP-MCNC: 4.1 G/DL (ref 3.5–5.2)
ANION GAP SERPL CALC-SCNC: 7 MMOL/L (ref 8–16)
BUN SERPL-MCNC: 37 MG/DL (ref 8–23)
CALCIUM SERPL-MCNC: 9.3 MG/DL (ref 8.7–10.5)
CHLORIDE SERPL-SCNC: 105 MMOL/L (ref 95–110)
CO2 SERPL-SCNC: 28 MMOL/L (ref 23–29)
CREAT SERPL-MCNC: 1.6 MG/DL (ref 0.5–1.4)
EST. GFR  (NO RACE VARIABLE): 34.9 ML/MIN/1.73 M^2
ESTIMATED AVG GLUCOSE: 174 MG/DL (ref 68–131)
GLUCOSE SERPL-MCNC: 88 MG/DL (ref 70–110)
HBA1C MFR BLD: 7.7 % (ref 4.5–6.2)
PHOSPHATE SERPL-MCNC: 4.3 MG/DL (ref 2.7–4.5)
POTASSIUM SERPL-SCNC: 4.7 MMOL/L (ref 3.5–5.1)
SODIUM SERPL-SCNC: 140 MMOL/L (ref 136–145)

## 2023-07-03 PROCEDURE — 83036 HEMOGLOBIN GLYCOSYLATED A1C: CPT | Performed by: INTERNAL MEDICINE

## 2023-07-03 PROCEDURE — 80069 RENAL FUNCTION PANEL: CPT | Performed by: INTERNAL MEDICINE

## 2023-07-03 PROCEDURE — 36415 COLL VENOUS BLD VENIPUNCTURE: CPT | Performed by: INTERNAL MEDICINE

## 2023-07-14 ENCOUNTER — PES CALL (OUTPATIENT)
Dept: ADMINISTRATIVE | Facility: CLINIC | Age: 68
End: 2023-07-14
Payer: MEDICARE

## 2023-07-18 RX ORDER — METOPROLOL SUCCINATE 50 MG/1
TABLET, EXTENDED RELEASE ORAL
Qty: 90 TABLET | Refills: 3 | Status: SHIPPED | OUTPATIENT
Start: 2023-07-18

## 2023-07-21 DIAGNOSIS — S69.90XA THUMB INJURY: Primary | ICD-10-CM

## 2023-08-11 ENCOUNTER — LAB VISIT (OUTPATIENT)
Dept: LAB | Facility: HOSPITAL | Age: 68
End: 2023-08-11
Attending: NURSE PRACTITIONER
Payer: MEDICARE

## 2023-08-11 DIAGNOSIS — I10 ESSENTIAL HYPERTENSION, MALIGNANT: ICD-10-CM

## 2023-08-11 DIAGNOSIS — R80.8 NEPHROGENOUS PROTEINURIA: ICD-10-CM

## 2023-08-11 DIAGNOSIS — E79.0 URICACIDEMIA: ICD-10-CM

## 2023-08-11 DIAGNOSIS — N18.6 TYPE 2 DIABETES MELLITUS WITH END-STAGE RENAL DISEASE: ICD-10-CM

## 2023-08-11 DIAGNOSIS — N18.31 CHRONIC KIDNEY DISEASE (CKD) STAGE G3A/A1, MODERATELY DECREASED GLOMERULAR FILTRATION RATE (GFR) BETWEEN 45-59 ML/MIN/1.73 SQUARE METER AND ALBUMINURIA CREATININE RATIO LESS THAN 30 MG/G: Primary | ICD-10-CM

## 2023-08-11 DIAGNOSIS — Z90.5 ACQUIRED ABSENCE OF KIDNEY: ICD-10-CM

## 2023-08-11 DIAGNOSIS — E11.22 TYPE 2 DIABETES MELLITUS WITH END-STAGE RENAL DISEASE: ICD-10-CM

## 2023-08-11 LAB
25(OH)D3+25(OH)D2 SERPL-MCNC: 33 NG/ML (ref 30–96)
ALBUMIN SERPL BCP-MCNC: 4.2 G/DL (ref 3.5–5.2)
ALBUMIN/CREAT UR: 263.9 UG/MG (ref 0–30)
ANION GAP SERPL CALC-SCNC: 9 MMOL/L (ref 8–16)
BACTERIA #/AREA URNS HPF: NEGATIVE /HPF
BASOPHILS # BLD AUTO: 0.05 K/UL (ref 0–0.2)
BASOPHILS NFR BLD: 0.7 % (ref 0–1.9)
BILIRUB UR QL STRIP: NEGATIVE
BUN SERPL-MCNC: 29 MG/DL (ref 8–23)
CALCIUM SERPL-MCNC: 9.8 MG/DL (ref 8.7–10.5)
CHLORIDE SERPL-SCNC: 104 MMOL/L (ref 95–110)
CLARITY UR: CLEAR
CO2 SERPL-SCNC: 26 MMOL/L (ref 23–29)
COLOR UR: COLORLESS
CREAT SERPL-MCNC: 1.5 MG/DL (ref 0.5–1.4)
CREAT UR-MCNC: 67 MG/DL (ref 15–325)
CREAT UR-MCNC: 67 MG/DL (ref 15–325)
DIFFERENTIAL METHOD: ABNORMAL
EOSINOPHIL # BLD AUTO: 0.2 K/UL (ref 0–0.5)
EOSINOPHIL NFR BLD: 3.3 % (ref 0–8)
ERYTHROCYTE [DISTWIDTH] IN BLOOD BY AUTOMATED COUNT: 13 % (ref 11.5–14.5)
EST. GFR  (NO RACE VARIABLE): 37.7 ML/MIN/1.73 M^2
GLUCOSE SERPL-MCNC: 140 MG/DL (ref 70–110)
GLUCOSE UR QL STRIP: NEGATIVE
HCT VFR BLD AUTO: 36.4 % (ref 37–48.5)
HGB BLD-MCNC: 11.8 G/DL (ref 12–16)
HGB UR QL STRIP: NEGATIVE
HYALINE CASTS #/AREA URNS LPF: 0 /LPF
IMM GRANULOCYTES # BLD AUTO: 0.02 K/UL (ref 0–0.04)
IMM GRANULOCYTES NFR BLD AUTO: 0.3 % (ref 0–0.5)
KETONES UR QL STRIP: NEGATIVE
LEUKOCYTE ESTERASE UR QL STRIP: NEGATIVE
LYMPHOCYTES # BLD AUTO: 2.6 K/UL (ref 1–4.8)
LYMPHOCYTES NFR BLD: 35.5 % (ref 18–48)
MAGNESIUM SERPL-MCNC: 2 MG/DL (ref 1.6–2.6)
MCH RBC QN AUTO: 31.1 PG (ref 27–31)
MCHC RBC AUTO-ENTMCNC: 32.4 G/DL (ref 32–36)
MCV RBC AUTO: 96 FL (ref 82–98)
MICROALBUMIN UR DL<=1MG/L-MCNC: 176.8 UG/ML
MICROSCOPIC COMMENT: NORMAL
MONOCYTES # BLD AUTO: 0.7 K/UL (ref 0.3–1)
MONOCYTES NFR BLD: 9.6 % (ref 4–15)
NEUTROPHILS # BLD AUTO: 3.7 K/UL (ref 1.8–7.7)
NEUTROPHILS NFR BLD: 50.6 % (ref 38–73)
NITRITE UR QL STRIP: NEGATIVE
NRBC BLD-RTO: 0 /100 WBC
PH UR STRIP: 7 [PH] (ref 5–8)
PHOSPHATE SERPL-MCNC: 3.4 MG/DL (ref 2.7–4.5)
PLATELET # BLD AUTO: 281 K/UL (ref 150–450)
PMV BLD AUTO: 8.8 FL (ref 9.2–12.9)
POTASSIUM SERPL-SCNC: 4.4 MMOL/L (ref 3.5–5.1)
PROT UR QL STRIP: ABNORMAL
PROT UR-MCNC: 35 MG/DL (ref 6–15)
PROT/CREAT UR: 0.52 MG/G{CREAT} (ref 0–0.2)
PTH-INTACT SERPL-MCNC: 102.1 PG/ML (ref 9–77)
RBC # BLD AUTO: 3.79 M/UL (ref 4–5.4)
RBC #/AREA URNS HPF: 0 /HPF (ref 0–4)
SODIUM SERPL-SCNC: 139 MMOL/L (ref 136–145)
SP GR UR STRIP: 1.01 (ref 1–1.03)
SQUAMOUS #/AREA URNS HPF: 0 /HPF
URATE SERPL-MCNC: 5.3 MG/DL (ref 2.4–5.7)
URN SPEC COLLECT METH UR: ABNORMAL
UROBILINOGEN UR STRIP-ACNC: NEGATIVE EU/DL
WBC # BLD AUTO: 7.26 K/UL (ref 3.9–12.7)
WBC #/AREA URNS HPF: 0 /HPF (ref 0–5)

## 2023-08-11 PROCEDURE — 81001 URINALYSIS AUTO W/SCOPE: CPT | Performed by: NURSE PRACTITIONER

## 2023-08-11 PROCEDURE — 36415 COLL VENOUS BLD VENIPUNCTURE: CPT | Performed by: NURSE PRACTITIONER

## 2023-08-11 PROCEDURE — 83735 ASSAY OF MAGNESIUM: CPT | Performed by: NURSE PRACTITIONER

## 2023-08-11 PROCEDURE — 84550 ASSAY OF BLOOD/URIC ACID: CPT | Performed by: NURSE PRACTITIONER

## 2023-08-11 PROCEDURE — 83970 ASSAY OF PARATHORMONE: CPT | Performed by: NURSE PRACTITIONER

## 2023-08-11 PROCEDURE — 84156 ASSAY OF PROTEIN URINE: CPT | Performed by: NURSE PRACTITIONER

## 2023-08-11 PROCEDURE — 82306 VITAMIN D 25 HYDROXY: CPT | Performed by: NURSE PRACTITIONER

## 2023-08-11 PROCEDURE — 82043 UR ALBUMIN QUANTITATIVE: CPT | Performed by: NURSE PRACTITIONER

## 2023-08-11 PROCEDURE — 80069 RENAL FUNCTION PANEL: CPT | Performed by: NURSE PRACTITIONER

## 2023-08-11 PROCEDURE — 85025 COMPLETE CBC W/AUTO DIFF WBC: CPT | Performed by: NURSE PRACTITIONER

## 2023-08-15 PROBLEM — M79.641 RIGHT HAND PAIN: Status: RESOLVED | Noted: 2020-03-03 | Resolved: 2023-08-15

## 2023-08-15 PROBLEM — G89.29 CHRONIC PAIN OF LEFT THUMB: Status: ACTIVE | Noted: 2023-08-15

## 2023-08-15 PROBLEM — M79.645 CHRONIC PAIN OF LEFT THUMB: Status: ACTIVE | Noted: 2023-08-15

## 2023-08-15 NOTE — PROGRESS NOTES
Patient evaluated and plan of care established.  Please sign and return if in agreement.    Thank you,  BARBIE Castelan         Occupational Therapy [facility]  Initial Evaluation     Date: 8/24/2023  Name: Payton Castillo  Clinic Number: [id number]    Therapy Diagnosis: S69.90XA (ICD-10-CM) - Thumb injury  Encounter Diagnosis   Name Primary?    Chronic pain of left thumb      Physician: Dr. Leonid Aragon    Physician Orders: S69.90XA (ICD-10-CM) - Thumb injury; evaluate and treat  Surgical Procedure and Date: status post (Left) trapeziectomy with Arthrex internal brace with fat pad transfer, 07/11/2023  post op: 6 weeks, 2 days  Dominant Side: Right  Date of Onset: 07/11/2023  History / Mechanism of Injury: Patient underwent status post (Left) trapeziectomy with Arthrex internal brace with fat pad transfer on 07/11/2023.    Previous Therapy:  outpatient therapy 03/2020, 07/2021    Environmental Concerns/Fall Risk: None  Language: None  Cultural/Spiritual: None  Abuse/Neglect/Nutritional: None  Imaging / Tests: See Epic    Past Medical History/Physical Systems Review:    has a past medical history of Allergy, Anemia of chronic disorder, Anemia, chronic renal failure, stage 2 (mild), Anemia, chronic renal failure, stage 3 (moderate), Asthma, Cancer, Cataract of right eye, CKD (chronic kidney disease), stage III, Diabetes mellitus, Fibromyalgia, GERD (gastroesophageal reflux disease), Hx of renal cell cancer - left nephrectomy Aug 2014, Hypercholesteremia, Hypertension, IDDM (insulin dependent diabetes mellitus), Lumbar spondylosis, Lumbosacral radiculopathy, Osteopenia, Palpitations, Plantar fasciitis, right, Sleep apnea, Syncope, Tachycardia, Thoracic radiculopathy, and TMJ (dislocation of temporomandibular joint).     has a past surgical history that includes Tubal ligation (1990); Shoulder open rotator cuff repair (Right, 02/2005); Joint replacement (Right); Total knee arthroplasty (Left, 2014); Tumor removal (2014);  Cataract extraction (Left, 03/2016); injection, sacroiliac joint (Left, 10/7/2022); Injection of anesthetic agent around medial branch nerves innervating lumbar facet joint (Bilateral, 12/15/2022); Injection of anesthetic agent around medial branch nerves innervating lumbar facet joint (Bilateral, 1/3/2023); Radiofrequency thermocoagulation (Bilateral, 1/13/2023); and injection, sacroiliac joint (Left, 4/5/2023).    has a current medication list which includes the following prescription(s): atorvastatin, bd insulin syringe, dexcom g6 sensor, cetirizine, empagliflozin, freestyle nelida 14 day sensor, fluticasone propionate, gabapentin, insulin aspart u-100, insulin syringe-needle u-100, kerendia, lantus u-100 insulin, losartan, metoprolol succinate, omeprazole, pen needle, diabetic, and solifenacin, and the following Facility-Administered Medications: lactated ringers and lactated ringers.    Review of patient's allergies indicates:   Allergen Reactions    Aspartame Other (See Comments)     headache    Other reaction(s): Other (See Comments)  Headache  migraine  Headache  Other reaction(s): Other (See Comments)  Headache  migraine  headache    Penicillins Itching, Rash, Other (See Comments) and Hives     Rash  Rash  Rash  Rash  Rash    Stevioside (bulk) Other (See Comments)    Fructose Other (See Comments)     headache    Other reaction(s): Other (See Comments)  headache  migraine  headache  Other reaction(s): Other (See Comments)  headache  migraine  headache    Monosodium glutamate Other (See Comments)     Headache    Other reaction(s): Other (See Comments)  Headache  headache  Headache  Other reaction(s): Other (See Comments)  Headache  headache  Headache        Insurance Authorization Period Expiration: 07/21/2023-07/20/2024  Plan of Care Certification Period: 08/24/2023-11/24/2023  Date of Return to MD: 09/11/2023    Occupation:  retired  Working presently: retired  Workers Compensation:  no      Visit # / Visits  "authorized:   Time In:  3:40  Time Out: 4:20  Total Billable Time: 10  minutes        Precautions:  Patient. Reports, "() kidney Cancer, no pacemaker, no allergies to latex or adhesives."      Subjective     Patient Reports, "I had surgery on my (Left) thumb again on 2023 and the doctor said he did it extra tight since last time it was too loose.  After surgery I went on 2023 and got a splint made. I wore the splint for about 3 weeks.  On 08/10/2023 I went back and he said everything looked good.  He did not say much.  When I went in I was not wearing the splint and he did not say anything about wearing it anymore. I have been taking my splint off to do stuff since I got it.  I have been exercising my thumb.  My  fell and I have been helping him out with buttoning.  My thumb feels tight.  Since they took the stitches out I have been taking the splint off and using my hand."     Pain   At rest: 0/10  With work/ Activity: 2/10  Sleepin/10  Location of Pain: (Left) thumb    Patient's Goals for Therapy: increase function and range of motion     Objective     Sensation: grossly intact  Scar / Wound: closed, cool to touch pinkish in color, moderate scar tissue  Edema:   centimeters (Right) / (Left)  Metacarpals:  18.8  /  18.2  Proximal Wrist Crease:  16.2 / 17.1                                  (Right)  /  (Left)  thumb  Proximal Phalanx:  6.3  / 6.5  Interphalangeal Joint:  6.5  / 6.5  Distal Phalanx:  5.5  / 5.2        Active Range of Motion: hand    ( (Left) thumb Interphalangeal joint fused @ 35* flexion,  )     Thumb:                           (Right)  //   (Left)  Metacarpophalangeal Joint: 0/40  // 0 / 15  Interphalangeal Joint:  0/35   //   35 / 35    Palmar Abduction: 40 //  40  Radial Abduction:  40 //  30    Opposition: 0.0  //  1.8  Comments: (Measured with wrist goni from thumb tip to base of 5th metacarpal) centimeters                                                  "   Passive Range of Motion: hand     Thumb:                           (Left)  Metacarpophalangeal Joint:  0 / 20  Interphalangeal Joint:  Not Applicable     Palmar Abduction:  WFL   Radial Abduction:  35    Opposition:  1.5  Comments: (Measured with wrist goni from thumb tip to base of 5th metacarpal) centimeters                                   Active Range of Motion: wrist                         (Right)   //  (Left)    Dorsal Flexion /Volar Flexion :  60/70  // 50 / 45  Radial Deviation /Ulnar Deviation: 15/35  //  5 / 20  Supination / Pronation: WFL  //  WFL         Strength: (KEYONNA Dynamometer in pounds),Position II  ( submaximally, within pain tolerance)   (Right): To be assessed   (Left):  To be assessed     Pinch Strength: (Pinch Gauge in pounds)   ( submaximally, within pain tolerance)              (Right) /  (Left)  Lateral Pinch:  To be assessed   3 Point Pinch:  To be assessed   2 Point Pinch:  To be assessed           FOTO SCORE: 31%      Treatment Included:   Occupational Therapy  evaluation and instruction in written Home Exercise Program including Thumb composite flexion, Thumb circumduction, 0# Dorsal Flexion, Volar Flexion, Radial Deviation, and Ulnar Deviation x 10 repetitions x 3 x daily.    Patient. Educated on modalities for home pain management, activity modifications and precautions, Multidirectional scar massage for scar management;   / Distal to proximal edema massage for edema management .   Patient. Demo understanding of above.     Patient/Family Education: role of Occupational Therapy, goals for Occupational Therapy, scheduling/cancellations - patient verbalized understanding. Discussed insurance limitations with patient.        Assessment:     Problem List :       Decreased Range of motion,  Decreased functional abilities,  Increased pain,   Edema      During the evaluation, the patient required Minimal modification or assistance.  The following co-morbid conditions/personal factors  may affect the plan of care: none.        Payton Castillo is a 68 y.o. female referred to outpatient occupational therapy and presents with a medical diagnosis of S69.90XA (ICD-10-CM) - Thumb injury, resulting in limited range of motion, strength, functional abilities, increased pain and inflammation and demonstrates limitations as described in the chart above. Following medical record review it is determined that patient will benefit from occupational therapy services in order to maximize pain free and/or functional use of left hand / thumb. The following goals were discussed with the patient and patient is in agreement with them as to be addressed in the treatment plan. The patient's rehab potential is Good.     Anticipated Barriers to Therapy: None     The following goals were discussed with the patient and patient is in agreement with them as to be addressed in the treatment plan.     Goals:   Goals:  1)   Patient to be Independent with Home exercise program and modalities for pain management by 1 week.   2)   Patient will report   1/10 pain on average with activity to assist with exercises by 6-8 weeks.  3)   Patient will increase range of Motion by 3-5 degrees to increase functional use for activities of daily living by 6-8 weeks.  4)   Patient will decrease edema by 0.1-0.3 millimeters  to increase joint mobility /flexibility by 6-8 weeks.         Plan:   Patient to be treated by Occupational Therapy    2    times per week for     90 days   during the certification period from   08/24/2023  to 11/24/2023     to achieve the established goals.  Treatment to include :  paraffin, fluidotherapy, manual therapy/joint mobilizations, kinesiotaping, dry needling performed by certified physical therapist,   modalities for pain management, Ultrasound 3.0mhz, therapeutic exercises/activities,        strengthening,    as well as any other treatments deemed necessary based on the patient's needs or progress.     Will  reassess as needed and adjust treatment plan accordingly.              I certify the need for these services furnished under this plan of treatment and while under my care    ____________________________________                         __________________  Physician/Referring Practitioner                                               Date of Signature    Toshia Fisher, OT

## 2023-08-24 ENCOUNTER — PATIENT MESSAGE (OUTPATIENT)
Dept: FAMILY MEDICINE | Facility: CLINIC | Age: 68
End: 2023-08-24

## 2023-08-24 ENCOUNTER — CLINICAL SUPPORT (OUTPATIENT)
Dept: REHABILITATION | Facility: HOSPITAL | Age: 68
End: 2023-08-24
Payer: MEDICARE

## 2023-08-24 DIAGNOSIS — M79.645 CHRONIC PAIN OF LEFT THUMB: ICD-10-CM

## 2023-08-24 DIAGNOSIS — G89.29 CHRONIC PAIN OF LEFT THUMB: ICD-10-CM

## 2023-08-24 PROCEDURE — 97530 THERAPEUTIC ACTIVITIES: CPT | Mod: PN

## 2023-08-24 PROCEDURE — 97165 OT EVAL LOW COMPLEX 30 MIN: CPT | Mod: PN

## 2023-08-24 NOTE — PROGRESS NOTES
"                                  Occupational Therapy Daily Treatment Note       Date: 8/29/2023  Name: Payton Castillo  Clinic Number: [id number]    Therapy Diagnosis: S69.90XA (ICD-10-CM) - Thumb injury  Encounter Diagnosis   Name Primary?    Chronic pain of left thumb Yes     Physician: Dr. Leonid Aragon    Physician Orders: S69.90XA (ICD-10-CM) - Thumb injury; evaluate and treat  Medical Diagnosis: S69.90XA (ICD-10-CM) - Thumb injury; (Left) thumb pain  Surgical Procedure and Date:  status post (Left) trapeziectomy with Arthrex internal brace with fat pad transfer, 07/11/2023     Insurance Authorization Period Expiration: 08/23/2023-12/31/2023  Plan of Care Certification Period: 08/24/2023-11/24/2023  Date of Return to MD: 09/11/2023    Evaluation FOTO: 08/24/2023 = 31%     Visit # / Visits authorized: 1 / 20   Time In:  1:30   Time Out: 2:10  Total Billable Time:  40 minutes    Precautions:  Standard      Post Op: 07/11/2023 = 7 weeks, 0 days      Subjective     Patient reports: "I am not really having any pain today in my hand.  I am having itching around the incision site."     Pain: 0/10   Location of pain: (Left) thumb / wrist      Objective    Patient seen by Occupational Therapy this session. Tx consisted of:      Supervised modalities after being cleared for contradictions  x  10   minutes:       -Fluidotherapy to  (Left)   hand to increase blood flow, circulation, tissue elasticity, desensitization, sensory re-education and for pain management  x 10 minutes.        Manual therapy techniques to increase joint mobilization /// scar massage/// soft tissue mobilization   x    8  minutes:     -Scar Massage to  dorsal aspect of (Left) thumb   with  mini therapeutic vibrator // mini dowel massager  to decrease dense scar adhesions and improve tensile glide  x 2 minutes.     -Manual Therapy techniques to  (Left) thumb Metacarpophalangeal Joint and (Left) wrist   including joint mobilizations, stretching, and Passive " "Range of Motion to increase joint mobility, range of motion  and for pain management  x  5 minutes     -Soft Tissue Mobilization to (Left) hand into wrist and forearm  from distal to proximal to decrease edema and increase range of motion and functional abilities  x  1 minutes        Therapeutic Exercises to improve functional performance while increasing strength, endurance, range of motion,  and flexibility  x     22 minutes:    - Active Assistive Range of Motion for (Left) thumb Metacarpophalangeal joint and (Left) wrist x 10 repetitions  - [place] Slot with (10) LARGE washers and  (10)  pennies ( 5 in hand) for Fine Motor Coordination x 1 repetitions  - Cotton balls thumb tip to base of 5th digit  x 2 repetitions x 10 balls  - X-Small wooden blocks UNLacing with thumb to base of 5th digit  x 11 blocks    - 0# Dorsiflexion/Volar flexion (Supination /Pronation ), Radial Deviation / Ulnar Deviation x 10 repetitions    - Wrist roller coaster for Active Range of Motion  of wrist in all planes x 10 repetitions    - Weighted ball on tabletop for Dorsiflexion /Volar Flexion  stretches x 10 repetitions    - RED Exercise ball on table top for Radial Deviation /Ulnar Deviation stretches   ( with hands in "W" pattern)      Initiate in future therapy sessions:    - Theraputty for PVC for "cookie cutting"     - Colored bead UNlacing with thumb to base of 5th digit x 20 beads  - Mini colored pegboard for Fine Motor Coordination and in-hand manipulation  (5 in-hand) x 20 pegs    -  ###Progressive Hand Gripper (black spring) for composite  x 20 repetitions   - ### Theraputty for intrinsic pulling, and medicine top x 10  Repetitions   - ### digiflex for isolated x 10 repetitions;  ### digiflex for composite digital flexion x 20 repetitions    - Wooden pegboard with ### clothespins with 3PT pinch x 2 repetitions   - YELLOW digiweb for composite digital Extension and  intrinsics x 20 repetitions     - Velcro checkerboard (on " "wedge) with "dart throwing" pattern x 1 repetition   - ### Flexbar for Supination /Pronation  and Dorsiflexion /Volar Flexion x 20 repetitions    - ### Wrist exerstick in standing for Dorsiflexion / Volar Flexion  x 3 repetitions            Assessment     Patient will continue to benefit from skilled Occupational Therapy intervention to increase functional abilities, range of motion, and strength and pain control.  Patient. demonstrated proper understanding of each exercise.  Patient continues to require verbal and tactile cues for throughout therapy session to maintain position and prevent compensation.   Patient continues to be limited in functional and leisurely pursuits. Pain limits patient's participation in activities of daily living.  Patient is not able to carryout necessary vocational tasks.   Patient's spiritual, cultural and educational needs considered and patient agreeable to plan of care and goals.  Patient is making good progress towards established goals.  Patient tolerated exercises / activities within pain tolerance.   Reviewed Home Exercise Program with patient., see EPIC under "patient instructions" for provided exercises, activity modifications and limitations, modalities for home pain management.  Patient. demo understanding of above.    Patient. tolerated Fluidotherapy with no irritation.     Patient arrived to therapy without orthotic.      New/Revised Goals: Continue Plan Of Care   Goals:  1)   Patient to be Independent with Home exercise program and modalities for pain management by 1 week. (MET)   2)   Patient will report   1/10 pain on average with activity to assist with exercises by 6-8 weeks.  ( In Progress)   3)   Patient will increase range of Motion by 3-5 degrees to increase functional use for activities of daily living by 6-8 weeks.   ( In Progress)   4)   Patient will decrease edema by 0.1-0.3 millimeters  to increase joint mobility /flexibility by 6-8 weeks. ( In Progress) "     Plan      Continue 2x week during the 90 day certification period   08/24/2023   to11/24/2023   with established Plan Of Care in pursuit of Occupational Therapy goals.      Toshia Fisher, OT

## 2023-08-28 ENCOUNTER — OFFICE VISIT (OUTPATIENT)
Dept: FAMILY MEDICINE | Facility: CLINIC | Age: 68
End: 2023-08-28
Payer: MEDICARE

## 2023-08-28 ENCOUNTER — PATIENT MESSAGE (OUTPATIENT)
Dept: FAMILY MEDICINE | Facility: CLINIC | Age: 68
End: 2023-08-28
Payer: MEDICARE

## 2023-08-28 ENCOUNTER — HOSPITAL ENCOUNTER (OUTPATIENT)
Dept: PREADMISSION TESTING | Facility: HOSPITAL | Age: 68
Discharge: HOME | End: 2023-08-28
Attending: INTERNAL MEDICINE
Payer: MEDICARE

## 2023-08-28 VITALS
HEIGHT: 66 IN | HEART RATE: 60 BPM | SYSTOLIC BLOOD PRESSURE: 121 MMHG | DIASTOLIC BLOOD PRESSURE: 66 MMHG | WEIGHT: 174 LBS | BODY MASS INDEX: 27.97 KG/M2

## 2023-08-28 DIAGNOSIS — R42 DIZZINESS: Primary | ICD-10-CM

## 2023-08-28 DIAGNOSIS — E78.2 MIXED DYSLIPIDEMIA: ICD-10-CM

## 2023-08-28 DIAGNOSIS — R00.2 PALPITATION: ICD-10-CM

## 2023-08-28 DIAGNOSIS — E11.22 CKD STAGE 3 DUE TO TYPE 2 DIABETES MELLITUS: ICD-10-CM

## 2023-08-28 DIAGNOSIS — M25.552 BILATERAL HIP PAIN: ICD-10-CM

## 2023-08-28 DIAGNOSIS — R42 DIZZINESS: ICD-10-CM

## 2023-08-28 DIAGNOSIS — H57.12 PAIN IN AND AROUND EYE, LEFT: ICD-10-CM

## 2023-08-28 DIAGNOSIS — Z79.4 TYPE 2 DIABETES MELLITUS WITH DIABETIC POLYNEUROPATHY, WITH LONG-TERM CURRENT USE OF INSULIN: ICD-10-CM

## 2023-08-28 DIAGNOSIS — M25.551 BILATERAL HIP PAIN: ICD-10-CM

## 2023-08-28 DIAGNOSIS — I10 ESSENTIAL HYPERTENSION: ICD-10-CM

## 2023-08-28 DIAGNOSIS — N18.30 CKD STAGE 3 DUE TO TYPE 2 DIABETES MELLITUS: ICD-10-CM

## 2023-08-28 DIAGNOSIS — E11.42 TYPE 2 DIABETES MELLITUS WITH DIABETIC POLYNEUROPATHY, WITH LONG-TERM CURRENT USE OF INSULIN: ICD-10-CM

## 2023-08-28 PROCEDURE — 93010 EKG 12-LEAD: ICD-10-PCS | Mod: ,,, | Performed by: SPECIALIST

## 2023-08-28 PROCEDURE — 99214 PR OFFICE/OUTPT VISIT, EST, LEVL IV, 30-39 MIN: ICD-10-PCS | Mod: S$PBB,AQ,, | Performed by: INTERNAL MEDICINE

## 2023-08-28 PROCEDURE — 99214 OFFICE O/P EST MOD 30 MIN: CPT | Mod: S$PBB,AQ,, | Performed by: INTERNAL MEDICINE

## 2023-08-28 PROCEDURE — 93010 ELECTROCARDIOGRAM REPORT: CPT | Mod: ,,, | Performed by: SPECIALIST

## 2023-08-28 PROCEDURE — 99213 OFFICE O/P EST LOW 20 MIN: CPT | Performed by: INTERNAL MEDICINE

## 2023-08-28 PROCEDURE — 93005 ELECTROCARDIOGRAM TRACING: CPT | Performed by: SPECIALIST

## 2023-08-28 NOTE — PROGRESS NOTES
Subjective:       Patient ID: Payton Castillo is a 68 y.o. female.    Chief Complaint: Hypertension, Palpitations, Headache, and Diabetes    Payton Le is a 68-year-old female who comes for evaluation today.  She is accompanied with her sister.  She is not feeling good.  She complains of palpitations which have been happening since 3 weeks now.  This seems to be bothering her.  No chest pains.  Her chronic medical issues include the following:-    1.-hypertension  2.-hyperlipidemia  3.-diabetes mellitus type 2   4.-neuropathy  5.-gastroesophageal reflux  6.-bladder incontinence currently on solifenacin  7.-chronic hip pains,-worse lately-bilateral    She is here for palpitations at described.  More on bending down.  She had some cardiac workup more than 10 years back by Dr. Blankenship.  Details are not available easily at this point.      Complains also of headaches.      Generally not doing well overall with poor control of diabetes, chronic kidney disease, bilateral hip pains.      She also states that medications work the opposite for her.      Jardiance was supposed to make her lose weight but increased her appetite and made her gain weight.      In past she was given some medications by her doctor which she does not recall right now which had the opposite effect.    Hypertension  This is a chronic problem. The current episode started more than 1 year ago. The problem has been waxing and waning since onset. The problem is uncontrolled. Associated symptoms include headaches, malaise/fatigue and palpitations. Pertinent negatives include no shortness of breath. Risk factors for coronary artery disease include sedentary lifestyle, dyslipidemia, diabetes mellitus and post-menopausal state. Past treatments include beta blockers, angiotensin blockers and calcium channel blockers. The current treatment provides moderate improvement. Compliance problems include psychosocial issues.  There is no history of heart failure or  left ventricular hypertrophy.   Palpitations   This is a new problem. The current episode started more than 1 month ago. The problem occurs constantly. The problem has been unchanged. On average, each episode lasts 1 month. Exacerbated by: Bending down forward. Associated symptoms include malaise/fatigue. Pertinent negatives include no anxiety, dizziness or shortness of breath. She has tried bed rest for the symptoms. The treatment provided no relief. Risk factors include diabetes mellitus, obesity, sedentary lifestyle, post menopause and dyslipidemia. There is no history of a valve disorder.   Headache   This is a new problem. The current episode started more than 1 year ago. The problem occurs constantly. The problem has been waxing and waning. The quality of the pain is described as aching. Pertinent negatives include no dizziness or seizures. Her past medical history is significant for obesity.   Diabetes  She presents for her follow-up diabetic visit. She has type 2 diabetes mellitus. No MedicAlert identification noted. The initial diagnosis of diabetes was made 25 years (Diagnosed in 1990s.) ago. Her disease course has been fluctuating. Hypoglycemia symptoms include headaches. Pertinent negatives for hypoglycemia include no confusion, dizziness, mood changes, nervousness/anxiousness, pallor, seizures or tremors. Associated symptoms include fatigue. Pertinent negatives for diabetes include no polydipsia and no polyphagia. There are no hypoglycemic complications. Symptoms are worsening. Risk factors for coronary artery disease include hypertension, sedentary lifestyle, post-menopausal, dyslipidemia and diabetes mellitus. Current diabetic treatment includes insulin injections (Both long-acting and short-acting insulins-also Jardiance 10 mg). She is compliant with treatment most of the time. She has not had a previous visit with a dietitian. She rarely participates in exercise. Her home blood glucose trend is  increasing steadily. Her breakfast blood glucose range is generally 130-140 mg/dl. An ACE inhibitor/angiotensin II receptor blocker is being taken. She does not see a podiatrist.Eye exam is not current.   Hyperlipidemia  This is a chronic problem. The current episode started more than 1 year ago. The problem is controlled. Exacerbating diseases include obesity. Pertinent negatives include no shortness of breath. Current antihyperlipidemic treatment includes statins. The current treatment provides moderate improvement of lipids. Risk factors for coronary artery disease include post-menopausal, obesity, dyslipidemia and diabetes mellitus.       Past Medical History:   Diagnosis Date    Allergy     aspartame,fructose monosodium glutamate and penicillins.    Anemia of chronic disorder 11/09/2017    Anemia, chronic renal failure, stage 2 (mild) 11/09/2017    Anemia, chronic renal failure, stage 3 (moderate) 11/09/2017    Asthma     Cancer     kidney    Cataract of right eye     CKD (chronic kidney disease), stage III     Diabetes mellitus     type II    Fibromyalgia     GERD (gastroesophageal reflux disease)     Hx of renal cell cancer - left nephrectomy Aug 2014 11/09/2017    Hypercholesteremia     Hypertension     IDDM (insulin dependent diabetes mellitus)     Lumbar spondylosis     Lumbosacral radiculopathy     Osteopenia     Palpitations     Plantar fasciitis, right     Sleep apnea     Syncope 02/2012    Tachycardia     Thoracic radiculopathy     TMJ (dislocation of temporomandibular joint)      Social History     Socioeconomic History    Marital status:      Spouse name: Owen Castillo    Number of children: 2   Occupational History    Occupation: Part - member support [employer]     Employer: [employer]   Tobacco Use    Smoking status: Never    Smokeless tobacco: Never   Substance and Sexual Activity    Alcohol use: No    Drug use: No    Sexual activity: Yes     Partners: Male     Social Determinants of Health      Financial Resource Strain: Low Risk  (4/6/2022)    Overall Financial Resource Strain (CARDIA)     Difficulty of Paying Living Expenses: Not very hard   Food Insecurity: No Food Insecurity (8/8/2022)    Hunger Vital Sign     Worried About Running Out of Food in the Last Year: Never true     Ran Out of Food in the Last Year: Never true   Transportation Needs: No Transportation Needs (4/6/2022)    PRAPARE - Transportation     Lack of Transportation (Medical): No     Lack of Transportation (Non-Medical): No   Physical Activity: Inactive (8/8/2022)    Exercise Vital Sign     Days of Exercise per Week: 0 days     Minutes of Exercise per Session: 0 min   Stress: Stress Concern Present (8/8/2022)    [employer] - Occupational Stress Questionnaire     Feeling of Stress : To some extent   Social Connections: Socially Integrated (8/8/2022)    Social Connection and Isolation Panel [NHANES]     Frequency of Communication with Friends and Family: Three times a week     Frequency of Social Gatherings with Friends and Family: Twice a week     Attends Orthodoxy Services: More than 4 times per year     Active Member of Clubs or Organizations: Yes     Attends Club or Organization Meetings: 1 to 4 times per year     Marital Status:    Housing Stability: Low Risk  (8/8/2022)    Housing Stability Vital Sign     Unable to Pay for Housing in the Last Year: No     Number of Places Lived in the Last Year: 1     Unstable Housing in the Last Year: No     Past Surgical History:   Procedure Laterality Date    CATARACT EXTRACTION Left 03/2016    INJECTION OF ANESTHETIC AGENT AROUND MEDIAL BRANCH NERVES INNERVATING LUMBAR FACET JOINT Bilateral 12/15/2022    Procedure: Block-nerve-medial branch-lumbar;  Surgeon: Howard Rivas MD;  Location: [facility];  Service: Pain Management;  Laterality: Bilateral;  L3,4,5 MBB    INJECTION OF ANESTHETIC AGENT AROUND MEDIAL BRANCH NERVES INNERVATING LUMBAR FACET JOINT Bilateral  "1/3/2023    Procedure: Block-nerve-medial branch-lumbar;  Surgeon: Howard Rivas MD;  Location: [facility] OR;  Service: Pain Management;  Laterality: Bilateral;  L3,4,5 MBB #2    INJECTION, SACROILIAC JOINT Left 10/7/2022    Procedure: INJECTION,SACROILIAC JOINT;  Surgeon: Cory Ruggiero MD;  Location: [facility] OR;  Service: Pain Management;  Laterality: Left;  sacroiliac injection    INJECTION, SACROILIAC JOINT Left 4/5/2023    Procedure: INJECTION,SACROILIAC JOINT;  Surgeon: Howard Rivas MD;  Location: [facility] OR;  Service: Pain Management;  Laterality: Left;  SIJ    JOINT REPLACEMENT Right     knee    RADIOFREQUENCY THERMOCOAGULATION Bilateral 1/13/2023    Procedure: RADIOFREQUENCY THERMAL COAGULATION;  Surgeon: Howard Rivas MD;  Location: [facility] OR;  Service: Pain Management;  Laterality: Bilateral;  L3,4,5 Raul RFA    SHOULDER OPEN ROTATOR CUFF REPAIR Right 02/2005    labral repair    TOTAL KNEE ARTHROPLASTY Left 2014    TUBAL LIGATION  1990    TUMOR REMOVAL  2014    from kidney     Family History   Problem Relation Age of Onset    Stomach cancer Mother     Breast cancer Mother     Diabetes Mother     Cancer Father        Review of Systems   Constitutional:  Positive for fatigue and malaise/fatigue.   Respiratory:  Negative for shortness of breath.    Cardiovascular:  Positive for palpitations.   Endocrine: Negative for polydipsia and polyphagia.   Skin:  Negative for pallor.   Neurological:  Positive for headaches. Negative for dizziness, tremors and seizures.   Psychiatric/Behavioral:  Negative for confusion. The patient is not nervous/anxious.          Objective:      Blood pressure 121/66, pulse 60, height 5' 6" (1.676 m), weight 78.9 kg (174 lb). Body mass index is 28.08 kg/m².  Physical Exam      Assessment:                 Lab Visit on 08/11/2023   Component Date Value Ref Range Status    Glucose 08/11/2023 140 (H)  70 - 110 mg/dL Final    Sodium 08/11/2023 139  136 - 145 mmol/L Final    Potassium 08/11/2023 4.4  3.5 - " 5.1 mmol/L Final    Chloride 08/11/2023 104  95 - 110 mmol/L Final    CO2 08/11/2023 26  23 - 29 mmol/L Final    BUN 08/11/2023 29 (H)  8 - 23 mg/dL Final    Calcium 08/11/2023 9.8  8.7 - 10.5 mg/dL Final    Creatinine 08/11/2023 1.5 (H)  0.5 - 1.4 mg/dL Final    Albumin 08/11/2023 4.2  3.5 - 5.2 g/dL Final    Phosphorus 08/11/2023 3.4  2.7 - 4.5 mg/dL Final    eGFR 08/11/2023 37.7 (A)  >60 mL/min/1.73 m^2 Final    Anion Gap 08/11/2023 9  8 - 16 mmol/L Final    Uric Acid 08/11/2023 5.3  2.4 - 5.7 mg/dL Final    PTH, Intact 08/11/2023 102.1 (H)  9.0 - 77.0 pg/mL Final    WBC 08/11/2023 7.26  3.90 - 12.70 K/uL Final    RBC 08/11/2023 3.79 (L)  4.00 - 5.40 M/uL Final    Hemoglobin 08/11/2023 11.8 (L)  12.0 - 16.0 g/dL Final    Hematocrit 08/11/2023 36.4 (L)  37.0 - 48.5 % Final    MCV 08/11/2023 96  82 - 98 fL Final    MCH 08/11/2023 31.1 (H)  27.0 - 31.0 pg Final    MCHC 08/11/2023 32.4  32.0 - 36.0 g/dL Final    RDW 08/11/2023 13.0  11.5 - 14.5 % Final    Platelets 08/11/2023 281  150 - 450 K/uL Final    MPV 08/11/2023 8.8 (L)  9.2 - 12.9 fL Final    Immature Granulocytes 08/11/2023 0.3  0.0 - 0.5 % Final    Gran # (ANC) 08/11/2023 3.7  1.8 - 7.7 K/uL Final    Immature Grans (Abs) 08/11/2023 0.02  0.00 - 0.04 K/uL Final    Lymph # 08/11/2023 2.6  1.0 - 4.8 K/uL Final    Mono # 08/11/2023 0.7  0.3 - 1.0 K/uL Final    Eos # 08/11/2023 0.2  0.0 - 0.5 K/uL Final    Baso # 08/11/2023 0.05  0.00 - 0.20 K/uL Final    nRBC 08/11/2023 0  0 /100 WBC Final    Gran % 08/11/2023 50.6  38.0 - 73.0 % Final    Lymph % 08/11/2023 35.5  18.0 - 48.0 % Final    Mono % 08/11/2023 9.6  4.0 - 15.0 % Final    Eosinophil % 08/11/2023 3.3  0.0 - 8.0 % Final    Basophil % 08/11/2023 0.7  0.0 - 1.9 % Final    Differential Method 08/11/2023 Automated   Final    Specimen UA 08/11/2023 Urine, Clean Catch   Final    Color, UA 08/11/2023 Colorless (A)  Yellow, Straw, Nimisha Final    Appearance, UA 08/11/2023 Clear  Clear Final    pH, UA 08/11/2023  7.0  5.0 - 8.0 Final    Specific Gravity, UA 08/11/2023 1.010  1.005 - 1.030 Final    Protein, UA 08/11/2023 1+ (A)  Negative Final    Glucose, UA 08/11/2023 Negative  Negative Final    Ketones, UA 08/11/2023 Negative  Negative Final    Bilirubin (UA) 08/11/2023 Negative  Negative Final    Occult Blood UA 08/11/2023 Negative  Negative Final    Nitrite, UA 08/11/2023 Negative  Negative Final    Urobilinogen, UA 08/11/2023 Negative  Negative EU/dL Final    Leukocytes, UA 08/11/2023 Negative  Negative Final    Microalbumin, Urine 08/11/2023 176.8 (H)  <19.9 ug/mL Final    Creatinine, Urine 08/11/2023 67.0  15.0 - 325.0 mg/dL Final    Microalb/Creat Ratio 08/11/2023 263.9 (H)  0.0 - 30.0 ug/mg Final    Protein, Urine Random 08/11/2023 35 (H)  6 - 15 mg/dL Final    Creatinine, Urine 08/11/2023 67.0  15.0 - 325.0 mg/dL Final    Prot/Creat Ratio, Urine 08/11/2023 0.52 (H)  0.00 - 0.20 Final    Magnesium 08/11/2023 2.0  1.6 - 2.6 mg/dL Final    Vit D, 25-Hydroxy 08/11/2023 33  30 - 96 ng/mL Final    RBC, UA 08/11/2023 0  0 - 4 /hpf Final    WBC, UA 08/11/2023 0  0 - 5 /hpf Final    Bacteria 08/11/2023 Negative  None-Occ /hpf Final    Squam Epithel, UA 08/11/2023 0  /hpf Final    Hyaline Casts, UA 08/11/2023 0  0-1/lpf /lpf Final    Microscopic Comment 08/11/2023 SEE COMMENT   Final   Lab Visit on 07/03/2023   Component Date Value Ref Range Status    Magnesium 07/03/2023 2.7 (H)  1.6 - 2.6 mg/dL Final    WBC 07/03/2023 7.04  3.90 - 12.70 K/uL Final    RBC 07/03/2023 4.09  4.00 - 5.40 M/uL Final    Hemoglobin 07/03/2023 12.6  12.0 - 16.0 g/dL Final    Hematocrit 07/03/2023 40.6  37.0 - 48.5 % Final    MCV 07/03/2023 99 (H)  82 - 98 fL Final    MCH 07/03/2023 30.8  27.0 - 31.0 pg Final    MCHC 07/03/2023 31.0 (L)  32.0 - 36.0 g/dL Final    RDW 07/03/2023 13.0  11.5 - 14.5 % Final    Platelets 07/03/2023 318  150 - 450 K/uL Final    MPV 07/03/2023 8.4 (L)  9.2 - 12.9 fL Final    Immature Granulocytes 07/03/2023 0.3  0.0 - 0.5 %  Final    Gran # (ANC) 07/03/2023 3.3  1.8 - 7.7 K/uL Final    Immature Grans (Abs) 07/03/2023 0.02  0.00 - 0.04 K/uL Final    Lymph # 07/03/2023 2.8  1.0 - 4.8 K/uL Final    Mono # 07/03/2023 0.6  0.3 - 1.0 K/uL Final    Eos # 07/03/2023 0.2  0.0 - 0.5 K/uL Final    Baso # 07/03/2023 0.04  0.00 - 0.20 K/uL Final    nRBC 07/03/2023 0  0 /100 WBC Final    Gran % 07/03/2023 46.8  38.0 - 73.0 % Final    Lymph % 07/03/2023 40.2  18.0 - 48.0 % Final    Mono % 07/03/2023 9.1  4.0 - 15.0 % Final    Eosinophil % 07/03/2023 3.0  0.0 - 8.0 % Final    Basophil % 07/03/2023 0.6  0.0 - 1.9 % Final    Differential Method 07/03/2023 Automated   Final    Specimen UA 07/03/2023 Urine, Clean Catch   Final    Color, UA 07/03/2023 Yellow  Yellow, Straw, Nimisha Final    Appearance, UA 07/03/2023 Clear  Clear Final    pH, UA 07/03/2023 6.0  5.0 - 8.0 Final    Specific Gravity, UA 07/03/2023 1.025  1.005 - 1.030 Final    Protein, UA 07/03/2023 Trace (A)  Negative Final    Glucose, UA 07/03/2023 4+ (A)  Negative Final    Ketones, UA 07/03/2023 Negative  Negative Final    Bilirubin (UA) 07/03/2023 Negative  Negative Final    Occult Blood UA 07/03/2023 Negative  Negative Final    Nitrite, UA 07/03/2023 Negative  Negative Final    Urobilinogen, UA 07/03/2023 Negative  Negative EU/dL Final    Leukocytes, UA 07/03/2023 Negative  Negative Final    Uric Acid 07/03/2023 7.1 (H)  2.4 - 5.7 mg/dL Final    Microalbumin, Urine 07/03/2023 52.7 (H)  <19.9 ug/mL Final    Creatinine, Urine 07/03/2023 174.0  15.0 - 325.0 mg/dL Final    Microalb/Creat Ratio 07/03/2023 30.3 (H)  0.0 - 30.0 ug/mg Final    RBC, UA 07/03/2023 1  0 - 4 /hpf Final    WBC, UA 07/03/2023 2  0 - 5 /hpf Final    Bacteria 07/03/2023 Negative  None-Occ /hpf Final    Yeast, UA 07/03/2023 None  None Final    Squam Epithel, UA 07/03/2023 2  /hpf Final    Hyaline Casts, UA 07/03/2023 4 (A)  0-1/lpf /lpf Final    Microscopic Comment 07/03/2023 SEE COMMENT   Final   Lab Visit on 07/03/2023    Component Date Value Ref Range Status    Hemoglobin A1C 07/03/2023 7.7 (H)  4.5 - 6.2 % Final    Estimated Avg Glucose 07/03/2023 174 (H)  68 - 131 mg/dL Final    Glucose 07/03/2023 88  70 - 110 mg/dL Final    Sodium 07/03/2023 140  136 - 145 mmol/L Final    Potassium 07/03/2023 4.7  3.5 - 5.1 mmol/L Final    Chloride 07/03/2023 105  95 - 110 mmol/L Final    CO2 07/03/2023 28  23 - 29 mmol/L Final    BUN 07/03/2023 37 (H)  8 - 23 mg/dL Final    Calcium 07/03/2023 9.3  8.7 - 10.5 mg/dL Final    Creatinine 07/03/2023 1.6 (H)  0.5 - 1.4 mg/dL Final    Albumin 07/03/2023 4.1  3.5 - 5.2 g/dL Final    Phosphorus 07/03/2023 4.3  2.7 - 4.5 mg/dL Final    eGFR 07/03/2023 34.9 (A)  >60 mL/min/1.73 m^2 Final    Anion Gap 07/03/2023 7 (L)  8 - 16 mmol/L Final     Component Ref Range & Units 1 mo ago  (7/3/23) 5 mo ago  (3/20/23) 9 mo ago  (11/10/22) 1 yr ago  (3/11/22) 1 yr ago  (11/19/21) 2 yr ago  (5/19/21) 2 yr ago  (1/12/21)   Hemoglobin A1C 4.5 - 6.2 % 7.7 High   6.7 High  CM  6.7 High  CM  7.0 High  CM  7.0 High  CM  7.6 High  CM  7.0 High      Plan:           Dizziness  -     EKG 12-lead; Future  -     Echo Saline Bubble? No; Future; Expected date: 09/11/2023  -     Ambulatory referral/consult to Cardiology; Future; Expected date: 09/11/2023    Palpitation  -     EKG 12-lead; Future  -     Echo Saline Bubble? No; Future; Expected date: 09/11/2023  -     Ambulatory referral/consult to Cardiology; Future; Expected date: 09/11/2023    Type 2 diabetes mellitus with diabetic polyneuropathy, with long-term current use of insulin  -     Ambulatory referral/consult to Ophthalmology; Future; Expected date: 09/11/2023    Essential hypertension    Mixed dyslipidemia    CKD stage 3 due to type 2 diabetes mellitus    Bilateral hip pain    Pain in and around eye, left  -     Ambulatory referral/consult to Ophthalmology; Future; Expected date: 09/11/2023      Patient's medical issues have been reviewed.      The biggest issue seems  "to be higher palpitations on bending.  I will get a EKG and echocardiogram.      Today on auscultation I could not feel any significant arrhythmias perhaps a missed beat or 2.    She has poorly-controlled diabetes at this point and will need a cardiology checkup also because of potential effect on heart.      She also complains of pain in the left eye with no clinical findings on direct examination superficially.  Will refer to Ophthalmology.      She also has bilateral hip pains.      For poorly-controlled diabetes, we may consider adding injection Ozempic or Inj. Mounjaro in future.  Her nephrologist has told her that she cannot take GLP 1 agonist like Ozempic or Inj. Mounjaro though it is a relative caution but not contraindication.  Will still consider this.  Coverage of medications is always a issue at this point.    Follow-up in about couple of months time to 3 months time.      Continue with COVID precautions.      Take flu vaccination.    Spent jhonatan 30 minutes with patient which involved review of pts medical conditions, labs, medications and with 50% of time face-to-face discussion about medical problems, management and any applicable changes.        Current Outpatient Medications:     atorvastatin (LIPITOR) 40 MG tablet, TAKE 1 TABLET EVERY EVENING, Disp: 90 tablet, Rfl: 3    BD INSULIN SYRINGE 1 mL 28 gauge x 1/2" Syrg, USE 1 SYRINGE ONCE DAILY, Disp: 100 each, Rfl: 3    blood-glucose sensor (DEXCOM G6 SENSOR) Millicent, 1 Device by Misc.(Non-Drug; Combo Route) route every 10 days., Disp: 9 each, Rfl: 3    cetirizine (ZYRTEC) 10 MG tablet, Take 10 mg by mouth., Disp: , Rfl:     flash glucose sensor (FREESTYLE BANG 14 DAY SENSOR) Kit, USE ONE SENSOR EVERY 14 DAYS, Disp: 6 kit, Rfl: 3    fluticasone propionate (FLONASE) 50 mcg/actuation nasal spray, USE 2 SPRAYS IN EACH NOSTRIL ONCE DAILY, Disp: 48 g, Rfl: 3    gabapentin (NEURONTIN) 800 MG tablet, Take 1 tablet (800 mg total) by mouth 3 (three) times daily., " "Disp: 270 tablet, Rfl: 0    insulin aspart U-100 (NOVOLOG FLEXPEN U-100 INSULIN) 100 unit/mL (3 mL) InPn pen, INJECT 20 UNITS UNDER THE SKIN THREE TIMES A DAY WITH MEALS ( INSTEAD OF HUMALOG ), Disp: 60 mL, Rfl: 3    insulin syringe-needle U-100 0.3 mL 30 Syrg, by Misc.(Non-Drug; Combo Route) route 3 (three) times daily., Disp: , Rfl:     KERENDIA 10 mg Tab, Take 1 tablet by mouth every evening., Disp: , Rfl:     LANTUS U-100 INSULIN 100 unit/mL injection, INJECT 70 UNITS UNDER THE SKIN EVERY EVENING, Disp: 210 mL, Rfl: 3    losartan (COZAAR) 100 MG tablet, TAKE 1 TABLET DAILY, Disp: 90 tablet, Rfl: 3    metoprolol succinate (TOPROL-XL) 50 MG 24 hr tablet, TAKE 1 TABLET DAILY, Disp: 90 tablet, Rfl: 3    omeprazole (PRILOSEC) 40 MG capsule, Take 40 mg by mouth every evening., Disp: , Rfl:     pen needle, diabetic (ADVOCATE PEN NEEDLE) 31 gauge x 5/16" Ndle, 1 Device by Misc.(Non-Drug; Combo Route) route 3 (three) times daily., Disp: 100 each, Rfl: 3    solifenacin (VESICARE) 5 MG tablet, Take 5 mg by mouth every evening., Disp: , Rfl:   No current facility-administered medications for this visit.    Facility-Administered Medications Ordered in Other Visits:     lactated ringers infusion, , Intravenous, Once PRN, Howard Rivas MD    lactated ringers infusion, , Intravenous, Once PRN, Howard Rivas MD    "

## 2023-08-29 ENCOUNTER — TELEPHONE (OUTPATIENT)
Dept: FAMILY MEDICINE | Facility: CLINIC | Age: 68
End: 2023-08-29
Payer: MEDICARE

## 2023-08-29 ENCOUNTER — CLINICAL SUPPORT (OUTPATIENT)
Dept: REHABILITATION | Facility: HOSPITAL | Age: 68
End: 2023-08-29
Payer: MEDICARE

## 2023-08-29 DIAGNOSIS — M79.645 CHRONIC PAIN OF LEFT THUMB: Primary | ICD-10-CM

## 2023-08-29 DIAGNOSIS — G89.29 CHRONIC PAIN OF LEFT THUMB: Primary | ICD-10-CM

## 2023-08-29 PROCEDURE — 97022 WHIRLPOOL THERAPY: CPT | Mod: PN

## 2023-08-29 PROCEDURE — 97110 THERAPEUTIC EXERCISES: CPT | Mod: PN

## 2023-08-29 PROCEDURE — 97140 MANUAL THERAPY 1/> REGIONS: CPT | Mod: PN

## 2023-08-29 NOTE — PROGRESS NOTES
"                                  Occupational Therapy Daily Treatment Note       Date: 9/6/2023  Name: Payton Castillo  Clinic Number: [id number]    Therapy Diagnosis: S69.90XA (ICD-10-CM) - Thumb injury  Encounter Diagnosis   Name Primary?    Chronic pain of left thumb Yes     Physician: Dr. Leonid Aragon    Physician Orders: S69.90XA (ICD-10-CM) - Thumb injury; evaluate and treat  Medical Diagnosis: S69.90XA (ICD-10-CM) - Thumb injury; (Left) thumb pain  Surgical Procedure and Date:  status post (Left) trapeziectomy with Arthrex internal brace with fat pad transfer, 07/11/2023     Insurance Authorization Period Expiration: 08/23/2023-12/31/2023  Plan of Care Certification Period: 08/24/2023-11/24/2023  Date of Return to MD: 09/11/2023    Evaluation FOTO: 08/24/2023 = 31%     Visit # / Visits authorized: 2 / 20   Time In:  1:45   Time Out: 2:30 ***  Total Billable Time:  40 minutes    Precautions:  Standard      Post Op: 07/11/2023 = 8 weeks, 1 days      Subjective     Patient reports: "I ***  start light hand strengthening     Pain: 0/10   Location of pain: (Left) thumb / wrist      Objective    Patient seen by Occupational Therapy this session. Tx consisted of:      Supervised modalities after being cleared for contradictions  x  10   minutes:       -Fluidotherapy to  (Left)   hand to increase blood flow, circulation, tissue elasticity, desensitization, sensory re-education and for pain management  x 10 minutes.        Manual therapy techniques to increase joint mobilization /// scar massage/// soft tissue mobilization   x    8  minutes:     -Scar Massage to  dorsal aspect of (Left) thumb   with  mini therapeutic vibrator // mini dowel massager  to decrease dense scar adhesions and improve tensile glide  x 2 minutes.     -Manual Therapy techniques to  (Left) thumb Metacarpophalangeal Joint and (Left) wrist   including joint mobilizations, stretching, and Passive Range of Motion to increase joint mobility, range of " "motion  and for pain management  x  5 minutes     -Soft Tissue Mobilization to (Left) hand into wrist and forearm  from distal to proximal to decrease edema and increase range of motion and functional abilities  x  1 minutes        Therapeutic Exercises to improve functional performance while increasing strength, endurance, range of motion,  and flexibility  x     22 minutes:    - Active Assistive Range of Motion for (Left) thumb Metacarpophalangeal joint and (Left) wrist x 10 repetitions  - [place] Slot with (10) LARGE washers and  (10)  pennies ( 5 in hand) for Fine Motor Coordination x 1 repetitions  - Cotton balls thumb tip to base of 5th digit  x 2 repetitions x 10 balls  - X-Small wooden blocks UNLacing with thumb to base of 5th digit  x 11 blocks    - 0# Dorsiflexion/Volar flexion (Supination /Pronation ), Radial Deviation / Ulnar Deviation x 10 repetitions    - Wrist roller coaster for Active Range of Motion  of wrist in all planes x 10 repetitions    - Weighted ball on tabletop for Dorsiflexion /Volar Flexion  stretches x 10 repetitions    - RED Exercise ball on table top for Radial Deviation /Ulnar Deviation stretches   ( with hands in "W" pattern)      Initiate in future therapy sessions:    - Theraputty for PVC for "cookie cutting"     - Colored bead UNlacing with thumb to base of 5th digit x 20 beads  - Mini colored pegboard for Fine Motor Coordination and in-hand manipulation  (5 in-hand) x 20 pegs    -  ###Progressive Hand Gripper (black spring) for composite  x 20 repetitions   - ### Theraputty for intrinsic pulling, and medicine top x 10  Repetitions   - ### digiflex for isolated x 10 repetitions;  ### digiflex for composite digital flexion x 20 repetitions    - Wooden pegboard with ### clothespins with 3PT pinch x 2 repetitions   - YELLOW digiweb for composite digital Extension and  intrinsics x 20 repetitions     - Velcro checkerboard (on wedge) with "dart throwing" pattern x 1 repetition   - ### " "Flexbar for Supination /Pronation  and Dorsiflexion /Volar Flexion x 20 repetitions    - ### Wrist exerstick in standing for Dorsiflexion / Volar Flexion  x 3 repetitions            Assessment     Patient will continue to benefit from skilled Occupational Therapy intervention to increase functional abilities, range of motion, and strength and pain control.  Patient. demonstrated proper understanding of each exercise.  Patient continues to require verbal and tactile cues for throughout therapy session to maintain position and prevent compensation.   Patient continues to be limited in functional and leisurely pursuits. Pain limits patient's participation in activities of daily living.  Patient is not able to carryout necessary vocational tasks.   Patient's spiritual, cultural and educational needs considered and patient agreeable to plan of care and goals.  Patient is making good progress towards established goals.  Patient tolerated exercises / activities within pain tolerance.   Reviewed Home Exercise Program with patient., see EPIC under "patient instructions" for provided exercises, activity modifications and limitations, modalities for home pain management.  Patient. demo understanding of above.    Patient. tolerated Fluidotherapy with no irritation.     Patient arrived to therapy without orthotic.      New/Revised Goals: Continue Plan Of Care   Goals:  1)   Patient to be Independent with Home exercise program and modalities for pain management by 1 week. (MET)   2)   Patient will report   1/10 pain on average with activity to assist with exercises by 6-8 weeks.  ( In Progress)   3)   Patient will increase range of Motion by 3-5 degrees to increase functional use for activities of daily living by 6-8 weeks.   ( In Progress)   4)   Patient will decrease edema by 0.1-0.3 millimeters  to increase joint mobility /flexibility by 6-8 weeks. ( In Progress)     Plan      Continue 2x week during the 90 day certification " period   08/24/2023   to11/24/2023   with established Plan Of Care in pursuit of Occupational Therapy goals.      Toshia Fisher, OT

## 2023-09-05 ENCOUNTER — CLINICAL SUPPORT (OUTPATIENT)
Dept: REHABILITATION | Facility: HOSPITAL | Age: 68
End: 2023-09-05
Payer: MEDICARE

## 2023-09-05 DIAGNOSIS — G89.29 CHRONIC PAIN OF LEFT THUMB: Primary | ICD-10-CM

## 2023-09-05 DIAGNOSIS — M79.645 CHRONIC PAIN OF LEFT THUMB: Primary | ICD-10-CM

## 2023-09-05 PROCEDURE — 97140 MANUAL THERAPY 1/> REGIONS: CPT | Mod: PN

## 2023-09-05 PROCEDURE — 97110 THERAPEUTIC EXERCISES: CPT | Mod: PN

## 2023-09-05 PROCEDURE — 97022 WHIRLPOOL THERAPY: CPT | Mod: PN

## 2023-09-05 NOTE — PROGRESS NOTES
"                                  Occupational Therapy Daily Treatment Note       Date: 9/6/2023  Name: Payton Castillo  Clinic Number: [id number]    Therapy Diagnosis: S69.90XA (ICD-10-CM) - Thumb injury  Encounter Diagnosis   Name Primary?    Chronic pain of left thumb Yes     Physician: Dr. Leonid Aragon    Physician Orders: S69.90XA (ICD-10-CM) - Thumb injury; evaluate and treat  Medical Diagnosis: S69.90XA (ICD-10-CM) - Thumb injury; (Left) thumb pain  Surgical Procedure and Date:  status post (Left) trapeziectomy with Arthrex internal brace with fat pad transfer, 07/11/2023     Insurance Authorization Period Expiration: 08/23/2023-12/31/2023  Plan of Care Certification Period: 08/24/2023-11/24/2023  Date of Return to MD: 09/14/2023    Evaluation FOTO: 08/24/2023 = 31%     Visit # / Visits authorized: 3 / 20   Time In:  1:50  Time Out: 2:35  Total Billable Time:  40 minutes    Precautions:  Standard      Post Op: 07/11/2023 = 8  weeks, 1 days      Subjective     Patient reports: "I am feeling ok today in my hand.  I was not sore after yesterdays therapy."     Pain: 0/10   Location of pain: (Left) thumb / wrist      Objective    Patient seen by Occupational Therapy this session. Tx consisted of:      Supervised modalities after being cleared for contradictions  x  10   minutes:       -Fluidotherapy to  (Left)   hand to increase blood flow, circulation, tissue elasticity, desensitization, sensory re-education and for pain management  x 10 minutes.        Manual therapy techniques to increase joint mobilization /// scar massage/// soft tissue mobilization   x    8  minutes:     -Scar Massage to  dorsal aspect of (Left) thumb   with  mini therapeutic vibrator // mini dowel massager  to decrease dense scar adhesions and improve tensile glide  x 2 minutes.     -Manual Therapy techniques to  (Left) thumb Metacarpophalangeal Joint and (Left) wrist   including  stretching, and Passive Range of Motion to increase joint " "mobility, range of motion  and for pain management  x  5 minutes     -Soft Tissue Mobilization to (Left) hand into wrist and forearm  from distal to proximal to decrease edema and increase range of motion and functional abilities  x  1 minutes        Therapeutic Exercises to improve functional performance while increasing strength, endurance, range of motion,  and flexibility  x     22 minutes:    - Active Assistive Range of Motion for (Left) thumb Metacarpophalangeal joint and (Left) wrist x 10 repetitions  - [place] Slot with (10) LARGE washers and  (10)  pennies ( 5 in hand) for Fine Motor Coordination x 1 repetitions  - Colored bead UNlacing with thumb to base of 5th digit x 20 beads   - 0# Dorsiflexion/Volar flexion (Supination /Pronation ), Radial Deviation / Ulnar Deviation x 10 repetitions    - Wrist roller coaster for Active Range of Motion  of wrist in all planes x 5 repetitions    - Weighted ball on tabletop for Dorsiflexion /Volar Flexion  stretches x 10 repetitions    - RED Exercise ball on table top for Radial Deviation /Ulnar Deviation stretches   ( with hands in "W" pattern)  -NP   - Velcro checkerboard (on wedge) with "dart throwing" pattern x 1 repetition     - RED Theraputty for composite  x 10 repetitions    - RED Theraputty for PVC for "cookie cutting" , and medicine top x 10 repetitions    - Wooden pegboard with YELLOW clothespins with 3PT pinch x 1 repetitions        -NP = Not Performed     Initiate in future therapy sessions:      - Mini colored pegboard for Fine Motor Coordination and in-hand manipulation  (5 in-hand) x 20 pegs    -  ###Progressive Hand Gripper (black spring) for composite  x 20 repetitions   - ### Theraputty for intrinsic pulling x 10  Repetitions   - ### digiflex for isolated x 10 repetitions;  ### digiflex for composite digital flexion x 20 repetitions       - YELLOW digiweb for composite digital Extension and  intrinsics x 20 repetitions     - ### Flexbar for " "Supination /Pronation  and Dorsiflexion /Volar Flexion x 20 repetitions    - ### Wrist exerstick in standing for Dorsiflexion / Volar Flexion  x 3 repetitions            Assessment     Patient will continue to benefit from skilled Occupational Therapy intervention to increase functional abilities, range of motion, and strength and pain control.  Patient. demonstrated proper understanding of each exercise.  Patient continues to require verbal and tactile cues for throughout therapy session to maintain position and prevent compensation.   Patient continues to be limited in functional and leisurely pursuits. Pain limits patient's participation in activities of daily living.  Patient is not able to carryout necessary vocational tasks.   Patient's spiritual, cultural and educational needs considered and patient agreeable to plan of care and goals.  Patient is making good progress towards established goals.  Patient tolerated exercises / activities within pain tolerance.   Reviewed Home Exercise Program with patient., see EPIC under "patient instructions" for provided exercises, activity modifications and limitations, modalities for home pain management.  Patient. demo understanding of above.    Patient. tolerated Fluidotherapy with no irritation.     Patient arrived to therapy without orthotic.     Patient tolerated addition of wooden pegboard with clothespins for 3 Point Pinch with no reported pain.   Patient continues to demo compensation during mini colored bead unlacing with small finger adduction.   Patient continues to demo limited coordination during coin slot while dropping objects several times throughout exercise.      New/Revised Goals: Continue Plan Of Care   Goals:  1)   Patient to be Independent with Home exercise program and modalities for pain management by 1 week. (MET)   2)   Patient will report   1/10 pain on average with activity to assist with exercises by 6-8 weeks.  ( In Progress)   3)   Patient " will increase range of Motion by 3-5 degrees to increase functional use for activities of daily living by 6-8 weeks.   ( In Progress)   4)   Patient will decrease edema by 0.1-0.3 millimeters  to increase joint mobility /flexibility by 6-8 weeks. ( In Progress)     Plan      Continue 2x week during the 90 day certification period   08/24/2023   to11/24/2023   with established Plan Of Care in pursuit of Occupational Therapy goals.      Toshia Fisher, OT

## 2023-09-06 ENCOUNTER — CLINICAL SUPPORT (OUTPATIENT)
Dept: REHABILITATION | Facility: HOSPITAL | Age: 68
End: 2023-09-06
Payer: MEDICARE

## 2023-09-06 DIAGNOSIS — G89.29 CHRONIC PAIN OF LEFT THUMB: Primary | ICD-10-CM

## 2023-09-06 DIAGNOSIS — M79.645 CHRONIC PAIN OF LEFT THUMB: Primary | ICD-10-CM

## 2023-09-06 PROCEDURE — 97110 THERAPEUTIC EXERCISES: CPT | Mod: PN

## 2023-09-06 PROCEDURE — 97022 WHIRLPOOL THERAPY: CPT | Mod: PN

## 2023-09-06 PROCEDURE — 97140 MANUAL THERAPY 1/> REGIONS: CPT | Mod: PN

## 2023-09-11 DIAGNOSIS — J30.1 NON-SEASONAL ALLERGIC RHINITIS DUE TO POLLEN: ICD-10-CM

## 2023-09-11 RX ORDER — FLUTICASONE PROPIONATE 50 MCG
SPRAY, SUSPENSION (ML) NASAL
Qty: 48 G | Refills: 3 | Status: SHIPPED | OUTPATIENT
Start: 2023-09-11

## 2023-09-12 ENCOUNTER — DOCUMENTATION ONLY (OUTPATIENT)
Dept: REHABILITATION | Facility: HOSPITAL | Age: 68
End: 2023-09-12
Payer: MEDICARE

## 2023-09-12 NOTE — PROGRESS NOTES
"                                  Occupational Therapy Daily Treatment Note       Date: 9/15/2023  Name: Payton Castillo  Clinic Number: [id number]    Therapy Diagnosis: S69.90XA (ICD-10-CM) - Thumb injury  Encounter Diagnosis   Name Primary?    Chronic pain of left thumb Yes     Physician: Dr. Leonid Aragon    Physician Orders: S69.90XA (ICD-10-CM) - Thumb injury; evaluate and treat  Medical Diagnosis: S69.90XA (ICD-10-CM) - Thumb injury; (Left) thumb pain  Surgical Procedure and Date:  status post (Left) trapeziectomy with Arthrex internal brace with fat pad transfer, 07/11/2023     Insurance Authorization Period Expiration: 08/23/2023-12/31/2023  Plan of Care Certification Period: 08/24/2023-11/24/2023  Date of Return to MD: as needed    Evaluation FOTO: 08/24/2023 = 31%     Visit # / Visits authorized: 4 / 20   Time In:  1:00  Time Out: 1:40   Total Billable Time:  40 minutes    Precautions:  Standard      Post Op: 07/11/2023 = 9  weeks, 3 days      Subjective     Patient reports: "I went to see the doctor and he said my hand looked good.  He said I can continue therapy if needed."     Pain: 0/10   Location of pain: (Left) thumb / wrist      Objective    Patient seen by Occupational Therapy this session. Tx consisted of:      Supervised modalities after being cleared for contradictions  x  10   minutes:       -Fluidotherapy to  (Left)   hand to increase blood flow, circulation, tissue elasticity, desensitization, sensory re-education and for pain management  x 10 minutes.        Manual therapy techniques to increase joint mobilization /// scar massage/// soft tissue mobilization   x    8  minutes:     -Scar Massage to  dorsal aspect of (Left) thumb   with  mini therapeutic vibrator // mini dowel massager  to decrease dense scar adhesions and improve tensile glide  x 2 minutes.     -Manual Therapy techniques to  (Left) thumb Metacarpophalangeal Joint and (Left) wrist   including  stretching, and Passive Range of Motion " "to increase joint mobility, range of motion  and for pain management  x  5 minutes     -Soft Tissue Mobilization to (Left) hand into wrist and forearm  from distal to proximal to decrease edema and increase range of motion and functional abilities  x  1 minutes        Therapeutic Exercises to improve functional performance while increasing strength, endurance, range of motion,  and flexibility  x     22 minutes:    - Active Assistive Range of Motion for (Left) thumb Metacarpophalangeal joint and (Left) wrist x 10 repetitions  - Mini colored pegboard for Fine Motor Coordination and in-hand manipulation  (5 in-hand) x 20 pegs  - Colored bead UNlacing with thumb to base of 5th digit x 20 beads   - 0# Dorsiflexion/Volar flexion (Supination /Pronation ), Radial Deviation / Ulnar Deviation x 10 repetitions    - Wrist roller coaster for Active Range of Motion  of wrist in all planes x 5 repetitions    - Weighted ball on tabletop for Dorsiflexion /Volar Flexion  stretches x 10 repetitions    - RED Exercise ball on table top for Radial Deviation /Ulnar Deviation stretches   ( with hands in "W" pattern)  -NP   - Velcro checkerboard (on wedge) with "dart throwing" pattern x 1 repetition     - RED Theraputty for composite  x 10 repetitions    - RED Theraputty for PVC for "cookie cutting" , and medicine top x 10 repetitions    - Wooden pegboard with YELLOW clothespins with 3PT pinch x 1 repetitions        -NP = Not Performed     Initiate in future therapy sessions:    - ### Flexbar for Supination /Pronation  and Dorsiflexion /Volar Flexion x 10 repetitions     -  ###Progressive Hand Gripper (black spring) for composite  x 20 repetitions   - ### Theraputty for intrinsic pulling x 10  Repetitions   - ### digiflex for isolated x 10 repetitions;  ### digiflex for composite digital flexion x 20 repetitions       - YELLOW digiweb for composite digital Extension and  intrinsics x 20 repetitions   - ### Wrist exerstick in " "standing for Dorsiflexion / Volar Flexion  x 3 repetitions            Assessment     Patient will continue to benefit from skilled Occupational Therapy intervention to increase functional abilities, range of motion, and strength and pain control.  Patient. demonstrated proper understanding of each exercise.  Patient continues to require verbal and tactile cues for throughout therapy session to maintain position and prevent compensation.   Patient continues to be limited in functional and leisurely pursuits. Pain limits patient's participation in activities of daily living.  Patient is not able to carryout necessary vocational tasks.   Patient's spiritual, cultural and educational needs considered and patient agreeable to plan of care and goals.  Patient is making good progress towards established goals.  Patient tolerated exercises / activities within pain tolerance.   Reviewed Home Exercise Program with patient., see EPIC under "patient instructions" for provided exercises, activity modifications and limitations, modalities for home pain management.  Patient. demo understanding of above.    Patient. tolerated Fluidotherapy with no irritation.     Patient tolerated addition of mini colored pegboard for Fine Motor Coordination with no reported pain but demo limited coordination while dropping pegs several times throughout exercise.   Patient continues to demo compensation during mini colored bead unlacing with small finger adduction.      New/Revised Goals: Continue Plan Of Care   Goals:  1)   Patient to be Independent with Home exercise program and modalities for pain management by 1 week. (MET)   2)   Patient will report   1/10 pain on average with activity to assist with exercises by 6-8 weeks.  ( In Progress)   3)   Patient will increase range of Motion by 3-5 degrees to increase functional use for activities of daily living by 6-8 weeks.   ( In Progress)   4)   Patient will decrease edema by 0.1-0.3 millimeters  " to increase joint mobility /flexibility by 6-8 weeks. ( In Progress)     Plan      Continue 2x week during the 90 day certification period   08/24/2023   to11/24/2023   with established Plan Of Care in pursuit of Occupational Therapy goals.      Toshia Fisher, OT

## 2023-09-15 ENCOUNTER — CLINICAL SUPPORT (OUTPATIENT)
Dept: REHABILITATION | Facility: HOSPITAL | Age: 68
End: 2023-09-15
Payer: MEDICARE

## 2023-09-15 DIAGNOSIS — G89.29 CHRONIC PAIN OF LEFT THUMB: Primary | ICD-10-CM

## 2023-09-15 DIAGNOSIS — M79.645 CHRONIC PAIN OF LEFT THUMB: Primary | ICD-10-CM

## 2023-09-15 PROCEDURE — 97140 MANUAL THERAPY 1/> REGIONS: CPT | Mod: PN

## 2023-09-15 PROCEDURE — 97022 WHIRLPOOL THERAPY: CPT | Mod: PN

## 2023-09-15 PROCEDURE — 97110 THERAPEUTIC EXERCISES: CPT | Mod: PN

## 2023-09-15 NOTE — PROGRESS NOTES
"                                  Occupational Therapy Daily Treatment Note       Date: 9/18/2023  Name: Payton Castillo  Clinic Number: [id number]    Therapy Diagnosis: S69.90XA (ICD-10-CM) - Thumb injury  Encounter Diagnosis   Name Primary?    Chronic pain of left thumb Yes     Physician: Dr. Leonid Aragon    Physician Orders: S69.90XA (ICD-10-CM) - Thumb injury; evaluate and treat  Medical Diagnosis: S69.90XA (ICD-10-CM) - Thumb injury; (Left) thumb pain  Surgical Procedure and Date:  status post (Left) trapeziectomy with Arthrex internal brace with fat pad transfer, 07/11/2023     Insurance Authorization Period Expiration: 08/23/2023-12/31/2023  Plan of Care Certification Period: 08/24/2023-11/24/2023  Date of Return to MD: as needed    Evaluation FOTO: 08/24/2023 = 31%     Visit # / Visits authorized: 5 / 20   Time In:  9:30  Time Out: 10:10    Total Billable Time:  40 minutes    Precautions:  Standard      Post Op: 07/11/2023 = 9  weeks, 6 days      Subjective     Patient reports: "I am feeling really stiff in my wrist this morning."     Pain: 0/10   Location of pain: (Left) thumb / wrist      Objective    Patient seen by Occupational Therapy this session. Tx consisted of:      Supervised modalities after being cleared for contradictions  x  10   minutes:       -Fluidotherapy to  (Left)   hand to increase blood flow, circulation, tissue elasticity, desensitization, sensory re-education and for pain management  x 10 minutes.        Manual therapy techniques to increase joint mobilization /// scar massage/// soft tissue mobilization   x    8  minutes:     -Scar Massage to  dorsal aspect of (Left) thumb   with  mini therapeutic vibrator // mini dowel massager  to decrease dense scar adhesions and improve tensile glide  x 2 minutes.     -Manual Therapy techniques to  (Left) thumb Metacarpophalangeal Joint and (Left) wrist   including  stretching, and Passive Range of Motion to increase joint mobility, range of motion  " "and for pain management  x  5 minutes     -Soft Tissue Mobilization to (Left) hand into wrist and forearm  from distal to proximal to decrease edema and increase range of motion and functional abilities  x  1 minutes        Therapeutic Exercises to improve functional performance while increasing strength, endurance, range of motion,  and flexibility  x     22 minutes:    - Active Assistive Range of Motion for (Left) thumb Metacarpophalangeal joint and (Left) wrist x 10 repetitions  - Mini colored pegboard for Fine Motor Coordination and in-hand manipulation  (5 in-hand) x 20 pegs  - Colored bead UNlacing with thumb to base of 5th digit x 20 beads   - 0.5# Dorsiflexion/Volar flexion (Supination /Pronation ), Radial Deviation / Ulnar Deviation x 10 repetitions    - Wrist roller coaster for Active Range of Motion  of wrist in all planes x 5 repetitions    - Weighted ball on tabletop for Dorsiflexion /Volar Flexion  stretches x 10 repetitions    - RED Exercise ball on table top for Radial Deviation /Ulnar Deviation stretches   ( with hands in "W" pattern)  -NP   - Velcro checkerboard (on wedge) with "dart throwing" pattern x 1 repetition     - RED Theraputty for composite  x 10 repetitions    - RED Theraputty for PVC for "cookie cutting" , and medicine top x 10 repetitions    - Wooden pegboard with YELLOW clothespins with 3PT pinch x 1 repetitions        -NP = Not Performed     Initiate in future therapy sessions:    - ### Flexbar for Supination /Pronation  and Dorsiflexion /Volar Flexion x 10 repetitions     -  ###Progressive Hand Gripper (black spring) for composite  x 20 repetitions   - ### Theraputty for intrinsic pulling x 10  Repetitions   - ### digiflex for isolated x 10 repetitions;  ### digiflex for composite digital flexion x 20 repetitions       - YELLOW digiweb for composite digital Extension and  intrinsics x 20 repetitions   - ### Wrist exerstick in standing for Dorsiflexion / Volar Flexion  x 3 " "repetitions            Assessment     Patient will continue to benefit from skilled Occupational Therapy intervention to increase functional abilities, range of motion, and strength and pain control.  Patient. demonstrated proper understanding of each exercise.  Patient continues to require verbal and tactile cues for throughout therapy session to maintain position and prevent compensation.   Patient continues to be limited in functional and leisurely pursuits. Pain limits patient's participation in activities of daily living.  Patient is not able to carryout necessary vocational tasks.   Patient's spiritual, cultural and educational needs considered and patient agreeable to plan of care and goals.  Patient is making good progress towards established goals.  Patient tolerated exercises / activities within pain tolerance.   Reviewed Home Exercise Program with patient., see EPIC under "patient instructions" for provided exercises, activity modifications and limitations, modalities for home pain management.  Patient. demo understanding of above.    Patient. tolerated Fluidotherapy with no irritation.     Patient tolerated increase in resistance during Dorsal Flexion, Volar Flexion, Radial Deviation, Ulnar Deviation with no reported pain.    Patient continues to demo compensation during mini colored bead unlacing with small finger adduction.  Patient demo decreased coordination while dropping pegs several times throughout exercise.      New/Revised Goals: Continue Plan Of Care   Goals:  1)   Patient to be Independent with Home exercise program and modalities for pain management by 1 week. (MET)   2)   Patient will report   1/10 pain on average with activity to assist with exercises by 6-8 weeks.  ( In Progress)   3)   Patient will increase range of Motion by 3-5 degrees to increase functional use for activities of daily living by 6-8 weeks.   ( In Progress)   4)   Patient will decrease edema by 0.1-0.3 millimeters  to " increase joint mobility /flexibility by 6-8 weeks. ( In Progress)     Plan      Continue 2x week during the 90 day certification period   08/24/2023   to11/24/2023   with established Plan Of Care in pursuit of Occupational Therapy goals.      Toshia Fisher, OT

## 2023-09-18 ENCOUNTER — CLINICAL SUPPORT (OUTPATIENT)
Dept: REHABILITATION | Facility: HOSPITAL | Age: 68
End: 2023-09-18
Payer: MEDICARE

## 2023-09-18 DIAGNOSIS — M79.645 CHRONIC PAIN OF LEFT THUMB: Primary | ICD-10-CM

## 2023-09-18 DIAGNOSIS — G89.29 CHRONIC PAIN OF LEFT THUMB: Primary | ICD-10-CM

## 2023-09-18 PROCEDURE — 97022 WHIRLPOOL THERAPY: CPT | Mod: PN

## 2023-09-18 PROCEDURE — 97140 MANUAL THERAPY 1/> REGIONS: CPT | Mod: PN

## 2023-09-18 PROCEDURE — 97110 THERAPEUTIC EXERCISES: CPT | Mod: PN

## 2023-09-18 NOTE — PROGRESS NOTES
"                                  Occupational Therapy Daily Treatment Note       Date: 9/20/2023  Name: Payton Castillo  Clinic Number: [id number]    Therapy Diagnosis: S69.90XA (ICD-10-CM) - Thumb injury  Encounter Diagnosis   Name Primary?    Chronic pain of left thumb Yes     Physician: Dr. Leonid Aragon    Physician Orders: S69.90XA (ICD-10-CM) - Thumb injury; evaluate and treat  Medical Diagnosis: S69.90XA (ICD-10-CM) - Thumb injury; (Left) thumb pain  Surgical Procedure and Date:  status post (Left) trapeziectomy with Arthrex internal brace with fat pad transfer, 07/11/2023     Insurance Authorization Period Expiration: 08/23/2023-12/31/2023  Plan of Care Certification Period: 08/24/2023-11/24/2023  Date of Return to MD: as needed    Evaluation FOTO: 08/24/2023 = 31%     Visit # / Visits authorized: 6 / 20   Time In:  12:55  Time Out: 1:40   Total Billable Time:  40 minutes    Precautions:  Standard      Post Op: 07/11/2023 = 10   weeks, 1 days      Subjective     Patient reports: "I am feeling a little stiff in my thumb today."     Pain: 0/10   Location of pain: (Left) thumb / wrist      Objective    Patient seen by Occupational Therapy this session. Tx consisted of:      Supervised modalities after being cleared for contradictions  x  10   minutes:       -Fluidotherapy to  (Left)   hand to increase blood flow, circulation, tissue elasticity, desensitization, sensory re-education and for pain management  x 10 minutes.        Manual therapy techniques to increase joint mobilization /// scar massage/// soft tissue mobilization   x    8  minutes:     -Scar Massage to  dorsal aspect of (Left) thumb   with  mini therapeutic vibrator // mini dowel massager  to decrease dense scar adhesions and improve tensile glide  x 2 minutes.     -Manual Therapy techniques to  (Left) thumb Metacarpophalangeal Joint and (Left) wrist   including  stretching, and Passive Range of Motion to increase joint mobility, range of motion  and " "for pain management  x  5 minutes     -Soft Tissue Mobilization to (Left) hand into wrist and forearm  from distal to proximal to decrease edema and increase range of motion and functional abilities  x  1 minutes        Therapeutic Exercises to improve functional performance while increasing strength, endurance, range of motion,  and flexibility  x     22 minutes:    - Active Assistive Range of Motion for (Left) thumb Metacarpophalangeal joint and (Left) wrist x 10 repetitions  - Mini colored pegboard for Fine Motor Coordination and in-hand manipulation  (5 in-hand) x 20 pegs  - Colored bead UNlacing with thumb to base of 5th digit x 20 beads   - 0.5# Dorsiflexion/Volar flexion (Supination /Pronation ), Radial Deviation / Ulnar Deviation x 10 repetitions    - Wrist roller coaster for Active Range of Motion  of wrist in all planes x 5 repetitions    - Weighted ball on tabletop for Dorsiflexion /Volar Flexion  stretches x 10 repetitions -NP   - RED Exercise ball on table top for Radial Deviation /Ulnar Deviation stretches   ( with hands in "W" pattern)  -NP   - Velcro checkerboard (on wedge) with "dart throwing" pattern x 1 repetition  -NP   - 15# Progressive Hand Gripper (black spring) for composite  x 20 repetitions   - RED Theraputty for PVC for "cookie cutting" , and medicine top x 10 repetitions    - Wooden pegboard with RED clothespins with 3PT pinch x 1 repetitions    - YELLOW Flexbar for Supination /Pronation  and Dorsiflexion /Volar Flexion x 10 repetitions       -NP = Not Performed     Initiate in future therapy sessions:       - ### Theraputty for intrinsic pulling x 10  Repetitions   - ### digiflex for isolated x 10 repetitions;  ### digiflex for composite digital flexion x 20 repetitions       - YELLOW digiweb for composite digital Extension and  intrinsics x 20 repetitions   - ### Wrist exerstick in standing for Dorsiflexion / Volar Flexion  x 3 repetitions            Assessment     Patient will " "continue to benefit from skilled Occupational Therapy intervention to increase functional abilities, range of motion, and strength and pain control.  Patient. demonstrated proper understanding of each exercise.  Patient continues to require verbal and tactile cues for throughout therapy session to maintain position and prevent compensation.   Patient continues to be limited in functional and leisurely pursuits. Pain limits patient's participation in activities of daily living.  Patient is not able to carryout necessary vocational tasks.   Patient's spiritual, cultural and educational needs considered and patient agreeable to plan of care and goals.  Patient is making good progress towards established goals.  Patient tolerated exercises / activities within pain tolerance.   Reviewed Home Exercise Program with patient., see EPIC under "patient instructions" for provided exercises, activity modifications and limitations, modalities for home pain management.  Patient. demo understanding of above.    Patient. tolerated Fluidotherapy with no irritation.     Patient tolerated increase in resistance during wooden pegboard with clothespins for 3 Point Pinch; addition of Progressive Hand Gripper for composite ; Flexbar for Supination, Pronation, Dorsal Flexion, Volar Flexion  with no reported pain.    Patient continues to demo compensation during mini colored bead unlacing with small finger adduction.        New/Revised Goals: Continue Plan Of Care   Goals:  1)   Patient to be Independent with Home exercise program and modalities for pain management by 1 week. (MET)   2)   Patient will report   1/10 pain on average with activity to assist with exercises by 6-8 weeks.  ( In Progress)   3)   Patient will increase range of Motion by 3-5 degrees to increase functional use for activities of daily living by 6-8 weeks.   ( In Progress)   4)   Patient will decrease edema by 0.1-0.3 millimeters  to increase joint mobility " /flexibility by 6-8 weeks. ( In Progress)     Plan      Continue 2x week during the 90 day certification period   08/24/2023   to11/24/2023   with established Plan Of Care in pursuit of Occupational Therapy goals.      Toshia Fisher, OT

## 2023-09-20 ENCOUNTER — CLINICAL SUPPORT (OUTPATIENT)
Dept: REHABILITATION | Facility: HOSPITAL | Age: 68
End: 2023-09-20
Payer: MEDICARE

## 2023-09-20 DIAGNOSIS — G89.29 CHRONIC PAIN OF LEFT THUMB: Primary | ICD-10-CM

## 2023-09-20 DIAGNOSIS — M79.645 CHRONIC PAIN OF LEFT THUMB: Primary | ICD-10-CM

## 2023-09-20 PROCEDURE — 97110 THERAPEUTIC EXERCISES: CPT | Mod: PN

## 2023-09-20 PROCEDURE — 97022 WHIRLPOOL THERAPY: CPT | Mod: PN

## 2023-09-20 PROCEDURE — 97140 MANUAL THERAPY 1/> REGIONS: CPT | Mod: PN

## 2023-09-20 NOTE — PROGRESS NOTES
"                                  Occupational Therapy Daily Treatment Note       Date: 9/26/2023  Name: Payton Castillo  Clinic Number: [id number]    Therapy Diagnosis: S69.90XA (ICD-10-CM) - Thumb injury  Encounter Diagnosis   Name Primary?    Chronic pain of left thumb Yes     Physician: Dr. Leonid Aragon    Physician Orders: S69.90XA (ICD-10-CM) - Thumb injury; evaluate and treat  Medical Diagnosis: S69.90XA (ICD-10-CM) - Thumb injury; (Left) thumb pain  Surgical Procedure and Date:  status post (Left) trapeziectomy with Arthrex internal brace with fat pad transfer, 07/11/2023     Insurance Authorization Period Expiration: 08/23/2023-12/31/2023  Plan of Care Certification Period: 08/24/2023-11/24/2023  Date of Return to MD: as needed    Evaluation FOTO: 08/24/2023 = 31%     Visit # / Visits authorized: 7 / 20   Time In:  3:00  Time Out: 3:40   Total Billable Time:  40 minutes    Precautions:  Standard      Post Op: 07/11/2023 = 11   weeks, 0 days      Subjective     Patient reports: "I am not in much pain just tightness in my thumb."     Pain: 0/10   Location of pain: (Left) thumb / wrist      Objective    Patient seen by Occupational Therapy this session. Tx consisted of:      Supervised modalities after being cleared for contradictions  x  10   minutes:       -Fluidotherapy to  (Left)   hand to increase blood flow, circulation, tissue elasticity, desensitization, sensory re-education and for pain management  x 10 minutes.        Manual therapy techniques to increase joint mobilization /// scar massage/// soft tissue mobilization   x    8  minutes:     -Scar Massage to  dorsal aspect of (Left) thumb   with  mini therapeutic vibrator // mini dowel massager  to decrease dense scar adhesions and improve tensile glide  x 2 minutes.     -Manual Therapy techniques to  (Left) thumb Metacarpophalangeal Joint and (Left) wrist   including  stretching, and Passive Range of Motion to increase joint mobility, range of motion  and " "for pain management  x  5 minutes     -Soft Tissue Mobilization to (Left) hand into wrist and forearm  from distal to proximal to decrease edema and increase range of motion and functional abilities  x  1 minutes        Therapeutic Exercises to improve functional performance while increasing strength, endurance, range of motion,  and flexibility  x     22 minutes:    - Active Assistive Range of Motion for (Left) thumb Metacarpophalangeal joint and (Left) wrist x 5 repetitions  - Mini colored pegboard for Fine Motor Coordination and in-hand manipulation  (5 in-hand) x 20 pegs  - Colored bead UNlacing with thumb to base of 5th digit x 20 beads   - 0.5# Dorsiflexion/Volar flexion (Supination /Pronation ), Radial Deviation / Ulnar Deviation x 10 repetitions    - Wrist roller coaster for Active Range of Motion  of wrist in all planes x 5 repetitions -NP   - Weighted ball on tabletop for Dorsiflexion /Volar Flexion  stretches x 10 repetitions -NP   - Velcro checkerboard (on wedge) with "dart throwing" pattern x 1 repetition  -NP   - 25# Progressive Hand Gripper (black spring) for composite  x 20 repetitions   - RED Theraputty for PVC for "cookie cutting" , and medicine top x 10 repetitions    - Wooden pegboard with RED clothespins with 3PT pinch x 1 repetitions    - YELLOW Flexbar for Supination /Pronation  and Dorsiflexion /Volar Flexion x 10 repetitions   - YELLOW digiweb for composite digital Extension and  intrinsics x 10 repetitions      -NP = Not Performed     Initiate in future therapy sessions:       - ### Theraputty for intrinsic pulling x 10  Repetitions   - ### digiflex for isolated x 10 repetitions;  ### digiflex for composite digital flexion x 20 repetitions    - ### Wrist exerstick in standing for Dorsiflexion / Volar Flexion  x 3 repetitions            Assessment     Patient will continue to benefit from skilled Occupational Therapy intervention to increase functional abilities, range of motion, and " "strength and pain control.  Patient. demonstrated proper understanding of each exercise.  Patient continues to require verbal and tactile cues for throughout therapy session to maintain position and prevent compensation.   Patient continues to be limited in functional and leisurely pursuits. Pain limits patient's participation in activities of daily living.  Patient is not able to carryout necessary vocational tasks.   Patient's spiritual, cultural and educational needs considered and patient agreeable to plan of care and goals.  Patient is making good progress towards established goals.  Patient tolerated exercises / activities within pain tolerance.   Reviewed Home Exercise Program with patient., see EPIC under "patient instructions" for provided exercises, activity modifications and limitations, modalities for home pain management.  Patient. demo understanding of above.    Patient. tolerated Fluidotherapy with no irritation.     Patient tolerated increase in resistance during Progressive Hand Gripper for composite ; addition of digiweb for intrinsics and composite digital flexion with no reported pain.         New/Revised Goals: Continue Plan Of Care   Goals:  1)   Patient to be Independent with Home exercise program and modalities for pain management by 1 week. (MET)   2)   Patient will report   1/10 pain on average with activity to assist with exercises by 6-8 weeks.  ( In Progress)   3)   Patient will increase range of Motion by 3-5 degrees to increase functional use for activities of daily living by 6-8 weeks.   ( In Progress)   4)   Patient will decrease edema by 0.1-0.3 millimeters  to increase joint mobility /flexibility by 6-8 weeks. ( In Progress)     Plan      Continue 2x week during the 90 day certification period   08/24/2023   to11/24/2023   with established Plan Of Care in pursuit of Occupational Therapy goals.      Toshia Fisher OT      "

## 2023-09-26 ENCOUNTER — CLINICAL SUPPORT (OUTPATIENT)
Dept: REHABILITATION | Facility: HOSPITAL | Age: 68
End: 2023-09-26
Payer: MEDICARE

## 2023-09-26 DIAGNOSIS — G89.29 CHRONIC PAIN OF LEFT THUMB: Primary | ICD-10-CM

## 2023-09-26 DIAGNOSIS — M79.645 CHRONIC PAIN OF LEFT THUMB: Primary | ICD-10-CM

## 2023-09-26 PROCEDURE — 97110 THERAPEUTIC EXERCISES: CPT | Mod: PN

## 2023-09-26 PROCEDURE — 97022 WHIRLPOOL THERAPY: CPT | Mod: PN

## 2023-09-26 PROCEDURE — 97140 MANUAL THERAPY 1/> REGIONS: CPT | Mod: PN

## 2023-09-26 NOTE — PROGRESS NOTES
"                                  Occupational Therapy Progress Note       Date: 9/29/2023  Name: Payton Castillo  Clinic Number: [id number]    Therapy Diagnosis: S69.90XA (ICD-10-CM) - Thumb injury  Encounter Diagnosis   Name Primary?    Chronic pain of left thumb Yes     Physician: Dr. Leonid Aragon    Physician Orders: S69.90XA (ICD-10-CM) - Thumb injury; evaluate and treat  Medical Diagnosis: S69.90XA (ICD-10-CM) - Thumb injury; (Left) thumb pain  Surgical Procedure and Date:  status post (Left) trapeziectomy with Arthrex internal brace with fat pad transfer, 07/11/2023     Insurance Authorization Period Expiration: 08/23/2023-12/31/2023  Plan of Care Certification Period: 08/24/2023-11/24/2023  Date of Return to MD: as needed    Evaluation FOTO: 08/24/2023 = 31%   Reassessment FOTO: 09/29/2023 = 12%    Visit # / Visits authorized: 8 / 20   Time In:  10:15  Time Out: 11:00  Total Billable Time:  40 minutes    Precautions:  Standard      Post Op: 07/11/2023 = 11   weeks, 3 days      Subjective     Patient reports: "I feel a little puffy in my hand today.  I mowed the grass yesterday. When I bring my wrist back it feels tight."     Pain: 1/10   Location of pain: (Left) thumb / wrist      Objective    Patient seen by Occupational Therapy this session. Tx consisted of:      Supervised modalities after being cleared for contradictions  x  10   minutes:       -Fluidotherapy to  (Left)   hand to increase blood flow, circulation, tissue elasticity, desensitization, sensory re-education and for pain management  x 10 minutes.        Manual therapy techniques to increase joint mobilization /// scar massage/// soft tissue mobilization   x    8  minutes:     -Scar Massage to  dorsal aspect of (Left) thumb   with  mini therapeutic vibrator // mini dowel massager  to decrease dense scar adhesions and improve tensile glide  x 2 minutes.     -Manual Therapy techniques to  (Left) thumb Metacarpophalangeal Joint and (Left) wrist   " "including  stretching, and Passive Range of Motion to increase joint mobility, range of motion  and for pain management  x  5 minutes     -Soft Tissue Mobilization to (Left) hand into wrist and forearm  from distal to proximal to decrease edema and increase range of motion and functional abilities  x  1 minutes        Therapeutic Exercises to improve functional performance while increasing strength, endurance, range of motion,  and flexibility  x     22 minutes:    - Active Assistive Range of Motion for (Left) thumb Metacarpophalangeal joint and (Left) wrist x 5 repetitions  - Mini colored pegboard for Fine Motor Coordination and in-hand manipulation  (5 in-hand) x 20 pegs  - Colored bead UNlacing with thumb to base of 5th digit x 20 beads   - 0.5# Dorsiflexion/Volar flexion (Supination /Pronation ), Radial Deviation / Ulnar Deviation x 10 repetitions    - Wrist roller coaster for Active Range of Motion  of wrist in all planes x 5 repetitions -NP   - Weighted ball on tabletop for Dorsiflexion /Volar Flexion  stretches x 10 repetitions -NP   - Velcro checkerboard (on wedge) with "dart throwing" pattern x 1 repetition  -NP   - 25# Progressive Hand Gripper (black spring) for composite  x 20 repetitions   - RED Theraputty for PVC for "cookie cutting" , and medicine top x 10 repetitions    - Wooden pegboard with RED clothespins with 3PT pinch x 1 repetitions    - YELLOW Flexbar for Supination /Pronation  and Dorsiflexion /Volar Flexion x 10 repetitions   - YELLOW digiweb for composite digital Extension and  intrinsics x 10 repetitions      -NP = Not Performed     Initiate in future therapy sessions:         - ### Theraputty for intrinsic pulling x 10  Repetitions   - ### digiflex for isolated x 10 repetitions;  ### digiflex for composite digital flexion x 20 repetitions    - ### Wrist exerstick in standing for Dorsiflexion / Volar Flexion  x 3 repetitions      Patient reassessed as follows:    Edema:   centimeters " (Right) / (Left)  Metacarpals:  18.8   /  18.1 (-0.1)   Proximal Wrist Crease:  16.2 / 17.0 (-0.1)                                   (Right)  /  (Left)  thumb  Proximal Phalanx:  6.3  / 6.4 (-0.1)  Interphalangeal Joint:  6.5  / 6.3 (-0.2)  Distal Phalanx:  5.5  / 5.2          Active Range of Motion: hand    ( (Left) thumb Interphalangeal joint fused @ 35* flexion,  2021)     Thumb:                           (Right)  //   (Left)  Metacarpophalangeal Joint: 0/40  // 0 / 25 (+20)   Interphalangeal Joint:  0/35   //   35 / 35    Palmar Abduction: 40 //  40  Radial Abduction:  40 //  40 (+10)     Opposition: 0.0  //  1.5 (+0.3)  Comments: (Measured with wrist goni from thumb tip to base of 5th metacarpal) centimeters                                                    Passive Range of Motion: hand     Thumb:                           (Left)  Metacarpophalangeal Joint:  0 / 40 (+ 20)   Interphalangeal Joint:  Not Applicable       Palmar Abduction:  WFL   Radial Abduction:  WFL    Opposition:  0.0 (+1.5)   Comments: (Measured with wrist goni from thumb tip to base of 5th metacarpal) centimeters                                   Active Range of Motion: wrist                         (Right)   //  (Left)    Dorsal Flexion /Volar Flexion :  60/70  // 55 (+5)  / 55 (+10)   Radial Deviation /Ulnar Deviation: 15/35  //  7 (+2)  / 25 (+5)   Supination / Pronation: WFL  //  WFL         Strength: (KEYONNA Dynamometer in pounds),Position II  ( submaximally, within pain tolerance)   (Right): 45  (Left):  30    Pinch Strength: (Pinch Gauge in pounds)   ( submaximally, within pain tolerance)              (Right) /  (Left)  Lateral Pinch:  12 / 7  3 Point Pinch:  11 / 7  2 Point Pinch:   10 / 6            Assessment     Patient will continue to benefit from skilled Occupational Therapy intervention to increase functional abilities, range of motion, and strength and pain control.  Patient. demonstrated proper understanding of each  "exercise.  Patient continues to require verbal and tactile cues for throughout therapy session to maintain position and prevent compensation.   Patient continues to be limited in functional and leisurely pursuits. Pain limits patient's participation in activities of daily living.  Patient is not able to carryout necessary vocational tasks.   Patient's spiritual, cultural and educational needs considered and patient agreeable to plan of care and goals.   Patient is making good progress towards established goals.  Patient tolerated exercises / activities within pain tolerance.   Reviewed Home Exercise Program with patient., see EPIC under "patient instructions" for provided exercises, activity modifications and limitations, modalities for home pain management.  Patient. demo understanding of above.    Patient. tolerated Fluidotherapy with no irritation.          Patient demo decreased edema in (Left) metacarpals, Proximal Wrist Crease, (Left) thumb Proximal Phalanx, and Interphalangeal joint;  increased Active range of motion and Passive range of motion for (Left) thumb Metacarpophalangeal Joint flexion, and opposition,  Active range of motion for (Left) radial Abduction; increase Active range of motion for (Left) wrist Dorsal Flexion, Volar Flexion< Radial Deviation, and Ulnar Deviation.      New/Revised Goals: Continue Plan Of Care    Goals:  1)   Patient to be Independent with Home exercise program and modalities for pain management by 1 week. (MET)   2)   Patient will report   0/10 pain on average with activity to assist with exercises by 6-8 weeks.  ( Revised 09/29/2023)   3)   Patient will increase range of Motion by 3-5 degrees to increase functional use for activities of daily living by 6-8 weeks.   ( In Progress)   4)   Patient will decrease edema by 0.1-0.3 millimeters  to increase joint mobility /flexibility by 6-8 weeks. ( In Progress)   5)   Patient will increase  strength 3-5 pounds to open " containers by 6-8 weeks.  ( Added 09/29/2023)   6)   Patient will increase pinch by 1-3 pounds for buttoning by 6-8 weeks.  ( Added 09/29/2023)    Plan       Continue 2x week during the 90 day certification period   08/24/2023   to11/24/2023   with established Plan Of Care in pursuit of Occupational Therapy goals.      Toshia Fisher, OT

## 2023-09-27 ENCOUNTER — HOSPITAL ENCOUNTER (OUTPATIENT)
Dept: CARDIOLOGY | Facility: HOSPITAL | Age: 68
Discharge: HOME | End: 2023-09-27
Attending: INTERNAL MEDICINE
Payer: MEDICARE

## 2023-09-27 VITALS — HEIGHT: 66 IN | WEIGHT: 174 LBS | BODY MASS INDEX: 27.97 KG/M2

## 2023-09-27 DIAGNOSIS — R42 DIZZINESS: ICD-10-CM

## 2023-09-27 DIAGNOSIS — R00.2 PALPITATION: ICD-10-CM

## 2023-09-27 PROCEDURE — 93306 TTE W/DOPPLER COMPLETE: CPT

## 2023-09-27 PROCEDURE — 93306 TTE W/DOPPLER COMPLETE: CPT | Mod: 26,,, | Performed by: INTERNAL MEDICINE

## 2023-09-27 PROCEDURE — 93306 ECHO (CUPID ONLY): ICD-10-PCS | Mod: 26,,, | Performed by: INTERNAL MEDICINE

## 2023-09-28 LAB
AORTIC ROOT ANNULUS: 3.1 CM
AORTIC VALVE CUSP SEPERATION: 2.1 CM
AV INDEX (PROSTH): 0.76
AV MEAN GRADIENT: 2 MMHG
AV PEAK GRADIENT: 4 MMHG
AV VALVE AREA BY VELOCITY RATIO: 2.43 CM²
AV VALVE AREA: 2.64 CM²
AV VELOCITY RATIO: 0.7
BSA FOR ECHO PROCEDURE: 1.92 M2
CV ECHO LV RWT: 0.55 CM
DOP CALC AO PEAK VEL: 1.04 M/S
DOP CALC AO VTI: 25.3 CM
DOP CALC LVOT AREA: 3.5 CM2
DOP CALC LVOT DIAMETER: 2.1 CM
DOP CALC LVOT PEAK VEL: 0.73 M/S
DOP CALC LVOT STROKE VOLUME: 66.81 CM3
DOP CALCLVOT PEAK VEL VTI: 19.3 CM
E WAVE DECELERATION TIME: 195 MSEC
E/A RATIO: 1.18
E/E' RATIO: 11.38 M/S
ECHO LV POSTERIOR WALL: 1.06 CM (ref 0.6–1.1)
FRACTIONAL SHORTENING: 38 % (ref 28–44)
INTERVENTRICULAR SEPTUM: 1.38 CM (ref 0.6–1.1)
IVRT: 111 MSEC
LEFT ATRIUM SIZE: 3.7 CM
LEFT INTERNAL DIMENSION IN SYSTOLE: 2.41 CM (ref 2.1–4)
LEFT VENTRICLE DIASTOLIC VOLUME INDEX: 34.44 ML/M2
LEFT VENTRICLE DIASTOLIC VOLUME: 65.1 ML
LEFT VENTRICLE MASS INDEX: 86 G/M2
LEFT VENTRICLE SYSTOLIC VOLUME INDEX: 10.8 ML/M2
LEFT VENTRICLE SYSTOLIC VOLUME: 20.4 ML
LEFT VENTRICULAR INTERNAL DIMENSION IN DIASTOLE: 3.88 CM (ref 3.5–6)
LEFT VENTRICULAR MASS: 162.03 G
LV LATERAL E/E' RATIO: 9.1 M/S
LV SEPTAL E/E' RATIO: 15.17 M/S
LVOT MG: 1 MMHG
LVOT MV: 0.47 CM/S
MV PEAK A VEL: 0.77 M/S
MV PEAK E VEL: 0.91 M/S
MV STENOSIS PRESSURE HALF TIME: 63 MS
MV VALVE AREA P 1/2 METHOD: 3.49 CM2
PISA TR MAX VEL: 1.94 M/S
RA PRESSURE ESTIMATED: 3 MMHG
RIGHT VENTRICULAR END-DIASTOLIC DIMENSION: 2.4 CM
RV TB RVSP: 5 MMHG
TDI LATERAL: 0.1 M/S
TDI SEPTAL: 0.06 M/S
TDI: 0.08 M/S
TR MAX PG: 15 MMHG
TV REST PULMONARY ARTERY PRESSURE: 18 MMHG
Z-SCORE OF LEFT VENTRICULAR DIMENSION IN END DIASTOLE: -3.1
Z-SCORE OF LEFT VENTRICULAR DIMENSION IN END SYSTOLE: -2.39

## 2023-09-29 ENCOUNTER — CLINICAL SUPPORT (OUTPATIENT)
Dept: REHABILITATION | Facility: HOSPITAL | Age: 68
End: 2023-09-29
Payer: MEDICARE

## 2023-09-29 DIAGNOSIS — M79.645 CHRONIC PAIN OF LEFT THUMB: Primary | ICD-10-CM

## 2023-09-29 DIAGNOSIS — G89.29 CHRONIC PAIN OF LEFT THUMB: Primary | ICD-10-CM

## 2023-09-29 PROCEDURE — 97022 WHIRLPOOL THERAPY: CPT | Mod: PN

## 2023-09-29 PROCEDURE — 97140 MANUAL THERAPY 1/> REGIONS: CPT | Mod: PN

## 2023-09-29 PROCEDURE — 97110 THERAPEUTIC EXERCISES: CPT | Mod: PN

## 2023-09-29 NOTE — PROGRESS NOTES
"                                  Occupational Therapy Daily Treatment  Note       Date: 10/4/2023  Name: Payton Castillo  Clinic Number: [id number]    Therapy Diagnosis: S69.90XA (ICD-10-CM) - Thumb injury  Encounter Diagnosis   Name Primary?    Chronic pain of left thumb Yes     Physician: Dr. Leonid Aragon    Physician Orders: S69.90XA (ICD-10-CM) - Thumb injury; evaluate and treat  Medical Diagnosis: S69.90XA (ICD-10-CM) - Thumb injury; (Left) thumb pain  Surgical Procedure and Date:  status post (Left) trapeziectomy with Arthrex internal brace with fat pad transfer, 07/11/2023     Insurance Authorization Period Expiration: 08/23/2023-12/31/2023  Plan of Care Certification Period: 08/24/2023-11/24/2023  Date of Return to MD: as needed    Evaluation FOTO: 08/24/2023 = 31%   Reassessment FOTO: 09/29/2023 = 12%    Visit # / Visits authorized: 9 / 20   Time In:  1:40  Time Out: 2:25   Total Billable Time:  40 minutes    Precautions:  Standard      Post Op: 07/11/2023 = 12   weeks,  1 days      Subjective     Patient reports: "I woke up over the weekend and my hand was more puffy and swollen.  I might have slept on it wrong."     Pain: 1/10   Location of pain: (Left) thumb / wrist      Objective    Patient seen by Occupational Therapy this session. Tx consisted of:      Supervised modalities after being cleared for contradictions  x  10   minutes:       -Fluidotherapy to  (Left)   hand to increase blood flow, circulation, tissue elasticity, desensitization, sensory re-education and for pain management  x 10 minutes.        Manual therapy techniques to increase joint mobilization /// scar massage/// soft tissue mobilization   x    8  minutes:     -Scar Massage to  dorsal aspect of (Left) thumb   with  mini therapeutic vibrator // mini dowel massager  to decrease dense scar adhesions and improve tensile glide  x 2 minutes.     -Manual Therapy techniques to  (Left) thumb Metacarpophalangeal Joint and (Left) wrist   including  " "stretching, and Passive Range of Motion to increase joint mobility, range of motion  and for pain management  x  5 minutes     -Soft Tissue Mobilization to (Left) hand into wrist and forearm  from distal to proximal to decrease edema and increase range of motion and functional abilities  x  1 minutes        Therapeutic Exercises to improve functional performance while increasing strength, endurance, range of motion,  and flexibility  x     22 minutes:    - Active Assistive Range of Motion for (Left) thumb Metacarpophalangeal joint and (Left) wrist x 5 repetitions  - Mini colored pegboard for Fine Motor Coordination and in-hand manipulation  (5 in-hand) x 20 pegs  - Colored bead UNlacing with thumb to base of 5th digit x 20 beads   - 0.5# Dorsiflexion/Volar flexion (Supination /Pronation ), Radial Deviation / Ulnar Deviation x 10 repetitions    - Wrist roller coaster for Active Range of Motion  of wrist in all planes x 5 repetitions -NP   - Weighted ball on tabletop for Dorsiflexion /Volar Flexion  stretches x 10 repetitions -NP   - Velcro checkerboard (on wedge) with "dart throwing" pattern x 1 repetition  -NP   - 25# Progressive Hand Gripper (black spring) for composite  x 20 repetitions   - RED Theraputty for PVC for "cookie cutting" , and medicine top x 10 repetitions    - Wooden pegboard with RED clothespins with 3PT pinch x 1 repetitions    - RED Flexbar for Supination /Pronation  and Dorsiflexion /Volar Flexion x 10 repetitions   - YELLOW digiweb for composite digital Extension and  intrinsics x 10 repetitions      -NP = Not Performed     Initiate in future therapy sessions:         - ### Theraputty for intrinsic pulling x 10  Repetitions   - ### digiflex for isolated x 10 repetitions;  ### digiflex for composite digital flexion x 20 repetitions    - ### Wrist exerstick in standing for Dorsiflexion / Volar Flexion  x 3 repetitions               Assessment     Patient will continue to benefit from skilled " "Occupational Therapy intervention to increase functional abilities, range of motion, and strength and pain control.  Patient. demonstrated proper understanding of each exercise.  Patient continues to require verbal and tactile cues for throughout therapy session to maintain position and prevent compensation.   Patient continues to be limited in functional and leisurely pursuits. Pain limits patient's participation in activities of daily living.  Patient is not able to carryout necessary vocational tasks.   Patient's spiritual, cultural and educational needs considered and patient agreeable to plan of care and goals.   Patient is making good progress towards established goals.  Patient tolerated exercises / activities within pain tolerance.   Reviewed Home Exercise Program with patient., see EPIC under "patient instructions" for provided exercises, activity modifications and limitations, modalities for home pain management.  Patient. demo understanding of above.    Patient. tolerated Fluidotherapy with no irritation.      Patient tolerated increase in resistance during Flexbar for Supination / Pronation, Dorsal Flexion, Volar Flexion with no reported pain.     New/Revised Goals: Continue Plan Of Care    Goals:  1)   Patient to be Independent with Home exercise program and modalities for pain management by 1 week. (MET)   2)   Patient will report   0/10 pain on average with activity to assist with exercises by 6-8 weeks.  ( Revised 09/29/2023)   3)   Patient will increase range of Motion by 3-5 degrees to increase functional use for activities of daily living by 6-8 weeks.   ( In Progress)   4)   Patient will decrease edema by 0.1-0.3 millimeters  to increase joint mobility /flexibility by 6-8 weeks. ( In Progress)   5)   Patient will increase  strength 3-5 pounds to open containers by 6-8 weeks.  ( Added 09/29/2023)   6)   Patient will increase pinch by 1-3 pounds for buttoning by 6-8 weeks.  ( Added " 09/29/2023)    Plan       Continue 2x week during the 90 day certification period   08/24/2023   to11/24/2023   with established Plan Of Care in pursuit of Occupational Therapy goals.      Toshia Fisher, OT

## 2023-10-04 ENCOUNTER — CLINICAL SUPPORT (OUTPATIENT)
Dept: REHABILITATION | Facility: HOSPITAL | Age: 68
End: 2023-10-04
Payer: MEDICARE

## 2023-10-04 DIAGNOSIS — G89.29 CHRONIC PAIN OF LEFT THUMB: Primary | ICD-10-CM

## 2023-10-04 DIAGNOSIS — M79.645 CHRONIC PAIN OF LEFT THUMB: Primary | ICD-10-CM

## 2023-10-04 PROCEDURE — 97110 THERAPEUTIC EXERCISES: CPT | Mod: PN

## 2023-10-04 PROCEDURE — 97022 WHIRLPOOL THERAPY: CPT | Mod: PN

## 2023-10-04 PROCEDURE — 97140 MANUAL THERAPY 1/> REGIONS: CPT | Mod: PN

## 2023-10-04 NOTE — PROGRESS NOTES
Delivery Note    Age: 0 days Corrected Gest. Age:  41w 1d   Sex: male Admit Attending: Latanya Sousa MD   VIVIAN:  Gestational Age: 41w1d BW: 3475 g (7 lb 10.6 oz)     Maternal Information:     Mother's Name: Sandra Wheeler   Age: 29 y.o.   ABO Type   Date Value Ref Range Status   2018 O  Final     RH type   Date Value Ref Range Status   2018 Positive  Final     Antibody Screen   Date Value Ref Range Status   2018 Negative  Final     External RPR   Date Value Ref Range Status   2017 Non-Reactive  Final     External Rubella Qual   Date Value Ref Range Status   2017 Immune  Final     External Hepatitis B Surface Ag   Date Value Ref Range Status   2017 Negative  Final     External HIV-1 Antibody   Date Value Ref Range Status   2017 Negative  Final     External Strep Group B Ag   Date Value Ref Range Status   12/15/2017 Negative  Final     No results found for: AMPHETSCREEN, BARBITSCNUR, LABBENZSCN, LABMETHSCN, PCPUR, LABOPIASCN, THCURSCR, COCSCRUR, PROPOXSCN, BUPRENORSCNU, OXYCODONESCN, UDS       GBS: No components found for: EXTGBS,  GBSANTIGEN       Patient Active Problem List   Diagnosis   • Pregnancy                       Mother's Past Medical and Social History:     Maternal /Para:      Maternal PMH:    Past Medical History:   Diagnosis Date   • Migraine        Maternal Social History:    Social History     Social History   • Marital status:      Spouse name: N/A   • Number of children: N/A   • Years of education: N/A     Occupational History   • Not on file.     Social History Main Topics   • Smoking status: Never Smoker   • Smokeless tobacco: Never Used   • Alcohol use No   • Drug use: No   • Sexual activity: Yes     Partners: Male     Other Topics Concern   • Not on file     Social History Narrative       Mother's Current Medications     Meds Administered:    acetaminophen (TYLENOL) suppository 650 mg     Date Action Dose Route User  "                                  Occupational Therapy Daily Treatment  Note       Date: 10/11/2023  Name: Payton Castillo  Clinic Number: [id number]    Therapy Diagnosis: S69.90XA (ICD-10-CM) - Thumb injury  Encounter Diagnosis   Name Primary?    Chronic pain of left thumb Yes     Physician: Dr. Leonid Aragon    Physician Orders: S69.90XA (ICD-10-CM) - Thumb injury; evaluate and treat  Medical Diagnosis: S69.90XA (ICD-10-CM) - Thumb injury; (Left) thumb pain  Surgical Procedure and Date:  status post (Left) trapeziectomy with Arthrex internal brace with fat pad transfer, 07/11/2023     Insurance Authorization Period Expiration: 08/23/2023-12/31/2023  Plan of Care Certification Period: 08/24/2023-11/24/2023  Date of Return to MD: as needed    Evaluation FOTO: 08/24/2023 = 31%   Reassessment FOTO: 09/29/2023 = 12%    Visit # / Visits authorized: 10 / 20   Time In:  1:00  Time Out: 1:45    Total Billable Time:  40 minutes    Precautions:  Standard      Post Op: 07/11/2023 = 13   weeks,  1 days      Subjective     Patient reports: "I went to a festival over the weekend and I was using my cane in my (Right) hand carried some things a bag with my (Left) hand.  After my thumb was a little sore and tingling.  I mainly used my other four fingers to carry the bag."     Pain: 0/10   Location of pain: (Left) thumb / wrist      Objective    Patient seen by Occupational Therapy this session. Tx consisted of:      Supervised modalities after being cleared for contradictions  x  10   minutes:       -Fluidotherapy to  (Left)   hand to increase blood flow, circulation, tissue elasticity, desensitization, sensory re-education and for pain management  x 10 minutes.        Manual therapy techniques to increase joint mobilization /// scar massage/// soft tissue mobilization   x    8  minutes:     -Scar Massage to  dorsal aspect of (Left) thumb   with  mini therapeutic vibrator // mini dowel massager  to decrease dense scar adhesions and "    2018 1725 Given 650 mg Rectal Angie Tanner RN      ampicillin 2 g/100 mL 0.9% NS (MBP)     Date Action Dose Route User    2018 1826 New Bag 2 g Intravenous Angie Tanner RN      dextrose 5 % and lactated Ringer's infusion     Date Action Dose Route User    2018 1424 New Bag 125 mL/hr Intravenous Jess Brush RN      famotidine (PEPCID) injection 20 mg     Date Action Dose Route User    2018 1855 Given 20 mg Intravenous Angie Tanner RN      fentaNYL (2 mcg/ml) and ropivacaine (0.2%) in 250 ml (PREMIX)     Date Action Dose Route User    2018 1004 New Bag 10 mL/hr Epidural Vishal Curran MD      gentamicin (GARAMYCIN) 110 mg in sodium chloride 0.9 % 100 mL IVPB     Date Action Dose Route User    2018 1912 New Bag 110 mg Intravenous Angie Tanner RN      lactated ringers bolus 1,000 mL     Date Action Dose Route User    2018 1052 Rate/Dose Change 1000 mL Intravenous Angie Tanner RN    2018 1015 New Bag 1000 mL Intravenous Angie Tanner RN      lactated ringers infusion     Date Action Dose Route User    2018 1935 New Bag (none) Intravenous Lisa Benson MD    2018 1506 New Bag 125 mL/hr Intravenous Angie Tanner RN    2018 0941 Rate/Dose Change 999 mL/hr Intravenous Angie Tanner RN    2018 0607 New Bag 125 mL/hr Intravenous Preeti Harden RN    2018 2229 New Bag 125 mL/hr Intravenous Preeti Harden RN      Lidocaine HCl (PF) (XYLOCAINE) 1.5 % injection     Date Action Dose Route User    2018 1000 Given 4 mL Injection Vishal Curran MD    2018 0958 Given 3 mL Injection Vishal Curran MD      lidocaine-EPINEPHrine (XYLOCAINE W/EPI) 2 %-1:[id number] injection     Date Action Dose Route User    2018 1923 Given 5 mL Injection Vishal Curran MD    2018 1921 Given 5 mL Injection Vishal Curran MD    2018 1919 Given 5 mL Injection Vishal Curran MD    2018 1917 Given 5 mL Injection Vishal BUENO  "improve tensile glide  x 2 minutes.     -Manual Therapy techniques to  (Left) thumb Metacarpophalangeal Joint and (Left) wrist   including  stretching, and Passive Range of Motion to increase joint mobility, range of motion  and for pain management  x  5 minutes     -Soft Tissue Mobilization to (Left) hand into wrist and forearm  from distal to proximal to decrease edema and increase range of motion and functional abilities  x  1 minutes        Therapeutic Exercises to improve functional performance while increasing strength, endurance, range of motion,  and flexibility  x     22 minutes:    - Active Assistive Range of Motion for (Left) thumb Metacarpophalangeal joint and (Left) wrist x 5 repetitions  - Mini colored pegboard for Fine Motor Coordination and in-hand manipulation  (5 in-hand) x 20 pegs  - Colored bead UNlacing with thumb to base of 5th digit x 20 beads   - 0.5# Dorsiflexion/Volar flexion (Supination /Pronation ), Radial Deviation / Ulnar Deviation x 10 repetitions    - Wrist roller coaster for Active Range of Motion  of wrist in all planes x 5 repetitions -NP   - Weighted ball on tabletop for Dorsiflexion /Volar Flexion  stretches x 10 repetitions -NP   - Velcro checkerboard (on wedge) with "dart throwing" pattern x 1 repetition  -NP   - 25# Progressive Hand Gripper (black spring) for composite  x 20 repetitions   - RED Theraputty for PVC for "cookie cutting" , and medicine top x 10 repetitions    - Wooden pegboard with RED clothespins with 3PT pinch x 1 repetitions    - RED Flexbar for Supination /Pronation  and Dorsiflexion /Volar Flexion x 10 repetitions   - YELLOW digiweb for composite digital Extension and  intrinsics x 10 repetitions  - YELLOW digiweb for thumb ADD x 10 repetitions       -NP = Not Performed     Initiate in future therapy sessions:      INCREASE resistance during intrinsic digiweb  ( next session)      - ### Theraputty for intrinsic pulling x 10  Repetitions   - ### digiflex " "for isolated x 10 repetitions;  ### digiflex for composite digital flexion x 20 repetitions    - ### Wrist exerstick in standing for Dorsiflexion / Volar Flexion  x 3 repetitions                Assessment     Patient will continue to benefit from skilled Occupational Therapy intervention to increase functional abilities, range of motion, and strength and pain control.  Patient. demonstrated proper understanding of each exercise.  Patient continues to require verbal and tactile cues for throughout therapy session to maintain position and prevent compensation.   Patient continues to be limited in functional and leisurely pursuits. Pain limits patient's participation in activities of daily living.  Patient is not able to carryout necessary vocational tasks.   Patient's spiritual, cultural and educational needs considered and patient agreeable to plan of care and goals.   Patient is making good progress towards established goals.  Patient tolerated exercises / activities within pain tolerance.   Reviewed Home Exercise Program with patient., see EPIC under "patient instructions" for provided exercises, activity modifications and limitations, modalities for home pain management.  Patient. demo understanding of above.    Patient. tolerated Fluidotherapy with no irritation.      Patient tolerated addition of digiweb for thumb ADD with no reported pain.     New/Revised Goals: Continue Plan Of Care    Goals:  1)   Patient to be Independent with Home exercise program and modalities for pain management by 1 week. (MET)   2)   Patient will report   0/10 pain on average with activity to assist with exercises by 6-8 weeks.  ( Revised 09/29/2023)   3)   Patient will increase range of Motion by 3-5 degrees to increase functional use for activities of daily living by 6-8 weeks.   ( In Progress)   4)   Patient will decrease edema by 0.1-0.3 millimeters  to increase joint mobility /flexibility by 6-8 weeks. ( In Progress)   5)   " MD Magdy      misoprostol (CYTOTEC) split tablet 25 mcg     Date Action Dose Route User    2018 0422 Given 25 mcg Oral Cindy JARVIS Andres, RN    2018 0026 Given 25 mcg Oral Cindy DJosue Andres RN      ondansetron (ZOFRAN) injection 4 mg     Date Action Dose Route User    2018 1633 Given 4 mg Intravenous Angie Tanner, JAXSON    2018 0717 Given 4 mg Intravenous Angie Tanner RN    2018 0114 Given 4 mg Intravenous Preeti Harden RN      oxytocin in sodium chloride (PITOCIN) 30 UNIT/500ML infusion solution     Date Action Dose Route User    2018 1635 Rate/Dose Change 20 juan carlos-units/min Intravenous Angie Tanner, JAXSON    2018 1312 Rate/Dose Change 18 juan carlos-units/min Intravenous Angie Tanner, JAXSON    2018 1232 Rate/Dose Change 16 juan carlos-units/min Intravenous Angelica Alonso RN    2018 1200 Rate/Dose Change 14 juan carlos-units/min Intravenous Angie Tanner, JAXSON    2018 0312 Rate/Dose Change 12 juan carlos-units/min Intravenous Preeti Harden, JAXSON    2018 2334 Rate/Dose Change 14 juan carlos-units/min Intravenous Preeti Harden, JAXSON    2018 2234 Rate/Dose Change 12 juan carlos-units/min Intravenous Preeti Harden, JAXSON    2018 2029 Rate/Dose Change 10 juan carlos-units/min Intravenous Preeti Harden, JAXSON    2018 1807 New Bag 8 juan carlos-units/min Intravenous Jess Brush, JAXSON    2018 1658 New Bag 6 juan carlos-units/min Intravenous Jess Bursh, RN    2018 1519 New Bag 4 juan carlos-units/min Intravenous Jess Brush, JAXSON    2018 1423 New Bag 2 juan carlos-units/min Intravenous Jess Brush RN          Labor Information:     Labor Events      labor: No Induction:  Misoprostol;Oxytocin    Steroids?    Reason for Induction:  Post-term Gestation   Rupture date:  2018 Labor Complications:  Failure To Progress In Second Stage   Rupture time:  1:00 AM Additional Complications:      Rupture type:  artificial rupture of membranes    Fluid Color:  Meconium  Patient will increase  strength 3-5 pounds to open containers by 6-8 weeks.  ( Added 09/29/2023)   6)   Patient will increase pinch by 1-3 pounds for buttoning by 6-8 weeks.  ( Added 09/29/2023)    Plan       Continue 2x week during the 90 day certification period   08/24/2023   to11/24/2023   with established Plan Of Care in pursuit of Occupational Therapy goals.      Toshia Fisher, OT       Present    Antibiotics during Labor?  Yes      Anesthesia     Method: Epidural       Delivery Information for Yousif Wheeler     YOB: 2018 Delivery Clinician:  TANJA ALVAREZ   Time of birth:  7:54 PM Delivery type: , Low Transverse   Forceps:     Vacuum:No      Breech:      Presentation/position: Vertex;          Indication for C/Section:  Arrest of Descent    Priority for C/Section:  Emergency      Delivery Complications:       APGAR SCORES           APGARS  One minute Five minutes Ten minutes Fifteen minutes Twenty minutes   Skin color: 0   1             Heart rate: 2   2             Grimace: 2   2              Muscle tone: 2   2              Breathin   2              Totals: 8   9                Resuscitation     Method: Suctioning;Tactile Stimulation   Comment:   warmed and dried   Suction: bulb syringe   O2 Duration:     Percentage O2 used:         Delivery Summary:     Called by delivering OB to attend   for failure to progress at 41w 1d gestation. Maternal history and prenatal labs reviewed.  ROM x 19 hrs. Amniotic fluid was Meconium. Labor complicated by maternal temp of 103.1 and fetal tachycardia--dx of chorio by OB. Received amp and gent x1 PTD. Delayed Cord Clampin seconds Treatment at included stimulation, oral suctioning and gastric suctioning.  Physical exam was normal. 3VC: yes.  The infant to be admitted to  ICU.      Gina Jones, APRN  2018  8:44 PM

## 2023-10-11 ENCOUNTER — CLINICAL SUPPORT (OUTPATIENT)
Dept: REHABILITATION | Facility: HOSPITAL | Age: 68
End: 2023-10-11
Payer: MEDICARE

## 2023-10-11 DIAGNOSIS — M79.645 CHRONIC PAIN OF LEFT THUMB: Primary | ICD-10-CM

## 2023-10-11 DIAGNOSIS — G89.29 CHRONIC PAIN OF LEFT THUMB: Primary | ICD-10-CM

## 2023-10-11 PROCEDURE — 97110 THERAPEUTIC EXERCISES: CPT | Mod: PN

## 2023-10-11 PROCEDURE — 97022 WHIRLPOOL THERAPY: CPT | Mod: PN

## 2023-10-11 PROCEDURE — 97140 MANUAL THERAPY 1/> REGIONS: CPT | Mod: PN

## 2023-10-11 NOTE — PROGRESS NOTES
"                                  Occupational Therapy Daily Treatment  Note       Date: 10/13/2023  Name: Payton Castillo  Clinic Number: [id number]    Therapy Diagnosis: S69.90XA (ICD-10-CM) - Thumb injury  Encounter Diagnosis   Name Primary?    Chronic pain of left thumb Yes     Physician: Dr. Leonid Aragon    Physician Orders: S69.90XA (ICD-10-CM) - Thumb injury; evaluate and treat  Medical Diagnosis: S69.90XA (ICD-10-CM) - Thumb injury; (Left) thumb pain  Surgical Procedure and Date:  status post (Left) trapeziectomy with Arthrex internal brace with fat pad transfer, 07/11/2023     Insurance Authorization Period Expiration: 08/23/2023-12/31/2023  Plan of Care Certification Period: 08/24/2023-11/24/2023  Date of Return to MD: as needed    Evaluation FOTO: 08/24/2023 = 31%   Reassessment FOTO: 09/29/2023 = 12%    Visit # / Visits authorized: 11 / 20   Time In:  11:00  Time Out: 11:45    Total Billable Time:  40 minutes    Precautions:  Standard      Post Op: 07/11/2023 = 13   weeks,  3 days      Subjective     Patient reports: "I am not in any pain today.  I get some shooting pains every now and then."     Pain: 0/10   Location of pain: (Left) thumb / wrist      Objective    Patient seen by Occupational Therapy this session. Tx consisted of:      Supervised modalities after being cleared for contradictions  x  10   minutes:       -Fluidotherapy to  (Left)   hand to increase blood flow, circulation, tissue elasticity, desensitization, sensory re-education and for pain management  x 10 minutes.        Manual therapy techniques to increase joint mobilization /// scar massage/// soft tissue mobilization   x    8  minutes:     -Scar Massage to  dorsal aspect of (Left) thumb   with  mini therapeutic vibrator // mini dowel massager  to decrease dense scar adhesions and improve tensile glide  x 2 minutes.     -Manual Therapy techniques to  (Left) thumb Metacarpophalangeal Joint and (Left) wrist   including  stretching, and " "Passive Range of Motion to increase joint mobility, range of motion  and for pain management  x  5 minutes     -Soft Tissue Mobilization to (Left) hand into wrist and forearm  from distal to proximal to decrease edema and increase range of motion and functional abilities  x  1 minutes        Therapeutic Exercises to improve functional performance while increasing strength, endurance, range of motion,  and flexibility  x     22 minutes:    - Active Assistive Range of Motion for (Left) thumb Metacarpophalangeal joint and (Left) wrist x 5 repetitions  - Mini colored pegboard for Fine Motor Coordination and in-hand manipulation  (5 in-hand) x 20 pegs  - Colored bead UNlacing with thumb to base of 5th digit x 20 beads   - 0.5# Dorsiflexion/Volar flexion (Supination /Pronation ), Radial Deviation / Ulnar Deviation x 10 repetitions    - Wrist roller coaster for Active Range of Motion  of wrist in all planes x 5 repetitions -NP   - Weighted ball on tabletop for Dorsiflexion /Volar Flexion  stretches x 10 repetitions -NP   - Velcro checkerboard (on wedge) with "dart throwing" pattern x 1 repetition  -NP   - 25# Progressive Hand Gripper (black spring) for composite  x 20 repetitions   - RED Theraputty for PVC for "cookie cutting" , and medicine top x 10 repetitions    - Wooden pegboard with RED clothespins with 3PT pinch x 1 repetitions    - RED Flexbar for Supination /Pronation  and Dorsiflexion /Volar Flexion x 10 repetitions   - YELLOW digiweb for composite digital Extension x 10 repetitions   - RED digiweb for intrinsics x 10 repetitions  - GREEN digiweb for thumb ADD x 10 repetitions       -NP = Not Performed     Initiate in future therapy sessions:         - ### Theraputty for intrinsic pulling x 10  Repetitions   - ### digiflex for isolated x 10 repetitions;  ### digiflex for composite digital flexion x 20 repetitions    - ### Wrist exerstick in standing for Dorsiflexion / Volar Flexion  x 3 repetitions            " "    Assessment     Patient will continue to benefit from skilled Occupational Therapy intervention to increase functional abilities, range of motion, and strength and pain control.  Patient. demonstrated proper understanding of each exercise.  Patient continues to require verbal and tactile cues for throughout therapy session to maintain position and prevent compensation.   Patient continues to be limited in functional and leisurely pursuits. Pain limits patient's participation in activities of daily living.  Patient is not able to carryout necessary vocational tasks.   Patient's spiritual, cultural and educational needs considered and patient agreeable to plan of care and goals.   Patient is making good progress towards established goals.  Patient tolerated exercises / activities within pain tolerance.   Reviewed Home Exercise Program with patient., see EPIC under "patient instructions" for provided exercises, activity modifications and limitations, modalities for home pain management.  Patient. demo understanding of above.    Patient. tolerated Fluidotherapy with no irritation.      Patient tolerated increase in resistance during digiweb for intrinsics and Thumb ADD with no reported pain.     New/Revised Goals: Continue Plan Of Care    Goals:  1)   Patient to be Independent with Home exercise program and modalities for pain management by 1 week. (MET)   2)   Patient will report   0/10 pain on average with activity to assist with exercises by 6-8 weeks.  ( Revised 09/29/2023)   3)   Patient will increase range of Motion by 3-5 degrees to increase functional use for activities of daily living by 6-8 weeks.   ( In Progress)   4)   Patient will decrease edema by 0.1-0.3 millimeters  to increase joint mobility /flexibility by 6-8 weeks. ( In Progress)   5)   Patient will increase  strength 3-5 pounds to open containers by 6-8 weeks.  ( Added 09/29/2023)   6)   Patient will increase pinch by 1-3 pounds for buttoning " by 6-8 weeks.  ( Added 09/29/2023)    Plan       Continue 2x week during the 90 day certification period   08/24/2023   to11/24/2023   with established Plan Of Care in pursuit of Occupational Therapy goals.      Toshia Fisher, OT

## 2023-10-13 ENCOUNTER — CLINICAL SUPPORT (OUTPATIENT)
Dept: REHABILITATION | Facility: HOSPITAL | Age: 68
End: 2023-10-13
Payer: MEDICARE

## 2023-10-13 DIAGNOSIS — G89.29 CHRONIC PAIN OF LEFT THUMB: Primary | ICD-10-CM

## 2023-10-13 DIAGNOSIS — M79.645 CHRONIC PAIN OF LEFT THUMB: Primary | ICD-10-CM

## 2023-10-13 PROCEDURE — 97022 WHIRLPOOL THERAPY: CPT | Mod: PN

## 2023-10-13 PROCEDURE — 97140 MANUAL THERAPY 1/> REGIONS: CPT | Mod: PN

## 2023-10-13 PROCEDURE — 97110 THERAPEUTIC EXERCISES: CPT | Mod: PN

## 2023-10-13 NOTE — PROGRESS NOTES
"                                  Occupational Therapy Daily Treatment  Note       Date: 10/16/2023  Name: Payton Castillo  Clinic Number: [id number]    Therapy Diagnosis: S69.90XA (ICD-10-CM) - Thumb injury  Encounter Diagnosis   Name Primary?    Chronic pain of left thumb Yes     Physician: Dr. Leonid Aragon    Physician Orders: S69.90XA (ICD-10-CM) - Thumb injury; evaluate and treat  Medical Diagnosis: S69.90XA (ICD-10-CM) - Thumb injury; (Left) thumb pain  Surgical Procedure and Date:  status post (Left) trapeziectomy with Arthrex internal brace with fat pad transfer, 07/11/2023     Insurance Authorization Period Expiration: 08/23/2023-12/31/2023  Plan of Care Certification Period: 08/24/2023-11/24/2023  Date of Return to MD: as needed    Evaluation FOTO: 08/24/2023 = 31%   Reassessment FOTO: 09/29/2023 = 12%    Visit # / Visits authorized: 12 / 20   Time In:  10:55  Time Out: 11:40   Total Billable Time:  40 minutes    Precautions:  Standard      Post Op: 07/11/2023 = 13   weeks,  6  days      Subjective     Patient reports: "I was in pain yesterday in my thumb.  I think I got a bug bite on my thumb because it was itching a lot.  When I move my wrist in certain motions the scar tissue pulls. "     Pain: 0-1/10   Location of pain: (Left) thumb / wrist      Objective    Patient seen by Occupational Therapy this session. Tx consisted of:      Supervised modalities after being cleared for contradictions  x  10   minutes:       -Fluidotherapy to  (Left)   hand to increase blood flow, circulation, tissue elasticity, desensitization, sensory re-education and for pain management  x 10 minutes.        Manual therapy techniques to increase joint mobilization /// scar massage/// soft tissue mobilization   x    8  minutes:     -Scar Massage to  dorsal aspect of (Left) thumb   with  mini therapeutic vibrator // mini dowel massager  to decrease dense scar adhesions and improve tensile glide  x 2 minutes.     -Manual Therapy " "techniques to  (Left) thumb Metacarpophalangeal Joint and (Left) wrist   including  stretching, and Passive Range of Motion to increase joint mobility, range of motion  and for pain management  x  5 minutes     -Soft Tissue Mobilization to (Left) hand into wrist and forearm  from distal to proximal to decrease edema and increase range of motion and functional abilities  x  1 minutes        Therapeutic Exercises to improve functional performance while increasing strength, endurance, range of motion,  and flexibility  x     22 minutes:    - Active Assistive Range of Motion for (Left) thumb Metacarpophalangeal joint and (Left) wrist x 5 repetitions  - Mini colored pegboard for Fine Motor Coordination and in-hand manipulation  (5 in-hand) x 20 pegs  - Colored bead UNlacing with thumb to base of 5th digit x 20 beads   - 0.5# Dorsiflexion/Volar flexion (Supination /Pronation ), Radial Deviation / Ulnar Deviation x 10 repetitions    - Wrist roller coaster for Active Range of Motion  of wrist in all planes x 5 repetitions -NP   - Weighted ball on tabletop for Dorsiflexion /Volar Flexion  stretches x 10 repetitions -NP   - Velcro checkerboard (on wedge) with "dart throwing" pattern x 1 repetition  -NP   - 25# Progressive Hand Gripper (black spring) for composite  x 20 repetitions   - RED Theraputty for PVC for "cookie cutting" , and medicine top x 10 repetitions   - RED Theraputty for intrinsic pulling x 10  Repetitions    - Wooden pegboard with RED clothespins with 3PT pinch x 1 repetitions    - RED Flexbar for Supination /Pronation  and Dorsiflexion /Volar Flexion x 10 repetitions   - YELLOW digiweb for composite digital Extension x 10 repetitions   - RED digiweb for intrinsics x 10 repetitions  - GREEN digiweb for thumb ADD x 10 repetitions       -NP = Not Performed     Initiate in future therapy sessions:           - ### digiflex for isolated x 10 repetitions;  ### digiflex for composite digital flexion x 20 " "repetitions    - ### Wrist exerstick in standing for Dorsiflexion / Volar Flexion  x 3 repetitions                Assessment     Patient will continue to benefit from skilled Occupational Therapy intervention to increase functional abilities, range of motion, and strength and pain control.  Patient. demonstrated proper understanding of each exercise.  Patient continues to require verbal and tactile cues for throughout therapy session to maintain position and prevent compensation.   Patient continues to be limited in functional and leisurely pursuits. Pain limits patient's participation in activities of daily living.  Patient is not able to carryout necessary vocational tasks.   Patient's spiritual, cultural and educational needs considered and patient agreeable to plan of care and goals.   Patient is making good progress towards established goals.  Patient tolerated exercises / activities within pain tolerance.   Reviewed Home Exercise Program with patient., see EPIC under "patient instructions" for provided exercises, activity modifications and limitations, modalities for home pain management.  Patient. demo understanding of above.    Patient. tolerated Fluidotherapy with no irritation.      Patient tolerated addition of Theraputty for intrinsic pulling with no reported pain.     New/Revised Goals: Continue Plan Of Care    Goals:  1)   Patient to be Independent with Home exercise program and modalities for pain management by 1 week. (MET)   2)   Patient will report   0/10 pain on average with activity to assist with exercises by 6-8 weeks.  ( Revised 09/29/2023)   3)   Patient will increase range of Motion by 3-5 degrees to increase functional use for activities of daily living by 6-8 weeks.   ( In Progress)   4)   Patient will decrease edema by 0.1-0.3 millimeters  to increase joint mobility /flexibility by 6-8 weeks. ( In Progress)   5)   Patient will increase  strength 3-5 pounds to open containers by 6-8 " weeks.  ( Added 09/29/2023)   6)   Patient will increase pinch by 1-3 pounds for buttoning by 6-8 weeks.  ( Added 09/29/2023)    Plan       Continue 2x week during the 90 day certification period   08/24/2023   to11/24/2023   with established Plan Of Care in pursuit of Occupational Therapy goals.      Toshia Fisher, OT

## 2023-10-16 ENCOUNTER — CLINICAL SUPPORT (OUTPATIENT)
Dept: REHABILITATION | Facility: HOSPITAL | Age: 68
End: 2023-10-16
Payer: MEDICARE

## 2023-10-16 DIAGNOSIS — M79.645 CHRONIC PAIN OF LEFT THUMB: Primary | ICD-10-CM

## 2023-10-16 DIAGNOSIS — G89.29 CHRONIC PAIN OF LEFT THUMB: Primary | ICD-10-CM

## 2023-10-16 PROCEDURE — 97140 MANUAL THERAPY 1/> REGIONS: CPT | Mod: PN

## 2023-10-16 PROCEDURE — 97022 WHIRLPOOL THERAPY: CPT | Mod: PN

## 2023-10-16 PROCEDURE — 97110 THERAPEUTIC EXERCISES: CPT | Mod: PN

## 2023-10-16 NOTE — PROGRESS NOTES
"                                  Occupational Therapy Daily Treatment  Note       Date: 10/18/2023  Name: Payton Castillo  Clinic Number: [id number]    Therapy Diagnosis: S69.90XA (ICD-10-CM) - Thumb injury  Encounter Diagnosis   Name Primary?    Chronic pain of left thumb Yes     Physician: Dr. Leonid Aragon    Physician Orders: S69.90XA (ICD-10-CM) - Thumb injury; evaluate and treat  Medical Diagnosis: S69.90XA (ICD-10-CM) - Thumb injury; (Left) thumb pain  Surgical Procedure and Date:  status post (Left) trapeziectomy with Arthrex internal brace with fat pad transfer, 07/11/2023     Insurance Authorization Period Expiration: 08/23/2023-12/31/2023  Plan of Care Certification Period: 08/24/2023-11/24/2023  Date of Return to MD: as needed    Evaluation FOTO: 08/24/2023 = 31%   Reassessment FOTO: 09/29/2023 = 12%    Visit # / Visits authorized: 13 / 20   Time In:  11:35  Time Out: 12:20    Total Billable Time:  40 minutes    Precautions:  Standard      Post Op: 07/11/2023 = 14   weeks,  1 days      Subjective     Patient reports: "I have been waking up with my hands swollen.  I am not sure what is going on.  I think I might be sleeping on my hand.  I am getting some numbness in my hand when I sleep on that side.  It resolves once I get my hand moving."     Pain: 0-1/10   Location of pain: (Left) thumb / wrist      Objective    Patient seen by Occupational Therapy this session. Tx consisted of:      Supervised modalities after being cleared for contradictions  x  10   minutes:       -Fluidotherapy to  (Left)   hand to increase blood flow, circulation, tissue elasticity, desensitization, sensory re-education and for pain management  x 10 minutes.        Manual therapy techniques to increase joint mobilization /// scar massage/// soft tissue mobilization   x    8  minutes:     -Scar Massage to  dorsal aspect of (Left) thumb   with  mini therapeutic vibrator // mini dowel massager  to decrease dense scar adhesions and improve " "tensile glide  x 2 minutes.     -Manual Therapy techniques to  (Left) thumb Metacarpophalangeal Joint and (Left) wrist   including  stretching, and Passive Range of Motion to increase joint mobility, range of motion  and for pain management  x  5 minutes     -Soft Tissue Mobilization to (Left) hand into wrist and forearm  from distal to proximal to decrease edema and increase range of motion and functional abilities  x  1 minutes        Therapeutic Exercises to improve functional performance while increasing strength, endurance, range of motion,  and flexibility  x     22 minutes:    - Active Assistive Range of Motion for (Left) thumb Metacarpophalangeal joint and (Left) wrist x 5 repetitions  - Mini colored pegboard for Fine Motor Coordination and in-hand manipulation  (5 in-hand) x 20 pegs  - Colored bead UNlacing with thumb to base of 5th digit x 20 beads   - 1# Dorsiflexion/Volar flexion (Supination /Pronation ), Radial Deviation / Ulnar Deviation x 10 repetitions    - Wrist roller coaster for Active Range of Motion  of wrist in all planes x 5 repetitions -NP   - Weighted ball on tabletop for Dorsiflexion /Volar Flexion  stretches x 10 repetitions -NP   - Velcro checkerboard (on wedge) with "dart throwing" pattern x 1 repetition  -NP   - 25# Progressive Hand Gripper (black spring) for composite  x 20 repetitions   - RED Theraputty for PVC for "cookie cutting" , and medicine top x 10 repetitions   - RED Theraputty for intrinsic pulling x 10  Repetitions    - Wooden pegboard with RED clothespins with 3PT pinch x 1 repetitions    - RED Flexbar for Supination /Pronation  and Dorsiflexion /Volar Flexion x 10 repetitions   - YELLOW digiweb for composite digital Extension x 10 repetitions   - RED digiweb for intrinsics x 10 repetitions  - GREEN digiweb for thumb ADD x 10 repetitions       -NP = Not Performed     Initiate in future therapy sessions:           - ### digiflex for isolated x 10 repetitions;  ### " "digiflex for composite digital flexion x 20 repetitions    - ### Wrist exerstick in standing for Dorsiflexion / Volar Flexion  x 3 repetitions                Assessment     Patient will continue to benefit from skilled Occupational Therapy intervention to increase functional abilities, range of motion, and strength and pain control.  Patient. demonstrated proper understanding of each exercise.  Patient continues to require verbal and tactile cues for throughout therapy session to maintain position and prevent compensation.   Patient continues to be limited in functional and leisurely pursuits. Pain limits patient's participation in activities of daily living.  Patient is not able to carryout necessary vocational tasks.   Patient's spiritual, cultural and educational needs considered and patient agreeable to plan of care and goals.   Patient is making good progress towards established goals.  Patient tolerated exercises / activities within pain tolerance.   Reviewed Home Exercise Program with patient., see EPIC under "patient instructions" for provided exercises, activity modifications and limitations, modalities for home pain management.  Patient. demo understanding of above.    Patient. tolerated Fluidotherapy with no irritation.      Patient tolerated increase in resistance during Dorsal Flexion, Volar Flexion, Radial Deviation, and Ulnar Deviation  with no reported pain.     New/Revised Goals: Continue Plan Of Care    Goals:  1)   Patient to be Independent with Home exercise program and modalities for pain management by 1 week. (MET)   2)   Patient will report   0/10 pain on average with activity to assist with exercises by 6-8 weeks.  ( Revised 09/29/2023)   3)   Patient will increase range of Motion by 3-5 degrees to increase functional use for activities of daily living by 6-8 weeks.   ( In Progress)   4)   Patient will decrease edema by 0.1-0.3 millimeters  to increase joint mobility /flexibility by 6-8 " weeks. ( In Progress)   5)   Patient will increase  strength 3-5 pounds to open containers by 6-8 weeks.  ( Added 09/29/2023)   6)   Patient will increase pinch by 1-3 pounds for buttoning by 6-8 weeks.  ( Added 09/29/2023)    Plan       Continue 2x week during the 90 day certification period   08/24/2023   to11/24/2023   with established Plan Of Care in pursuit of Occupational Therapy goals.      Toshia Fisher, OT

## 2023-10-18 ENCOUNTER — CLINICAL SUPPORT (OUTPATIENT)
Dept: REHABILITATION | Facility: HOSPITAL | Age: 68
End: 2023-10-18
Payer: MEDICARE

## 2023-10-18 DIAGNOSIS — G89.29 CHRONIC PAIN OF LEFT THUMB: Primary | ICD-10-CM

## 2023-10-18 DIAGNOSIS — M79.645 CHRONIC PAIN OF LEFT THUMB: Primary | ICD-10-CM

## 2023-10-18 PROCEDURE — 97110 THERAPEUTIC EXERCISES: CPT | Mod: PN

## 2023-10-18 PROCEDURE — 97140 MANUAL THERAPY 1/> REGIONS: CPT | Mod: PN

## 2023-10-18 PROCEDURE — 97022 WHIRLPOOL THERAPY: CPT | Mod: PN

## 2023-10-18 NOTE — PROGRESS NOTES
"                                Occupational Therapy Discharge Summary        Date: 10/25/2023  Name: Payton Castillo  Clinic Number: [id number]    Therapy Diagnosis: S69.90XA (ICD-10-CM) - Thumb injury  Encounter Diagnosis   Name Primary?    Chronic pain of left thumb Yes     Physician: Dr. Leonid Aragon    Physician Orders: S69.90XA (ICD-10-CM) - Thumb injury; evaluate and treat  Medical Diagnosis: S69.90XA (ICD-10-CM) - Thumb injury; (Left) thumb pain  Surgical Procedure and Date:  status post (Left) trapeziectomy with Arthrex internal brace with fat pad transfer, 07/11/2023     Insurance Authorization Period Expiration: 08/23/2023-12/31/2023  Plan of Care Certification Period: 08/24/2023-11/24/2023  Date of Return to MD: as needed    Evaluation FOTO: 08/24/2023 = 31%   Reassessment FOTO: 09/29/2023 = 12%  Discharge Summary FOTO: 10/25/2023 = 12%    Visit # / Visits authorized: 14 / 20   Time In:  2:30  Time Out: 3:10   Total Billable Time:  40 minutes    Precautions:  Standard      Post Op: 07/11/2023 = 15   weeks,  1 days      Subjective     Patient reports: "I think I can continue my exercises at home.  I still sleep with my wrist bent which if I would stop doing that my wrist may feel better.  Sometimes I get like a prickly pain around the incision site. Its doesn't last long and goes away. "     Pain: 0-2/10   Location of pain: (Left) thumb / wrist      Objective    Patient seen by Occupational Therapy this session. Tx consisted of:      Supervised modalities after being cleared for contradictions  x  10   minutes:       -Fluidotherapy to  (Left)   hand to increase blood flow, circulation, tissue elasticity, desensitization, sensory re-education and for pain management  x 10 minutes.        Manual therapy techniques to increase joint mobilization /// scar massage/// soft tissue mobilization   x    8  minutes:     -Scar Massage to  dorsal aspect of (Left) thumb   with  mini therapeutic vibrator // mini dowel " massager  to decrease dense scar adhesions and improve tensile glide  x 2 minutes.     -Manual Therapy techniques to  (Left) thumb Metacarpophalangeal Joint and (Left) wrist   including  stretching, and Passive Range of Motion to increase joint mobility, range of motion  and for pain management  x  5 minutes     -Soft Tissue Mobilization to (Left) hand into wrist and forearm  from distal to proximal to decrease edema and increase range of motion and functional abilities  x  1 minutes        Therapeutic Exercises to reassess functional abilities, edema, range of motion, Manual Muscle Testing,  and pinch strength x     22 minutes:    Patient reassessed as follows:    Edema:   centimeters (Right) / (Left)  Metacarpals:  18.8   /  18.1    Proximal Wrist Crease:  16.2 / 16.5 (-0.5)                                   (Right)  /  (Left)  thumb  Proximal Phalanx:  6.3  / 6.4   Interphalangeal Joint:  6.5  / 6.3   Distal Phalanx:  5.5  / 5.2          Active Range of Motion: hand    ( (Left) thumb Interphalangeal joint fused @ 35* flexion,  2021)     Thumb:                           (Right)  //   (Left)  Metacarpophalangeal Joint: 0/40  // 0 / 35 (+10)   Interphalangeal Joint:  0/35   //   35 / 35    Palmar Abduction: 40 //  40  Radial Abduction:  40 //  40     Opposition: 0.5  //  0.8 (+0.8)  Comments: (Measured with wrist goni from thumb tip to base of 5th metacarpal) centimeters                                                       Passive Range of Motion: hand     Thumb:                           (Left)  Metacarpophalangeal Joint:  0 / 40   Interphalangeal Joint:  Not Applicable       Palmar Abduction:  WFL   Radial Abduction:  WFL    Opposition:  0.0   Comments: (Measured with wrist goni from thumb tip to base of 5th metacarpal) centimeters                                   Active Range of Motion: wrist                         (Right)   //  (Left)    Dorsal Flexion /Volar Flexion :  60/70  // 65 (+10)  / 65 (+10)  "  Radial Deviation /Ulnar Deviation: 15/35  //  14 (+7)  / 30 (+5)   Supination / Pronation: WFL  //  WFL            Strength: (KEYONNA Dynamometer in pounds),Position II  ( submaximally, within pain tolerance)   (Right): 50   (Left):  45 (+15)     Pinch Strength: (Pinch Gauge in pounds)   ( submaximally, within pain tolerance)              (Right) /  (Left)  Lateral Pinch:  12 / 9 (+2)   3 Point Pinch:  11 / 8 (+1)   2 Point Pinch:   10 / 7 (+1)     Assessment      Patient to be discharged home 2* to reaching max benefit from skilled outpatient Occupational Therapy at this time.  Patient to continue Home Exercise Program to maintain range of motion, strength, functional abilities, and pain management.  Patient to follow up with doctor as needed.   Patient's spiritual, cultural and educational needs considered and patient agreeable to plan of care and goals.    Reviewed Home Exercise Program with patient., see EPIC under "patient instructions" for provided exercises, activity modifications and limitations, modalities for home pain management.  Patient. demo understanding of above.    Patient. tolerated Fluidotherapy with no irritation.      Patient demo decreased edema in (Left) Proximal Wrist Crease; increase Active range of motion for (Left) thumb Metacarpophalangeal Joint flexion and opposition, Active range of motion for Dorsal Flexion, Volar Flexion, Radial Deviation, Ulnar Deviation;  increased  strength, increase 3 Point Pinch, 2 Point Pinch, and Lateral Pinch strength.  Patient met 5/6 established goals.       Goals:  1)   Patient to be Independent with Home exercise program and modalities for pain management by 1 week. (MET)   2)   Patient will report   0/10 pain on average with activity to assist with exercises by 6-8 weeks.  (Not Met)   3)   Patient will increase range of Motion by 3-5 degrees to increase functional use for activities of daily living by 6-8 weeks.   ( MET)   4)   Patient will " decrease edema by 0.1-0.3 millimeters  to increase joint mobility /flexibility by 6-8 weeks. (MET)   5)   Patient will increase  strength 3-5 pounds to open containers by 6-8 weeks.  ( MET)   6)   Patient will increase pinch by 1-3 pounds for buttoning by 6-8 weeks.  ( MET)     Plan      Patient to be discharged home 2* to reaching max benefit from skilled outpatient Occupational Therapy at this time.    Patient to follow up with doctor as needed.           Toshia Fisher, OT

## 2023-10-25 ENCOUNTER — CLINICAL SUPPORT (OUTPATIENT)
Dept: REHABILITATION | Facility: HOSPITAL | Age: 68
End: 2023-10-25
Payer: MEDICARE

## 2023-10-25 DIAGNOSIS — G89.29 CHRONIC PAIN OF LEFT THUMB: Primary | ICD-10-CM

## 2023-10-25 DIAGNOSIS — M79.645 CHRONIC PAIN OF LEFT THUMB: Primary | ICD-10-CM

## 2023-10-25 PROCEDURE — 97110 THERAPEUTIC EXERCISES: CPT | Mod: PN

## 2023-10-25 PROCEDURE — 97022 WHIRLPOOL THERAPY: CPT | Mod: PN

## 2023-10-25 PROCEDURE — 97140 MANUAL THERAPY 1/> REGIONS: CPT | Mod: PN

## 2023-10-26 ENCOUNTER — OFFICE VISIT (OUTPATIENT)
Dept: CARDIOLOGY | Facility: CLINIC | Age: 68
End: 2023-10-26
Payer: MEDICARE

## 2023-10-26 VITALS
WEIGHT: 171 LBS | SYSTOLIC BLOOD PRESSURE: 118 MMHG | DIASTOLIC BLOOD PRESSURE: 80 MMHG | HEIGHT: 66 IN | HEART RATE: 71 BPM | BODY MASS INDEX: 27.48 KG/M2 | OXYGEN SATURATION: 98 % | RESPIRATION RATE: 16 BRPM

## 2023-10-26 DIAGNOSIS — E11.21 DIABETES MELLITUS WITH NEPHROPATHY: ICD-10-CM

## 2023-10-26 DIAGNOSIS — R00.2 PALPITATION: Primary | ICD-10-CM

## 2023-10-26 DIAGNOSIS — I13.0: ICD-10-CM

## 2023-10-26 DIAGNOSIS — E78.2 MIXED HYPERLIPIDEMIA: ICD-10-CM

## 2023-10-26 DIAGNOSIS — I50.810: ICD-10-CM

## 2023-10-26 DIAGNOSIS — C64.2 RENAL CELL CARCINOMA OF LEFT KIDNEY: ICD-10-CM

## 2023-10-26 DIAGNOSIS — N18.31: ICD-10-CM

## 2023-10-26 DIAGNOSIS — I10 ESSENTIAL HYPERTENSION: ICD-10-CM

## 2023-10-26 DIAGNOSIS — R00.0 TACHYCARDIA: ICD-10-CM

## 2023-10-26 PROCEDURE — 93005 ELECTROCARDIOGRAM TRACING: CPT | Mod: PBBFAC,PN | Performed by: INTERNAL MEDICINE

## 2023-10-26 PROCEDURE — 93010 EKG 12-LEAD: ICD-10-PCS | Mod: S$PBB,,, | Performed by: INTERNAL MEDICINE

## 2023-10-26 PROCEDURE — 99999 PR PBB SHADOW E&M-EST. PATIENT-LVL V: CPT | Mod: PBBFAC,,, | Performed by: INTERNAL MEDICINE

## 2023-10-26 PROCEDURE — 99205 PR OFFICE/OUTPT VISIT, NEW, LEVL V, 60-74 MIN: ICD-10-PCS | Mod: S$PBB,,, | Performed by: INTERNAL MEDICINE

## 2023-10-26 PROCEDURE — 99215 OFFICE O/P EST HI 40 MIN: CPT | Mod: PBBFAC,PN | Performed by: INTERNAL MEDICINE

## 2023-10-26 PROCEDURE — 99205 OFFICE O/P NEW HI 60 MIN: CPT | Mod: S$PBB,,, | Performed by: INTERNAL MEDICINE

## 2023-10-26 PROCEDURE — 93010 ELECTROCARDIOGRAM REPORT: CPT | Mod: S$PBB,,, | Performed by: INTERNAL MEDICINE

## 2023-10-26 PROCEDURE — 99999 PR PBB SHADOW E&M-EST. PATIENT-LVL V: ICD-10-PCS | Mod: PBBFAC,,, | Performed by: INTERNAL MEDICINE

## 2023-10-26 NOTE — PROGRESS NOTES
Subjective:    Patient ID:  Payton Castillo is a 68 y.o. female     Chief Complaint   Patient presents with    Hypertension    Establish Care    Palpitations       HPI:  Ms Payton Castillo is a 68 y.o. female for initial consultation.    Patient has been having a sensation of pounding palpitations for the past 2 and after 3 months.  And she can feel her heart beating hard and especially when she is lying on the left side it is more uncomfortable and it lasts almost most of the day and then no aggravating or relieving factors.  And many a times is brought in by the stress at home.  Her breathing is good denies any shortness of breath or difficulty in breathing denies any dizziness or lightheadedness denies any loss of consciousness falls or head injury denies any chest pain or tightness or heaviness.  She is taking all her medications regularly.  She recently had an echocardiogram done where she had an hyperdynamic left ventricle.  Patient had diagnosis of renal cell carcinoma and she had a partial resection of the kidney.  She has underlying chronic kidney disease with CKD of 3A with a BUN of 929 and creatinine of 1.5  In the past patient was diagnosed with tachycardia and was started on metoprolol.      Review of patient's allergies indicates:   Allergen Reactions    Aspartame Other (See Comments)     headache    Other reaction(s): Other (See Comments)  Headache  migraine  Headache  Other reaction(s): Other (See Comments)  Headache  migraine  headache    Penicillins Itching, Rash, Other (See Comments) and Hives     Rash  Rash  Rash  Rash  Rash    Stevioside (bulk) Other (See Comments)    Fructose Other (See Comments)     headache    Other reaction(s): Other (See Comments)  headache  migraine  headache  Other reaction(s): Other (See Comments)  headache  migraine  headache    Jardiance [empagliflozin] Other (See Comments)     Made her more hungry    Monosodium glutamate Other (See Comments)     Headache    Other  reaction(s): Other (See Comments)  Headache  headache  Headache  Other reaction(s): Other (See Comments)  Headache  headache  Headache       Past Medical History:   Diagnosis Date    Allergy     aspartame,fructose monosodium glutamate and penicillins.    Anemia of chronic disorder 11/09/2017    Anemia, chronic renal failure, stage 2 (mild) 11/09/2017    Anemia, chronic renal failure, stage 3 (moderate) 11/09/2017    Asthma     Cancer     kidney    Cataract of right eye     CKD (chronic kidney disease), stage III     Diabetes mellitus     type II    Fibromyalgia     GERD (gastroesophageal reflux disease)     Hx of renal cell cancer - left nephrectomy Aug 2014 11/09/2017    Hypercholesteremia     Hypertension     IDDM (insulin dependent diabetes mellitus)     Lumbar spondylosis     Lumbosacral radiculopathy     Osteopenia     Palpitations     Plantar fasciitis, right     Sleep apnea     Syncope 02/2012    Tachycardia     Thoracic radiculopathy     TMJ (dislocation of temporomandibular joint)      Past Surgical History:   Procedure Laterality Date    CATARACT EXTRACTION Left 03/2016    INJECTION OF ANESTHETIC AGENT AROUND MEDIAL BRANCH NERVES INNERVATING LUMBAR FACET JOINT Bilateral 12/15/2022    Procedure: Block-nerve-medial branch-lumbar;  Surgeon: Howard Rivas MD;  Location: [facility] OR;  Service: Pain Management;  Laterality: Bilateral;  L3,4,5 MBB    INJECTION OF ANESTHETIC AGENT AROUND MEDIAL BRANCH NERVES INNERVATING LUMBAR FACET JOINT Bilateral 1/3/2023    Procedure: Block-nerve-medial branch-lumbar;  Surgeon: Howard Rivas MD;  Location: [facility] OR;  Service: Pain Management;  Laterality: Bilateral;  L3,4,5 MBB #2    INJECTION, SACROILIAC JOINT Left 10/7/2022    Procedure: INJECTION,SACROILIAC JOINT;  Surgeon: Cory Ruggiero MD;  Location: [facility] OR;  Service: Pain Management;  Laterality: Left;  sacroiliac injection    INJECTION, SACROILIAC JOINT Left 4/5/2023    Procedure: INJECTION,SACROILIAC JOINT;  Surgeon: Howard ALFORD  MD Rob;  Location: [facility] OR;  Service: Pain Management;  Laterality: Left;  SIJ    JOINT REPLACEMENT Right     knee    RADIOFREQUENCY THERMOCOAGULATION Bilateral 1/13/2023    Procedure: RADIOFREQUENCY THERMAL COAGULATION;  Surgeon: Howard Rivas MD;  Location: [facility] OR;  Service: Pain Management;  Laterality: Bilateral;  L3,4,5 Raul RFA    SHOULDER OPEN ROTATOR CUFF REPAIR Right 02/2005    labral repair    TOTAL KNEE ARTHROPLASTY Left 2014    TUBAL LIGATION  1990    TUMOR REMOVAL  2014    from kidney     Social History     Tobacco Use    Smoking status: Never    Smokeless tobacco: Never   Substance Use Topics    Alcohol use: No    Drug use: No     Family History   Problem Relation Age of Onset    Stomach cancer Mother     Breast cancer Mother     Diabetes Mother     Cancer Father         Review of Systems:   Constitution: Negative for diaphoresis and fever.   HEENT: Negative for nosebleeds.    Cardiovascular: Negative for chest pain       No dyspnea on exertion       No leg swelling        Recurrent palpitations  Respiratory: Negative for shortness of breath and wheezing.    Hematologic/Lymphatic: Negative for bleeding problem. Does not bruise/bleed easily.   Skin: Negative for color change and rash.   Musculoskeletal: Negative for falls and myalgias.   Gastrointestinal: Negative for hematemesis and hematochezia.   Genitourinary: Negative for hematuria.   Neurological: Negative for dizziness and light-headedness.   Psychiatric/Behavioral: Negative for altered mental status and memory loss.          Objective:        Vitals:    10/26/23 0903   BP: 118/80   Pulse: 71   Resp: 16       Lab Results   Component Value Date    WBC 7.26 08/11/2023    HGB 11.8 (L) 08/11/2023    HCT 36.4 (L) 08/11/2023     08/11/2023    CHOL 151 03/20/2023    TRIG 145 03/20/2023    HDL 49 03/20/2023    ALT 47 (H) 11/10/2022    AST 30 11/10/2022     08/11/2023    K 4.4 08/11/2023     08/11/2023    CREATININE 1.5 (H) 08/11/2023     "BUN 29 (H) 08/11/2023    CO2 26 08/11/2023    HGBA1C 7.7 (H) 07/03/2023    MICROALBUR 20.2 09/17/2019        ECHOCARDIOGRAM RESULTS  Results for orders placed during the hospital encounter of 09/27/23    Echo Saline Bubble? No    Interpretation Summary    Left Ventricle: The left ventricle is normal in size. Normal wall thickness. Normal wall motion. There is hyperdynamic systolic function with a visually estimated ejection fraction of 70 - 75%. There is normal diastolic function.    Right Ventricle: Normal right ventricular cavity size. Wall thickness is normal. Right ventricle wall motion  is normal. Systolic function is normal.    Mitral Valve: There is bileaflet sclerosis.    IVC/SVC: Normal venous pressure at 3 mmHg.        CURRENT/PREVIOUS VISIT EKG  Results for orders placed or performed during the hospital encounter of 08/28/23   EKG 12-lead    Collection Time: 08/28/23  9:48 AM    Narrative    Test Reason : R42,R00.2,    Vent. Rate : 057 BPM     Atrial Rate : 057 BPM     P-R Int : 160 ms          QRS Dur : 088 ms      QT Int : 426 ms       P-R-T Axes : 034 002 041 degrees     QTc Int : 414 ms    Sinus bradycardia  Otherwise normal ECG  No previous ECGs available  Confirmed by Robb FOSS, Dagoberto MORELAND ([id number]) on 8/30/2023 12:27:57 PM    Referred By: JIMI OLIVARES           Confirmed By:Dagoberto Zamudio MD     No valid procedures specified.   No results found for this or any previous visit.      Physical Exam:  CONSTITUTIONAL: No fever, no chills  HEENT: Normocephalic, atraumatic,pupils reactive to light                 NECK:  No JVD no carotid bruit  CVS: S1S2+, RRR, systolic murmurs,   LUNGS: Clear  ABDOMEN: Soft, NT, BS+  EXTREMITIES: No cyanosis, edema  : No bright catheter  NEURO: AAO X 3  PSY: Normal affect      Medication List with Changes/Refills   Current Medications    ATORVASTATIN (LIPITOR) 40 MG TABLET    TAKE 1 TABLET EVERY EVENING    BD INSULIN SYRINGE 1 ML 28 GAUGE X 1/2" SYRG    USE 1 SYRINGE ONCE " "DAILY    BLOOD-GLUCOSE SENSOR (DEXCOM G6 SENSOR) JESS    1 Device by Misc.(Non-Drug; Combo Route) route every 10 days.    CETIRIZINE (ZYRTEC) 10 MG TABLET    Take 10 mg by mouth.    FLASH GLUCOSE SENSOR (FREESTYLE BANG 14 DAY SENSOR) KIT    USE ONE SENSOR EVERY 14 DAYS    FLUTICASONE PROPIONATE (FLONASE) 50 MCG/ACTUATION NASAL SPRAY    USE 2 SPRAYS IN EACH NOSTRIL ONCE DAILY    GABAPENTIN (NEURONTIN) 800 MG TABLET    Take 1 tablet (800 mg total) by mouth 3 (three) times daily.    INSULIN ASPART U-100 (NOVOLOG FLEXPEN U-100 INSULIN) 100 UNIT/ML (3 ML) INPN PEN    INJECT 20 UNITS UNDER THE SKIN THREE TIMES A DAY WITH MEALS ( INSTEAD OF HUMALOG )    INSULIN SYRINGE-NEEDLE U-100 0.3 ML 30 SYRG    by Misc.(Non-Drug; Combo Route) route 3 (three) times daily.    KERENDIA 10 MG TAB    Take 1 tablet by mouth every evening.    LANTUS U-100 INSULIN 100 UNIT/ML INJECTION    INJECT 70 UNITS UNDER THE SKIN EVERY EVENING    LOSARTAN (COZAAR) 100 MG TABLET    TAKE 1 TABLET DAILY    METOPROLOL SUCCINATE (TOPROL-XL) 50 MG 24 HR TABLET    TAKE 1 TABLET DAILY    OMEPRAZOLE (PRILOSEC) 40 MG CAPSULE    Take 40 mg by mouth every evening.    PEN NEEDLE, DIABETIC (ADVOCATE PEN NEEDLE) 31 GAUGE X 5/16" NDLE    1 Device by Misc.(Non-Drug; Combo Route) route 3 (three) times daily.    SOLIFENACIN (VESICARE) 5 MG TABLET    Take 5 mg by mouth every evening.             Assessment:       1. Palpitation    2. Essential hypertension    3. Mixed hyperlipidemia    4. Renal cell carcinoma of left kidney    5. Hypertensive heart disease with right-sided heart failure and stage 3a chronic kidney disease, unspecified HF chronicity    6. Diabetes mellitus with nephropathy    7. Tachycardia         Plan:   1. Palpitations   Patient has been having recurrent palpitations etiology is unclear.  However on her EKG today and the previous EKG she has no PVCs or PACs.  Will check her heart for rhythm issues.  Will do Zio for 1 week.  2. Essential hypertension "   Today her blood pressure is very well controlled is 118/80 and she is currently on metoprolol succinate 50 mg p.o. daily losartan 100 mg p.o. daily.  Discussed with patient that will check her blood pressure as soon as she wakes up, before breakfast, before lunch, before dinner and before bedtime.  And will have a week's worth of blood pressure readings and re-evaluate.    Number 3.  Hypertensive heart disease with chronic kidney disease   Patient has underlying chronic kidney disease with a BUN of 29 and a creatinine of 1.3 will repeat her BNP at the present time.  4. Will also check a thyroid function test.  Will check a TSH and a free T3 and a T4 levels.  5. Her echocardiogram showed she has hyperdynamic left ventricle systolic function with an ejection fraction of 70-75%.  Etiology is unclear however she is on metoprolol succinate 50 mg p.o. daily may need to increase her metoprolol.  6. Diabetes with nephropathy   At the present time patient is stable.  And will check a BMP.  7. Mixed hyper mixed hyperlipidemia  She is currently not on any specific statin therapy.  On her last blood work her total cholesterol is 151 HDL is 49 LDL is 73 and triglycerides of 145.    8. EKG  Reviewed EKG independently patient is in normal sinus rhythm with a heart rate of 71 beats per minute normal intervals and no acute ST T-wave changes.  Early repolarization changes.  9. I will see her back in the office in after the testing is completed.              Problem List Items Addressed This Visit          Cardiac/Vascular    Essential hypertension     Other Visit Diagnoses       Palpitation    -  Primary    Relevant Orders    IN OFFICE EKG 12-LEAD (to Muse)    Mixed hyperlipidemia        Renal cell carcinoma of left kidney        Hypertensive heart disease with right-sided heart failure and stage 3a chronic kidney disease, unspecified HF chronicity        Diabetes mellitus with nephropathy        Tachycardia                No  follow-ups on file.

## 2023-10-27 ENCOUNTER — TELEPHONE (OUTPATIENT)
Dept: FAMILY MEDICINE | Facility: CLINIC | Age: 68
End: 2023-10-27

## 2023-10-27 DIAGNOSIS — E11.42 TYPE 2 DIABETES MELLITUS WITH DIABETIC POLYNEUROPATHY, WITH LONG-TERM CURRENT USE OF INSULIN: ICD-10-CM

## 2023-10-27 DIAGNOSIS — Z79.4 TYPE 2 DIABETES MELLITUS WITH DIABETIC POLYNEUROPATHY, WITH LONG-TERM CURRENT USE OF INSULIN: ICD-10-CM

## 2023-10-27 NOTE — TELEPHONE ENCOUNTER
Refill request  LANTUS U-100 INSULIN 100 unit/mL injection    insulin aspart U-100 (NOVOLOG FLEXPEN U-100 INSULIN) 100 unit/mL (3 mL) InPn pen    Last office visit     8/28/23  Next office visit     9/12/23 patient canceled  Medication pended     Type 2 diabetes mellitus with diabetic polyneuropathy, with long-term current use of insulin  -     insulin glargine (LANTUS U-100 INSULIN) 100 unit/mL injection; INJECT 70 UNITS UNDER THE SKIN EVERY EVENING  Dispense: 21 mL; Refill: 3

## 2023-11-01 ENCOUNTER — PATIENT MESSAGE (OUTPATIENT)
Dept: FAMILY MEDICINE | Facility: CLINIC | Age: 68
End: 2023-11-01

## 2023-11-01 DIAGNOSIS — E11.42 TYPE 2 DIABETES MELLITUS WITH DIABETIC POLYNEUROPATHY, WITH LONG-TERM CURRENT USE OF INSULIN: ICD-10-CM

## 2023-11-01 DIAGNOSIS — Z79.4 TYPE 2 DIABETES MELLITUS WITH DIABETIC POLYNEUROPATHY, WITH LONG-TERM CURRENT USE OF INSULIN: ICD-10-CM

## 2023-11-01 RX ORDER — INSULIN GLARGINE 100 [IU]/ML
INJECTION, SOLUTION SUBCUTANEOUS
Qty: 21 ML | Refills: 3 | Status: SHIPPED | OUTPATIENT
Start: 2023-11-01 | End: 2024-01-23 | Stop reason: SDUPTHER

## 2023-11-01 RX ORDER — INSULIN GLARGINE 100 [IU]/ML
INJECTION, SOLUTION SUBCUTANEOUS
Qty: 21 ML | Refills: 3 | Status: SHIPPED | OUTPATIENT
Start: 2023-11-01 | End: 2023-11-01 | Stop reason: SDUPTHER

## 2023-11-01 RX ORDER — INSULIN ASPART 100 [IU]/ML
INJECTION, SOLUTION INTRAVENOUS; SUBCUTANEOUS
Qty: 60 ML | Refills: 3 | Status: SHIPPED | OUTPATIENT
Start: 2023-11-01

## 2023-11-10 ENCOUNTER — LAB VISIT (OUTPATIENT)
Dept: LAB | Facility: HOSPITAL | Age: 68
End: 2023-11-10
Attending: NURSE PRACTITIONER
Payer: MEDICARE

## 2023-11-10 DIAGNOSIS — I10 ESSENTIAL HYPERTENSION, MALIGNANT: ICD-10-CM

## 2023-11-10 DIAGNOSIS — R80.8 NEPHROGENOUS PROTEINURIA: ICD-10-CM

## 2023-11-10 DIAGNOSIS — N18.6 TYPE 2 DIABETES MELLITUS WITH END-STAGE RENAL DISEASE: Primary | ICD-10-CM

## 2023-11-10 DIAGNOSIS — Z90.5 ACQUIRED ABSENCE OF KIDNEY: ICD-10-CM

## 2023-11-10 DIAGNOSIS — E11.22 TYPE 2 DIABETES MELLITUS WITH END-STAGE RENAL DISEASE: Primary | ICD-10-CM

## 2023-11-10 DIAGNOSIS — N18.31 CHRONIC KIDNEY DISEASE (CKD) STAGE G3A/A1, MODERATELY DECREASED GLOMERULAR FILTRATION RATE (GFR) BETWEEN 45-59 ML/MIN/1.73 SQUARE METER AND ALBUMINURIA CREATININE RATIO LESS THAN 30 MG/G: ICD-10-CM

## 2023-11-10 LAB
25(OH)D3+25(OH)D2 SERPL-MCNC: 15 NG/ML (ref 30–96)
ALBUMIN SERPL BCP-MCNC: 4.4 G/DL (ref 3.5–5.2)
ALBUMIN/CREAT UR: 498.1 UG/MG (ref 0–30)
ANION GAP SERPL CALC-SCNC: 7 MMOL/L (ref 8–16)
BACTERIA #/AREA URNS HPF: NEGATIVE /HPF
BUN SERPL-MCNC: 24 MG/DL (ref 8–23)
CALCIUM SERPL-MCNC: 9.8 MG/DL (ref 8.7–10.5)
CHLORIDE SERPL-SCNC: 105 MMOL/L (ref 95–110)
CO2 SERPL-SCNC: 29 MMOL/L (ref 23–29)
CREAT SERPL-MCNC: 1.4 MG/DL (ref 0.5–1.4)
CREAT UR-MCNC: 138.5 MG/DL (ref 15–325)
ERYTHROCYTE [DISTWIDTH] IN BLOOD BY AUTOMATED COUNT: 13 % (ref 11.5–14.5)
EST. GFR  (NO RACE VARIABLE): 41 ML/MIN/1.73 M^2
GLUCOSE SERPL-MCNC: 98 MG/DL (ref 70–110)
HCT VFR BLD AUTO: 40 % (ref 37–48.5)
HGB BLD-MCNC: 13 G/DL (ref 12–16)
HYALINE CASTS #/AREA URNS LPF: 1 /LPF
MAGNESIUM SERPL-MCNC: 1.9 MG/DL (ref 1.6–2.6)
MCH RBC QN AUTO: 30.7 PG (ref 27–31)
MCHC RBC AUTO-ENTMCNC: 32.5 G/DL (ref 32–36)
MCV RBC AUTO: 95 FL (ref 82–98)
MICROALBUMIN UR DL<=1MG/L-MCNC: 689.8 UG/ML
MICROSCOPIC COMMENT: NORMAL
PHOSPHATE SERPL-MCNC: 4 MG/DL (ref 2.7–4.5)
PLATELET # BLD AUTO: 332 K/UL (ref 150–450)
PMV BLD AUTO: 8.9 FL (ref 9.2–12.9)
POTASSIUM SERPL-SCNC: 4.7 MMOL/L (ref 3.5–5.1)
PROT UR-MCNC: 108 MG/DL (ref 6–15)
PTH-INTACT SERPL-MCNC: 177.8 PG/ML (ref 9–77)
RBC # BLD AUTO: 4.23 M/UL (ref 4–5.4)
RBC #/AREA URNS HPF: 0 /HPF (ref 0–4)
SODIUM SERPL-SCNC: 141 MMOL/L (ref 136–145)
SQUAMOUS #/AREA URNS HPF: 1 /HPF
URATE SERPL-MCNC: 6.7 MG/DL (ref 2.4–5.7)
WBC # BLD AUTO: 7.79 K/UL (ref 3.9–12.7)
WBC #/AREA URNS HPF: 1 /HPF (ref 0–5)

## 2023-11-10 PROCEDURE — 83970 ASSAY OF PARATHORMONE: CPT | Performed by: NURSE PRACTITIONER

## 2023-11-10 PROCEDURE — 83735 ASSAY OF MAGNESIUM: CPT | Performed by: NURSE PRACTITIONER

## 2023-11-10 PROCEDURE — 85027 COMPLETE CBC AUTOMATED: CPT | Performed by: NURSE PRACTITIONER

## 2023-11-10 PROCEDURE — 82043 UR ALBUMIN QUANTITATIVE: CPT | Performed by: NURSE PRACTITIONER

## 2023-11-10 PROCEDURE — 84156 ASSAY OF PROTEIN URINE: CPT | Performed by: NURSE PRACTITIONER

## 2023-11-10 PROCEDURE — 81001 URINALYSIS AUTO W/SCOPE: CPT | Performed by: NURSE PRACTITIONER

## 2023-11-10 PROCEDURE — 80069 RENAL FUNCTION PANEL: CPT | Performed by: NURSE PRACTITIONER

## 2023-11-10 PROCEDURE — 82306 VITAMIN D 25 HYDROXY: CPT | Performed by: NURSE PRACTITIONER

## 2023-11-10 PROCEDURE — 84550 ASSAY OF BLOOD/URIC ACID: CPT | Performed by: NURSE PRACTITIONER

## 2023-11-10 PROCEDURE — 36415 COLL VENOUS BLD VENIPUNCTURE: CPT | Performed by: NURSE PRACTITIONER

## 2023-11-20 ENCOUNTER — TELEPHONE (OUTPATIENT)
Dept: HEMATOLOGY/ONCOLOGY | Facility: CLINIC | Age: 68
End: 2023-11-20

## 2023-11-20 ENCOUNTER — LAB VISIT (OUTPATIENT)
Dept: LAB | Facility: HOSPITAL | Age: 68
End: 2023-11-20
Attending: INTERNAL MEDICINE
Payer: MEDICARE

## 2023-11-20 DIAGNOSIS — Z85.528 HX OF RENAL CELL CANCER: Primary | ICD-10-CM

## 2023-11-20 DIAGNOSIS — D63.1 ANEMIA, CHRONIC RENAL FAILURE, STAGE 3 (MODERATE): ICD-10-CM

## 2023-11-20 DIAGNOSIS — N18.30 ANEMIA, CHRONIC RENAL FAILURE, STAGE 3 (MODERATE): ICD-10-CM

## 2023-11-20 DIAGNOSIS — Z85.528 HX OF RENAL CELL CANCER: ICD-10-CM

## 2023-11-20 LAB
ALBUMIN SERPL BCP-MCNC: 4.2 G/DL (ref 3.5–5.2)
ALP SERPL-CCNC: 74 U/L (ref 55–135)
ALT SERPL W/O P-5'-P-CCNC: 29 U/L (ref 10–44)
ANION GAP SERPL CALC-SCNC: 7 MMOL/L (ref 8–16)
AST SERPL-CCNC: 21 U/L (ref 10–40)
BASOPHILS # BLD AUTO: 0.07 K/UL (ref 0–0.2)
BASOPHILS NFR BLD: 0.9 % (ref 0–1.9)
BILIRUB SERPL-MCNC: 0.5 MG/DL (ref 0.1–1)
BUN SERPL-MCNC: 21 MG/DL (ref 8–23)
CALCIUM SERPL-MCNC: 9.7 MG/DL (ref 8.7–10.5)
CHLORIDE SERPL-SCNC: 106 MMOL/L (ref 95–110)
CO2 SERPL-SCNC: 29 MMOL/L (ref 23–29)
CREAT SERPL-MCNC: 1.3 MG/DL (ref 0.5–1.4)
DIFFERENTIAL METHOD: ABNORMAL
EOSINOPHIL # BLD AUTO: 0.2 K/UL (ref 0–0.5)
EOSINOPHIL NFR BLD: 2.5 % (ref 0–8)
ERYTHROCYTE [DISTWIDTH] IN BLOOD BY AUTOMATED COUNT: 12.8 % (ref 11.5–14.5)
EST. GFR  (NO RACE VARIABLE): 44.8 ML/MIN/1.73 M^2
GLUCOSE SERPL-MCNC: 86 MG/DL (ref 70–110)
HCT VFR BLD AUTO: 39 % (ref 37–48.5)
HGB BLD-MCNC: 12.5 G/DL (ref 12–16)
IMM GRANULOCYTES # BLD AUTO: 0.01 K/UL (ref 0–0.04)
IMM GRANULOCYTES NFR BLD AUTO: 0.1 % (ref 0–0.5)
LYMPHOCYTES # BLD AUTO: 3.2 K/UL (ref 1–4.8)
LYMPHOCYTES NFR BLD: 41.7 % (ref 18–48)
MCH RBC QN AUTO: 30.4 PG (ref 27–31)
MCHC RBC AUTO-ENTMCNC: 32.1 G/DL (ref 32–36)
MCV RBC AUTO: 95 FL (ref 82–98)
MONOCYTES # BLD AUTO: 0.8 K/UL (ref 0.3–1)
MONOCYTES NFR BLD: 9.9 % (ref 4–15)
NEUTROPHILS # BLD AUTO: 3.4 K/UL (ref 1.8–7.7)
NEUTROPHILS NFR BLD: 44.9 % (ref 38–73)
NRBC BLD-RTO: 0 /100 WBC
PLATELET # BLD AUTO: 308 K/UL (ref 150–450)
PMV BLD AUTO: 8.5 FL (ref 9.2–12.9)
POTASSIUM SERPL-SCNC: 4.8 MMOL/L (ref 3.5–5.1)
PROT SERPL-MCNC: 7.8 G/DL (ref 6–8.4)
RBC # BLD AUTO: 4.11 M/UL (ref 4–5.4)
SODIUM SERPL-SCNC: 142 MMOL/L (ref 136–145)
WBC # BLD AUTO: 7.61 K/UL (ref 3.9–12.7)

## 2023-11-20 PROCEDURE — 85025 COMPLETE CBC W/AUTO DIFF WBC: CPT | Performed by: INTERNAL MEDICINE

## 2023-11-20 PROCEDURE — 36415 COLL VENOUS BLD VENIPUNCTURE: CPT | Performed by: INTERNAL MEDICINE

## 2023-11-20 PROCEDURE — 80053 COMPREHEN METABOLIC PANEL: CPT | Performed by: INTERNAL MEDICINE

## 2023-11-20 NOTE — PROGRESS NOTES
"   [facility] Hematology/Oncology  PROGRESS NOTE      Subjective:       Patient ID:   NAME: Payton Castillo : 1955     68 y.o. female    Referring Doc: An (new PCP)  Other Physicians: Corona Haro; Mahesh (Hand Surg); [place]    Chief Complaint:  Anemia/renal ca f/u    History of Present Illness:     Patient returns today for a regularly scheduled follow-up visit.  She is here by herself. She is doing ok.     She has chronic aches with sciatica and uses cane to ambulate. She denies any CP, SOB, HA's or N/V.     She has been seeing Dr Rivas now, but the last shot did not last long    She saw Dr Nolan  on  and Dr Nina with cardiology         Discussed covid19 precautions - she had her vaccinations and booster        ROS:   GEN: normal without any fever, night sweats or weight loss; chronic arthritis issues  HEENT: normal with no HA's, sore throat, stiff neck, changes in vision  CV: normal with no CP, SOB, PND, PALMA or orthopnea  PULM: normal with no SOB, cough, hemoptysis, sputum or pleuritic pain  GI: normal with no abdominal pain, nausea, vomiting, constipation, diarrhea, melanotic stools, BRBPR, or hematemesis  : normal with no hematuria, dysuria  BREAST: normal with no mass, discharge, pain  SKIN: normal with no rash, erythema, bruising, or swelling    Allergies:  Review of patient's allergies indicates:   Allergen Reactions    Aspartame Other (See Comments)     Headache      Fructose Other (See Comments)     headache    Monosodium glutamate Other (See Comments)     Headache      Pcn [penicillins]      Rash         Medications:    Current Outpatient Medications:     atorvastatin (LIPITOR) 40 MG tablet, TAKE 1 TABLET EVERY EVENING, Disp: 90 tablet, Rfl: 3    BD INSULIN SYRINGE 1 mL 28 gauge x 1/2" Syrg, USE 1 SYRINGE ONCE DAILY, Disp: 100 each, Rfl: 3    blood-glucose sensor (DEXCOM G6 SENSOR) Millicent, 1 Device by Misc.(Non-Drug; Combo Route) route every 10 days., Disp: 9 each, Rfl: 3    cetirizine " "(ZYRTEC) 10 MG tablet, Take 10 mg by mouth., Disp: , Rfl:     flash glucose sensor (FREESTYLE BANG 14 DAY SENSOR) Kit, USE ONE SENSOR EVERY 14 DAYS, Disp: 6 kit, Rfl: 3    fluticasone propionate (FLONASE) 50 mcg/actuation nasal spray, USE 2 SPRAYS IN EACH NOSTRIL ONCE DAILY, Disp: 48 g, Rfl: 3    insulin aspart U-100 (NOVOLOG FLEXPEN U-100 INSULIN) 100 unit/mL (3 mL) InPn pen, INJECT 20 UNITS UNDER THE SKIN THREE TIMES A DAY WITH MEALS ( INSTEAD OF HUMALOG ), Disp: 60 mL, Rfl: 3    insulin glargine (LANTUS U-100 INSULIN) 100 unit/mL injection, INJECT 70 UNITS UNDER THE SKIN EVERY EVENING, Disp: 21 mL, Rfl: 3    insulin syringe-needle U-100 0.3 mL 30 Syrg, by Misc.(Non-Drug; Combo Route) route 3 (three) times daily., Disp: , Rfl:     KERENDIA 10 mg Tab, Take 1 tablet by mouth every evening., Disp: , Rfl:     metoprolol succinate (TOPROL-XL) 50 MG 24 hr tablet, TAKE 1 TABLET DAILY, Disp: 90 tablet, Rfl: 3    olmesartan (BENICAR) 20 MG tablet, Take 20 mg by mouth., Disp: , Rfl:     omeprazole (PRILOSEC) 40 MG capsule, Take 40 mg by mouth every evening., Disp: , Rfl:     pen needle, diabetic (ADVOCATE PEN NEEDLE) 31 gauge x 5/16" Ndle, 1 Device by Misc.(Non-Drug; Combo Route) route 3 (three) times daily., Disp: 100 each, Rfl: 3    solifenacin (VESICARE) 5 MG tablet, Take 5 mg by mouth every evening., Disp: , Rfl:     gabapentin (NEURONTIN) 800 MG tablet, Take 1 tablet (800 mg total) by mouth 3 (three) times daily., Disp: 270 tablet, Rfl: 0    losartan (COZAAR) 100 MG tablet, TAKE 1 TABLET DAILY (Patient not taking: Reported on 11/21/2023), Disp: 90 tablet, Rfl: 3  No current facility-administered medications for this visit.    Facility-Administered Medications Ordered in Other Visits:     lactated ringers infusion, , Intravenous, Once PRN, Howard Rivas MD    lactated ringers infusion, , Intravenous, Once PRN, Howard Rivas MD    PMHx/PSHx Updates:  See patient's last visit with me on 11/22/2022  See H&P on " "10/11/2006        Pathology:  See path Aug 2014          Objective:     Vitals:  Blood pressure 129/80, pulse 71, temperature 97.9 °F (36.6 °C), resp. rate 16, height 5' 6" (1.676 m), weight 78.2 kg (172 lb 6.4 oz).    Physical Examination:   GEN: no apparent distress, comfortable; AAOx3; overweight  HEAD: atraumatic and normocephalic  EYES: no pallor, no icterus, PERRLA  ENT: OMM, no pharyngeal erythema, external ears WNL; no nasal discharge; no thrush  NECK: no masses, thyroid normal, trachea midline, no LAD/LN's, supple  CV: RRR with no murmur; normal pulse; normal S1 and S2; no pedal edema  CHEST: Normal respiratory effort; CTAB; normal breath sounds; no wheeze or crackles  ABDOM: nontender and nondistended; soft; normal bowel sounds; no rebound/guarding  MUSC/Skeletal: ROM normal; no crepitus; joints normal; no deformities or arthropathy; uses cane  EXTREM: no clubbing, cyanosis, inflammation or swelling  SKIN: no rashes, lesions, ulcers, petechiae or subcutaneous nodules  : no bright  NEURO: grossly intact; motor/sensory WNL; AAOx3; no tremors  PSYCH: normal mood, affect and behavior  LYMPH: normal cervical, supraclavicular, axillary and groin LN's            Labs:        Lab Results   Component Value Date    WBC 7.61 11/20/2023    HGB 12.5 11/20/2023    HCT 39.0 11/20/2023    MCV 95 11/20/2023     11/20/2023     BMP  Lab Results   Component Value Date     11/20/2023    K 4.8 11/20/2023     11/20/2023    CO2 29 11/20/2023    BUN 21 11/20/2023    CREATININE 1.3 11/20/2023    CALCIUM 9.7 11/20/2023    ANIONGAP 7 (L) 11/20/2023    ESTGFRAFRICA 44.9 (A) 07/20/2022    EGFRNONAA 38.9 (A) 07/20/2022     Lab Results   Component Value Date    ALT 29 11/20/2023    AST 21 11/20/2023    ALKPHOS 74 11/20/2023    BILITOT 0.5 11/20/2023             Radiology/Diagnostic Studies:    Mammo 2/20/2023:       1. No mammographic evidence of malignancy and no detrimental change.  2. Yearly mammography is " recommended.  BI-RADS CATEGORY 1: NEGATIVE.      Mammo  2/7/2022:  Impression:     No mammographic findings of malignancy or significant detrimental interval change.     BI-RADS CATEGORY 1: NEGATIVE.         Mammo  2/5/2021:    Impression:     Negative mammogram.     Recommendation: Annual screening mammography.     BI-RADS CATEGORY: 1  NEGATIVE    CXR 1/12/2021:    IMPRESSION:     No acute cardiopulmonary abnormality.      CT Abdom/pelvis  6/25/2021:    IMPRESSION: Mild wall thickening of the distal sigmoid colon and rectum suggestive of colitis most likely infectious or inflammatory etiology.     Small hepatic cysts     Fat-containing left inguinal hernia       US  11/12/2020:    Impression:     Borderline mildly elevated bilateral renal resistive indices which can be seen with renal dysfunction.         CT 11/1/2017    I have reviewed all available lab results and radiology reports.    Assessment/Plan:   (1) 68 y.o. female with diagnosis of chronic anemia and thrombocytopenia in the past  - multifactorial anemia process with underlying anemia of chronic disorders and anemia of chronic renal  - latest labs wnl - no residual anemia or tcp    11/23/2021:  - latest labs are adequate    11/22/2022:  - no current anemia    11/23/2023:  - no current anemia  - labs adequate    (2) prior left nephrectomy in Aug 2014  - Stage I renal cell carcinoma  - followed by Dr Hill with  - she is seeing him this coming Monday  - CT scans on 11/1/2017 negative  - recent US of kidneys - negative 11/12/2020 11/23/2021:  - she sees Dr Jeter again in Dec 2021  - she had CT scans in June 2021 11/22/2022:  - she has US planned on Dec 1st with     11/21/2023:  - she sees  again in Dec 2023    (3) CRI - followed by Dr Tommy Haro with nephrology    (4) Chronic joint/hip issues - followed by Dr Rivas    1. Anemia, chronic renal failure, stage 3 (moderate)        2. Hx of renal cell cancer - left nephrectomy Aug 2014         3. Anemia of chronic disorder              PLAN:  1. F/u with PCP,  and Neph  2. Check labs again in one year  3. F/u with urology this coming Dec 2023  4. Routine Mammo due in Feb 2024    RTC in 12 months    Fax note to Estrellita Nolan Kreiger and Leonid Hill    Discussion:       COVID-19 Discussion:    I had long discussion with patient and any applicable family about the COVID-19 coronavirus epidemic and the recommended precautions with regard to cancer and/or hematology patients. I have re-iterated the CDC recommendations for adequate hand washing, use of hand -like products, and coughing into elbow, etc. In addition, especially for our patients who are on chemotherapy and/or our otherwise immunocompromised patients, I have recommended avoidance of crowds, including movie theaters, restaurants, churches, etc. I have recommended avoidance of any sick or symptomatic family members and/or friends. I have also recommended avoidance of any raw and unwashed food products, and general avoidance of food items that have not been prepared by themselves. The patient has been asked to call us immediately with any symptom developments, issues, questions or other general concerns.         I have explained all of the above in detail and the patient understands all of the current recommendation(s). I have answered all of their questions to the best of my ability and to their complete satisfaction.   The patient is to continue with the current management plan.            Electronically signed by Sebastien Tavarez MD

## 2023-11-21 ENCOUNTER — OFFICE VISIT (OUTPATIENT)
Dept: HEMATOLOGY/ONCOLOGY | Facility: CLINIC | Age: 68
End: 2023-11-21
Payer: MEDICARE

## 2023-11-21 VITALS
TEMPERATURE: 98 F | HEART RATE: 71 BPM | SYSTOLIC BLOOD PRESSURE: 129 MMHG | RESPIRATION RATE: 16 BRPM | WEIGHT: 172.38 LBS | BODY MASS INDEX: 27.7 KG/M2 | DIASTOLIC BLOOD PRESSURE: 80 MMHG | HEIGHT: 66 IN

## 2023-11-21 DIAGNOSIS — D63.8 ANEMIA OF CHRONIC DISORDER: ICD-10-CM

## 2023-11-21 DIAGNOSIS — D63.1 ANEMIA, CHRONIC RENAL FAILURE, STAGE 3 (MODERATE): Primary | ICD-10-CM

## 2023-11-21 DIAGNOSIS — N18.30 ANEMIA, CHRONIC RENAL FAILURE, STAGE 3 (MODERATE): Primary | ICD-10-CM

## 2023-11-21 DIAGNOSIS — Z85.528 HX OF RENAL CELL CANCER: ICD-10-CM

## 2023-11-21 PROCEDURE — 99213 OFFICE O/P EST LOW 20 MIN: CPT | Mod: S$GLB,,, | Performed by: INTERNAL MEDICINE

## 2023-11-21 PROCEDURE — 99213 PR OFFICE/OUTPT VISIT, EST, LEVL III, 20-29 MIN: ICD-10-PCS | Mod: S$GLB,,, | Performed by: INTERNAL MEDICINE

## 2023-11-21 RX ORDER — OLMESARTAN MEDOXOMIL 20 MG/1
20 TABLET ORAL
COMMUNITY
Start: 2023-11-17 | End: 2024-03-16

## 2023-11-27 RX ORDER — ATORVASTATIN CALCIUM 40 MG/1
TABLET, FILM COATED ORAL
Qty: 90 TABLET | Refills: 3 | Status: SHIPPED | OUTPATIENT
Start: 2023-11-27

## 2023-12-05 ENCOUNTER — OFFICE VISIT (OUTPATIENT)
Dept: CARDIOLOGY | Facility: CLINIC | Age: 68
End: 2023-12-05
Payer: MEDICARE

## 2023-12-05 VITALS
OXYGEN SATURATION: 98 % | RESPIRATION RATE: 16 BRPM | SYSTOLIC BLOOD PRESSURE: 126 MMHG | HEART RATE: 64 BPM | WEIGHT: 172 LBS | BODY MASS INDEX: 27.64 KG/M2 | DIASTOLIC BLOOD PRESSURE: 60 MMHG | HEIGHT: 66 IN

## 2023-12-05 DIAGNOSIS — N18.31: ICD-10-CM

## 2023-12-05 DIAGNOSIS — I50.810: ICD-10-CM

## 2023-12-05 DIAGNOSIS — I10 ESSENTIAL HYPERTENSION: ICD-10-CM

## 2023-12-05 DIAGNOSIS — R42 DIZZINESS: ICD-10-CM

## 2023-12-05 DIAGNOSIS — E78.2 MIXED HYPERLIPIDEMIA: ICD-10-CM

## 2023-12-05 DIAGNOSIS — I95.1 ORTHOSTATIC HYPOTENSION: Primary | ICD-10-CM

## 2023-12-05 DIAGNOSIS — E11.21 DIABETES MELLITUS WITH NEPHROPATHY: ICD-10-CM

## 2023-12-05 DIAGNOSIS — I13.0: ICD-10-CM

## 2023-12-05 DIAGNOSIS — C64.2 RENAL CELL CARCINOMA OF LEFT KIDNEY: ICD-10-CM

## 2023-12-05 DIAGNOSIS — R00.2 PALPITATION: ICD-10-CM

## 2023-12-05 PROCEDURE — 99999 PR PBB SHADOW E&M-EST. PATIENT-LVL IV: CPT | Mod: PBBFAC,,, | Performed by: INTERNAL MEDICINE

## 2023-12-05 PROCEDURE — 99214 OFFICE O/P EST MOD 30 MIN: CPT | Mod: PBBFAC,PN | Performed by: INTERNAL MEDICINE

## 2023-12-05 PROCEDURE — 99999 PR PBB SHADOW E&M-EST. PATIENT-LVL IV: ICD-10-PCS | Mod: PBBFAC,,, | Performed by: INTERNAL MEDICINE

## 2023-12-05 PROCEDURE — 99215 PR OFFICE/OUTPT VISIT, EST, LEVL V, 40-54 MIN: ICD-10-PCS | Mod: S$PBB,,, | Performed by: INTERNAL MEDICINE

## 2023-12-05 PROCEDURE — 99215 OFFICE O/P EST HI 40 MIN: CPT | Mod: S$PBB,,, | Performed by: INTERNAL MEDICINE

## 2023-12-05 RX ORDER — CALCITRIOL 0.25 UG/1
0.25 CAPSULE ORAL
COMMUNITY
Start: 2023-11-17 | End: 2024-10-18

## 2023-12-05 NOTE — PROGRESS NOTES
Subjective:    Patient ID:  Payton Castillo is a 68 y.o. female   HPI:  Ms Payton Castillo is a 68 y.o. female for follow-up.    Patient has been doing okay except that she still feels her heart racing intermittently.  Sometimes she can feel it as beating faster up pounding harder.  And transiently she feels lightheaded and dizzy and has sinking feeling.  And this happens mostly when she is seated for long periods of time or when she is standing for periods of time.  And more often in the morning times or sometimes even in the evenings.    Denies any loss of consciousness falls or head injury.    She is taking all her medications.    Patient was monitoring her blood pressure closely.  And she was checking it from past few months and it was found to have that her blood pressure was running on the higher side and she was started on olmesartan and blood pressure seems to be very well controlled.      Review of patient's allergies indicates:   Allergen Reactions    Aspartame Other (See Comments)     headache    Other reaction(s): Other (See Comments)  Headache  migraine  Headache  Other reaction(s): Other (See Comments)  Headache  migraine  headache    Penicillins Itching, Rash, Other (See Comments) and Hives     Rash  Rash  Rash  Rash  Rash    Stevioside (bulk) Other (See Comments)    Fructose Other (See Comments)     headache    Other reaction(s): Other (See Comments)  headache  migraine  headache  Other reaction(s): Other (See Comments)  headache  migraine  headache    Jardiance [empagliflozin] Other (See Comments)     Made her more hungry    Monosodium glutamate Other (See Comments)     Headache    Other reaction(s): Other (See Comments)  Headache  headache  Headache  Other reaction(s): Other (See Comments)  Headache  headache  Headache       Past Medical History:   Diagnosis Date    Allergy     aspartame,fructose monosodium glutamate and penicillins.    Anemia of chronic disorder 11/09/2017    Anemia, chronic renal  failure, stage 2 (mild) 11/09/2017    Anemia, chronic renal failure, stage 3 (moderate) 11/09/2017    Asthma     Cancer     kidney    Cataract of right eye     CKD (chronic kidney disease), stage III     Diabetes mellitus     type II    Fibromyalgia     GERD (gastroesophageal reflux disease)     Hx of renal cell cancer - left nephrectomy Aug 2014 11/09/2017    Hypercholesteremia     Hypertension     IDDM (insulin dependent diabetes mellitus)     Lumbar spondylosis     Lumbosacral radiculopathy     Osteopenia     Palpitations     Plantar fasciitis, right     Sleep apnea     Syncope 02/2012    Tachycardia     Thoracic radiculopathy     TMJ (dislocation of temporomandibular joint)      Past Surgical History:   Procedure Laterality Date    CATARACT EXTRACTION Left 03/2016    INJECTION OF ANESTHETIC AGENT AROUND MEDIAL BRANCH NERVES INNERVATING LUMBAR FACET JOINT Bilateral 12/15/2022    Procedure: Block-nerve-medial branch-lumbar;  Surgeon: Howard Rivas MD;  Location: [facility] OR;  Service: Pain Management;  Laterality: Bilateral;  L3,4,5 MBB    INJECTION OF ANESTHETIC AGENT AROUND MEDIAL BRANCH NERVES INNERVATING LUMBAR FACET JOINT Bilateral 1/3/2023    Procedure: Block-nerve-medial branch-lumbar;  Surgeon: Howard Rivas MD;  Location: [facility] OR;  Service: Pain Management;  Laterality: Bilateral;  L3,4,5 MBB #2    INJECTION, SACROILIAC JOINT Left 10/7/2022    Procedure: INJECTION,SACROILIAC JOINT;  Surgeon: Cory Ruggiero MD;  Location: [facility] OR;  Service: Pain Management;  Laterality: Left;  sacroiliac injection    INJECTION, SACROILIAC JOINT Left 4/5/2023    Procedure: INJECTION,SACROILIAC JOINT;  Surgeon: Howard Rivas MD;  Location: [facility] OR;  Service: Pain Management;  Laterality: Left;  SIJ    JOINT REPLACEMENT Right     knee    RADIOFREQUENCY THERMOCOAGULATION Bilateral 1/13/2023    Procedure: RADIOFREQUENCY THERMAL COAGULATION;  Surgeon: Howard Rivas MD;  Location: [facility] OR;  Service: Pain Management;  Laterality: Bilateral;   L3,4,5 Raul RFA    SHOULDER OPEN ROTATOR CUFF REPAIR Right 02/2005    labral repair    TOTAL KNEE ARTHROPLASTY Left 2014    TUBAL LIGATION  1990    TUMOR REMOVAL  2014    from kidney     Social History     Tobacco Use    Smoking status: Never    Smokeless tobacco: Never   Substance Use Topics    Alcohol use: No    Drug use: No     Family History   Problem Relation Age of Onset    Stomach cancer Mother     Breast cancer Mother     Diabetes Mother     Cancer Father         Review of Systems:   Constitution: Negative for diaphoresis and fever.   HEENT: Negative for nosebleeds.    Cardiovascular: Negative for chest pain       No dyspnea on exertion       No leg swelling        Intermittent palpitations  Respiratory: Negative for shortness of breath and wheezing.    Hematologic/Lymphatic: Negative for bleeding problem. Does not bruise/bleed easily.   Skin: Negative for color change and rash.   Musculoskeletal: Negative for falls and myalgias.   Gastrointestinal: Negative for hematemesis and hematochezia.   Genitourinary: Negative for hematuria.   Neurological: Negative for dizziness and light-headedness.   Psychiatric/Behavioral: Negative for altered mental status and memory loss.          Objective:        Vitals:    12/05/23 1627   BP: 126/60   Pulse: 64   Resp: 16       Lab Results   Component Value Date    WBC 7.61 11/20/2023    HGB 12.5 11/20/2023    HCT 39.0 11/20/2023     11/20/2023    CHOL 151 03/20/2023    TRIG 145 03/20/2023    HDL 49 03/20/2023    ALT 29 11/20/2023    AST 21 11/20/2023     11/20/2023    K 4.8 11/20/2023     11/20/2023    CREATININE 1.3 11/20/2023    BUN 21 11/20/2023    CO2 29 11/20/2023    HGBA1C 7.7 (H) 07/03/2023    MICROALBUR 20.2 09/17/2019        ECHOCARDIOGRAM RESULTS  Results for orders placed during the hospital encounter of 09/27/23    Echo Saline Bubble? No    Interpretation Summary    Left Ventricle: The left ventricle is normal in size. Normal wall thickness.  "Normal wall motion. There is hyperdynamic systolic function with a visually estimated ejection fraction of 70 - 75%. There is normal diastolic function.    Right Ventricle: Normal right ventricular cavity size. Wall thickness is normal. Right ventricle wall motion  is normal. Systolic function is normal.    Mitral Valve: There is bileaflet sclerosis.    IVC/SVC: Normal venous pressure at 3 mmHg.        CURRENT/PREVIOUS VISIT EKG  Results for orders placed or performed in visit on 10/26/23   IN OFFICE EKG 12-LEAD (to [place])    Collection Time: 10/26/23  8:58 AM    Narrative    Test Reason : R42,R00.2,    Vent. Rate : 071 BPM     Atrial Rate : 071 BPM     P-R Int : 166 ms          QRS Dur : 088 ms      QT Int : 414 ms       P-R-T Axes : 062 044 068 degrees     QTc Int : 449 ms    Normal sinus rhythm  Normal ECG Early repolarization  When compared with ECG of 28-AUG-2023 09:48,  No significant change was found  Confirmed by Po Garcia MD ([id number]) on 11/16/2023 8:46:40 AM    Referred By: JIMI OLIVARES           Confirmed By:Po Garcia MD     No valid procedures specified.   No results found for this or any previous visit.      Physical Exam:  CONSTITUTIONAL: No fever, no chills  HEENT: Normocephalic, atraumatic,pupils reactive to light                 NECK:  No JVD no carotid bruit  CVS: S1S2+, RRR, systolic murmurs,   LUNGS: Clear  ABDOMEN: Soft, NT, BS+  EXTREMITIES: No cyanosis, edema  : No bright catheter  NEURO: AAO X 3  PSY: Normal affect      Medication List with Changes/Refills   Current Medications    ATORVASTATIN (LIPITOR) 40 MG TABLET    TAKE 1 TABLET EVERY EVENING    BD INSULIN SYRINGE 1 ML 28 GAUGE X 1/2" SYRG    USE 1 SYRINGE ONCE DAILY    BLOOD-GLUCOSE SENSOR (DEXCOM G6 SENSOR) JESS    1 Device by Misc.(Non-Drug; Combo Route) route every 10 days.    CALCITRIOL (ROCALTROL) 0.25 MCG CAP    Take 0.25 mcg by mouth every Mon, Wed, Fri.    CETIRIZINE (ZYRTEC) 10 MG TABLET    Take 10 mg by mouth.    FLASH " "GLUCOSE SENSOR (FREESTYLE BANG 14 DAY SENSOR) KIT    USE ONE SENSOR EVERY 14 DAYS    FLUTICASONE PROPIONATE (FLONASE) 50 MCG/ACTUATION NASAL SPRAY    USE 2 SPRAYS IN EACH NOSTRIL ONCE DAILY    GABAPENTIN (NEURONTIN) 800 MG TABLET    Take 1 tablet (800 mg total) by mouth 3 (three) times daily.    INSULIN ASPART U-100 (NOVOLOG FLEXPEN U-100 INSULIN) 100 UNIT/ML (3 ML) INPN PEN    INJECT 20 UNITS UNDER THE SKIN THREE TIMES A DAY WITH MEALS ( INSTEAD OF HUMALOG )    INSULIN GLARGINE (LANTUS U-100 INSULIN) 100 UNIT/ML INJECTION    INJECT 70 UNITS UNDER THE SKIN EVERY EVENING    INSULIN SYRINGE-NEEDLE U-100 0.3 ML 30 SYRG    by Misc.(Non-Drug; Combo Route) route 3 (three) times daily.    KERENDIA 10 MG TAB    Take 1 tablet by mouth every evening.    METOPROLOL SUCCINATE (TOPROL-XL) 50 MG 24 HR TABLET    TAKE 1 TABLET DAILY    OLMESARTAN (BENICAR) 20 MG TABLET    Take 20 mg by mouth.    OMEPRAZOLE (PRILOSEC) 40 MG CAPSULE    Take 40 mg by mouth every evening.    PEN NEEDLE, DIABETIC (ADVOCATE PEN NEEDLE) 31 GAUGE X 5/16" NDLE    1 Device by Misc.(Non-Drug; Combo Route) route 3 (three) times daily.    SOLIFENACIN (VESICARE) 5 MG TABLET    Take 5 mg by mouth every evening.   Discontinued Medications    LOSARTAN (COZAAR) 100 MG TABLET    TAKE 1 TABLET DAILY             Assessment:       1. Orthostatic hypotension    2. Essential hypertension    3. Mixed hyperlipidemia    4. Hypertensive heart disease with right-sided heart failure and stage 3a chronic kidney disease, unspecified HF chronicity    5. Diabetes mellitus with nephropathy    6. Palpitation    7. Dizziness    8. Renal cell carcinoma of left kidney         Plan:   1. Orthostatic hypotension   Since patient has been on both the medications and recently started on olmesartan 20 mg p.o. daily her blood pressure has been well controlled however she does have episodes of intermittent orthostatic hypotension where she drops the pressure and feels the sinking feeling at " the same time she can feel her heart racing up.  Discussed with patient to keep herself adequately hydrated and also to wear compression stockings.  2. Essential hypertension   Her blood pressure is seems to be well controlled and she is currently on olmesartan 20 mg p.o. daily.  3. Mixed hyperlipidemia   She is currently on Lipitor 40 mg p.o. daily continue the same on her last blood work her total cholesterol is 151 HDL is 49 LDL is 73 and triglycerides of 145.  4. Hypertensive heart disease with right-sided heart failure.  Patient had an echocardiogram done and she had normal to hyperdynamic left ventricle and evidence of possibly minimal diastolic dysfunction.  She is currently  on metoprolol succinate 50 mg p.o. daily continue the same.  5. Palpitations   What patient feels his palpitations is actually normal sinus rhythm and it is in response to orthostatic hypotension.  She does feel the racing of the heart a little bit.    6. Dizziness   She does have intermittent dizziness all at the same time.  Patient to wear compression stockings.    7. 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  8. Continue current management I will see her in the office in 6 months time.  9. Reviewed her echo results and also her cardiac monitor Zio results with her.              Problem List Items Addressed This Visit          Cardiac/Vascular    Essential hypertension     Other Visit Diagnoses       Orthostatic hypotension    -  Primary    Mixed hyperlipidemia        Hypertensive heart disease with right-sided heart failure and stage 3a chronic kidney disease, unspecified HF chronicity        Diabetes mellitus with nephropathy        Palpitation        Dizziness        Renal cell carcinoma of left kidney                No follow-ups on file.

## 2024-01-04 DIAGNOSIS — E11.42 TYPE 2 DIABETES MELLITUS WITH DIABETIC POLYNEUROPATHY, WITH LONG-TERM CURRENT USE OF INSULIN: ICD-10-CM

## 2024-01-04 DIAGNOSIS — Z79.4 TYPE 2 DIABETES MELLITUS WITH DIABETIC POLYNEUROPATHY, WITH LONG-TERM CURRENT USE OF INSULIN: ICD-10-CM

## 2024-01-08 ENCOUNTER — TELEPHONE (OUTPATIENT)
Dept: FAMILY MEDICINE | Facility: CLINIC | Age: 69
End: 2024-01-08
Payer: MEDICARE

## 2024-01-08 RX ORDER — BLOOD-GLUCOSE SENSOR
EACH MISCELLANEOUS
Qty: 6 EACH | Refills: 4 | Status: SHIPPED | OUTPATIENT
Start: 2024-01-08 | End: 2025-04-02

## 2024-01-08 NOTE — TELEPHONE ENCOUNTER
----- Message from Skyla Nazario sent at 1/8/2024 11:42 AM CST -----  Regarding: flash glucose sensor (FREESTYLE BANG 14 DAY SENSOR) Kit  Patient called stating that she has left messages to get flash glucose sensor (FREESTYLE BANG 14 DAY SENSOR) Kit sent to [facility] on [address]. But has not heard back from anyone and [facility] has not received a request. She said she is completely out. Please contact patient with any questions and if/when done.     Thank You

## 2024-01-08 NOTE — TELEPHONE ENCOUNTER
----- Message from Skyla Nazario sent at 1/8/2024 11:42 AM CST -----  Regarding: flash glucose sensor (FREESTYLE BANG 14 DAY SENSOR) Kit  Patient called stating that she has left messages to get flash glucose sensor (FREESTYLE BANG 14 DAY SENSOR) Kit sent to [facility] on [address]. But has not heard back from anyone and [facility] has not received a request. She said she is completely out. Please contact patient with any questions and if/when done.     Thank You

## 2024-01-10 ENCOUNTER — OFFICE VISIT (OUTPATIENT)
Dept: PAIN MEDICINE | Facility: CLINIC | Age: 69
End: 2024-01-10
Payer: MEDICARE

## 2024-01-10 ENCOUNTER — TELEPHONE (OUTPATIENT)
Dept: PAIN MEDICINE | Facility: CLINIC | Age: 69
End: 2024-01-10

## 2024-01-10 VITALS
SYSTOLIC BLOOD PRESSURE: 142 MMHG | BODY MASS INDEX: 27.64 KG/M2 | WEIGHT: 172 LBS | HEIGHT: 66 IN | HEART RATE: 67 BPM | DIASTOLIC BLOOD PRESSURE: 80 MMHG

## 2024-01-10 DIAGNOSIS — M46.1 SACROILIITIS: ICD-10-CM

## 2024-01-10 DIAGNOSIS — M46.1 SACROILIITIS: Primary | ICD-10-CM

## 2024-01-10 DIAGNOSIS — M70.62 GREATER TROCHANTERIC BURSITIS OF LEFT HIP: ICD-10-CM

## 2024-01-10 DIAGNOSIS — M62.838 MUSCLE SPASM OF LEFT LOWER EXTREMITY: Primary | ICD-10-CM

## 2024-01-10 PROCEDURE — 99213 OFFICE O/P EST LOW 20 MIN: CPT | Mod: PBBFAC,PN | Performed by: PHYSICIAN ASSISTANT

## 2024-01-10 PROCEDURE — 99214 OFFICE O/P EST MOD 30 MIN: CPT | Mod: S$PBB,,, | Performed by: PHYSICIAN ASSISTANT

## 2024-01-10 PROCEDURE — 99999 PR PBB SHADOW E&M-EST. PATIENT-LVL III: CPT | Mod: PBBFAC,,, | Performed by: PHYSICIAN ASSISTANT

## 2024-01-10 RX ORDER — METHOCARBAMOL 500 MG/1
500 TABLET, FILM COATED ORAL 4 TIMES DAILY PRN
Qty: 40 TABLET | Refills: 0 | Status: SHIPPED | OUTPATIENT
Start: 2024-01-10 | End: 2024-01-20

## 2024-01-10 NOTE — TELEPHONE ENCOUNTER
Types of orders made on 01/10/2024: Medications, Procedure Request      Order Date:1/10/2024   Ordering User:ANASTASIA WINSTON [[id number]]   Encounter Provider:Anastasia Winston PA-C [[id number]]   Authorizing Provider: Anastasia Winston PA-C [[id number]]   Supervising Provider:ABELARDO LARSEN [[id number]]   Type of Supervision:Supervision Required   Department:[facility] PAIN MANAGEMENT[[id number]]      Common Order Information   Procedure -> Sacroiliac Injection (Specify laterality) Cmt: left SIJ and GTB             injection       -> Other (Specify location and laterality)      Pre-op Diagnosis -> Greater trochanteric bursiti   s of left hip       -> Sacroiliitis      Order Specific Information   Order: Procedure Order to Pain Management [Custom: [id number]]  Order #:          [id number]: 1 FUTURE     Priority: Routine  Class: Clinic Performed     Future Order Information       Expires on:01/10/2025            Expected by:01/10/2024                   Associated Diagnoses       M62.838 Muscle spasm of left lower extremity       M

## 2024-01-10 NOTE — PROGRESS NOTES
FOLLOW UP NOTE:     CHIEF COMPLAINT: left sided back and hip pain      INTERVAL HISTORY OF PRESENT ILLNESS: Pyaton Castillo is a 68 y.o. female with PMH significant for HTN, DM II on insulin, CKD, and hx of bilateral knee arthroplasties presents as an established patient for the continued management of back and hip pain.  She had moderate short term improvement with Left SIJ injection. Sharp pain has returned as well as left lateral hip pain. Pain worsens with walking and standing up.  She is s/p lumbar MB RFA at L3, 4, 5 with 30% improvement. Sharp pain has resolved. She discontinued Tylenol #3 PO BID. She is taking Gabapentin 800 mg TID.   The patient denies of any significant changes in her health since her last appointment. The patient also denies of any changes in the character of her pain since her last appointment. The patient reports that her current pain is a 7/10. Patient denies of any urinary/fecal incontinence, saddle anesthesia, or weakness.     INITIAL HISTORY OF PRESENT ILLNESS: Payton Castillo is a 67 y.o. female with PMH significant for HTN, DM II on insulin, CKD, and hx of bilateral knee arthroplasties presents as a referral for the evaluation of back and hip pain. The patient reports that her pain began approximately decades ago after no inciting incident or trauma. The patient reports of worsening severity over time. Hip pain is currently her worst pain. The patient believes her low back and hip pain are related. The patient localizes her pain to the area across her lower back (L > R). The patient reports of radiation to her hips b/l (L > R) and down the lateral aspect of her BLE's to her ankles. The patient describes her pain as a throbbing and sharp type of pain and a burning type of pain in her legs. The patient reports of tingling in her BLE's and her feet. The patient reports that her pain is a 6/10 with prolonged standing. Patient denies of any urinary/fecal incontinence, saddle anesthesia, or  "weakness.       Aggravating factors: bending, standing, walking     Mitigating factors: sitting, heat, massage      Relevant Surgeries: no     Interventional Therapies: yes; saw Dr. Dane Dalton - did back injections with some benefit         CURRENT PAIN MEDICATIONS:   Currently taking: Biofreeze, OTC Tylenol, gabapentin 800 mg PO TID, Tylenol #3 PO BID     Has tried in the past:    Opioids: yes  NSAIDS: no; avoids secondary to CKD  Tylenol: yes  Muscle relaxants: yes; tried with some benefit; tried robaxin - was instructed to discontinue secondary to her kidney issues  TCAs: no  SNRIs: no  Anticonvulsants: yes  topical creams: yes      ROS:  Review of Systems   Constitutional:  Negative for chills and fever.   HENT:  Negative for sore throat.    Eyes:  Negative for visual disturbance.   Respiratory:  Negative for shortness of breath.    Cardiovascular:  Negative for chest pain.   Gastrointestinal:  Negative for nausea and vomiting.   Genitourinary:  Negative for difficulty urinating.   Musculoskeletal:  Positive for arthralgias and back pain.   Skin:  Negative for rash.   Allergic/Immunologic: Negative for immunocompromised state.   Neurological:  Negative for syncope.   Hematological:  Does not bruise/bleed easily.   Psychiatric/Behavioral:  Negative for suicidal ideas.         MEDICAL, SURGICAL, FAMILY, SOCIAL HX: reviewed    MEDICATIONS/ALLERGIES: reviewed    PHYSICAL EXAM:    VITALS: Vitals reviewed.   Vitals:    01/10/24 1113   BP: (!) 142/80   Pulse: 67   Weight: 78 kg (172 lb)   Height: 5' 6" (1.676 m)   PainSc:   7   PainLoc: Back       Physical Exam  Vitals and nursing note reviewed.   Constitutional:       Appearance: Normal appearance. She is not toxic-appearing or diaphoretic.   HENT:      Head: Normocephalic and atraumatic.   Eyes:      General:         Right eye: No discharge.         Left eye: No discharge.      Extraocular Movements: Extraocular movements intact.      Conjunctiva/sclera: " Conjunctivae normal.   Cardiovascular:      Rate and Rhythm: Normal rate.   Pulmonary:      Effort: Pulmonary effort is normal. No respiratory distress.      Breath sounds: Normal breath sounds.   Abdominal:      Palpations: Abdomen is soft.   Skin:     General: Skin is warm and dry.      Findings: No rash.   Neurological:      Mental Status: She is alert and oriented to person, place, and time.   Psychiatric:         Mood and Affect: Mood and affect normal.         Cognition and Memory: Memory normal.         Judgment: Judgment normal.          UPPER EXTREMITIES: Normal alignment, normal range of motion, no atrophy, no skin changes,  hair growth and nail growth normal and equal bilaterally. No swelling, no tenderness.    LOWER EXTREMITIES:  Normal alignment, normal range of motion, no atrophy, no skin changes,  hair growth and nail growth normal and equal bilaterally. No swelling, no tenderness.     LUMBAR SPINE  Lumbar spine: ROM is limited with flexion extension and oblique extension with increased pain with passive ROM    ((--)) Supine straight leg raise    ((-)) Facet loading   Internal and external rotation of the hip causes no increased pain on either side.  Myofascial exam: Tenderness to palpation across lumbar paraspinous muscles.     ((+)) TTP at the SI joint  ((+)) MANDO's test  ((+)) One leg stand    ((+)) Distraction test  ((+)) Thigh thrust   + TTP left GTB  MOTOR: Tone and bulk: normal      MUSCLE STRENGTH:  Hip Flexion: Right 5/5, Left 5/5  Hip Extension: Right 5/5, Left 5/5  Leg Flexion: Right 5/5, Left 5/5  Leg Extension: Right 5/5, Left 5/5  Plantar Flexion: Right 5/5, Left 5/5  Dorsiflexion: Right 5/5, Left 5/5     Delt      Bi         Tri                         Right:   5/5       5/5       5/5       5/5         Left:     5/5       5/5       5/5       5/5            SENSATION: Light touch and pinprick intact bilaterally  REFLEXES: normal, symmetric, nonbrisk.  Toes down, no clonus. Negative  woody's sign bilaterally.  GAIT: normal rise, base, steps, and arm swing.      IMAGING:    MRI Lumbar Spine without contrast (9/16/2022):  FINDINGS: Comparison to multiple prior exams. The lumbar spine has normal lordotic curvature and vertebral body alignment, with normal vertebral heights, and no acute fractures or evidence of a diffuse marrow replacement process. Mild-to-moderate degenerative loss of disc height and signal involves multiple levels. The conus medullaris is normal in signal and terminates at L1-L2.     At T12-L1, L1-L2, L2-L3, L3-L4, and L4-L5, there is mild broad-based disc bulging, without focal disc herniation or spinal canal stenosis. There is mild multilevel facet arthropathy, without significant neural foraminal narrowing or evidence of nerve impingement..     At L5-S1, there is broad-based disc bulging with T2 hyperintense right paracentral partial-thickness annular fissure. There is no focal disc herniation or spinal canal stenosis, with bilateral facet hypertrophy and T2 hyperintense synovial cyst formation. There is no significant foraminal narrowing or evidence of nerve impingement.     There are no signal abnormalities of the paraspinal soft tissues or the paraspinal ligaments. The visualized retroperitoneum is unremarkable.     IMPRESSION: Multilevel lumbar degenerative disc disease and facet osteoarthritis, with no focal disc herniation, spinal canal stenosis, or evidence of nerve impingement at any level.    X-ray bilateral hips (8/26/2022):  There is mild osteoarthritic changes of the left hip with spurring of the acetabulum. The hip joints are symmetric in size. There are no fractures or acute osseous abnormalities. The SI joints are symmetrical. There is facet hypertrophy at L5-S1. The soft tissues are normal    X-ray sacroiliac joints (8/26/2022):  The SI joints are symmetric in size. There are no erosions or ankylosis. There are mild degenerative changes of the left hip. There  is facet hypertrophy at L5-S1. There are no acute osseous anomalies.     IMPRESSION: Facet hypertrophy at L5-S1     Normal SI joints     Mild degenerative changes of the left hip    ASSESSMENT: Payton Castillo is a 67 y.o. female with PMH significant for HTN, DM II on insulin, CKD, and hx of bilateral knee arthroplasties presents as an established patient for the continued management of back and hip pain. Treatment plan outlined below.     PLAN:    Schedule left SIJ and GTB injection. I have explained the risks, benefits, and alternatives of the procedure in detail. The patient voices understanding and all questions have been answered. The patient agrees to proceed as planned. Written Consent obtained.   Avoid NSAIDS given history of CKD  I have stressed the importance of physical activity and a home exercise plan to help with chronic pain and improve health.  Continue Gabapentin 800 mg TID. Discussed decreased clearance due to CKD. Do not recommend further dose escalation   Monitor progress from lumbar MB RFA at L3, 4, 5 bilaterally.   6.  F/u s/p injection

## 2024-01-10 NOTE — H&P (VIEW-ONLY)
FOLLOW UP NOTE:     CHIEF COMPLAINT: left sided back and hip pain      INTERVAL HISTORY OF PRESENT ILLNESS: Payton Castillo is a 68 y.o. female with PMH significant for HTN, DM II on insulin, CKD, and hx of bilateral knee arthroplasties presents as an established patient for the continued management of back and hip pain.  She had moderate short term improvement with Left SIJ injection. Sharp pain has returned as well as left lateral hip pain. Pain worsens with walking and standing up.  She is s/p lumbar MB RFA at L3, 4, 5 with 30% improvement. Sharp pain has resolved. She discontinued Tylenol #3 PO BID. She is taking Gabapentin 800 mg TID.   The patient denies of any significant changes in her health since her last appointment. The patient also denies of any changes in the character of her pain since her last appointment. The patient reports that her current pain is a 7/10. Patient denies of any urinary/fecal incontinence, saddle anesthesia, or weakness.     INITIAL HISTORY OF PRESENT ILLNESS: Payton Castillo is a 67 y.o. female with PMH significant for HTN, DM II on insulin, CKD, and hx of bilateral knee arthroplasties presents as a referral for the evaluation of back and hip pain. The patient reports that her pain began approximately decades ago after no inciting incident or trauma. The patient reports of worsening severity over time. Hip pain is currently her worst pain. The patient believes her low back and hip pain are related. The patient localizes her pain to the area across her lower back (L > R). The patient reports of radiation to her hips b/l (L > R) and down the lateral aspect of her BLE's to her ankles. The patient describes her pain as a throbbing and sharp type of pain and a burning type of pain in her legs. The patient reports of tingling in her BLE's and her feet. The patient reports that her pain is a 6/10 with prolonged standing. Patient denies of any urinary/fecal incontinence, saddle anesthesia, or  "weakness.       Aggravating factors: bending, standing, walking     Mitigating factors: sitting, heat, massage      Relevant Surgeries: no     Interventional Therapies: yes; saw Dr. Dane Dalton - did back injections with some benefit         CURRENT PAIN MEDICATIONS:   Currently taking: Biofreeze, OTC Tylenol, gabapentin 800 mg PO TID, Tylenol #3 PO BID     Has tried in the past:    Opioids: yes  NSAIDS: no; avoids secondary to CKD  Tylenol: yes  Muscle relaxants: yes; tried with some benefit; tried robaxin - was instructed to discontinue secondary to her kidney issues  TCAs: no  SNRIs: no  Anticonvulsants: yes  topical creams: yes      ROS:  Review of Systems   Constitutional:  Negative for chills and fever.   HENT:  Negative for sore throat.    Eyes:  Negative for visual disturbance.   Respiratory:  Negative for shortness of breath.    Cardiovascular:  Negative for chest pain.   Gastrointestinal:  Negative for nausea and vomiting.   Genitourinary:  Negative for difficulty urinating.   Musculoskeletal:  Positive for arthralgias and back pain.   Skin:  Negative for rash.   Allergic/Immunologic: Negative for immunocompromised state.   Neurological:  Negative for syncope.   Hematological:  Does not bruise/bleed easily.   Psychiatric/Behavioral:  Negative for suicidal ideas.         MEDICAL, SURGICAL, FAMILY, SOCIAL HX: reviewed    MEDICATIONS/ALLERGIES: reviewed    PHYSICAL EXAM:    VITALS: Vitals reviewed.   Vitals:    01/10/24 1113   BP: (!) 142/80   Pulse: 67   Weight: 78 kg (172 lb)   Height: 5' 6" (1.676 m)   PainSc:   7   PainLoc: Back       Physical Exam  Vitals and nursing note reviewed.   Constitutional:       Appearance: Normal appearance. She is not toxic-appearing or diaphoretic.   HENT:      Head: Normocephalic and atraumatic.   Eyes:      General:         Right eye: No discharge.         Left eye: No discharge.      Extraocular Movements: Extraocular movements intact.      Conjunctiva/sclera: " Conjunctivae normal.   Cardiovascular:      Rate and Rhythm: Normal rate.   Pulmonary:      Effort: Pulmonary effort is normal. No respiratory distress.      Breath sounds: Normal breath sounds.   Abdominal:      Palpations: Abdomen is soft.   Skin:     General: Skin is warm and dry.      Findings: No rash.   Neurological:      Mental Status: She is alert and oriented to person, place, and time.   Psychiatric:         Mood and Affect: Mood and affect normal.         Cognition and Memory: Memory normal.         Judgment: Judgment normal.          UPPER EXTREMITIES: Normal alignment, normal range of motion, no atrophy, no skin changes,  hair growth and nail growth normal and equal bilaterally. No swelling, no tenderness.    LOWER EXTREMITIES:  Normal alignment, normal range of motion, no atrophy, no skin changes,  hair growth and nail growth normal and equal bilaterally. No swelling, no tenderness.     LUMBAR SPINE  Lumbar spine: ROM is limited with flexion extension and oblique extension with increased pain with passive ROM    ((--)) Supine straight leg raise    ((-)) Facet loading   Internal and external rotation of the hip causes no increased pain on either side.  Myofascial exam: Tenderness to palpation across lumbar paraspinous muscles.     ((+)) TTP at the SI joint  ((+)) MANDO's test  ((+)) One leg stand    ((+)) Distraction test  ((+)) Thigh thrust   + TTP left GTB  MOTOR: Tone and bulk: normal      MUSCLE STRENGTH:  Hip Flexion: Right 5/5, Left 5/5  Hip Extension: Right 5/5, Left 5/5  Leg Flexion: Right 5/5, Left 5/5  Leg Extension: Right 5/5, Left 5/5  Plantar Flexion: Right 5/5, Left 5/5  Dorsiflexion: Right 5/5, Left 5/5     Delt      Bi         Tri                         Right:   5/5       5/5       5/5       5/5         Left:     5/5       5/5       5/5       5/5            SENSATION: Light touch and pinprick intact bilaterally  REFLEXES: normal, symmetric, nonbrisk.  Toes down, no clonus. Negative  woody's sign bilaterally.  GAIT: normal rise, base, steps, and arm swing.      IMAGING:    MRI Lumbar Spine without contrast (9/16/2022):  FINDINGS: Comparison to multiple prior exams. The lumbar spine has normal lordotic curvature and vertebral body alignment, with normal vertebral heights, and no acute fractures or evidence of a diffuse marrow replacement process. Mild-to-moderate degenerative loss of disc height and signal involves multiple levels. The conus medullaris is normal in signal and terminates at L1-L2.     At T12-L1, L1-L2, L2-L3, L3-L4, and L4-L5, there is mild broad-based disc bulging, without focal disc herniation or spinal canal stenosis. There is mild multilevel facet arthropathy, without significant neural foraminal narrowing or evidence of nerve impingement..     At L5-S1, there is broad-based disc bulging with T2 hyperintense right paracentral partial-thickness annular fissure. There is no focal disc herniation or spinal canal stenosis, with bilateral facet hypertrophy and T2 hyperintense synovial cyst formation. There is no significant foraminal narrowing or evidence of nerve impingement.     There are no signal abnormalities of the paraspinal soft tissues or the paraspinal ligaments. The visualized retroperitoneum is unremarkable.     IMPRESSION: Multilevel lumbar degenerative disc disease and facet osteoarthritis, with no focal disc herniation, spinal canal stenosis, or evidence of nerve impingement at any level.    X-ray bilateral hips (8/26/2022):  There is mild osteoarthritic changes of the left hip with spurring of the acetabulum. The hip joints are symmetric in size. There are no fractures or acute osseous abnormalities. The SI joints are symmetrical. There is facet hypertrophy at L5-S1. The soft tissues are normal    X-ray sacroiliac joints (8/26/2022):  The SI joints are symmetric in size. There are no erosions or ankylosis. There are mild degenerative changes of the left hip. There  is facet hypertrophy at L5-S1. There are no acute osseous anomalies.     IMPRESSION: Facet hypertrophy at L5-S1     Normal SI joints     Mild degenerative changes of the left hip    ASSESSMENT: Payton Castillo is a 67 y.o. female with PMH significant for HTN, DM II on insulin, CKD, and hx of bilateral knee arthroplasties presents as an established patient for the continued management of back and hip pain. Treatment plan outlined below.     PLAN:    Schedule left SIJ and GTB injection. I have explained the risks, benefits, and alternatives of the procedure in detail. The patient voices understanding and all questions have been answered. The patient agrees to proceed as planned. Written Consent obtained.   Avoid NSAIDS given history of CKD  I have stressed the importance of physical activity and a home exercise plan to help with chronic pain and improve health.  Continue Gabapentin 800 mg TID. Discussed decreased clearance due to CKD. Do not recommend further dose escalation   Monitor progress from lumbar MB RFA at L3, 4, 5 bilaterally.   6.  F/u s/p injection

## 2024-01-11 DIAGNOSIS — Z00.00 ENCOUNTER FOR MEDICARE ANNUAL WELLNESS EXAM: ICD-10-CM

## 2024-01-18 RX ORDER — GABAPENTIN 800 MG/1
800 TABLET ORAL 3 TIMES DAILY
Qty: 270 TABLET | Refills: 3 | Status: SHIPPED | OUTPATIENT
Start: 2024-01-18

## 2024-01-23 DIAGNOSIS — E11.42 TYPE 2 DIABETES MELLITUS WITH DIABETIC POLYNEUROPATHY, WITH LONG-TERM CURRENT USE OF INSULIN: ICD-10-CM

## 2024-01-23 DIAGNOSIS — Z79.4 TYPE 2 DIABETES MELLITUS WITH DIABETIC POLYNEUROPATHY, WITH LONG-TERM CURRENT USE OF INSULIN: ICD-10-CM

## 2024-01-23 RX ORDER — INSULIN GLARGINE 100 [IU]/ML
INJECTION, SOLUTION SUBCUTANEOUS
Qty: 21 ML | Refills: 3 | Status: SHIPPED | OUTPATIENT
Start: 2024-01-23 | End: 2024-03-14 | Stop reason: SDUPTHER

## 2024-01-26 ENCOUNTER — HOSPITAL ENCOUNTER (OUTPATIENT)
Facility: HOSPITAL | Age: 69
Discharge: HOME | End: 2024-01-26
Attending: ANESTHESIOLOGY | Admitting: ANESTHESIOLOGY
Payer: MEDICARE

## 2024-01-26 DIAGNOSIS — M53.3 SACROILIAC JOINT PAIN: ICD-10-CM

## 2024-01-26 DIAGNOSIS — M46.1 SACROILIITIS: ICD-10-CM

## 2024-01-26 LAB — POCT GLUCOSE: 113 MG/DL (ref 70–110)

## 2024-01-26 PROCEDURE — 25000003 PHARM REV CODE 250: Performed by: ANESTHESIOLOGY

## 2024-01-26 PROCEDURE — 20610 DRAIN/INJ JOINT/BURSA W/O US: CPT | Mod: 59,LT | Performed by: ANESTHESIOLOGY

## 2024-01-26 PROCEDURE — 25500020 PHARM REV CODE 255: Performed by: ANESTHESIOLOGY

## 2024-01-26 PROCEDURE — 27096 INJECT SACROILIAC JOINT: CPT | Mod: LT,,, | Performed by: ANESTHESIOLOGY

## 2024-01-26 PROCEDURE — 27096 INJECT SACROILIAC JOINT: CPT

## 2024-01-26 PROCEDURE — 27096 INJECT SACROILIAC JOINT: CPT | Mod: LT | Performed by: ANESTHESIOLOGY

## 2024-01-26 PROCEDURE — 20610 DRAIN/INJ JOINT/BURSA W/O US: CPT | Mod: 59,LT,, | Performed by: ANESTHESIOLOGY

## 2024-01-26 PROCEDURE — 63600175 PHARM REV CODE 636 W HCPCS: Performed by: ANESTHESIOLOGY

## 2024-01-26 RX ORDER — LIDOCAINE HYDROCHLORIDE 10 MG/ML
INJECTION, SOLUTION EPIDURAL; INFILTRATION; INTRACAUDAL; PERINEURAL
Status: DISCONTINUED | OUTPATIENT
Start: 2024-01-26 | End: 2024-01-26 | Stop reason: HOSPADM

## 2024-01-26 RX ORDER — FENTANYL CITRATE 50 UG/ML
INJECTION, SOLUTION INTRAMUSCULAR; INTRAVENOUS
Status: DISCONTINUED | OUTPATIENT
Start: 2024-01-26 | End: 2024-01-26 | Stop reason: HOSPADM

## 2024-01-26 RX ORDER — LIDOCAINE HYDROCHLORIDE 10 MG/ML
1 INJECTION, SOLUTION EPIDURAL; INFILTRATION; INTRACAUDAL; PERINEURAL ONCE
Status: COMPLETED | OUTPATIENT
Start: 2024-01-26 | End: 2024-01-26

## 2024-01-26 RX ORDER — METHYLPREDNISOLONE ACETATE 80 MG/ML
INJECTION, SUSPENSION INTRA-ARTICULAR; INTRALESIONAL; INTRAMUSCULAR; SOFT TISSUE
Status: DISCONTINUED | OUTPATIENT
Start: 2024-01-26 | End: 2024-01-26 | Stop reason: HOSPADM

## 2024-01-26 RX ORDER — MIDAZOLAM HYDROCHLORIDE 1 MG/ML
INJECTION INTRAMUSCULAR; INTRAVENOUS
Status: DISCONTINUED | OUTPATIENT
Start: 2024-01-26 | End: 2024-01-26 | Stop reason: HOSPADM

## 2024-01-26 RX ORDER — BUPIVACAINE HYDROCHLORIDE 2.5 MG/ML
INJECTION, SOLUTION EPIDURAL; INFILTRATION; INTRACAUDAL
Status: DISCONTINUED | OUTPATIENT
Start: 2024-01-26 | End: 2024-01-26 | Stop reason: HOSPADM

## 2024-01-26 RX ORDER — SODIUM CHLORIDE, SODIUM LACTATE, POTASSIUM CHLORIDE, CALCIUM CHLORIDE 600; 310; 30; 20 MG/100ML; MG/100ML; MG/100ML; MG/100ML
INJECTION, SOLUTION INTRAVENOUS CONTINUOUS
Status: ACTIVE | OUTPATIENT
Start: 2024-01-26

## 2024-01-26 RX ADMIN — SODIUM CHLORIDE, POTASSIUM CHLORIDE, SODIUM LACTATE AND CALCIUM CHLORIDE 500 ML: 600; 310; 30; 20 INJECTION, SOLUTION INTRAVENOUS at 09:01

## 2024-01-26 RX ADMIN — LIDOCAINE HYDROCHLORIDE 0.2 MG: 10 INJECTION, SOLUTION EPIDURAL; INFILTRATION; INTRACAUDAL; PERINEURAL at 09:01

## 2024-01-26 NOTE — DISCHARGE SUMMARY
[facility] - Periop Services  Discharge Note  Short Stay    Procedure(s) (LRB):  INJECTION,SACROILIAC JOINT (Left)  Injection, Joint (Left)      OUTCOME: Patient tolerated treatment/procedure well without complication and is now ready for discharge.    DISPOSITION: Home or Self Care    FINAL DIAGNOSIS:  <principal problem not specified>    FOLLOWUP: In clinic    DISCHARGE INSTRUCTIONS:    Discharge Procedure Orders   Notify your health care provider if you experience any of the following:  temperature >100.4     Notify your health care provider if you experience any of the following:  severe uncontrolled pain     Notify your health care provider if you experience any of the following:  redness, tenderness, or signs of infection (pain, swelling, redness, odor or green/yellow discharge around incision site)     Activity as tolerated        TIME SPENT ON DISCHARGE: 30 minutes

## 2024-01-26 NOTE — DISCHARGE INSTRUCTIONS
Pain injection instructions:     This procedure may take a couple weeks to relieve pain  You may get some pain relief from the local anesthetic initally.   Steroids can have side effects of flushed face or nervous feeling.    No driving for 24 hrs.   Activity as tolerated- gradually increase activities.  Dont lift over 10 lbs for 24 hrs   No heat at injection sites for 2 full days. No heating pads, hot tubs, saunas, or swimming in any body of water or pool for 2 full days.  Use ice pack for mild swelling and for comfort , apply for 20 minutes, remove for 20 minute intervals. No direct contact of ice itself  to skin.  May shower today.  Do not allow shower water to beat on injections site(s) for 2 full days. No tub baths for two full days.      Resume Aspirin, Plavix, or Coumadin the day after the procedure unless otherwise instructed.   If diabetic,monitor your glucose carefully as steroids can increase your glucose level    Seek immediate medical help for:     Severe increase in pain not relieved by medication or ice or any new pain in the region .    Prolonged (more than 24 hrs)or increasing weakness or numbness in the legs or arms. - it is normal to have weakness/numbness for up to 8 hrs.   Fever above 100 degrees F , Drainage,redness,active bleeding, or increased swelling at the injection site.  Headache that increases when sitting up, but decreases when lying down.  New shortness of breath, chest pain, or breathing problems.    After Surgery:  Always be aware that any surgery can cause these symptoms:    Pain- Medication can be prescribed for pain to decrease your pain but may not completely take your pain away. Over the Counter pain medicine my be enough and you can always use Ice and rest to help ease pain.    Bleeding- a little bleeding after a surgery is usually within normal.  If there is a lot of blood you need to notify your MD.  Emergency treatments of bleeding are cold application, elevation of the  bleeding site and compression.    Infection- Infection after surgery is NOT a normal occurrence.  Signs of infection are fever, swelling, hot to touch the incision.  If this occurs notify your MD immediately.    Nausea- this can be common after a surgery especially if you have had anesthesia medicine or are taking pain medicine.  Steroids have a side effect of nausea sometimes. Staying on clear liquids, bland foods, gingerale, or over the counter anti nausea medicines can help.  If you vomit more than once, notify your MD.  Anti Nausea medicines can be prescribed.   No indicators present

## 2024-01-26 NOTE — OP NOTE
PROCEDURE DATE: 1/26/2024       PROCEDURE: 1. Left Greater trochanteric bursa injection under fluoroscopy                            2. Left sacroiliac joint injection under fluoroscopy     DIAGNOSIS: Greater trochanteric bursistis; Sacroiliac joint pain  Post op diagnosis: Same     PHYSICIAN: Howard Rivas MD     MEDICATIONS INJECTED:  Methylprednisone 40mg (0.5ml) and 1.5ml 0.25% bupivicaine at each site     LOCAL ANESTHETIC INJECTED:  Lidocaine 1%. 2 ml per site.     SEDATION MEDICATIONS: RN IV sedation     ESTIMATED BLOOD LOSS:  None     COMPLICATIONS:  NOne     TECHNIQUE:   A time-out was taken to identify patient and procedure side prior to starting the procedure. The patient was placed in a prone position, prepped and draped in the usual sterile fashion using ChloraPrep and sterile towels.  The area to be injected was determined under fluoroscopic guidance in AP and oblique view.  Local anesthetic was given by raising a wheal and going down to the hub of a 25-gauge 1.5 inch needle.  A 22-gauge 3.5 inch needle was then inserted until contacting os on the left greater trochanter and then withdrawn slightly.  Aspiration was negative for blood.  A mixture of 40 mg of methyl prednisone and 2 mL of 0.25% bupivacaine was then injected.  The needle was removed and a bandage was then applied.  The patient tolerated the procedure well.     Next, right sacroiliac joint was determined under fluoroscopy in the AP view.  Lidocaine was injected by raising a wheal and going down to the periosteum using a 25-gauge 1.5 inch needle.  The 3.5 inch 22-gauge spinal needle was introduced into inferior opening of the left sacroiliac joint.  Negative pressure applied to confirm no intravascular placement.  0.5 ml of contrast dye was injected to confirm placement and to confirm that there was no vascular uptake. The medication was then injected slowly. The patient tolerated the procedure well.     The patient was monitored after the  procedure.  The patient was given post procedure and discharge instructions to follow at home. The patient was discharged in a stable condition

## 2024-01-29 VITALS
WEIGHT: 171.94 LBS | HEIGHT: 66 IN | TEMPERATURE: 97 F | RESPIRATION RATE: 18 BRPM | BODY MASS INDEX: 27.63 KG/M2 | SYSTOLIC BLOOD PRESSURE: 139 MMHG | DIASTOLIC BLOOD PRESSURE: 63 MMHG | OXYGEN SATURATION: 96 % | HEART RATE: 56 BPM

## 2024-02-01 DIAGNOSIS — Z79.4 TYPE 2 DIABETES MELLITUS WITH DIABETIC POLYNEUROPATHY, WITH LONG-TERM CURRENT USE OF INSULIN: ICD-10-CM

## 2024-02-01 DIAGNOSIS — E11.42 TYPE 2 DIABETES MELLITUS WITH DIABETIC POLYNEUROPATHY, WITH LONG-TERM CURRENT USE OF INSULIN: ICD-10-CM

## 2024-02-01 RX ORDER — SYRINGE AND NEEDLE,INSULIN,1ML 30 G X1/2"
SYRINGE, EMPTY DISPOSABLE MISCELLANEOUS
Qty: 100 EACH | Refills: 3 | Status: SHIPPED | OUTPATIENT
Start: 2024-02-01

## 2024-02-26 ENCOUNTER — OFFICE VISIT (OUTPATIENT)
Dept: PAIN MEDICINE | Facility: CLINIC | Age: 69
End: 2024-02-26
Payer: MEDICARE

## 2024-02-26 VITALS
HEART RATE: 61 BPM | BODY MASS INDEX: 27.48 KG/M2 | HEIGHT: 66 IN | DIASTOLIC BLOOD PRESSURE: 68 MMHG | SYSTOLIC BLOOD PRESSURE: 158 MMHG | WEIGHT: 171 LBS

## 2024-02-26 DIAGNOSIS — M70.62 GREATER TROCHANTERIC BURSITIS OF LEFT HIP: Primary | ICD-10-CM

## 2024-02-26 DIAGNOSIS — M62.838 MUSCLE SPASM OF LEFT LOWER EXTREMITY: ICD-10-CM

## 2024-02-26 DIAGNOSIS — M46.1 SACROILIITIS: ICD-10-CM

## 2024-02-26 PROCEDURE — 99214 OFFICE O/P EST MOD 30 MIN: CPT | Mod: S$PBB,,, | Performed by: PHYSICIAN ASSISTANT

## 2024-02-26 PROCEDURE — 99213 OFFICE O/P EST LOW 20 MIN: CPT | Mod: PBBFAC,PN | Performed by: PHYSICIAN ASSISTANT

## 2024-02-26 PROCEDURE — 99999 PR PBB SHADOW E&M-EST. PATIENT-LVL III: CPT | Mod: PBBFAC,,, | Performed by: PHYSICIAN ASSISTANT

## 2024-02-26 NOTE — PROGRESS NOTES
FOLLOW UP NOTE:     CHIEF COMPLAINT: left sided back and hip pain      INTERVAL HISTORY OF PRESENT ILLNESS: Payton Castillo is a 68 y.o. female with PMH significant for HTN, DM II on insulin, CKD, and hx of bilateral knee arthroplasties presents as an established patient for the continued management of back and hip pain.  She is s/p Left SIJ and GTB injection. She had 100% relief x 1 week with continued 75% relief since. She is s/p lumbar MB RFA at L3, 4, 5 with 30% improvement. Sharp pain has resolved. She discontinued Tylenol #3 PO BID. She is taking Gabapentin 800 mg TID.   The patient denies of any significant changes in her health since her last appointment. The patient also denies of any changes in the character of her pain since her last appointment. The patient reports that her current pain is a 2/10. Patient denies of any urinary/fecal incontinence, saddle anesthesia, or weakness.     INITIAL HISTORY OF PRESENT ILLNESS: Payton Castillo is a 67 y.o. female with PMH significant for HTN, DM II on insulin, CKD, and hx of bilateral knee arthroplasties presents as a referral for the evaluation of back and hip pain. The patient reports that her pain began approximately decades ago after no inciting incident or trauma. The patient reports of worsening severity over time. Hip pain is currently her worst pain. The patient believes her low back and hip pain are related. The patient localizes her pain to the area across her lower back (L > R). The patient reports of radiation to her hips b/l (L > R) and down the lateral aspect of her BLE's to her ankles. The patient describes her pain as a throbbing and sharp type of pain and a burning type of pain in her legs. The patient reports of tingling in her BLE's and her feet. The patient reports that her pain is a 6/10 with prolonged standing. Patient denies of any urinary/fecal incontinence, saddle anesthesia, or weakness.       Aggravating factors: bending, standing, walking    "  Mitigating factors: sitting, heat, massage      Relevant Surgeries: no     Interventional Therapies: yes; saw Dr. Dane Dalton - did back injections with some benefit         CURRENT PAIN MEDICATIONS:   Currently taking: Biofreeze, OTC Tylenol, gabapentin 800 mg PO TID, Tylenol #3 PO BID     Has tried in the past:    Opioids: yes  NSAIDS: no; avoids secondary to CKD  Tylenol: yes  Muscle relaxants: yes; tried with some benefit; tried robaxin - was instructed to discontinue secondary to her kidney issues  TCAs: no  SNRIs: no  Anticonvulsants: yes  topical creams: yes      ROS:  Review of Systems   Constitutional:  Negative for chills and fever.   HENT:  Negative for sore throat.    Eyes:  Negative for visual disturbance.   Respiratory:  Negative for shortness of breath.    Cardiovascular:  Negative for chest pain.   Gastrointestinal:  Negative for nausea and vomiting.   Genitourinary:  Negative for difficulty urinating.   Musculoskeletal:  Positive for arthralgias and back pain.   Skin:  Negative for rash.   Allergic/Immunologic: Negative for immunocompromised state.   Neurological:  Negative for syncope.   Hematological:  Does not bruise/bleed easily.   Psychiatric/Behavioral:  Negative for suicidal ideas.         MEDICAL, SURGICAL, FAMILY, SOCIAL HX: reviewed    MEDICATIONS/ALLERGIES: reviewed    PHYSICAL EXAM:    VITALS: Vitals reviewed.   Vitals:    02/26/24 1228   BP: (!) 158/68   Pulse: 61   Weight: 77.6 kg (171 lb)   Height: 5' 6" (1.676 m)   PainSc:   2   PainLoc: Hip       Physical Exam  Vitals and nursing note reviewed.   Constitutional:       Appearance: Normal appearance. She is not toxic-appearing or diaphoretic.   HENT:      Head: Normocephalic and atraumatic.   Eyes:      General:         Right eye: No discharge.         Left eye: No discharge.      Extraocular Movements: Extraocular movements intact.      Conjunctiva/sclera: Conjunctivae normal.   Cardiovascular:      Rate and Rhythm: Normal " rate.   Pulmonary:      Effort: Pulmonary effort is normal. No respiratory distress.      Breath sounds: Normal breath sounds.   Abdominal:      Palpations: Abdomen is soft.   Skin:     General: Skin is warm and dry.      Findings: No rash.   Neurological:      Mental Status: She is alert and oriented to person, place, and time.   Psychiatric:         Mood and Affect: Mood and affect normal.         Cognition and Memory: Memory normal.         Judgment: Judgment normal.          UPPER EXTREMITIES: Normal alignment, normal range of motion, no atrophy, no skin changes,  hair growth and nail growth normal and equal bilaterally. No swelling, no tenderness.    LOWER EXTREMITIES:  Normal alignment, normal range of motion, no atrophy, no skin changes,  hair growth and nail growth normal and equal bilaterally. No swelling, no tenderness.     LUMBAR SPINE  Lumbar spine: ROM is limited with flexion extension and oblique extension with increased pain with passive ROM    ((--)) Supine straight leg raise    ((-)) Facet loading   Internal and external rotation of the hip causes no increased pain on either side.  Myofascial exam: Tenderness to palpation across lumbar paraspinous muscles.     ((-)) TTP at the SI joint  ((-)) MANDO's test  ((-)) One leg stand    ((-)) Distraction test  ((-)) Thigh thrust   + TTP left GTB  MOTOR: Tone and bulk: normal      MUSCLE STRENGTH:  Hip Flexion: Right 5/5, Left 5/5  Hip Extension: Right 5/5, Left 5/5  Leg Flexion: Right 5/5, Left 5/5  Leg Extension: Right 5/5, Left 5/5  Plantar Flexion: Right 5/5, Left 5/5  Dorsiflexion: Right 5/5, Left 5/5     Delt      Bi         Tri                         Right:   5/5       5/5       5/5       5/5         Left:     5/5       5/5       5/5       5/5            SENSATION: Light touch and pinprick intact bilaterally  REFLEXES: normal, symmetric, nonbrisk.  Toes down, no clonus. Negative woody's sign bilaterally.  GAIT: normal rise, base, steps, and arm  swing.      IMAGING:    MRI Lumbar Spine without contrast (9/16/2022):  FINDINGS: Comparison to multiple prior exams. The lumbar spine has normal lordotic curvature and vertebral body alignment, with normal vertebral heights, and no acute fractures or evidence of a diffuse marrow replacement process. Mild-to-moderate degenerative loss of disc height and signal involves multiple levels. The conus medullaris is normal in signal and terminates at L1-L2.     At T12-L1, L1-L2, L2-L3, L3-L4, and L4-L5, there is mild broad-based disc bulging, without focal disc herniation or spinal canal stenosis. There is mild multilevel facet arthropathy, without significant neural foraminal narrowing or evidence of nerve impingement..     At L5-S1, there is broad-based disc bulging with T2 hyperintense right paracentral partial-thickness annular fissure. There is no focal disc herniation or spinal canal stenosis, with bilateral facet hypertrophy and T2 hyperintense synovial cyst formation. There is no significant foraminal narrowing or evidence of nerve impingement.     There are no signal abnormalities of the paraspinal soft tissues or the paraspinal ligaments. The visualized retroperitoneum is unremarkable.     IMPRESSION: Multilevel lumbar degenerative disc disease and facet osteoarthritis, with no focal disc herniation, spinal canal stenosis, or evidence of nerve impingement at any level.    X-ray bilateral hips (8/26/2022):  There is mild osteoarthritic changes of the left hip with spurring of the acetabulum. The hip joints are symmetric in size. There are no fractures or acute osseous abnormalities. The SI joints are symmetrical. There is facet hypertrophy at L5-S1. The soft tissues are normal    X-ray sacroiliac joints (8/26/2022):  The SI joints are symmetric in size. There are no erosions or ankylosis. There are mild degenerative changes of the left hip. There is facet hypertrophy at L5-S1. There are no acute osseous  anomalies.     IMPRESSION: Facet hypertrophy at L5-S1     Normal SI joints     Mild degenerative changes of the left hip    ASSESSMENT: Payton Castillo is a 67 y.o. female with PMH significant for HTN, DM II on insulin, CKD, and hx of bilateral knee arthroplasties presents as an established patient for the continued management of back and hip pain. Treatment plan outlined below.     PLAN:    Monitor progress from left SIJ and GTB injection.   Avoid NSAIDS given history of CKD  I have stressed the importance of physical activity and a home exercise plan to help with chronic pain and improve health.  Continue Gabapentin 800 mg TID. Discussed decreased clearance due to CKD. Do not recommend further dose escalation   Monitor progress from lumbar MB RFA at L3, 4, 5 bilaterally.   6.  F/u prn

## 2024-02-27 ENCOUNTER — LAB VISIT (OUTPATIENT)
Dept: LAB | Facility: HOSPITAL | Age: 69
End: 2024-02-27
Attending: NURSE PRACTITIONER
Payer: MEDICARE

## 2024-02-27 DIAGNOSIS — R80.8 NEPHROGENOUS PROTEINURIA: ICD-10-CM

## 2024-02-27 DIAGNOSIS — E79.0 URICACIDEMIA: ICD-10-CM

## 2024-02-27 DIAGNOSIS — N18.31 CHRONIC KIDNEY DISEASE (CKD) STAGE G3A/A1, MODERATELY DECREASED GLOMERULAR FILTRATION RATE (GFR) BETWEEN 45-59 ML/MIN/1.73 SQUARE METER AND ALBUMINURIA CREATININE RATIO LESS THAN 30 MG/G: Primary | ICD-10-CM

## 2024-02-27 DIAGNOSIS — E78.2 MIXED HYPERLIPIDEMIA: ICD-10-CM

## 2024-02-27 DIAGNOSIS — E11.22 TYPE 2 DIABETES MELLITUS WITH END-STAGE RENAL DISEASE: ICD-10-CM

## 2024-02-27 DIAGNOSIS — N18.6 TYPE 2 DIABETES MELLITUS WITH END-STAGE RENAL DISEASE: ICD-10-CM

## 2024-02-27 DIAGNOSIS — I10 ESSENTIAL HYPERTENSION, MALIGNANT: ICD-10-CM

## 2024-02-27 LAB
25(OH)D3+25(OH)D2 SERPL-MCNC: 19 NG/ML (ref 30–96)
ALBUMIN SERPL BCP-MCNC: 4.1 G/DL (ref 3.5–5.2)
ALBUMIN/CREAT UR: 466.6 UG/MG (ref 0–30)
ANION GAP SERPL CALC-SCNC: 7 MMOL/L (ref 8–16)
BACTERIA #/AREA URNS HPF: NORMAL /HPF
BASOPHILS # BLD AUTO: 0.05 K/UL (ref 0–0.2)
BASOPHILS NFR BLD: 0.8 % (ref 0–1.9)
BILIRUB UR QL STRIP: NEGATIVE
BUN SERPL-MCNC: 28 MG/DL (ref 8–23)
CALCIUM SERPL-MCNC: 9.9 MG/DL (ref 8.7–10.5)
CHLORIDE SERPL-SCNC: 105 MMOL/L (ref 95–110)
CLARITY UR: CLEAR
CO2 SERPL-SCNC: 28 MMOL/L (ref 23–29)
COLOR UR: YELLOW
CREAT SERPL-MCNC: 1.4 MG/DL (ref 0.5–1.4)
CREAT UR-MCNC: 188.2 MG/DL (ref 15–325)
DIFFERENTIAL METHOD BLD: ABNORMAL
EOSINOPHIL # BLD AUTO: 0.2 K/UL (ref 0–0.5)
EOSINOPHIL NFR BLD: 3.3 % (ref 0–8)
ERYTHROCYTE [DISTWIDTH] IN BLOOD BY AUTOMATED COUNT: 12.9 % (ref 11.5–14.5)
EST. GFR  (NO RACE VARIABLE): 40.7 ML/MIN/1.73 M^2
GLUCOSE SERPL-MCNC: 161 MG/DL (ref 70–110)
GLUCOSE UR QL STRIP: NEGATIVE
HCT VFR BLD AUTO: 38.3 % (ref 37–48.5)
HGB BLD-MCNC: 12.3 G/DL (ref 12–16)
HGB UR QL STRIP: NEGATIVE
HYALINE CASTS #/AREA URNS LPF: 0 /LPF
IMM GRANULOCYTES # BLD AUTO: 0.01 K/UL (ref 0–0.04)
IMM GRANULOCYTES NFR BLD AUTO: 0.2 % (ref 0–0.5)
KETONES UR QL STRIP: NEGATIVE
LEUKOCYTE ESTERASE UR QL STRIP: NEGATIVE
LYMPHOCYTES # BLD AUTO: 2.5 K/UL (ref 1–4.8)
LYMPHOCYTES NFR BLD: 37.9 % (ref 18–48)
MAGNESIUM SERPL-MCNC: 1.5 MG/DL (ref 1.6–2.6)
MCH RBC QN AUTO: 30.4 PG (ref 27–31)
MCHC RBC AUTO-ENTMCNC: 32.1 G/DL (ref 32–36)
MCV RBC AUTO: 95 FL (ref 82–98)
MICROALBUMIN UR DL<=1MG/L-MCNC: 878.2 UG/ML
MICROSCOPIC COMMENT: NORMAL
MONOCYTES # BLD AUTO: 0.4 K/UL (ref 0.3–1)
MONOCYTES NFR BLD: 6.3 % (ref 4–15)
NEUTROPHILS # BLD AUTO: 3.4 K/UL (ref 1.8–7.7)
NEUTROPHILS NFR BLD: 51.5 % (ref 38–73)
NITRITE UR QL STRIP: NEGATIVE
NRBC BLD-RTO: 0 /100 WBC
PH UR STRIP: 6 [PH] (ref 5–8)
PHOSPHATE SERPL-MCNC: 3.6 MG/DL (ref 2.7–4.5)
PLATELET # BLD AUTO: 338 K/UL (ref 150–450)
PMV BLD AUTO: 8.3 FL (ref 9.2–12.9)
POTASSIUM SERPL-SCNC: 4.8 MMOL/L (ref 3.5–5.1)
PROT UR QL STRIP: ABNORMAL
PROT UR-MCNC: 157 MG/DL (ref 6–15)
PTH-INTACT SERPL-MCNC: 94.8 PG/ML (ref 9–77)
RBC # BLD AUTO: 4.05 M/UL (ref 4–5.4)
RBC #/AREA URNS HPF: 1 /HPF (ref 0–4)
SODIUM SERPL-SCNC: 140 MMOL/L (ref 136–145)
SP GR UR STRIP: 1.03 (ref 1–1.03)
SQUAMOUS #/AREA URNS HPF: 1 /HPF
URATE SERPL-MCNC: 6.9 MG/DL (ref 2.4–5.7)
URN SPEC COLLECT METH UR: ABNORMAL
UROBILINOGEN UR STRIP-ACNC: NEGATIVE EU/DL
WBC # BLD AUTO: 6.63 K/UL (ref 3.9–12.7)
WBC #/AREA URNS HPF: 3 /HPF (ref 0–5)

## 2024-02-27 PROCEDURE — 85025 COMPLETE CBC W/AUTO DIFF WBC: CPT | Performed by: NURSE PRACTITIONER

## 2024-02-27 PROCEDURE — 84156 ASSAY OF PROTEIN URINE: CPT | Performed by: NURSE PRACTITIONER

## 2024-02-27 PROCEDURE — 82043 UR ALBUMIN QUANTITATIVE: CPT | Performed by: NURSE PRACTITIONER

## 2024-02-27 PROCEDURE — 81001 URINALYSIS AUTO W/SCOPE: CPT | Performed by: NURSE PRACTITIONER

## 2024-02-27 PROCEDURE — 83735 ASSAY OF MAGNESIUM: CPT | Performed by: NURSE PRACTITIONER

## 2024-02-27 PROCEDURE — 36415 COLL VENOUS BLD VENIPUNCTURE: CPT | Performed by: NURSE PRACTITIONER

## 2024-02-27 PROCEDURE — 84550 ASSAY OF BLOOD/URIC ACID: CPT | Performed by: NURSE PRACTITIONER

## 2024-02-27 PROCEDURE — 82306 VITAMIN D 25 HYDROXY: CPT | Performed by: NURSE PRACTITIONER

## 2024-02-27 PROCEDURE — 80069 RENAL FUNCTION PANEL: CPT | Performed by: NURSE PRACTITIONER

## 2024-02-27 PROCEDURE — 83970 ASSAY OF PARATHORMONE: CPT | Performed by: NURSE PRACTITIONER

## 2024-03-14 ENCOUNTER — PATIENT MESSAGE (OUTPATIENT)
Dept: FAMILY MEDICINE | Facility: CLINIC | Age: 69
End: 2024-03-14
Payer: MEDICARE

## 2024-03-14 ENCOUNTER — PATIENT MESSAGE (OUTPATIENT)
Dept: PAIN MEDICINE | Facility: CLINIC | Age: 69
End: 2024-03-14
Payer: MEDICARE

## 2024-03-14 DIAGNOSIS — Z79.4 TYPE 2 DIABETES MELLITUS WITH DIABETIC POLYNEUROPATHY, WITH LONG-TERM CURRENT USE OF INSULIN: ICD-10-CM

## 2024-03-14 DIAGNOSIS — E11.42 TYPE 2 DIABETES MELLITUS WITH DIABETIC POLYNEUROPATHY, WITH LONG-TERM CURRENT USE OF INSULIN: ICD-10-CM

## 2024-03-14 RX ORDER — INSULIN GLARGINE 100 [IU]/ML
INJECTION, SOLUTION SUBCUTANEOUS
Qty: 21 ML | Refills: 3 | Status: SHIPPED | OUTPATIENT
Start: 2024-03-14 | End: 2024-05-10 | Stop reason: SDUPTHER

## 2024-04-11 ENCOUNTER — PATIENT MESSAGE (OUTPATIENT)
Dept: FAMILY MEDICINE | Facility: CLINIC | Age: 69
End: 2024-04-11
Payer: MEDICARE

## 2024-04-16 ENCOUNTER — PATIENT MESSAGE (OUTPATIENT)
Dept: FAMILY MEDICINE | Facility: CLINIC | Age: 69
End: 2024-04-16
Payer: MEDICARE

## 2024-05-07 ENCOUNTER — PATIENT MESSAGE (OUTPATIENT)
Dept: FAMILY MEDICINE | Facility: CLINIC | Age: 69
End: 2024-05-07
Payer: MEDICARE

## 2024-05-07 ENCOUNTER — PATIENT MESSAGE (OUTPATIENT)
Dept: PAIN MEDICINE | Facility: CLINIC | Age: 69
End: 2024-05-07
Payer: MEDICARE

## 2024-05-07 DIAGNOSIS — E11.42 TYPE 2 DIABETES MELLITUS WITH DIABETIC POLYNEUROPATHY, WITH LONG-TERM CURRENT USE OF INSULIN: ICD-10-CM

## 2024-05-07 DIAGNOSIS — Z79.4 TYPE 2 DIABETES MELLITUS WITH DIABETIC POLYNEUROPATHY, WITH LONG-TERM CURRENT USE OF INSULIN: ICD-10-CM

## 2024-05-10 RX ORDER — INSULIN GLARGINE 100 [IU]/ML
INJECTION, SOLUTION SUBCUTANEOUS
Qty: 63 ML | Refills: 3 | Status: SHIPPED | OUTPATIENT
Start: 2024-05-10

## 2024-05-10 NOTE — TELEPHONE ENCOUNTER
Prescription for Lantus Solostar pen has been [facility].  Each pen is 3 mL and you will require about 21 mL for one-month or 63 mL for 3 months.  Further refills have been sent.    .Type 2 diabetes mellitus with diabetic polyneuropathy, with long-term current use of insulin  -     insulin glargine U-100, Lantus, (LANTUS U-100 INSULIN) 100 unit/mL injection; INJECT 70 UNITS UNDER THE SKIN EVERY EVENING  Dispense: 63 mL; Refill: 3    ===View-only below this line===      ----- Message -----       From:Payton Castillo       Sent:5/10/2024  4:01 PM CDT         To:Patient Medical Advice Request Message List    Subject:Lantis Pen prescription     I am taking 70 units each day      ----- Message -----       From:John Nolan       Sent:5/10/2024 10:31 AM CDT         To:Payton Castillo    Subject:Lantis Pen prescription     Please confirm if you are taking 70 units of Lantus per day.  Each Lantus pen has a about 300 units which were last for about between 4-5 days.  Which means you will require about 6-7 pens a month or about 20 pens for 3 months.      ----- Message -----       From:Payton Castillo       Sent:5/7/2024  5:04 PM CDT         To:Patient Medical Advice Request Message List    Subject:Lantis Pen prescription     Would like to get the LANTUS PEN. [employer] wants a prescription for 3 months supply, not 1 month.  I also want to change from the vial to the pen.      ----- Message -----       From:John Teixeira       Sent:5/7/2024  4:17 PM CDT         To:Payton Castillo    Subject:Lantis Pen prescription     Rx was sent in 3/14/2024  to mail order  please check with them  thanks       ----- Message -----       From:Anika Arana (proxy for Payton Castillo)       Sent:5/7/2024  4:14 PM CDT         To:Patient Medical Advice Request Message List    Subject:Lantis Pen prescription     For Payton Castillo       ----- Message -----       From:Anika Arana (proxy for Payton Castillo)       Sent:5/7/2024   4:13 PM CDT         To:John Nolan    Subject:Lantis Pen prescription     I need a 3 month supply with refills for the my lantis PEN.

## 2024-05-28 ENCOUNTER — LAB VISIT (OUTPATIENT)
Dept: LAB | Facility: HOSPITAL | Age: 69
End: 2024-05-28
Attending: SPECIALIST
Payer: MEDICARE

## 2024-05-28 DIAGNOSIS — C64.2 MALIGNANT NEOPLASM OF LEFT KIDNEY, EXCEPT RENAL PELVIS: Primary | ICD-10-CM

## 2024-05-28 LAB
ANION GAP SERPL CALC-SCNC: 6 MMOL/L (ref 8–16)
BUN SERPL-MCNC: 26 MG/DL (ref 8–23)
CALCIUM SERPL-MCNC: 10 MG/DL (ref 8.7–10.5)
CHLORIDE SERPL-SCNC: 106 MMOL/L (ref 95–110)
CO2 SERPL-SCNC: 28 MMOL/L (ref 23–29)
CREAT SERPL-MCNC: 1.4 MG/DL (ref 0.5–1.4)
EST. GFR  (NO RACE VARIABLE): 40.7 ML/MIN/1.73 M^2
GLUCOSE SERPL-MCNC: 145 MG/DL (ref 70–110)
POTASSIUM SERPL-SCNC: 4.5 MMOL/L (ref 3.5–5.1)
SODIUM SERPL-SCNC: 140 MMOL/L (ref 136–145)

## 2024-05-28 PROCEDURE — 80048 BASIC METABOLIC PNL TOTAL CA: CPT | Performed by: SPECIALIST

## 2024-05-28 PROCEDURE — 36415 COLL VENOUS BLD VENIPUNCTURE: CPT | Performed by: SPECIALIST

## 2024-05-31 ENCOUNTER — LAB VISIT (OUTPATIENT)
Dept: LAB | Facility: HOSPITAL | Age: 69
End: 2024-05-31
Attending: NURSE PRACTITIONER
Payer: MEDICARE

## 2024-05-31 DIAGNOSIS — R80.9 PROTEINURIA: ICD-10-CM

## 2024-05-31 DIAGNOSIS — N18.6 TYPE 2 DIABETES MELLITUS WITH END-STAGE RENAL DISEASE: ICD-10-CM

## 2024-05-31 DIAGNOSIS — I10 ESSENTIAL HYPERTENSION, MALIGNANT: ICD-10-CM

## 2024-05-31 DIAGNOSIS — E78.5 HYPERLIPEMIA: ICD-10-CM

## 2024-05-31 DIAGNOSIS — Z90.5 ACQUIRED ABSENCE OF KIDNEY: ICD-10-CM

## 2024-05-31 DIAGNOSIS — E11.22 TYPE 2 DIABETES MELLITUS WITH END-STAGE RENAL DISEASE: ICD-10-CM

## 2024-05-31 DIAGNOSIS — N18.32 CHRONIC KIDNEY DISEASE (CKD) STAGE G3B/A1, MODERATELY DECREASED GLOMERULAR FILTRATION RATE (GFR) BETWEEN 30-44 ML/MIN/1.73 SQUARE METER AND ALBUMINURIA CREATININE RATIO LESS THAN 30 MG/G: Primary | ICD-10-CM

## 2024-05-31 LAB
25(OH)D3+25(OH)D2 SERPL-MCNC: 22 NG/ML (ref 30–96)
ALBUMIN SERPL BCP-MCNC: 4.2 G/DL (ref 3.5–5.2)
ALBUMIN/CREAT UR: 188 UG/MG (ref 0–30)
ANION GAP SERPL CALC-SCNC: 7 MMOL/L (ref 8–16)
BACTERIA #/AREA URNS HPF: NORMAL /HPF
BASOPHILS # BLD AUTO: 0.05 K/UL (ref 0–0.2)
BASOPHILS NFR BLD: 0.7 % (ref 0–1.9)
BILIRUB UR QL STRIP: NEGATIVE
BUN SERPL-MCNC: 25 MG/DL (ref 8–23)
CALCIUM SERPL-MCNC: 9.5 MG/DL (ref 8.7–10.5)
CHLORIDE SERPL-SCNC: 107 MMOL/L (ref 95–110)
CLARITY UR: CLEAR
CO2 SERPL-SCNC: 27 MMOL/L (ref 23–29)
COLOR UR: YELLOW
CREAT SERPL-MCNC: 1.3 MG/DL (ref 0.5–1.4)
CREAT UR-MCNC: 115.1 MG/DL (ref 15–325)
CREAT UR-MCNC: 115.1 MG/DL (ref 15–325)
DIFFERENTIAL METHOD BLD: ABNORMAL
EOSINOPHIL # BLD AUTO: 0.2 K/UL (ref 0–0.5)
EOSINOPHIL NFR BLD: 2.5 % (ref 0–8)
ERYTHROCYTE [DISTWIDTH] IN BLOOD BY AUTOMATED COUNT: 12.7 % (ref 11.5–14.5)
EST. GFR  (NO RACE VARIABLE): 44.5 ML/MIN/1.73 M^2
GLUCOSE SERPL-MCNC: 115 MG/DL (ref 70–110)
GLUCOSE UR QL STRIP: NEGATIVE
HCT VFR BLD AUTO: 38.7 % (ref 37–48.5)
HGB BLD-MCNC: 12.5 G/DL (ref 12–16)
HGB UR QL STRIP: NEGATIVE
HYALINE CASTS #/AREA URNS LPF: 0 /LPF
IMM GRANULOCYTES # BLD AUTO: 0.02 K/UL (ref 0–0.04)
IMM GRANULOCYTES NFR BLD AUTO: 0.3 % (ref 0–0.5)
KETONES UR QL STRIP: NEGATIVE
LEUKOCYTE ESTERASE UR QL STRIP: NEGATIVE
LYMPHOCYTES # BLD AUTO: 2.9 K/UL (ref 1–4.8)
LYMPHOCYTES NFR BLD: 41.2 % (ref 18–48)
MAGNESIUM SERPL-MCNC: 1.7 MG/DL (ref 1.6–2.6)
MCH RBC QN AUTO: 30.3 PG (ref 27–31)
MCHC RBC AUTO-ENTMCNC: 32.3 G/DL (ref 32–36)
MCV RBC AUTO: 94 FL (ref 82–98)
MICROALBUMIN UR DL<=1MG/L-MCNC: 216.4 UG/ML
MICROSCOPIC COMMENT: NORMAL
MONOCYTES # BLD AUTO: 0.5 K/UL (ref 0.3–1)
MONOCYTES NFR BLD: 7.1 % (ref 4–15)
NEUTROPHILS # BLD AUTO: 3.3 K/UL (ref 1.8–7.7)
NEUTROPHILS NFR BLD: 48.2 % (ref 38–73)
NITRITE UR QL STRIP: NEGATIVE
NRBC BLD-RTO: 0 /100 WBC
PH UR STRIP: 6 [PH] (ref 5–8)
PHOSPHATE SERPL-MCNC: 3.2 MG/DL (ref 2.7–4.5)
PLATELET # BLD AUTO: 330 K/UL (ref 150–450)
PMV BLD AUTO: 8.7 FL (ref 9.2–12.9)
POTASSIUM SERPL-SCNC: 4.6 MMOL/L (ref 3.5–5.1)
PROT UR QL STRIP: ABNORMAL
PROT UR-MCNC: 47 MG/DL (ref 6–15)
PROT/CREAT UR: 0.41 MG/G{CREAT} (ref 0–0.2)
PTH-INTACT SERPL-MCNC: 117.7 PG/ML (ref 9–77)
RBC # BLD AUTO: 4.12 M/UL (ref 4–5.4)
RBC #/AREA URNS HPF: 0 /HPF (ref 0–4)
SODIUM SERPL-SCNC: 141 MMOL/L (ref 136–145)
SP GR UR STRIP: 1.02 (ref 1–1.03)
SQUAMOUS #/AREA URNS HPF: 0 /HPF
URATE SERPL-MCNC: 6.9 MG/DL (ref 2.4–5.7)
URN SPEC COLLECT METH UR: ABNORMAL
UROBILINOGEN UR STRIP-ACNC: NEGATIVE EU/DL
WBC # BLD AUTO: 6.91 K/UL (ref 3.9–12.7)
WBC #/AREA URNS HPF: 1 /HPF (ref 0–5)

## 2024-05-31 PROCEDURE — 80069 RENAL FUNCTION PANEL: CPT | Performed by: NURSE PRACTITIONER

## 2024-05-31 PROCEDURE — 84156 ASSAY OF PROTEIN URINE: CPT | Performed by: NURSE PRACTITIONER

## 2024-05-31 PROCEDURE — 85025 COMPLETE CBC W/AUTO DIFF WBC: CPT | Performed by: NURSE PRACTITIONER

## 2024-05-31 PROCEDURE — 36415 COLL VENOUS BLD VENIPUNCTURE: CPT | Performed by: NURSE PRACTITIONER

## 2024-05-31 PROCEDURE — 81001 URINALYSIS AUTO W/SCOPE: CPT | Performed by: NURSE PRACTITIONER

## 2024-05-31 PROCEDURE — 82043 UR ALBUMIN QUANTITATIVE: CPT | Performed by: NURSE PRACTITIONER

## 2024-05-31 PROCEDURE — 84550 ASSAY OF BLOOD/URIC ACID: CPT | Performed by: NURSE PRACTITIONER

## 2024-05-31 PROCEDURE — 83735 ASSAY OF MAGNESIUM: CPT | Performed by: NURSE PRACTITIONER

## 2024-05-31 PROCEDURE — 83970 ASSAY OF PARATHORMONE: CPT | Performed by: NURSE PRACTITIONER

## 2024-05-31 PROCEDURE — 82306 VITAMIN D 25 HYDROXY: CPT | Performed by: NURSE PRACTITIONER

## 2024-07-01 ENCOUNTER — TELEPHONE (OUTPATIENT)
Dept: PAIN MEDICINE | Facility: CLINIC | Age: 69
End: 2024-07-01
Payer: MEDICARE

## 2024-07-01 NOTE — TELEPHONE ENCOUNTER
----- Message from Marvin Reyna sent at 7/1/2024  2:38 PM CDT -----  Contact: Self  Type:  Patient Returning Call    Who Called:  PT  Who Left Message for Patient:  Trich  Does the patient know what this is regarding?:  yes  Best Call Back Number:  [phone]   Additional Information:

## 2024-07-02 ENCOUNTER — OFFICE VISIT (OUTPATIENT)
Dept: FAMILY MEDICINE | Facility: CLINIC | Age: 69
End: 2024-07-02
Payer: MEDICARE

## 2024-07-02 VITALS
SYSTOLIC BLOOD PRESSURE: 139 MMHG | WEIGHT: 166 LBS | BODY MASS INDEX: 26.68 KG/M2 | HEIGHT: 66 IN | OXYGEN SATURATION: 99 % | DIASTOLIC BLOOD PRESSURE: 70 MMHG | HEART RATE: 64 BPM

## 2024-07-02 DIAGNOSIS — L08.9 INFECTED CYST OF SKIN: ICD-10-CM

## 2024-07-02 DIAGNOSIS — Z12.31 ENCOUNTER FOR SCREENING MAMMOGRAM FOR BREAST CANCER: ICD-10-CM

## 2024-07-02 DIAGNOSIS — L72.9 INFECTED CYST OF SKIN: ICD-10-CM

## 2024-07-02 DIAGNOSIS — L72.0 EPIDERMOID CYST OF SKIN OF CHEST: Primary | ICD-10-CM

## 2024-07-02 PROCEDURE — 99999 PR PBB SHADOW E&M-EST. PATIENT-LVL V: CPT | Mod: PBBFAC,,, | Performed by: INTERNAL MEDICINE

## 2024-07-02 PROCEDURE — 99213 OFFICE O/P EST LOW 20 MIN: CPT | Mod: S$PBB,AQ,, | Performed by: INTERNAL MEDICINE

## 2024-07-02 PROCEDURE — 99215 OFFICE O/P EST HI 40 MIN: CPT | Mod: PBBFAC,PN | Performed by: INTERNAL MEDICINE

## 2024-07-02 RX ORDER — CEPHALEXIN 500 MG/1
500 CAPSULE ORAL EVERY 8 HOURS
Qty: 15 CAPSULE | Refills: 0 | Status: SHIPPED | OUTPATIENT
Start: 2024-07-02 | End: 2024-07-07

## 2024-07-02 NOTE — Clinical Note
Jass BazanI made a formal referral for this patient with a somewhat painful cyst which was probably small but growing in size on the upper chest.  Not sure if it needs I and D or excision.  She is a somewhat poorly controlled diabetic.    Regards   Dr. An FOSS

## 2024-07-02 NOTE — PROGRESS NOTES
Subjective:       Patient ID: Payton Castillo is a 69 y.o. female.    Chief Complaint: Cyst (Center of chest-painful to touch/)    Patient is a 69-year-old female who comes for evaluation.  She has found a lump in the middle of her chest and wants to be checked out.    Underlying medical issues are as below:-    Type 2 diabetes mellitus with diabetic polyneuropathy, with long-term current use of insulin  Chronic midline low back pain without sciatica  Essential hypertension  Mixed dyslipidemia  CKD stage 3 due to type 2 diabetes mellitus and she is following up with-Pat[facility] Balderas [facility].( Dr Nicol MD Nephrology)        Cyst  This is a recurrent problem. The current episode started more than 1 year ago. The problem occurs constantly. The problem has been gradually worsening. Associated symptoms include fatigue and headaches. Pertinent negatives include no anorexia, chest pain, chills or congestion.       Past Medical History:   Diagnosis Date    Allergy     aspartame,fructose monosodium glutamate and penicillins.    Anemia of chronic disorder 11/09/2017    Anemia, chronic renal failure, stage 2 (mild) 11/09/2017    Anemia, chronic renal failure, stage 3 (moderate) 11/09/2017    Asthma     Cancer     kidney    Cataract of right eye     CKD (chronic kidney disease), stage III     Diabetes mellitus     type II    Fibromyalgia     GERD (gastroesophageal reflux disease)     Hx of renal cell cancer - left nephrectomy Aug 2014 11/09/2017    Hypercholesteremia     Hypertension     IDDM (insulin dependent diabetes mellitus)     Lumbar spondylosis     Lumbosacral radiculopathy     Osteopenia     Palpitations     Plantar fasciitis, right     Sleep apnea     Syncope 02/2012    Tachycardia     Thoracic radiculopathy     TMJ (dislocation of temporomandibular joint)      Social History     Socioeconomic History    Marital status:      Spouse name: Owen Castillo    Number of children: 2   Occupational  History    Occupation: Part - member support [employer]     Employer: [employer]   Tobacco Use    Smoking status: Never    Smokeless tobacco: Never   Substance and Sexual Activity    Alcohol use: No    Drug use: No    Sexual activity: Yes     Partners: Male     Social Determinants of Health     Financial Resource Strain: Low Risk  (4/6/2022)    Overall Financial Resource Strain (CARDIA)     Difficulty of Paying Living Expenses: Not very hard   Food Insecurity: No Food Insecurity (8/8/2022)    Hunger Vital Sign     Worried About Running Out of Food in the Last Year: Never true     Ran Out of Food in the Last Year: Never true   Transportation Needs: No Transportation Needs (4/6/2022)    PRAPARE - Transportation     Lack of Transportation (Medical): No     Lack of Transportation (Non-Medical): No   Physical Activity: Inactive (8/8/2022)    Exercise Vital Sign     Days of Exercise per Week: 0 days     Minutes of Exercise per Session: 0 min   Stress: Stress Concern Present (8/8/2022)    [employer] [place] [employer] - Occupational Stress Questionnaire     Feeling of Stress : To some extent   Housing Stability: Low Risk  (8/8/2022)    Housing Stability Vital Sign     Unable to Pay for Housing in the Last Year: No     Number of Places Lived in the Last Year: 1     Unstable Housing in the Last Year: No     Past Surgical History:   Procedure Laterality Date    CATARACT EXTRACTION Left 03/2016    INJECTION OF ANESTHETIC AGENT AROUND MEDIAL BRANCH NERVES INNERVATING LUMBAR FACET JOINT Bilateral 12/15/2022    Procedure: Block-nerve-medial branch-lumbar;  Surgeon: Howard Rivas MD;  Location: [facility] OR;  Service: Pain Management;  Laterality: Bilateral;  L3,4,5 MBB    INJECTION OF ANESTHETIC AGENT AROUND MEDIAL BRANCH NERVES INNERVATING LUMBAR FACET JOINT Bilateral 1/3/2023    Procedure: Block-nerve-medial branch-lumbar;  Surgeon: Howard Rivas MD;  Location: [facility] OR;  Service: Pain Management;  Laterality: Bilateral;  L3,4,5  MBB #2    INJECTION OF JOINT Left 1/26/2024    Procedure: Injection, Joint;  Surgeon: Howard Rivas MD;  Location: [facility] OR;  Service: Anesthesiology;  Laterality: Left;  left GTB injection    INJECTION, SACROILIAC JOINT Left 10/7/2022    Procedure: INJECTION,SACROILIAC JOINT;  Surgeon: Cory Ruggiero MD;  Location: [facility] OR;  Service: Pain Management;  Laterality: Left;  sacroiliac injection    INJECTION, SACROILIAC JOINT Left 4/5/2023    Procedure: INJECTION,SACROILIAC JOINT;  Surgeon: Howard Rivas MD;  Location: [facility] OR;  Service: Pain Management;  Laterality: Left;  SIJ    INJECTION, SACROILIAC JOINT Left 1/26/2024    Procedure: INJECTION,SACROILIAC JOINT;  Surgeon: Howard Rivas MD;  Location: [facility] OR;  Service: Anesthesiology;  Laterality: Left;  left SIJ and Left GTB injection    JOINT REPLACEMENT Right     knee    RADIOFREQUENCY THERMOCOAGULATION Bilateral 1/13/2023    Procedure: RADIOFREQUENCY THERMAL COAGULATION;  Surgeon: Howard Rivas MD;  Location: [facility] OR;  Service: Pain Management;  Laterality: Bilateral;  L3,4,5 Raul RFA    SHOULDER OPEN ROTATOR CUFF REPAIR Right 02/2005    labral repair    TOTAL KNEE ARTHROPLASTY Left 2014    TUBAL LIGATION  1990    TUMOR REMOVAL  2014    from kidney     Family History   Problem Relation Name Age of Onset    Stomach cancer Mother      Breast cancer Mother      Diabetes Mother      Cancer Father         Review of Systems   Constitutional:  Positive for activity change and fatigue. Negative for chills.   HENT:  Negative for congestion.    Respiratory:  Negative for chest tightness and shortness of breath.    Cardiovascular:  Positive for palpitations. Negative for chest pain.   Gastrointestinal:  Negative for anorexia.   Endocrine: Negative for polydipsia and polyphagia.   Skin:  Negative for pallor.   Neurological:  Positive for headaches. Negative for dizziness, tremors and seizures.   Psychiatric/Behavioral:  Negative for confusion. The patient is not  "nervous/anxious.          Objective:      Blood pressure 139/70, pulse 64, height 5' 6" (1.676 m), weight 75.3 kg (166 lb), SpO2 99%. Body mass index is 26.79 kg/m².  Physical Exam  Constitutional:       Appearance: Normal appearance.   Cardiovascular:      Rate and Rhythm: Normal rate and regular rhythm.   Pulmonary:      Effort: Pulmonary effort is normal.      Breath sounds: Normal breath sounds.   Abdominal:      Palpations: Abdomen is soft.   Musculoskeletal:        Arms:    Neurological:      Mental Status: She is alert.                 Assessment:           1. Epidermoid cyst of skin of chest  -     Ambulatory referral/consult to General Surgery; Future; Expected date: 07/03/2024    2. Infected cyst of skin  -     Ambulatory referral/consult to General Surgery; Future; Expected date: 07/03/2024  -     cephALEXin (KEFLEX) 500 MG capsule; Take 1 capsule (500 mg total) by mouth every 8 (eight) hours. for 5 days  Dispense: 15 capsule; Refill: 0    3. Encounter for screening mammogram for breast cancer  -     Mammo Digital Screening Bilat; Future; Expected date: 08/02/2024      Component Ref Range & Units 6 d ago  (6/26/24) 6 d ago  (6/26/24) 6 d ago  (6/26/24) 5 mo ago  (1/26/24) 1 yr ago  (4/5/23) 1 yr ago  (1/13/23) 1 yr ago  (1/3/23)   POCT Glucose 68 - 106 mg/dL 124 High  158 High  50 Low Panic  113 High  R 131 High  R 105 R 76             Plan:   Epidermoid cyst of skin of chest  -     Ambulatory referral/consult to General Surgery; Future; Expected date: 07/03/2024    Infected cyst of skin  -     Ambulatory referral/consult to General Surgery; Future; Expected date: 07/03/2024  -     cephALEXin (KEFLEX) 500 MG capsule; Take 1 capsule (500 mg total) by mouth every 8 (eight) hours. for 5 days  Dispense: 15 capsule; Refill: 0    Encounter for screening mammogram for breast cancer  -     Mammo Digital Screening Bilat; Future; Expected date: 08/02/2024        Follow up in about 2 months (around 9/2/2024), or if " "symptoms worsen or fail to improve, for Diabetes/HTN/Lipids.      Current Outpatient Medications:     atorvastatin (LIPITOR) 40 MG tablet, TAKE 1 TABLET EVERY EVENING, Disp: 90 tablet, Rfl: 3    BD INSULIN SYRINGE 1 mL 28 gauge x 1/2" Syrg, USE 1 SYRINGE ONCE DAILY, Disp: 100 each, Rfl: 3    blood-glucose sensor (FREESTYLE BANG 3 SENSOR) Millicent, INJECT 1 DEVICE INTO THE SKIN CONTINUOUSLY. CHANGE SENSOR EVERY 14 DAYS, Disp: 6 each, Rfl: 4    calcitRIOL (ROCALTROL) 0.25 MCG Cap, Take 0.25 mcg by mouth every Mon, Wed, Fri., Disp: , Rfl:     cetirizine (ZYRTEC) 10 MG tablet, Take 10 mg by mouth., Disp: , Rfl:     fluticasone propionate (FLONASE) 50 mcg/actuation nasal spray, USE 2 SPRAYS IN EACH NOSTRIL ONCE DAILY, Disp: 48 g, Rfl: 3    gabapentin (NEURONTIN) 800 MG tablet, TAKE 1 TABLET THREE TIMES A DAY, Disp: 270 tablet, Rfl: 3    insulin aspart U-100 (NOVOLOG FLEXPEN U-100 INSULIN) 100 unit/mL (3 mL) InPn pen, INJECT 20 UNITS UNDER THE SKIN THREE TIMES A DAY WITH MEALS ( INSTEAD OF HUMALOG ), Disp: 60 mL, Rfl: 3    insulin glargine U-100, Lantus, (LANTUS U-100 INSULIN) 100 unit/mL injection, INJECT 70 UNITS UNDER THE SKIN EVERY EVENING, Disp: 63 mL, Rfl: 3    insulin syringe-needle U-100 0.3 mL 30 Syrg, by Misc.(Non-Drug; Combo Route) route 3 (three) times daily., Disp: , Rfl:     KERENDIA 10 mg Tab, Take 1 tablet by mouth every evening., Disp: , Rfl:     metoprolol succinate (TOPROL-XL) 50 MG 24 hr tablet, TAKE 1 TABLET DAILY, Disp: 90 tablet, Rfl: 3    olmesartan (BENICAR) 20 MG tablet, Take 20 mg by mouth., Disp: , Rfl:     omeprazole (PRILOSEC) 40 MG capsule, Take 40 mg by mouth every evening., Disp: , Rfl:     pen needle, diabetic (ADVOCATE PEN NEEDLE) 31 gauge x 5/16" Ndle, 1 Device by Misc.(Non-Drug; Combo Route) route 3 (three) times daily., Disp: 100 each, Rfl: 3    cephALEXin (KEFLEX) 500 MG capsule, Take 1 capsule (500 mg total) by mouth every 8 (eight) hours. for 5 days, Disp: 15 capsule, Rfl: 0    flash " glucose sensor (FREESTYLE BANG 14 DAY SENSOR) Kit, USE ONE SENSOR EVERY 14 DAYS (Patient not taking: Reported on 7/2/2024), Disp: 6 kit, Rfl: 3  No current facility-administered medications for this visit.    Facility-Administered Medications Ordered in Other Visits:     lactated ringers infusion, , Intravenous, Once PRN, Howard Rivas MD    lactated ringers infusion, , Intravenous, Once PRN, Howard Rivas MD    lactated ringers infusion, , Intravenous, Continuous, Howard Rivas MD, Last Rate: 25 mL/hr at 01/26/24 0937, 500 mL at 01/26/24 0937    John Nolan

## 2024-07-10 ENCOUNTER — OFFICE VISIT (OUTPATIENT)
Dept: SURGERY | Facility: CLINIC | Age: 69
End: 2024-07-10
Payer: MEDICARE

## 2024-07-10 VITALS — HEART RATE: 65 BPM | TEMPERATURE: 98 F | SYSTOLIC BLOOD PRESSURE: 132 MMHG | DIASTOLIC BLOOD PRESSURE: 65 MMHG

## 2024-07-10 DIAGNOSIS — L72.9 INFECTED CYST OF SKIN: ICD-10-CM

## 2024-07-10 DIAGNOSIS — L72.0 EPIDERMOID CYST OF SKIN OF CHEST: ICD-10-CM

## 2024-07-10 DIAGNOSIS — L08.9 INFECTED CYST OF SKIN: ICD-10-CM

## 2024-07-10 PROCEDURE — 99999 PR PBB SHADOW E&M-EST. PATIENT-LVL III: CPT | Mod: PBBFAC,,, | Performed by: SURGERY

## 2024-07-10 PROCEDURE — 10060 I&D ABSCESS SIMPLE/SINGLE: CPT | Mod: PBBFAC,PN | Performed by: SURGERY

## 2024-07-10 PROCEDURE — 99213 OFFICE O/P EST LOW 20 MIN: CPT | Mod: PBBFAC,PN | Performed by: SURGERY

## 2024-07-10 RX ORDER — DOXYCYCLINE HYCLATE 100 MG
100 TABLET ORAL 2 TIMES DAILY
Qty: 10 TABLET | Refills: 0 | Status: SHIPPED | OUTPATIENT
Start: 2024-07-10 | End: 2024-07-15

## 2024-07-11 NOTE — H&P
GENERAL SURGERY  OUTPATIENT H&P    REASON FOR VISIT/CC: Chest wall cyst    HPI: Payton Castillo is a 69 y.o. female here for evaluation of a cystic lesion of the mid chest wall. Lesion had been small but recently increased in size.  It was referred to General surgery.  Then developed redness and significant increase in size with associated tenderness. Was started on antibiotics.  Reports today in clinic.  Has been taken antibiotics however redness has persisted.  No spontaneous drainage. No previous attempted removal.  Not currently on blood thinners. Denies fevers or chills.    I have reviewed the patient's chart including prior progress notes, procedures and testing.     ROS:   Review of Systems    PROBLEM LIST:  Patient Active Problem List   Diagnosis    Hx of renal cell cancer - left nephrectomy Aug 2014    Anemia, chronic renal failure, stage 3 (moderate)    Anemia of chronic disorder    Spinal stenosis of lumbar region with radiculopathy    Cervical disc disease    Essential hypertension    Type 2 diabetes mellitus with diabetic polyneuropathy, with long-term current use of insulin    CKD stage 3 due to type 2 diabetes mellitus    Mixed dyslipidemia    Gastroesophageal reflux disease without esophagitis    Chronic pain of left thumb         HISTORY  Past Medical History:   Diagnosis Date    Allergy     aspartame,fructose monosodium glutamate and penicillins.    Anemia of chronic disorder 11/09/2017    Anemia, chronic renal failure, stage 2 (mild) 11/09/2017    Anemia, chronic renal failure, stage 3 (moderate) 11/09/2017    Asthma     Cancer     kidney    Cataract of right eye     CKD (chronic kidney disease), stage III     Diabetes mellitus     type II    Fibromyalgia     GERD (gastroesophageal reflux disease)     Hx of renal cell cancer - left nephrectomy Aug 2014 11/09/2017    Hypercholesteremia     Hypertension     IDDM (insulin dependent diabetes mellitus)     Lumbar spondylosis     Lumbosacral radiculopathy      Osteopenia     Palpitations     Plantar fasciitis, right     Sleep apnea     Syncope 02/2012    Tachycardia     Thoracic radiculopathy     TMJ (dislocation of temporomandibular joint)        Past Surgical History:   Procedure Laterality Date    CATARACT EXTRACTION Left 03/2016    INJECTION OF ANESTHETIC AGENT AROUND MEDIAL BRANCH NERVES INNERVATING LUMBAR FACET JOINT Bilateral 12/15/2022    Procedure: Block-nerve-medial branch-lumbar;  Surgeon: Howard Rivas MD;  Location: [facility] OR;  Service: Pain Management;  Laterality: Bilateral;  L3,4,5 MBB    INJECTION OF ANESTHETIC AGENT AROUND MEDIAL BRANCH NERVES INNERVATING LUMBAR FACET JOINT Bilateral 1/3/2023    Procedure: Block-nerve-medial branch-lumbar;  Surgeon: Howard Rivas MD;  Location: [facility] OR;  Service: Pain Management;  Laterality: Bilateral;  L3,4,5 MBB #2    INJECTION OF JOINT Left 1/26/2024    Procedure: Injection, Joint;  Surgeon: Howard Rivas MD;  Location: [facility] OR;  Service: Anesthesiology;  Laterality: Left;  left GTB injection    INJECTION, SACROILIAC JOINT Left 10/7/2022    Procedure: INJECTION,SACROILIAC JOINT;  Surgeon: Cory Ruggiero MD;  Location: [facility] OR;  Service: Pain Management;  Laterality: Left;  sacroiliac injection    INJECTION, SACROILIAC JOINT Left 4/5/2023    Procedure: INJECTION,SACROILIAC JOINT;  Surgeon: Howard Rivas MD;  Location: [facility] OR;  Service: Pain Management;  Laterality: Left;  SIJ    INJECTION, SACROILIAC JOINT Left 1/26/2024    Procedure: INJECTION,SACROILIAC JOINT;  Surgeon: Howard Rivas MD;  Location: [facility] OR;  Service: Anesthesiology;  Laterality: Left;  left SIJ and Left GTB injection    JOINT REPLACEMENT Right     knee    RADIOFREQUENCY THERMOCOAGULATION Bilateral 1/13/2023    Procedure: RADIOFREQUENCY THERMAL COAGULATION;  Surgeon: Howard Rivas MD;  Location: [facility] OR;  Service: Pain Management;  Laterality: Bilateral;  L3,4,5 Raul RFA    SHOULDER OPEN ROTATOR CUFF REPAIR Right 02/2005    labral repair    TOTAL  "KNEE ARTHROPLASTY Left 2014    TUBAL LIGATION  1990    TUMOR REMOVAL  2014    from kidney       Social History     Tobacco Use    Smoking status: Never    Smokeless tobacco: Never   Substance Use Topics    Alcohol use: No    Drug use: No       Family History   Problem Relation Name Age of Onset    Stomach cancer Mother      Breast cancer Mother      Diabetes Mother      Cancer Father           MEDS:  Current Outpatient Medications on File Prior to Visit   Medication Sig Dispense Refill    atorvastatin (LIPITOR) 40 MG tablet TAKE 1 TABLET EVERY EVENING 90 tablet 3    BD INSULIN SYRINGE 1 mL 28 gauge x 1/2" Syrg USE 1 SYRINGE ONCE DAILY 100 each 3    blood-glucose sensor (FREESTYLE BANG 3 SENSOR) Millicent INJECT 1 DEVICE INTO THE SKIN CONTINUOUSLY. CHANGE SENSOR EVERY 14 DAYS 6 each 4    calcitRIOL (ROCALTROL) 0.25 MCG Cap Take 0.25 mcg by mouth every Mon, Wed, Fri.      cetirizine (ZYRTEC) 10 MG tablet Take 10 mg by mouth.      flash glucose sensor (FREESTYLE BANG 14 DAY SENSOR) Kit USE ONE SENSOR EVERY 14 DAYS (Patient not taking: Reported on 7/2/2024) 6 kit 3    fluticasone propionate (FLONASE) 50 mcg/actuation nasal spray USE 2 SPRAYS IN EACH NOSTRIL ONCE DAILY 48 g 3    gabapentin (NEURONTIN) 800 MG tablet TAKE 1 TABLET THREE TIMES A  tablet 3    insulin aspart U-100 (NOVOLOG FLEXPEN U-100 INSULIN) 100 unit/mL (3 mL) InPn pen INJECT 20 UNITS UNDER THE SKIN THREE TIMES A DAY WITH MEALS ( INSTEAD OF HUMALOG ) 60 mL 3    insulin glargine U-100, Lantus, (LANTUS U-100 INSULIN) 100 unit/mL injection INJECT 70 UNITS UNDER THE SKIN EVERY EVENING 63 mL 3    insulin syringe-needle U-100 0.3 mL 30 Syrg by Misc.(Non-Drug; Combo Route) route 3 (three) times daily.      KERENDIA 10 mg Tab Take 1 tablet by mouth every evening.      metoprolol succinate (TOPROL-XL) 50 MG 24 hr tablet TAKE 1 TABLET DAILY 90 tablet 3    olmesartan (BENICAR) 20 MG tablet Take 20 mg by mouth.      omeprazole (PRILOSEC) 40 MG capsule Take 40 mg " "by mouth every evening.      pen needle, diabetic (ADVOCATE PEN NEEDLE) 31 gauge x 5/16" Ndle 1 Device by Misc.(Non-Drug; Combo Route) route 3 (three) times daily. 100 each 3     Current Facility-Administered Medications on File Prior to Visit   Medication Dose Route Frequency Provider Last Rate Last Admin    lactated ringers infusion   Intravenous Once PRN Howard Rivas MD        lactated ringers infusion   Intravenous Once PRN Howard Rivas MD        lactated ringers infusion   Intravenous Continuous Howard Rivas MD 25 mL/hr at 01/26/24 0937 500 mL at 01/26/24 0937       ALLERGIES:  Review of patient's allergies indicates:   Allergen Reactions    Aspartame Other (See Comments)     headache    Other reaction(s): Other (See Comments)  Headache  migraine  Headache  Other reaction(s): Other (See Comments)  Headache  migraine  headache    Penicillins Itching, Rash, Other (See Comments) and Hives     Rash  Rash  Rash  Rash  Rash    Stevioside (bulk) Other (See Comments)    Fructose Other (See Comments)     headache    Other reaction(s): Other (See Comments)  headache  migraine  headache  Other reaction(s): Other (See Comments)  headache  migraine  headache    Jardiance [empagliflozin] Other (See Comments)     Made her more hungry    Monosodium glutamate Other (See Comments)     Headache    Other reaction(s): Other (See Comments)  Headache  headache  Headache  Other reaction(s): Other (See Comments)  Headache  headache  Headache         VITALS:  Vitals:    07/10/24 1542   BP: 132/65   Pulse: 65   Temp: 98 °F (36.7 °C)         PHYSICAL EXAM:  Physical Exam  Vitals reviewed.   Constitutional:       General: She is not in acute distress.     Appearance: Normal appearance. She is well-developed.   HENT:      Head: Normocephalic and atraumatic.      Nose: Nose normal.   Eyes:      General: No scleral icterus.     Conjunctiva/sclera: Conjunctivae normal.   Neck:      Trachea: No tracheal tenderness or tracheal deviation. "   Cardiovascular:      Rate and Rhythm: Normal rate and regular rhythm.      Pulses: Normal pulses.   Pulmonary:      Effort: Pulmonary effort is normal. No accessory muscle usage or respiratory distress.      Breath sounds: Normal breath sounds.   Abdominal:      General: There is no distension.      Palpations: Abdomen is soft.      Tenderness: There is no abdominal tenderness.   Musculoskeletal:         General: No swelling or tenderness. Normal range of motion.      Cervical back: Normal range of motion and neck supple. No rigidity.   Skin:     General: Skin is warm and dry.      Coloration: Skin is not jaundiced.      Findings: Erythema and lesion present.   Neurological:      General: No focal deficit present.      Mental Status: She is alert and oriented to person, place, and time.      Motor: No weakness or abnormal muscle tone.   Psychiatric:         Mood and Affect: Mood normal.         Behavior: Behavior normal.         Thought Content: Thought content normal.         Judgment: Judgment normal.           LABS:  Lab Results   Component Value Date    WBC 6.91 05/31/2024    RBC 4.12 05/31/2024    HGB 12.5 05/31/2024    HCT 38.7 05/31/2024     05/31/2024     Lab Results   Component Value Date     (H) 05/31/2024     05/31/2024    K 4.6 05/31/2024     05/31/2024    CO2 27 05/31/2024    BUN 25 (H) 05/31/2024    CREATININE 1.3 05/31/2024    CALCIUM 9.5 05/31/2024     Lab Results   Component Value Date    ALT 29 11/20/2023    AST 21 11/20/2023    ALKPHOS 74 11/20/2023    BILITOT 0.5 11/20/2023     Lab Results   Component Value Date    MG 1.7 05/31/2024    PHOS 3.2 05/31/2024       STUDIES:  N/a      ASSESSMENT & PLAN:  69 y.o. female with subcutaneous lesion of the central chest  -lesion and history consistent with sebaceous cyst which is currently infected   -patient was been antibiotics without improvement   -recommending incision and drainage to drain off underlying abscess and cyst  materials which should allow antibiotics to help resolve surrounding cellulitis   -patient agreed to proceed with bedside incision and drainage, see procedure note below for further details, purulence and cyst material were expressed   -recommend completing last dose of current antibiotic then will prescribe doxycycline 100 mg b.i.d. x5 days   -wash area daily with soap and water and apply dry gauze bandage as needed to control any drainage   -scheduled follow up appointment with me once lesion is healed at which time we may consider formal excision of the cyst wall    07/11/2024    Payton Castilol  [id number]    PROCEDURE PERFORMED: Incision drainage sebaceous cyst chest wall    PERFORMING SURGEON: Lester Lomax Jr    CONSENT:  The risks benefits and alternatives of the procedure were discussed with the patient. The patient was queried for questions and all concerns were addressed.  The patient provided written consent for the procedure.    ANESTHESIA: Lidocaine 2% with epinephrine    INDICATION: Infected sebaceous cyst    FINDINGS: Purulence and cyst contents expressed    PROCEDURE IN DETAIL: Verbal consent obtained.  Area prepped with Betadine.  Local anesthetic injected over the central portion.  A 6 mm punch biopsy was used to cut through the skin and dermis into the cyst wall. We immediately had purulence and cyst material expressed. Gentle pressure was used to help express additional cyst contents.  A gauze bandage was applied.    EBL: Minimal    COMPLICATIONS: none

## 2024-07-26 ENCOUNTER — OFFICE VISIT (OUTPATIENT)
Dept: SURGERY | Facility: CLINIC | Age: 69
End: 2024-07-26
Payer: MEDICARE

## 2024-07-26 VITALS — SYSTOLIC BLOOD PRESSURE: 116 MMHG | TEMPERATURE: 98 F | DIASTOLIC BLOOD PRESSURE: 74 MMHG | HEART RATE: 66 BPM

## 2024-07-26 DIAGNOSIS — L72.0 EPIDERMOID CYST OF SKIN OF CHEST: Primary | ICD-10-CM

## 2024-07-26 PROCEDURE — 99999 PR PBB SHADOW E&M-EST. PATIENT-LVL II: CPT | Mod: PBBFAC,,, | Performed by: SURGERY

## 2024-07-26 PROCEDURE — 99212 OFFICE O/P EST SF 10 MIN: CPT | Mod: PBBFAC,PN | Performed by: SURGERY

## 2024-07-26 NOTE — PROGRESS NOTES
GENERAL SURGERY PROGRESS NOTE    HPI: Payton Castillo is a 69 y.o. female here for follow up after incision drainage of infected sebaceous cyst of the central chest. Patient reports lesion has mostly healed however small subcutaneous lesion still persist.  No significant pain.  No further drainage. No fevers or chills.      VITALS:  Vitals:    07/26/24 0954   BP: 116/74   Pulse: 66   Temp: 98.1 °F (36.7 °C)       Physical Exam  Vitals reviewed.   Constitutional:       General: She is not in acute distress.     Appearance: Normal appearance. She is well-developed.   HENT:      Head: Normocephalic and atraumatic.      Nose: Nose normal.   Eyes:      General: No scleral icterus.     Conjunctiva/sclera: Conjunctivae normal.   Neck:      Trachea: No tracheal tenderness or tracheal deviation.   Cardiovascular:      Rate and Rhythm: Normal rate and regular rhythm.      Pulses: Normal pulses.   Pulmonary:      Effort: Pulmonary effort is normal. No accessory muscle usage or respiratory distress.      Breath sounds: Normal breath sounds.   Chest:          Comments: An approximally 1 cm cystic structure underneath the mid chest with well healing previous incision and drainage site  Abdominal:      General: There is no distension.      Palpations: Abdomen is soft.      Tenderness: There is no abdominal tenderness.   Musculoskeletal:         General: No swelling or tenderness. Normal range of motion.      Cervical back: Normal range of motion and neck supple. No rigidity.   Skin:     General: Skin is warm and dry.      Coloration: Skin is not jaundiced.      Findings: No erythema.   Neurological:      General: No focal deficit present.      Mental Status: She is alert and oriented to person, place, and time.      Motor: No weakness or abnormal muscle tone.   Psychiatric:         Mood and Affect: Mood normal.         Behavior: Behavior normal.         Thought Content: Thought content normal.         Judgment: Judgment normal.             ASSESSMENT & PLAN:  69 y.o. female s/p incision drainage infected sebaceous cyst of the anterior chest wall  -infection has resolved however cyst persist as expected  -discussed options including observation versus removal of the cyst to prevent recurrence  -after reviewing options patient agreed proceed with bedside excision of the cyst wall to prevent recurrence, see procedure note below for further details  -return to clinic as needed    07/26/2024    Payton Castillo  [id number]    PROCEDURE PERFORMED: Excision sebaceous cyst, anterior chest, 1 cm    PERFORMING SURGEON: Lester Lomax Jr    CONSENT:  The risks benefits and alternatives of the procedure were discussed with the patient. The patient was queried for questions and all concerns were addressed.  The patient provided written consent for the procedure.    ANESTHESIA: Lidocaine 2% with epinephrine    INDICATION: History of infected sebaceous cyst    FINDINGS: Cystic lesion excised in totality    PROCEDURE IN DETAIL: Verbal consent obtained.  Area prepped with Betadine.  Area injected with local anesthetic. Elliptical incision created around the previous incision and drainage site.  Dissection performed through the dermis into the subcutaneous fat to remove the cyst in totality. Measured 1 cm at its widest.  Hemostasis was adequate.  It was closed with running Monocryl suture.  An additional retention suture was placed in the middle aspect.  Dermabond was then applied.    EBL: Minimal    COMPLICATIONS: none

## 2024-08-06 ENCOUNTER — HOSPITAL ENCOUNTER (OUTPATIENT)
Dept: RADIOLOGY | Facility: HOSPITAL | Age: 69
Discharge: HOME | End: 2024-08-06
Attending: INTERNAL MEDICINE
Payer: MEDICARE

## 2024-08-06 VITALS — HEIGHT: 66 IN | BODY MASS INDEX: 26.68 KG/M2 | WEIGHT: 166 LBS

## 2024-08-06 DIAGNOSIS — Z12.31 ENCOUNTER FOR SCREENING MAMMOGRAM FOR BREAST CANCER: ICD-10-CM

## 2024-08-06 PROCEDURE — 77067 SCR MAMMO BI INCL CAD: CPT | Mod: TC,PO

## 2024-08-06 PROCEDURE — 77067 SCR MAMMO BI INCL CAD: CPT | Mod: 26,,, | Performed by: RADIOLOGY

## 2024-08-06 PROCEDURE — 77063 BREAST TOMOSYNTHESIS BI: CPT | Mod: TC,PO

## 2024-08-06 PROCEDURE — 77063 BREAST TOMOSYNTHESIS BI: CPT | Mod: 26,,, | Performed by: RADIOLOGY

## 2024-09-02 NOTE — PROGRESS NOTES
Subjective:       Patient ID: Payton Castillo is a 69 y.o. female.    Chief Complaint: Follow-up (2 month), Diabetes, Hyperlipidemia, Hypertension, Gastroesophageal Reflux, and Bladder incontinence      Two months ago seen for cyst in the upper chest. Which was considered to be epidural points yesterday she had wanted a referral to surgery for removal.    Saw Dr Lomax and Cyst removed     Cyst removal on   1.-hypertension  2.-hyperlipidemia  3.-diabetes mellitus type 2   4.-neuropathy  5.-gastroesophageal reflux  6.-bladder incontinence currently on solifenacin  7.-chronic hip pains,-worse lately-bilateral      She recently had eye surgery for lot of floaters.  This was done Dr. Michael Mederos MD    Hypertension  This is a chronic problem. The current episode started more than 1 year ago. The problem has been waxing and waning since onset. The problem is uncontrolled. Risk factors for coronary artery disease include sedentary lifestyle, dyslipidemia, diabetes mellitus and post-menopausal state. Past treatments include beta blockers, angiotensin blockers and calcium channel blockers. The current treatment provides moderate improvement. Compliance problems include psychosocial issues.  There is no history of heart failure or left ventricular hypertrophy.   Diabetes  She presents for her follow-up diabetic visit. She has type 2 diabetes mellitus. No MedicAlert identification noted. The initial diagnosis of diabetes was made 25 years (Diagnosed in 1990s.) ago. Her disease course has been stable. There are no hypoglycemic complications. Symptoms are worsening. Diabetic complications include nephropathy. Risk factors for coronary artery disease include hypertension, sedentary lifestyle, post-menopausal, dyslipidemia and diabetes mellitus. Current diabetic treatment includes insulin injections (Both long-acting and short-acting insulins-also Jardiance 10 mg). She is compliant with treatment most of the time. She has  not had a previous visit with a dietitian. She rarely participates in exercise. Her home blood glucose trend is increasing steadily. Her breakfast blood glucose range is generally 130-140 mg/dl. An ACE inhibitor/angiotensin II receptor blocker is being taken. She does not see a podiatrist.Eye exam is not current.   Hyperlipidemia  This is a chronic problem. The current episode started more than 1 year ago. The problem is controlled. Current antihyperlipidemic treatment includes statins. The current treatment provides moderate improvement of lipids. Risk factors for coronary artery disease include post-menopausal, obesity, dyslipidemia and diabetes mellitus.       Past Medical History:   Diagnosis Date    Allergy     aspartame,fructose monosodium glutamate and penicillins.    Anemia of chronic disorder 11/09/2017    Anemia, chronic renal failure, stage 2 (mild) 11/09/2017    Anemia, chronic renal failure, stage 3 (moderate) 11/09/2017    Asthma     Cancer     kidney    Cataract of right eye     CKD (chronic kidney disease), stage III     Diabetes mellitus     type II    Fibromyalgia     GERD (gastroesophageal reflux disease)     Hx of renal cell cancer - left nephrectomy Aug 2014 11/09/2017    Hypercholesteremia     Hypertension     IDDM (insulin dependent diabetes mellitus)     Lumbar spondylosis     Lumbosacral radiculopathy     Osteopenia     Palpitations     Plantar fasciitis, right     Sleep apnea     Syncope 02/2012    Tachycardia     Thoracic radiculopathy     TMJ (dislocation of temporomandibular joint)      Social History     Socioeconomic History    Marital status:      Spouse name: Owen Castillo    Number of children: 2   Occupational History    Occupation: Part - member support [employer]     Employer: [employer]   Tobacco Use    Smoking status: Never    Smokeless tobacco: Never   Substance and Sexual Activity    Alcohol use: No    Drug use: No    Sexual activity: Yes     Partners: Male     Social  Determinants of Health     Financial Resource Strain: Low Risk  (4/6/2022)    Overall Financial Resource Strain (CARDIA)     Difficulty of Paying Living Expenses: Not very hard   Food Insecurity: No Food Insecurity (8/8/2022)    Hunger Vital Sign     Worried About Running Out of Food in the Last Year: Never true     Ran Out of Food in the Last Year: Never true   Transportation Needs: No Transportation Needs (4/6/2022)    PRAPARE - Transportation     Lack of Transportation (Medical): No     Lack of Transportation (Non-Medical): No   Physical Activity: Inactive (8/8/2022)    Exercise Vital Sign     Days of Exercise per Week: 0 days     Minutes of Exercise per Session: 0 min   Stress: Stress Concern Present (8/8/2022)    Citizen of the [place] [employer] Occupational [employer] - Occupational Stress Questionnaire     Feeling of Stress : To some extent   Housing Stability: Low Risk  (8/8/2022)    Housing Stability Vital Sign     Unable to Pay for Housing in the Last Year: No     Number of Places Lived in the Last Year: 1     Unstable Housing in the Last Year: No     Past Surgical History:   Procedure Laterality Date    CATARACT EXTRACTION Left 03/2016    INJECTION OF ANESTHETIC AGENT AROUND MEDIAL BRANCH NERVES INNERVATING LUMBAR FACET JOINT Bilateral 12/15/2022    Procedure: Block-nerve-medial branch-lumbar;  Surgeon: Howard Rivas MD;  Location: [facility] OR;  Service: Pain Management;  Laterality: Bilateral;  L3,4,5 MBB    INJECTION OF ANESTHETIC AGENT AROUND MEDIAL BRANCH NERVES INNERVATING LUMBAR FACET JOINT Bilateral 1/3/2023    Procedure: Block-nerve-medial branch-lumbar;  Surgeon: Howard Rivas MD;  Location: [facility] OR;  Service: Pain Management;  Laterality: Bilateral;  L3,4,5 MBB #2    INJECTION OF JOINT Left 1/26/2024    Procedure: Injection, Joint;  Surgeon: Howard Rivas MD;  [facility] [facility] [place];  Service: Anesthesiology;  Laterality: Left;  left GTB injection    INJECTION, SACROILIAC JOINT Left 10/7/2022    Procedure:  "INJECTION,SACROILIAC JOINT;  Surgeon: Cory Ruggiero MD;  Location: [facility] OR;  Service: Pain Management;  Laterality: Left;  sacroiliac injection    INJECTION, SACROILIAC JOINT Left 4/5/2023    Procedure: INJECTION,SACROILIAC JOINT;  Surgeon: Howard Rivas MD;  Location: [facility] OR;  Service: Pain Management;  Laterality: Left;  SIJ    INJECTION, SACROILIAC JOINT Left 1/26/2024    Procedure: INJECTION,SACROILIAC JOINT;  Surgeon: Howard Rivas MD;  Location: [facility] OR;  Service: Anesthesiology;  Laterality: Left;  left SIJ and Left GTB injection    JOINT REPLACEMENT Right     knee    RADIOFREQUENCY THERMOCOAGULATION Bilateral 1/13/2023    Procedure: RADIOFREQUENCY THERMAL COAGULATION;  Surgeon: Howard Rivas MD;  Location: [facility] OR;  Service: Pain Management;  Laterality: Bilateral;  L3,4,5 Raul RFA    SHOULDER OPEN ROTATOR CUFF REPAIR Right 02/2005    labral repair    TOTAL KNEE ARTHROPLASTY Left 2014    TUBAL LIGATION  1990    TUMOR REMOVAL  2014    from kidney     Family History   Problem Relation Name Age of Onset    Stomach cancer Mother      Breast cancer Mother      Diabetes Mother      Cancer Father         Review of Systems   HENT:  Negative for congestion.    Respiratory:  Negative for chest tightness.          Objective:      Blood pressure 133/70, pulse (!) 53, height 5' 6" (1.676 m), weight 76.1 kg (167 lb 12.8 oz), SpO2 95%. Body mass index is 27.08 kg/m².  Physical Exam  Vitals and nursing note reviewed.   Constitutional:       General: She is not in acute distress.     Appearance: She is well-developed. She is not ill-appearing, toxic-appearing or diaphoretic.      Comments: BMI is 27.44.   HENT:      Head: Normocephalic and atraumatic.      Mouth/Throat:      Tonsils: No tonsillar abscesses.   Eyes:      General: No scleral icterus.     Conjunctiva/sclera: Conjunctivae normal.      Comments: Chronically puffy eyelids.   Neck:      Thyroid: No thyromegaly.      Trachea: Trachea normal. No tracheal deviation. "   Cardiovascular:      Rate and Rhythm: Regular rhythm.      Heart sounds: Normal heart sounds, S1 normal and S2 normal.   Pulmonary:      Breath sounds: Normal breath sounds.   Abdominal:      General: There is no distension.      Palpations: Abdomen is soft. Abdomen is not rigid.      Tenderness: There is no abdominal tenderness. There is no guarding or rebound.          Comments: scar amy of prior surgeries are noted including scars of nephrectomy .   Musculoskeletal:         General: No tenderness or deformity.      Cervical back: Neck supple.      Right foot: Normal range of motion. No deformity or bunion.      Left foot: Normal range of motion. No deformity or bunion.        Feet:    Feet:      Right foot:      Protective Sensation: 5 sites tested.  5 sites sensed.      Skin integrity: Callus (.Medial aspect of the great toe) present. No ulcer or blister.      Toenail Condition: Right toenails are long.      Left foot:      Protective Sensation: 5 sites tested.  5 sites sensed.      Skin integrity: Callus (Medial aspect of the great toe) present. No ulcer or blister.      Toenail Condition: Left toenails are long.      Comments: Last time she had felt numbness in her feet.  Today no numbness.  Lymphadenopathy:      Cervical: No cervical adenopathy.   Skin:     General: Skin is warm and dry.      Findings: No erythema, lesion or rash.   Neurological:      Mental Status: She is alert.      Comments: l     Psychiatric:         Behavior: Behavior normal. Behavior is cooperative.           Assessment:       No visits with results within 3 Month(s) from this visit.   Latest known visit with results is:   Lab Visit on 05/31/2024   Component Date Value Ref Range Status    Specimen UA 05/31/2024 Urine, Clean Catch   Final    Color, UA 05/31/2024 Yellow  Yellow, Straw, Nimisha Final    Appearance, UA 05/31/2024 Clear  Clear Final    pH, UA 05/31/2024 6.0  5.0 - 8.0 Final    Specific Gravity, UA 05/31/2024 1.020  1.005 -  1.030 Final    Protein, UA 05/31/2024 1+ (A)  Negative Final    Glucose, UA 05/31/2024 Negative  Negative Final    Ketones, UA 05/31/2024 Negative  Negative Final    Bilirubin (UA) 05/31/2024 Negative  Negative Final    Occult Blood UA 05/31/2024 Negative  Negative Final    Nitrite, UA 05/31/2024 Negative  Negative Final    Urobilinogen, UA 05/31/2024 Negative  Negative EU/dL Final    Leukocytes, UA 05/31/2024 Negative  Negative Final    Glucose 05/31/2024 115 (H)  70 - 110 mg/dL Final    Sodium 05/31/2024 141  136 - 145 mmol/L Final    Potassium 05/31/2024 4.6  3.5 - 5.1 mmol/L Final    Chloride 05/31/2024 107  95 - 110 mmol/L Final    CO2 05/31/2024 27  23 - 29 mmol/L Final    BUN 05/31/2024 25 (H)  8 - 23 mg/dL Final    Calcium 05/31/2024 9.5  8.7 - 10.5 mg/dL Final    Creatinine 05/31/2024 1.3  0.5 - 1.4 mg/dL Final    Albumin 05/31/2024 4.2  3.5 - 5.2 g/dL Final    Phosphorus 05/31/2024 3.2  2.7 - 4.5 mg/dL Final    eGFR 05/31/2024 44.5 (A)  >60 mL/min/1.73 m^2 Final    Anion Gap 05/31/2024 7 (L)  8 - 16 mmol/L Final    WBC 05/31/2024 6.91  3.90 - 12.70 K/uL Final    RBC 05/31/2024 4.12  4.00 - 5.40 M/uL Final    Hemoglobin 05/31/2024 12.5  12.0 - 16.0 g/dL Final    Hematocrit 05/31/2024 38.7  37.0 - 48.5 % Final    MCV 05/31/2024 94  82 - 98 fL Final    MCH 05/31/2024 30.3  27.0 - 31.0 pg Final    MCHC 05/31/2024 32.3  32.0 - 36.0 g/dL Final    RDW 05/31/2024 12.7  11.5 - 14.5 % Final    Platelets 05/31/2024 330  150 - 450 K/uL Final    MPV 05/31/2024 8.7 (L)  9.2 - 12.9 fL Final    Immature Granulocytes 05/31/2024 0.3  0.0 - 0.5 % Final    Gran # (ANC) 05/31/2024 3.3  1.8 - 7.7 K/uL Final    Immature Grans (Abs) 05/31/2024 0.02  0.00 - 0.04 K/uL Final    Lymph # 05/31/2024 2.9  1.0 - 4.8 K/uL Final    Mono # 05/31/2024 0.5  0.3 - 1.0 K/uL Final    Eos # 05/31/2024 0.2  0.0 - 0.5 K/uL Final    Baso # 05/31/2024 0.05  0.00 - 0.20 K/uL Final    nRBC 05/31/2024 0  0 /100 WBC Final    Gran % 05/31/2024 48.2  38.0  - 73.0 % Final    Lymph % 05/31/2024 41.2  18.0 - 48.0 % Final    Mono % 05/31/2024 7.1  4.0 - 15.0 % Final    Eosinophil % 05/31/2024 2.5  0.0 - 8.0 % Final    Basophil % 05/31/2024 0.7  0.0 - 1.9 % Final    Differential Method 05/31/2024 Automated   Final    Microalbumin, Urine 05/31/2024 216.4 (H)  <19.9 ug/mL Final    Creatinine, Urine 05/31/2024 115.1  15.0 - 325.0 mg/dL Final    Microalb/Creat Ratio 05/31/2024 188.0 (H)  0.0 - 30.0 ug/mg Final    Protein, Urine Random 05/31/2024 47 (H)  6 - 15 mg/dL Final    Creatinine, Urine 05/31/2024 115.1  15.0 - 325.0 mg/dL Final    Prot/Creat Ratio, Urine 05/31/2024 0.41 (H)  0.00 - 0.20 Final    PTH, Intact 05/31/2024 117.7 (H)  9.0 - 77.0 pg/mL Final    Vit D, 25-Hydroxy 05/31/2024 22 (L)  30 - 96 ng/mL Final    Uric Acid 05/31/2024 6.9 (H)  2.4 - 5.7 mg/dL Final    Magnesium 05/31/2024 1.7  1.6 - 2.6 mg/dL Final    RBC, UA 05/31/2024 0  0 - 4 /hpf Final    WBC, UA 05/31/2024 1  0 - 5 /hpf Final    Bacteria 05/31/2024 None  None-Occ /hpf Final    Squam Epithel, UA 05/31/2024 0  /hpf Final    Hyaline Casts, UA 05/31/2024 0  0-1/lpf /lpf Final    Microscopic Comment 05/31/2024 SEE COMMENT   Final       1. Type 2 diabetes mellitus with diabetic polyneuropathy, with long-term current use of insulin  -     Lipid Panel; Future; Expected date: 09/06/2024  -     Hemoglobin A1C; Future; Expected date: 09/06/2024    2. Essential hypertension    3. Mixed dyslipidemia  -     Lipid Panel; Future; Expected date: 09/06/2024    4. CKD stage 3 due to type 2 diabetes mellitus    5. Encounter for osteoporosis screening in asymptomatic postmenopausal patient  -     DXA Bone Density Axial Skeleton 1 or more sites; Future; Expected date: 10/05/2024           Status: Final result       Next appt: 09/05/2024 at 11:00 AM in Family Medicine (John Nolan MD)               Component Ref Range & Units 1 mo ago  (7/31/24) 1 mo ago  (7/31/24) 2 mo ago  (6/26/24) 2 mo ago  (6/26/24) 2 mo  ago  (6/26/24) 7 mo ago  (1/26/24) 1 yr ago  (4/5/23)   POCT Glucose 68 - 106 mg/dL 121 High  106 124 High  158 High  50 Low Panic  113 High  R 131 High  R                   Component Ref Range & Units 3 mo ago  (5/31/24) 6 mo ago  (2/27/24) 9 mo ago  (11/10/23) 1 yr ago  (8/11/23) 1 yr ago  (7/3/23) 1 yr ago  (3/20/23) 1 yr ago  (12/1/22)   Uric Acid 2.4 - 5.7 mg/dL 6.9 High  6.9 High  6.7 High  5.3 7.1 High  7.0 High  6.3 High    Resulting Agency  [facility]        Component Ref Range & Units 3 mo ago  (5/31/24) 6 mo ago  (2/27/24) 9 mo ago  (11/10/23) 1 yr ago  (8/11/23) 1 yr ago  (12/1/22) 3 yr ago  (1/12/21)   Vit D, 25-Hydroxy 30 - 96 ng/mL 22 Low  19 Low  CM 15 Low  CM 33 CM 34 CM 43 CM     BUN 8 - 23 mg/dL 25 High  26 High  28 High  21 24 High  29 High  37 High    Calcium 8.7 - 10.5 mg/dL 9.5 10.0 9.9 9.7 9.8 9.8 9.3   Creatinine 0.5 - 1.4 mg/dL 1.3 1.4 1.4 1.3 1.4 1.5 High  1.6 High    Albumin 3.5 - 5.2 g/dL 4.2  4.1 4.2 4.4 4.2 4.1   Phosphorus 2.7 - 4.5 mg/dL 3.2  3.6  4.0 3.4 4.3   eGFR >60 mL/min/1.73 m^2 44.5 Abnormal  40.7 Abnormal  40.7 Abnormal  44.8 Abnormal  41.0 Abnormal  37.7 Abnormal  34.9     eGFR >60 mL/min/1.73 m^2 44.5 Abnormal  40.7 Abnormal  40.7 Abnormal  44.8 Abnormal  41.0 Abnormal  37.7 Abnormal  34.9 Abnormal      Plan:   Type 2 diabetes mellitus with diabetic polyneuropathy, with long-term current use of insulin  -     Lipid Panel; Future; Expected date: 09/06/2024  -     Hemoglobin A1C; Future; Expected date: 09/06/2024    Essential hypertension    Mixed dyslipidemia  -     Lipid Panel; Future; Expected date: 09/06/2024    CKD stage 3 due to type 2 diabetes mellitus    Encounter for osteoporosis screening in asymptomatic postmenopausal patient  -     DXA Bone Density Axial Skeleton 1 or more sites; Future; Expected date: 10/05/2024      Her random blood sugars are doing okay.  I will check A1c level at this point.    Blood pressures were borderline at  "arrival.      She was also following up with Nephrology for CKD.  Not sure if it was CKD stage IIIA or 3B at this point her GFR is almost close to 45.    I will continue with chronic kidney disease stage IIIA till proven otherwise.    Foot examination will be done today.      She has been recommended appropriate vaccinations including RSV, flu and COVID updates.  She is not driving these days.    Chronic kidney disease advised given for remaining hydrated, avoiding NSAIDs.  Consideration for future medications would be SGLT2 and Kerendia which he is already taking.    He has an appointment with her ophthalmologist Dr. Michael sutton following her surgery recently.  Follow up in about 4 months (around 1/5/2025), or if symptoms worsen or fail to improve, for Diabetes/HTN/Lipids.      Current Outpatient Medications:     atorvastatin (LIPITOR) 40 MG tablet, TAKE 1 TABLET EVERY EVENING, Disp: 90 tablet, Rfl: 3    BD INSULIN SYRINGE 1 mL 28 gauge x 1/2" Syrg, USE 1 SYRINGE ONCE DAILY, Disp: 100 each, Rfl: 3    blood-glucose sensor (KereosSTYLE BANG 3 SENSOR) Millicent, INJECT 1 DEVICE INTO THE SKIN CONTINUOUSLY. CHANGE SENSOR EVERY 14 DAYS, Disp: 6 each, Rfl: 4    calcitRIOL (ROCALTROL) 0.25 MCG Cap, Take 0.25 mcg by mouth every Mon, Wed, Fri., Disp: , Rfl:     cetirizine (ZYRTEC) 10 MG tablet, Take 10 mg by mouth., Disp: , Rfl:     fluticasone propionate (FLONASE) 50 mcg/actuation nasal spray, USE 2 SPRAYS IN EACH NOSTRIL ONCE DAILY, Disp: 48 g, Rfl: 3    gabapentin (NEURONTIN) 800 MG tablet, TAKE 1 TABLET THREE TIMES A DAY, Disp: 270 tablet, Rfl: 3    insulin aspart U-100 (NOVOLOG FLEXPEN U-100 INSULIN) 100 unit/mL (3 mL) InPn pen, INJECT 20 UNITS UNDER THE SKIN THREE TIMES A DAY WITH MEALS ( INSTEAD OF HUMALOG ), Disp: 60 mL, Rfl: 3    insulin glargine U-100, Lantus, (LANTUS U-100 INSULIN) 100 unit/mL injection, INJECT 70 UNITS UNDER THE SKIN EVERY EVENING, Disp: 63 mL, Rfl: 3    insulin syringe-needle U-100 0.3 mL 30 " "Boubacar, by Misc.(Non-Drug; Combo Route) route 3 (three) times daily., Disp: , Rfl:     KERENDIA 10 mg Tab, Take 1 tablet by mouth every evening., Disp: , Rfl:     metoprolol succinate (TOPROL-XL) 50 MG 24 hr tablet, TAKE 1 TABLET DAILY, Disp: 90 tablet, Rfl: 3    olmesartan (BENICAR) 20 MG tablet, Take 20 mg by mouth., Disp: , Rfl:     omeprazole (PRILOSEC) 40 MG capsule, Take 40 mg by mouth every evening., Disp: , Rfl:     pen needle, diabetic (ADVOCATE PEN NEEDLE) 31 gauge x 5/16" Ndle, 1 Device by Misc.(Non-Drug; Combo Route) route 3 (three) times daily., Disp: 100 each, Rfl: 3    flash glucose sensor (FREESTYLE BANG 14 DAY SENSOR) Kit, USE ONE SENSOR EVERY 14 DAYS (Patient not taking: Reported on 7/2/2024), Disp: 6 kit, Rfl: 3  No current facility-administered medications for this visit.    Facility-Administered Medications Ordered in Other Visits:     lactated ringers infusion, , Intravenous, Once PRN, Howard Rivas MD    lactated ringers infusion, , Intravenous, Once PRN, Howard Rivas MD    lactated ringers infusion, , Intravenous, Continuous, Howard Rivas MD, Last Rate: 25 mL/hr at 01/26/24 0937, 500 mL at 01/26/24 0937    John Nolan      "

## 2024-09-04 RX ORDER — METOPROLOL SUCCINATE 50 MG/1
TABLET, EXTENDED RELEASE ORAL
Qty: 90 TABLET | Refills: 3 | Status: SHIPPED | OUTPATIENT
Start: 2024-09-04

## 2024-09-05 ENCOUNTER — OFFICE VISIT (OUTPATIENT)
Dept: FAMILY MEDICINE | Facility: CLINIC | Age: 69
End: 2024-09-05
Payer: MEDICARE

## 2024-09-05 VITALS
SYSTOLIC BLOOD PRESSURE: 133 MMHG | HEIGHT: 66 IN | OXYGEN SATURATION: 95 % | BODY MASS INDEX: 26.97 KG/M2 | DIASTOLIC BLOOD PRESSURE: 70 MMHG | WEIGHT: 167.81 LBS | HEART RATE: 53 BPM

## 2024-09-05 DIAGNOSIS — Z79.4 TYPE 2 DIABETES MELLITUS WITH DIABETIC POLYNEUROPATHY, WITH LONG-TERM CURRENT USE OF INSULIN: Primary | ICD-10-CM

## 2024-09-05 DIAGNOSIS — E11.22 CKD STAGE 3 DUE TO TYPE 2 DIABETES MELLITUS: ICD-10-CM

## 2024-09-05 DIAGNOSIS — N18.30 CKD STAGE 3 DUE TO TYPE 2 DIABETES MELLITUS: ICD-10-CM

## 2024-09-05 DIAGNOSIS — E78.2 MIXED DYSLIPIDEMIA: ICD-10-CM

## 2024-09-05 DIAGNOSIS — Z78.0 ENCOUNTER FOR OSTEOPOROSIS SCREENING IN ASYMPTOMATIC POSTMENOPAUSAL PATIENT: ICD-10-CM

## 2024-09-05 DIAGNOSIS — Z13.820 ENCOUNTER FOR OSTEOPOROSIS SCREENING IN ASYMPTOMATIC POSTMENOPAUSAL PATIENT: ICD-10-CM

## 2024-09-05 DIAGNOSIS — E11.42 TYPE 2 DIABETES MELLITUS WITH DIABETIC POLYNEUROPATHY, WITH LONG-TERM CURRENT USE OF INSULIN: Primary | ICD-10-CM

## 2024-09-05 DIAGNOSIS — I10 ESSENTIAL HYPERTENSION: ICD-10-CM

## 2024-09-05 PROCEDURE — 99999 PR PBB SHADOW E&M-EST. PATIENT-LVL V: CPT | Mod: PBBFAC,,, | Performed by: INTERNAL MEDICINE

## 2024-09-06 ENCOUNTER — LAB VISIT (OUTPATIENT)
Dept: LAB | Facility: HOSPITAL | Age: 69
End: 2024-09-06
Attending: INTERNAL MEDICINE
Payer: MEDICARE

## 2024-09-06 DIAGNOSIS — E78.2 MIXED DYSLIPIDEMIA: ICD-10-CM

## 2024-09-06 DIAGNOSIS — Z79.4 TYPE 2 DIABETES MELLITUS WITH DIABETIC POLYNEUROPATHY, WITH LONG-TERM CURRENT USE OF INSULIN: ICD-10-CM

## 2024-09-06 DIAGNOSIS — E11.42 TYPE 2 DIABETES MELLITUS WITH DIABETIC POLYNEUROPATHY, WITH LONG-TERM CURRENT USE OF INSULIN: ICD-10-CM

## 2024-09-06 LAB
CHOLEST SERPL-MCNC: 159 MG/DL (ref 120–199)
CHOLEST/HDLC SERPL: 3.2 {RATIO} (ref 2–5)
ESTIMATED AVG GLUCOSE: 140 MG/DL (ref 68–131)
HBA1C MFR BLD: 6.5 % (ref 4–5.6)
HDLC SERPL-MCNC: 50 MG/DL (ref 40–75)
HDLC SERPL: 31.4 % (ref 20–50)
LDLC SERPL CALC-MCNC: 74.2 MG/DL (ref 63–159)
NONHDLC SERPL-MCNC: 109 MG/DL
TRIGL SERPL-MCNC: 174 MG/DL (ref 30–150)

## 2024-09-06 PROCEDURE — 80061 LIPID PANEL: CPT | Performed by: INTERNAL MEDICINE

## 2024-09-06 PROCEDURE — 83036 HEMOGLOBIN GLYCOSYLATED A1C: CPT | Performed by: INTERNAL MEDICINE

## 2024-09-25 ENCOUNTER — HOSPITAL ENCOUNTER (OUTPATIENT)
Dept: RADIOLOGY | Facility: HOSPITAL | Age: 69
Discharge: HOME | End: 2024-09-25
Attending: INTERNAL MEDICINE
Payer: MEDICARE

## 2024-09-25 DIAGNOSIS — Z13.820 ENCOUNTER FOR OSTEOPOROSIS SCREENING IN ASYMPTOMATIC POSTMENOPAUSAL PATIENT: ICD-10-CM

## 2024-09-25 DIAGNOSIS — Z78.0 ENCOUNTER FOR OSTEOPOROSIS SCREENING IN ASYMPTOMATIC POSTMENOPAUSAL PATIENT: ICD-10-CM

## 2024-09-25 PROCEDURE — 77080 DXA BONE DENSITY AXIAL: CPT | Mod: TC,PO

## 2024-09-25 PROCEDURE — 77080 DXA BONE DENSITY AXIAL: CPT | Mod: 26,,, | Performed by: RADIOLOGY

## 2024-10-10 ENCOUNTER — PATIENT MESSAGE (OUTPATIENT)
Dept: FAMILY MEDICINE | Facility: CLINIC | Age: 69
End: 2024-10-10
Payer: MEDICARE

## 2024-10-11 ENCOUNTER — TELEPHONE (OUTPATIENT)
Dept: FAMILY MEDICINE | Facility: CLINIC | Age: 69
End: 2024-10-11
Payer: MEDICARE

## 2024-10-11 RX ORDER — BLOOD-GLUCOSE SENSOR
1 EACH MISCELLANEOUS
Qty: 6 EACH | Refills: 1 | Status: SHIPPED | OUTPATIENT
Start: 2024-10-11 | End: 2025-04-17

## 2024-10-11 NOTE — TELEPHONE ENCOUNTER
Free Style Amy SHANNON Denied    Close reason: Other  Payer: [facility] [employer] Case ID: [id number]  Note from payer: This medication may be excluded from the patient's benefit. For more information, please reach out to "[employer] directly at [phone]. Message from "[employer]: Drug is not covered by plan  View History

## 2024-10-14 ENCOUNTER — PATIENT MESSAGE (OUTPATIENT)
Dept: FAMILY MEDICINE | Facility: CLINIC | Age: 69
End: 2024-10-14
Payer: MEDICARE

## 2024-10-14 RX ORDER — BLOOD-GLUCOSE SENSOR
1 EACH MISCELLANEOUS
Qty: 6 EACH | Refills: 3 | Status: SHIPPED | OUTPATIENT
Start: 2024-10-14 | End: 2024-10-16 | Stop reason: SDUPTHER

## 2024-10-16 RX ORDER — BLOOD-GLUCOSE SENSOR
1 EACH MISCELLANEOUS
Qty: 6 EACH | Refills: 3 | Status: SHIPPED | OUTPATIENT
Start: 2024-10-16

## 2024-10-16 NOTE — TELEPHONE ENCOUNTER
Pt needs freestyle nelida 3 plus   can't find in system so I put it in comments   and pended rx  must go to [facility]

## 2024-10-24 ENCOUNTER — PATIENT MESSAGE (OUTPATIENT)
Dept: FAMILY MEDICINE | Facility: CLINIC | Age: 69
End: 2024-10-24
Payer: MEDICARE

## 2024-11-14 ENCOUNTER — TELEPHONE (OUTPATIENT)
Facility: CLINIC | Age: 69
End: 2024-11-14
Payer: MEDICARE

## 2024-11-14 DIAGNOSIS — D63.8 ANEMIA OF CHRONIC DISORDER: Primary | ICD-10-CM

## 2024-11-18 ENCOUNTER — LAB VISIT (OUTPATIENT)
Dept: LAB | Facility: HOSPITAL | Age: 69
End: 2024-11-18
Attending: INTERNAL MEDICINE
Payer: MEDICARE

## 2024-11-18 DIAGNOSIS — D63.8 ANEMIA OF CHRONIC DISORDER: ICD-10-CM

## 2024-11-18 LAB
ALBUMIN SERPL BCP-MCNC: 4.3 G/DL (ref 3.5–5.2)
ALP SERPL-CCNC: 78 U/L (ref 55–135)
ALT SERPL W/O P-5'-P-CCNC: 22 U/L (ref 10–44)
ANION GAP SERPL CALC-SCNC: 6 MMOL/L (ref 8–16)
AST SERPL-CCNC: 18 U/L (ref 10–40)
BASOPHILS # BLD AUTO: 0.07 K/UL (ref 0–0.2)
BASOPHILS NFR BLD: 0.8 % (ref 0–1.9)
BILIRUB SERPL-MCNC: 0.5 MG/DL (ref 0.1–1)
BUN SERPL-MCNC: 26 MG/DL (ref 8–23)
CALCIUM SERPL-MCNC: 9.5 MG/DL (ref 8.7–10.5)
CHLORIDE SERPL-SCNC: 106 MMOL/L (ref 95–110)
CO2 SERPL-SCNC: 28 MMOL/L (ref 23–29)
CREAT SERPL-MCNC: 1.3 MG/DL (ref 0.5–1.4)
DIFFERENTIAL METHOD BLD: ABNORMAL
EOSINOPHIL # BLD AUTO: 0.2 K/UL (ref 0–0.5)
EOSINOPHIL NFR BLD: 2.5 % (ref 0–8)
ERYTHROCYTE [DISTWIDTH] IN BLOOD BY AUTOMATED COUNT: 12.4 % (ref 11.5–14.5)
EST. GFR  (NO RACE VARIABLE): 44.5 ML/MIN/1.73 M^2
GLUCOSE SERPL-MCNC: 122 MG/DL (ref 70–110)
HCT VFR BLD AUTO: 41.5 % (ref 37–48.5)
HGB BLD-MCNC: 13.4 G/DL (ref 12–16)
IMM GRANULOCYTES # BLD AUTO: 0.01 K/UL (ref 0–0.04)
IMM GRANULOCYTES NFR BLD AUTO: 0.1 % (ref 0–0.5)
LYMPHOCYTES # BLD AUTO: 2.7 K/UL (ref 1–4.8)
LYMPHOCYTES NFR BLD: 32.7 % (ref 18–48)
MCH RBC QN AUTO: 30.1 PG (ref 27–31)
MCHC RBC AUTO-ENTMCNC: 32.3 G/DL (ref 32–36)
MCV RBC AUTO: 93 FL (ref 82–98)
MONOCYTES # BLD AUTO: 0.6 K/UL (ref 0.3–1)
MONOCYTES NFR BLD: 7.5 % (ref 4–15)
NEUTROPHILS # BLD AUTO: 4.7 K/UL (ref 1.8–7.7)
NEUTROPHILS NFR BLD: 56.4 % (ref 38–73)
NRBC BLD-RTO: 0 /100 WBC
PLATELET # BLD AUTO: 324 K/UL (ref 150–450)
PMV BLD AUTO: 8.4 FL (ref 9.2–12.9)
POTASSIUM SERPL-SCNC: 5 MMOL/L (ref 3.5–5.1)
PROT SERPL-MCNC: 7.9 G/DL (ref 6–8.4)
RBC # BLD AUTO: 4.45 M/UL (ref 4–5.4)
SODIUM SERPL-SCNC: 140 MMOL/L (ref 136–145)
WBC # BLD AUTO: 8.37 K/UL (ref 3.9–12.7)

## 2024-11-18 PROCEDURE — 85025 COMPLETE CBC W/AUTO DIFF WBC: CPT | Performed by: INTERNAL MEDICINE

## 2024-11-18 PROCEDURE — 80053 COMPREHEN METABOLIC PANEL: CPT | Performed by: INTERNAL MEDICINE

## 2024-11-18 PROCEDURE — 36415 COLL VENOUS BLD VENIPUNCTURE: CPT | Performed by: INTERNAL MEDICINE

## 2024-12-03 ENCOUNTER — TELEPHONE (OUTPATIENT)
Dept: FAMILY MEDICINE | Facility: CLINIC | Age: 69
End: 2024-12-03
Payer: MEDICARE

## 2024-12-03 NOTE — TELEPHONE ENCOUNTER
----- Message from Amaris sent at 12/3/2024 10:56 AM CST -----  - 10:20- pt has covid and had it for a week.   [phone]

## 2024-12-13 NOTE — PROGRESS NOTES
"   [facility] Hematology/Oncology  PROGRESS NOTE      Subjective:       Patient ID:   NAME: Payton Castillo : 1955     69 y.o. female    Referring Doc: An (new PCP)  Other Physicians: Corona Haro; Mahesh (Hand Surg); [place]    Chief Complaint:  Anemia/renal ca f/u    History of Present Illness:     Patient returns today for a regularly scheduled follow-up visit.  She is here with her mom. She lost her  in 2024    She had recent bout of covid from which she has recovered    She has chronic aches with sciatica and uses cane to ambulate on occasion but not today.     She denies any CP, SOB, HA's or N/V.     She has been seeing Dr Rivas periodically    She saw Dr Nolan  in 2024    She had a skin tag removed and I&D'd with Dr Lomax in 2024         Discussed covid19 precautions - she had her vaccinations and booster        ROS:   GEN: normal without any fever, night sweats or weight loss; chronic arthritis issues  HEENT: normal with no HA's, sore throat, stiff neck, changes in vision  CV: normal with no CP, SOB, PND, PALMA or orthopnea  PULM: normal with no SOB, cough, hemoptysis, sputum or pleuritic pain  GI: normal with no abdominal pain, nausea, vomiting, constipation, diarrhea, melanotic stools, BRBPR, or hematemesis  : normal with no hematuria, dysuria  BREAST: normal with no mass, discharge, pain  SKIN: normal with no rash, erythema, bruising, or swelling    Allergies:  Review of patient's allergies indicates:   Allergen Reactions    Aspartame Other (See Comments)     Headache      Fructose Other (See Comments)     headache    Monosodium glutamate Other (See Comments)     Headache      Pcn [penicillins]      Rash         Medications:    Current Outpatient Medications:     atorvastatin (LIPITOR) 40 MG tablet, TAKE 1 TABLET EVERY EVENING, Disp: 90 tablet, Rfl: 3    BD INSULIN SYRINGE 1 mL 28 gauge x 1/2" Syrg, USE 1 SYRINGE ONCE DAILY, Disp: 100 each, Rfl: 3    blood-glucose " "sensor (FREESTYLE BANG 3 SENSOR) Millicent, Inject 1 Device into the skin every 14 (fourteen) days., Disp: 6 each, Rfl: 3    cetirizine (ZYRTEC) 10 MG tablet, Take 10 mg by mouth., Disp: , Rfl:     fluticasone propionate (FLONASE) 50 mcg/actuation nasal spray, USE 2 SPRAYS IN EACH NOSTRIL ONCE DAILY, Disp: 48 g, Rfl: 3    gabapentin (NEURONTIN) 800 MG tablet, TAKE 1 TABLET THREE TIMES A DAY, Disp: 270 tablet, Rfl: 3    insulin aspart U-100 (NOVOLOG FLEXPEN U-100 INSULIN) 100 unit/mL (3 mL) InPn pen, INJECT 20 UNITS UNDER THE SKIN THREE TIMES A DAY WITH MEALS ( INSTEAD OF HUMALOG ), Disp: 60 mL, Rfl: 3    insulin glargine U-100, Lantus, (LANTUS U-100 INSULIN) 100 unit/mL injection, INJECT 70 UNITS UNDER THE SKIN EVERY EVENING, Disp: 63 mL, Rfl: 3    insulin syringe-needle U-100 0.3 mL 30 Syrg, by Misc.(Non-Drug; Combo Route) route 3 (three) times daily., Disp: , Rfl:     KERENDIA 10 mg Tab, Take 1 tablet by mouth every evening., Disp: , Rfl:     metoprolol succinate (TOPROL-XL) 50 MG 24 hr tablet, TAKE 1 TABLET DAILY, Disp: 90 tablet, Rfl: 3    omeprazole (PRILOSEC) 40 MG capsule, Take 40 mg by mouth every evening., Disp: , Rfl:     pen needle, diabetic (ADVOCATE PEN NEEDLE) 31 gauge x 5/16" Ndle, 1 Device by Misc.(Non-Drug; Combo Route) route 3 (three) times daily., Disp: 100 each, Rfl: 3    olmesartan (BENICAR) 20 MG tablet, Take 20 mg by mouth., Disp: , Rfl:   No current facility-administered medications for this visit.    Facility-Administered Medications Ordered in Other Visits:     lactated ringers infusion, , Intravenous, Once PRN, Howard Rivas MD    lactated ringers infusion, , Intravenous, Once PRN, Howard Rivas MD    lactated ringers infusion, , Intravenous, Continuous, Howard Rivas MD, Last Rate: 25 mL/hr at 01/26/24 0937, 500 mL at 01/26/24 0937    PMHx/PSHx Updates:  See patient's last visit with me on 11/21/2023  See H&P on 10/11/2006        Pathology:  See path Aug 2014          Objective: " "    Vitals:  Blood pressure 106/66, pulse 78, temperature 98.1 °F (36.7 °C), temperature source Temporal, resp. rate 16, height 5' 6" (1.676 m), weight 75.8 kg (167 lb).    Physical Examination:   GEN: no apparent distress, comfortable; AAOx3; overweight  HEAD: atraumatic and normocephalic  EYES: no pallor, no icterus, PERRLA  ENT: OMM, no pharyngeal erythema, external ears WNL; no nasal discharge; no thrush  NECK: no masses, thyroid normal, trachea midline, no LAD/LN's, supple  CV: RRR with no murmur; normal pulse; normal S1 and S2; no pedal edema  CHEST: Normal respiratory effort; CTAB; normal breath sounds; no wheeze or crackles  ABDOM: nontender and nondistended; soft; normal bowel sounds; no rebound/guarding  MUSC/Skeletal: ROM normal; no crepitus; joints normal; no deformities or arthropathy; uses cane  EXTREM: no clubbing, cyanosis, inflammation or swelling  SKIN: no rashes, lesions, ulcers, petechiae or subcutaneous nodules  : no bright  NEURO: grossly intact; motor/sensory WNL; AAOx3; no tremors  PSYCH: normal mood, affect and behavior  LYMPH: normal cervical, supraclavicular, axillary and groin LN's            Labs:        Lab Results   Component Value Date    WBC 8.37 11/18/2024    HGB 13.4 11/18/2024    HCT 41.5 11/18/2024    MCV 93 11/18/2024     11/18/2024     BMP  Lab Results   Component Value Date     11/18/2024    K 5.0 11/18/2024     11/18/2024    CO2 28 11/18/2024    BUN 26 (H) 11/18/2024    CREATININE 1.3 11/18/2024    CALCIUM 9.5 11/18/2024    ANIONGAP 6 (L) 11/18/2024    ESTGFRAFRICA 44.9 (A) 07/20/2022    EGFRNONAA 38.9 (A) 07/20/2022     Lab Results   Component Value Date    ALT 22 11/18/2024    AST 18 11/18/2024    ALKPHOS 78 11/18/2024    BILITOT 0.5 11/18/2024             Radiology/Diagnostic Studies:    CTA 11/27/2024:  Impression    Impression:  No CT evidence of pulmonary thromboembolism or other acute intrathoracic pathology.            Mammo " 8/6/2024:  Impression:     Negative mammogram. Annual screening mammography is recommended.     BI-RADS CATEGORY 1: NEGATIVE.        Mammo 2/20/2023:       1. No mammographic evidence of malignancy and no detrimental change.  2. Yearly mammography is recommended.  BI-RADS CATEGORY 1: NEGATIVE.      Mammo  2/7/2022:  Impression:     No mammographic findings of malignancy or significant detrimental interval change.     BI-RADS CATEGORY 1: NEGATIVE.         Mammo  2/5/2021:    Impression:     Negative mammogram.     Recommendation: Annual screening mammography.     BI-RADS CATEGORY: 1  NEGATIVE    CXR 1/12/2021:    IMPRESSION:     No acute cardiopulmonary abnormality.      CT Abdom/pelvis  6/25/2021:    IMPRESSION: Mild wall thickening of the distal sigmoid colon and rectum suggestive of colitis most likely infectious or inflammatory etiology.     Small hepatic cysts     Fat-containing left inguinal hernia       US  11/12/2020:    Impression:     Borderline mildly elevated bilateral renal resistive indices which can be seen with renal dysfunction.         CT 11/1/2017    I have reviewed all available lab results and radiology reports.    Assessment/Plan:   (1) 69 y.o. female with diagnosis of chronic anemia and thrombocytopenia in the past  - multifactorial anemia process with underlying anemia of chronic disorders and anemia of chronic renal  - latest labs wnl - no residual anemia or tcp    11/23/2021:  - latest labs are adequate    11/22/2022:  - no current anemia    11/23/2023:  - no current anemia  - labs adequate    12/16/2024:  - latest labs adequate  - no current anemia    (2) prior left nephrectomy in Aug 2014  - Stage I renal cell carcinoma  - followed by Dr Hill with  - she is seeing him this coming Monday  - CT scans on 11/1/2017 negative  - recent US of kidneys - negative 11/12/2020 11/23/2021:  - she sees Dr Jeter again in Dec 2021  - she had CT scans in June 2021 11/22/2022:  - she  has US planned on Dec 1st with     11/21/2023:  - she sees  again in Dec 2023    12/16/2024:  - she sees urology again next month    (3) CRI - followed by Dr Tommy Haro with nephrology    (4) Chronic joint/hip issues - followed by Dr Rivas    1. Anemia, chronic renal failure, stage 3 (moderate)        2. Hx of renal cell cancer - left nephrectomy Aug 2014        3. Anemia of chronic disorder              PLAN:  1. F/u with PCP,  and Neph  2. Check labs again in one year  3. F/u with urology this coming Jan 2025  4. Routine Mammo due in Aug 2024    RTC in 12 months    Fax note to [facility]iger and Leonid Hill    Discussion:       COVID-19 Discussion:    I had long discussion with patient and any applicable family about the COVID-19 coronavirus epidemic and the recommended precautions with regard to cancer and/or hematology patients. I have re-iterated the CDC recommendations for adequate hand washing, use of hand -like products, and coughing into elbow, etc. In addition, especially for our patients who are on chemotherapy and/or our otherwise immunocompromised patients, I have recommended avoidance of crowds, including movie theaters, restaurants, churches, etc. I have recommended avoidance of any sick or symptomatic family members and/or friends. I have also recommended avoidance of any raw and unwashed food products, and general avoidance of food items that have not been prepared by themselves. The patient has been asked to call us immediately with any symptom developments, issues, questions or other general concerns.         I have explained all of the above in detail and the patient understands all of the current recommendation(s). I have answered all of their questions to the best of my ability and to their complete satisfaction.   The patient is to continue with the current management plan.            Electronically signed by Sebastien Tavarez MD

## 2024-12-16 ENCOUNTER — OFFICE VISIT (OUTPATIENT)
Facility: CLINIC | Age: 69
End: 2024-12-16
Payer: MEDICARE

## 2024-12-16 VITALS
WEIGHT: 167 LBS | BODY MASS INDEX: 26.84 KG/M2 | RESPIRATION RATE: 16 BRPM | HEIGHT: 66 IN | DIASTOLIC BLOOD PRESSURE: 66 MMHG | TEMPERATURE: 98 F | SYSTOLIC BLOOD PRESSURE: 106 MMHG | HEART RATE: 78 BPM

## 2024-12-16 DIAGNOSIS — D63.8 ANEMIA OF CHRONIC DISORDER: ICD-10-CM

## 2024-12-16 DIAGNOSIS — N18.30 ANEMIA, CHRONIC RENAL FAILURE, STAGE 3 (MODERATE): Primary | ICD-10-CM

## 2024-12-16 DIAGNOSIS — Z85.528 HX OF RENAL CELL CANCER: ICD-10-CM

## 2024-12-16 DIAGNOSIS — D63.1 ANEMIA, CHRONIC RENAL FAILURE, STAGE 3 (MODERATE): Primary | ICD-10-CM

## 2024-12-16 PROCEDURE — 1160F RVW MEDS BY RX/DR IN RCRD: CPT | Mod: CPTII,S$GLB,, | Performed by: INTERNAL MEDICINE

## 2024-12-16 PROCEDURE — 99999 PR PBB SHADOW E&M-EST. PATIENT-LVL III: CPT | Mod: PBBFAC,,, | Performed by: INTERNAL MEDICINE

## 2024-12-16 PROCEDURE — 3074F SYST BP LT 130 MM HG: CPT | Mod: CPTII,S$GLB,, | Performed by: INTERNAL MEDICINE

## 2024-12-16 PROCEDURE — 3008F BODY MASS INDEX DOCD: CPT | Mod: CPTII,S$GLB,, | Performed by: INTERNAL MEDICINE

## 2024-12-16 PROCEDURE — 1125F AMNT PAIN NOTED PAIN PRSNT: CPT | Mod: CPTII,S$GLB,, | Performed by: INTERNAL MEDICINE

## 2024-12-16 PROCEDURE — 3044F HG A1C LEVEL LT 7.0%: CPT | Mod: CPTII,S$GLB,, | Performed by: INTERNAL MEDICINE

## 2024-12-16 PROCEDURE — 3288F FALL RISK ASSESSMENT DOCD: CPT | Mod: CPTII,S$GLB,, | Performed by: INTERNAL MEDICINE

## 2024-12-16 PROCEDURE — 3078F DIAST BP <80 MM HG: CPT | Mod: CPTII,S$GLB,, | Performed by: INTERNAL MEDICINE

## 2024-12-16 PROCEDURE — 3066F NEPHROPATHY DOC TX: CPT | Mod: CPTII,S$GLB,, | Performed by: INTERNAL MEDICINE

## 2024-12-16 PROCEDURE — 3060F POS MICROALBUMINURIA REV: CPT | Mod: CPTII,S$GLB,, | Performed by: INTERNAL MEDICINE

## 2024-12-16 PROCEDURE — 4010F ACE/ARB THERAPY RXD/TAKEN: CPT | Mod: CPTII,S$GLB,, | Performed by: INTERNAL MEDICINE

## 2024-12-16 PROCEDURE — 1101F PT FALLS ASSESS-DOCD LE1/YR: CPT | Mod: CPTII,S$GLB,, | Performed by: INTERNAL MEDICINE

## 2024-12-16 PROCEDURE — 99213 OFFICE O/P EST LOW 20 MIN: CPT | Mod: S$GLB,,, | Performed by: INTERNAL MEDICINE

## 2024-12-16 PROCEDURE — G2211 COMPLEX E/M VISIT ADD ON: HCPCS | Mod: S$GLB,,, | Performed by: INTERNAL MEDICINE

## 2024-12-16 PROCEDURE — 1159F MED LIST DOCD IN RCRD: CPT | Mod: CPTII,S$GLB,, | Performed by: INTERNAL MEDICINE

## 2025-01-06 ENCOUNTER — TELEPHONE (OUTPATIENT)
Dept: FAMILY MEDICINE | Facility: CLINIC | Age: 70
End: 2025-01-06
Payer: MEDICARE

## 2025-01-06 DIAGNOSIS — E78.2 MIXED DYSLIPIDEMIA: ICD-10-CM

## 2025-01-06 DIAGNOSIS — E11.42 TYPE 2 DIABETES MELLITUS WITH DIABETIC POLYNEUROPATHY, WITH LONG-TERM CURRENT USE OF INSULIN: Primary | ICD-10-CM

## 2025-01-06 DIAGNOSIS — E11.22 CKD STAGE 3 DUE TO TYPE 2 DIABETES MELLITUS: ICD-10-CM

## 2025-01-06 DIAGNOSIS — I10 ESSENTIAL HYPERTENSION: ICD-10-CM

## 2025-01-06 DIAGNOSIS — N18.30 CKD STAGE 3 DUE TO TYPE 2 DIABETES MELLITUS: ICD-10-CM

## 2025-01-06 DIAGNOSIS — Z79.4 TYPE 2 DIABETES MELLITUS WITH DIABETIC POLYNEUROPATHY, WITH LONG-TERM CURRENT USE OF INSULIN: Primary | ICD-10-CM

## 2025-01-06 NOTE — TELEPHONE ENCOUNTER
----- Message from Lisa sent at 1/6/2025  2:45 PM CST -----  The patient  had to reschedule her appointment. It is in April. Can you put her lab orders in for your office. She said she needs an A1C added. She wants to go to your office in March. Please call pt's # [phone]       Type 2 diabetes mellitus with diabetic polyneuropathy, with long-term current use of insulin  -     Hemoglobin A1C; Future; Expected date: 01/08/2025  -     Comprehensive Metabolic Panel; Future; Expected date: 01/08/2025  -     Lipid Panel; Future; Expected date: 01/08/2025  -     Microalbumin/Creatinine Ratio, Urine; Future; Expected date: 01/08/2025    Essential hypertension  -     Comprehensive Metabolic Panel; Future; Expected date: 01/08/2025  -     Lipid Panel; Future; Expected date: 01/08/2025  -     Microalbumin/Creatinine Ratio, Urine; Future; Expected date: 01/08/2025    Mixed dyslipidemia  -     Comprehensive Metabolic Panel; Future; Expected date: 01/08/2025  -     Lipid Panel; Future; Expected date: 01/08/2025    CKD stage 3 due to type 2 diabetes mellitus  -     Comprehensive Metabolic Panel; Future; Expected date: 01/08/2025  -     Microalbumin/Creatinine Ratio, Urine; Future; Expected date: 01/08/2025     Following labs can be done anywhere at [facility] including our office subject to availability of phlebotomist on that day.  Phlebotomist or always available at [facility] and [facility] but may not be another at our clinic on all days.  To be done fasting.    ----- Message from Lisa sent at 1/6/2025  2:45 PM CST -----  The patient  had to reschedule her appointment. It is in April. Can you put her lab orders in for your office. She said she needs an A1C added. She wants to go to your office in March. Please call pt's # [phone]      Type 2 diabetes mellitus with diabetic polyneuropathy, with long-term current use of insulin  -     Hemoglobin A1C; Future; Expected date: 01/08/2025  -     Comprehensive Metabolic Panel;  Future; Expected date: 01/08/2025  -     Lipid Panel; Future; Expected date: 01/08/2025  -     Microalbumin/Creatinine Ratio, Urine; Future; Expected date: 01/08/2025    Essential hypertension  -     Comprehensive Metabolic Panel; Future; Expected date: 01/08/2025  -     Lipid Panel; Future; Expected date: 01/08/2025  -     Microalbumin/Creatinine Ratio, Urine; Future; Expected date: 01/08/2025    Mixed dyslipidemia  -     Comprehensive Metabolic Panel; Future; Expected date: 01/08/2025  -     Lipid Panel; Future; Expected date: 01/08/2025    CKD stage 3 due to type 2 diabetes mellitus  -     Comprehensive Metabolic Panel; Future; Expected date: 01/08/2025  -     Microalbumin/Creatinine Ratio, Urine; Future; Expected date: 01/08/2025

## 2025-01-07 ENCOUNTER — PATIENT MESSAGE (OUTPATIENT)
Dept: FAMILY MEDICINE | Facility: CLINIC | Age: 70
End: 2025-01-07
Payer: MEDICARE

## 2025-01-10 ENCOUNTER — LAB VISIT (OUTPATIENT)
Dept: LAB | Facility: HOSPITAL | Age: 70
End: 2025-01-10
Attending: NURSE PRACTITIONER
Payer: MEDICARE

## 2025-01-10 DIAGNOSIS — N18.6 TYPE 2 DIABETES MELLITUS WITH END-STAGE RENAL DISEASE: Primary | ICD-10-CM

## 2025-01-10 DIAGNOSIS — E11.22 TYPE 2 DIABETES MELLITUS WITH END-STAGE RENAL DISEASE: Primary | ICD-10-CM

## 2025-01-10 DIAGNOSIS — R80.8 NEPHROGENOUS PROTEINURIA: ICD-10-CM

## 2025-01-10 LAB
ALBUMIN SERPL BCP-MCNC: 4.3 G/DL (ref 3.5–5.2)
ALBUMIN/CREAT UR: 1399 UG/MG (ref 0–30)
ANION GAP SERPL CALC-SCNC: 6 MMOL/L (ref 8–16)
BACTERIA #/AREA URNS HPF: NORMAL /HPF
BASOPHILS # BLD AUTO: 0.06 K/UL (ref 0–0.2)
BASOPHILS NFR BLD: 0.7 % (ref 0–1.9)
BILIRUB UR QL STRIP: NEGATIVE
BUN SERPL-MCNC: 21 MG/DL (ref 8–23)
CALCIUM SERPL-MCNC: 9.7 MG/DL (ref 8.7–10.5)
CHLORIDE SERPL-SCNC: 102 MMOL/L (ref 95–110)
CLARITY UR: CLEAR
CO2 SERPL-SCNC: 30 MMOL/L (ref 23–29)
COLOR UR: YELLOW
CREAT SERPL-MCNC: 1.3 MG/DL (ref 0.5–1.4)
CREAT UR-MCNC: 95.8 MG/DL (ref 15–325)
CREAT UR-MCNC: 96.5 MG/DL (ref 15–325)
DIFFERENTIAL METHOD BLD: ABNORMAL
EOSINOPHIL # BLD AUTO: 0.4 K/UL (ref 0–0.5)
EOSINOPHIL NFR BLD: 4.5 % (ref 0–8)
ERYTHROCYTE [DISTWIDTH] IN BLOOD BY AUTOMATED COUNT: 12.9 % (ref 11.5–14.5)
EST. GFR  (NO RACE VARIABLE): 44.5 ML/MIN/1.73 M^2
GLUCOSE SERPL-MCNC: 156 MG/DL (ref 70–110)
GLUCOSE UR QL STRIP: NEGATIVE
HCT VFR BLD AUTO: 38.3 % (ref 37–48.5)
HGB BLD-MCNC: 12.4 G/DL (ref 12–16)
HGB UR QL STRIP: ABNORMAL
HYALINE CASTS #/AREA URNS LPF: 0 /LPF
IMM GRANULOCYTES # BLD AUTO: 0.03 K/UL (ref 0–0.04)
IMM GRANULOCYTES NFR BLD AUTO: 0.3 % (ref 0–0.5)
KETONES UR QL STRIP: NEGATIVE
LEUKOCYTE ESTERASE UR QL STRIP: ABNORMAL
LYMPHOCYTES # BLD AUTO: 2.4 K/UL (ref 1–4.8)
LYMPHOCYTES NFR BLD: 28.1 % (ref 18–48)
MAGNESIUM SERPL-MCNC: 1.5 MG/DL (ref 1.6–2.6)
MCH RBC QN AUTO: 30 PG (ref 27–31)
MCHC RBC AUTO-ENTMCNC: 32.4 G/DL (ref 32–36)
MCV RBC AUTO: 93 FL (ref 82–98)
MICROALBUMIN UR DL<=1MG/L-MCNC: >1350 UG/ML
MICROSCOPIC COMMENT: NORMAL
MONOCYTES # BLD AUTO: 0.5 K/UL (ref 0.3–1)
MONOCYTES NFR BLD: 6.3 % (ref 4–15)
NEUTROPHILS # BLD AUTO: 5.2 K/UL (ref 1.8–7.7)
NEUTROPHILS NFR BLD: 60.1 % (ref 38–73)
NITRITE UR QL STRIP: NEGATIVE
NRBC BLD-RTO: 0 /100 WBC
PH UR STRIP: 7 [PH] (ref 5–8)
PHOSPHATE SERPL-MCNC: 3.2 MG/DL (ref 2.7–4.5)
PLATELET # BLD AUTO: 344 K/UL (ref 150–450)
PMV BLD AUTO: 8.5 FL (ref 9.2–12.9)
POTASSIUM SERPL-SCNC: 4.9 MMOL/L (ref 3.5–5.1)
PROT UR QL STRIP: ABNORMAL
PROT UR-MCNC: 172 MG/DL (ref 6–15)
PROT/CREAT UR: 1.8 MG/G{CREAT} (ref 0–0.2)
PTH-INTACT SERPL-MCNC: 138.3 PG/ML (ref 9–77)
RBC # BLD AUTO: 4.13 M/UL (ref 4–5.4)
RBC #/AREA URNS HPF: 1 /HPF (ref 0–4)
SODIUM SERPL-SCNC: 138 MMOL/L (ref 136–145)
SP GR UR STRIP: 1.01 (ref 1–1.03)
SQUAMOUS #/AREA URNS HPF: 0 /HPF
URATE SERPL-MCNC: 7 MG/DL (ref 2.4–5.7)
URN SPEC COLLECT METH UR: ABNORMAL
UROBILINOGEN UR STRIP-ACNC: NEGATIVE EU/DL
WBC # BLD AUTO: 8.61 K/UL (ref 3.9–12.7)
WBC #/AREA URNS HPF: 3 /HPF (ref 0–5)

## 2025-01-10 PROCEDURE — 80069 RENAL FUNCTION PANEL: CPT | Performed by: NURSE PRACTITIONER

## 2025-01-10 PROCEDURE — 36415 COLL VENOUS BLD VENIPUNCTURE: CPT | Performed by: NURSE PRACTITIONER

## 2025-01-10 PROCEDURE — 85025 COMPLETE CBC W/AUTO DIFF WBC: CPT | Performed by: NURSE PRACTITIONER

## 2025-01-10 PROCEDURE — 83970 ASSAY OF PARATHORMONE: CPT | Performed by: NURSE PRACTITIONER

## 2025-01-10 PROCEDURE — 82043 UR ALBUMIN QUANTITATIVE: CPT | Performed by: NURSE PRACTITIONER

## 2025-01-10 PROCEDURE — 84156 ASSAY OF PROTEIN URINE: CPT | Performed by: NURSE PRACTITIONER

## 2025-01-10 PROCEDURE — 81001 URINALYSIS AUTO W/SCOPE: CPT | Performed by: NURSE PRACTITIONER

## 2025-01-10 PROCEDURE — 83735 ASSAY OF MAGNESIUM: CPT | Performed by: NURSE PRACTITIONER

## 2025-01-10 PROCEDURE — 84550 ASSAY OF BLOOD/URIC ACID: CPT | Performed by: NURSE PRACTITIONER

## 2025-01-13 ENCOUNTER — PATIENT MESSAGE (OUTPATIENT)
Dept: FAMILY MEDICINE | Facility: CLINIC | Age: 70
End: 2025-01-13
Payer: MEDICARE

## 2025-01-13 DIAGNOSIS — E11.42 TYPE 2 DIABETES MELLITUS WITH DIABETIC POLYNEUROPATHY, WITH LONG-TERM CURRENT USE OF INSULIN: ICD-10-CM

## 2025-01-13 DIAGNOSIS — Z79.4 TYPE 2 DIABETES MELLITUS WITH DIABETIC POLYNEUROPATHY, WITH LONG-TERM CURRENT USE OF INSULIN: ICD-10-CM

## 2025-01-13 NOTE — TELEPHONE ENCOUNTER
----- Message from Anneliese sent at 1/13/2025  2:30 PM CST -----  Vm:[phone]    Pt would like a call back regarding refills.    [phone]

## 2025-01-14 DIAGNOSIS — Z79.4 TYPE 2 DIABETES MELLITUS WITH DIABETIC POLYNEUROPATHY, WITH LONG-TERM CURRENT USE OF INSULIN: ICD-10-CM

## 2025-01-14 DIAGNOSIS — J30.1 NON-SEASONAL ALLERGIC RHINITIS DUE TO POLLEN: ICD-10-CM

## 2025-01-14 DIAGNOSIS — E11.42 TYPE 2 DIABETES MELLITUS WITH DIABETIC POLYNEUROPATHY, WITH LONG-TERM CURRENT USE OF INSULIN: ICD-10-CM

## 2025-01-15 NOTE — TELEPHONE ENCOUNTER
----- Message from Amaris sent at 1/15/2025  2:33 PM CST -----  - 2:23-pt called yesterday about getting an rx sent to [facility] for novolog 30 day. She has not heard from the pharmacy and would like to know the status   [phone]

## 2025-01-16 ENCOUNTER — PATIENT MESSAGE (OUTPATIENT)
Dept: FAMILY MEDICINE | Facility: CLINIC | Age: 70
End: 2025-01-16
Payer: MEDICARE

## 2025-01-16 ENCOUNTER — TELEPHONE (OUTPATIENT)
Dept: FAMILY MEDICINE | Facility: CLINIC | Age: 70
End: 2025-01-16
Payer: MEDICARE

## 2025-01-16 DIAGNOSIS — Z79.4 TYPE 2 DIABETES MELLITUS WITH DIABETIC POLYNEUROPATHY, WITH LONG-TERM CURRENT USE OF INSULIN: ICD-10-CM

## 2025-01-16 DIAGNOSIS — E11.42 TYPE 2 DIABETES MELLITUS WITH DIABETIC POLYNEUROPATHY, WITH LONG-TERM CURRENT USE OF INSULIN: ICD-10-CM

## 2025-01-16 DIAGNOSIS — J30.1 NON-SEASONAL ALLERGIC RHINITIS DUE TO POLLEN: Primary | ICD-10-CM

## 2025-01-16 RX ORDER — INSULIN ASPART 100 [IU]/ML
INJECTION, SOLUTION INTRAVENOUS; SUBCUTANEOUS
Qty: 60 ML | Refills: 3 | Status: SHIPPED | OUTPATIENT
Start: 2025-01-16 | End: 2025-01-29

## 2025-01-16 RX ORDER — INSULIN ASPART 100 [IU]/ML
INJECTION, SOLUTION INTRAVENOUS; SUBCUTANEOUS
Qty: 60 ML | Refills: 3 | OUTPATIENT
Start: 2025-01-16

## 2025-01-16 RX ORDER — FLUTICASONE PROPIONATE 50 MCG
SPRAY, SUSPENSION (ML) NASAL
Qty: 48 G | Refills: 3 | Status: SHIPPED | OUTPATIENT
Start: 2025-01-16

## 2025-01-16 RX ORDER — AZELASTINE 1 MG/ML
1 SPRAY, METERED NASAL 2 TIMES DAILY
Qty: 30 ML | Refills: 3 | Status: SHIPPED | OUTPATIENT
Start: 2025-01-16 | End: 2026-01-16

## 2025-01-16 NOTE — TELEPHONE ENCOUNTER
----- Message from Amaris sent at 1/16/2025  3:15 PM CST -----  -2:23- pt needs a refill for insulin   [phone]

## 2025-01-16 NOTE — PROGRESS NOTES
Assessment & Plan     Fluticasone is no longer covered by [employer].  Sending Astelin nasal spray 2 sprays in each nostril twice a day let us hope it is covered.      Please send me the exact dosages of insulins and how much do you require so that I can refill your prescriptions.    Dr. An FOSS     Non-seasonal allergic rhinitis due to pollen  -     azelastine (ASTELIN) 137 mcg (0.1 %) nasal spray; 1 spray (137 mcg total) by Nasal route 2 (two) times daily.  Dispense: 30 mL; Refill: 3

## 2025-01-21 RX ORDER — ATORVASTATIN CALCIUM 40 MG/1
40 TABLET, FILM COATED ORAL NIGHTLY
Qty: 90 TABLET | Refills: 3 | Status: SHIPPED | OUTPATIENT
Start: 2025-01-21

## 2025-01-21 RX ORDER — OMEPRAZOLE 10 MG/1
10 CAPSULE, DELAYED RELEASE ORAL EVERY MORNING
Qty: 90 CAPSULE | Refills: 3 | OUTPATIENT
Start: 2025-01-21

## 2025-01-21 RX ORDER — GABAPENTIN 800 MG/1
TABLET ORAL
Refills: 0 | OUTPATIENT
Start: 2025-01-21

## 2025-01-21 RX ORDER — FINERENONE 20 MG/1
TABLET, FILM COATED ORAL
Refills: 0 | OUTPATIENT
Start: 2025-01-21

## 2025-01-21 RX ORDER — INSULIN GLARGINE 100 [IU]/ML
INJECTION, SOLUTION SUBCUTANEOUS
Refills: 0 | OUTPATIENT
Start: 2025-01-21

## 2025-01-21 RX ORDER — CALCITRIOL 0.25 UG/1
CAPSULE ORAL
Refills: 0 | OUTPATIENT
Start: 2025-01-21

## 2025-01-21 RX ORDER — SYRINGE-NEEDLE,INSULIN,0.5 ML 27GX1/2"
SYRINGE, EMPTY DISPOSABLE MISCELLANEOUS
Refills: 0 | OUTPATIENT
Start: 2025-01-21

## 2025-01-29 DIAGNOSIS — Z79.4 TYPE 2 DIABETES MELLITUS WITH DIABETIC POLYNEUROPATHY, WITH LONG-TERM CURRENT USE OF INSULIN: ICD-10-CM

## 2025-01-29 DIAGNOSIS — E11.42 TYPE 2 DIABETES MELLITUS WITH DIABETIC POLYNEUROPATHY, WITH LONG-TERM CURRENT USE OF INSULIN: ICD-10-CM

## 2025-01-29 RX ORDER — INSULIN ASPART 100 [IU]/ML
20 INJECTION, SOLUTION INTRAVENOUS; SUBCUTANEOUS
Qty: 54 ML | Refills: 3 | Status: SHIPPED | OUTPATIENT
Start: 2025-01-29 | End: 2026-01-29

## 2025-03-07 ENCOUNTER — LAB VISIT (OUTPATIENT)
Dept: LAB | Facility: HOSPITAL | Age: 70
End: 2025-03-07
Attending: NURSE PRACTITIONER
Payer: MEDICARE

## 2025-03-07 DIAGNOSIS — E11.22 TYPE 2 DIABETES MELLITUS WITH END-STAGE RENAL DISEASE: ICD-10-CM

## 2025-03-07 DIAGNOSIS — N18.6 TYPE 2 DIABETES MELLITUS WITH END-STAGE RENAL DISEASE: ICD-10-CM

## 2025-03-07 DIAGNOSIS — R80.8 NEPHROGENOUS PROTEINURIA: ICD-10-CM

## 2025-03-07 LAB — 25(OH)D3+25(OH)D2 SERPL-MCNC: 24 NG/ML (ref 30–96)

## 2025-03-07 PROCEDURE — 82306 VITAMIN D 25 HYDROXY: CPT | Performed by: NURSE PRACTITIONER

## 2025-03-07 PROCEDURE — 36415 COLL VENOUS BLD VENIPUNCTURE: CPT | Performed by: NURSE PRACTITIONER

## 2025-03-14 ENCOUNTER — LAB VISIT (OUTPATIENT)
Dept: LAB | Facility: HOSPITAL | Age: 70
End: 2025-03-14
Attending: SPECIALIST
Payer: MEDICARE

## 2025-03-14 DIAGNOSIS — C64.2 MALIGNANT NEOPLASM OF LEFT KIDNEY, EXCEPT RENAL PELVIS: Primary | ICD-10-CM

## 2025-03-14 LAB
ANION GAP SERPL CALC-SCNC: 6 MMOL/L (ref 8–16)
BUN SERPL-MCNC: 22 MG/DL (ref 8–23)
CALCIUM SERPL-MCNC: 10.2 MG/DL (ref 8.7–10.5)
CHLORIDE SERPL-SCNC: 102 MMOL/L (ref 95–110)
CO2 SERPL-SCNC: 29 MMOL/L (ref 23–29)
CREAT SERPL-MCNC: 1.7 MG/DL (ref 0.5–1.4)
EST. GFR  (NO RACE VARIABLE): 32.1 ML/MIN/1.73 M^2
GLUCOSE SERPL-MCNC: 201 MG/DL (ref 70–110)
POTASSIUM SERPL-SCNC: 5 MMOL/L (ref 3.5–5.1)
SODIUM SERPL-SCNC: 137 MMOL/L (ref 136–145)

## 2025-03-14 PROCEDURE — 80048 BASIC METABOLIC PNL TOTAL CA: CPT | Performed by: SPECIALIST

## 2025-03-14 PROCEDURE — 36415 COLL VENOUS BLD VENIPUNCTURE: CPT | Performed by: SPECIALIST

## 2025-03-17 ENCOUNTER — PATIENT MESSAGE (OUTPATIENT)
Dept: FAMILY MEDICINE | Facility: CLINIC | Age: 70
End: 2025-03-17
Payer: MEDICARE

## 2025-03-17 DIAGNOSIS — G47.30 OBSERVED SLEEP APNEA: Primary | ICD-10-CM

## 2025-03-17 NOTE — TELEPHONE ENCOUNTER
ref pended  please sign     Observed sleep apnea  -     Ambulatory referral/consult to Sleep Disorders; Future; Expected date: 04/18/2025

## 2025-03-19 DIAGNOSIS — Z79.4 TYPE 2 DIABETES MELLITUS WITH DIABETIC POLYNEUROPATHY, WITH LONG-TERM CURRENT USE OF INSULIN: ICD-10-CM

## 2025-03-19 DIAGNOSIS — E11.42 TYPE 2 DIABETES MELLITUS WITH DIABETIC POLYNEUROPATHY, WITH LONG-TERM CURRENT USE OF INSULIN: ICD-10-CM

## 2025-03-19 RX ORDER — SYRINGE AND NEEDLE,INSULIN,1ML 30 G X1/2"
SYRINGE, EMPTY DISPOSABLE MISCELLANEOUS
Qty: 100 EACH | Refills: 3 | Status: SHIPPED | OUTPATIENT
Start: 2025-03-19

## 2025-03-20 NOTE — TELEPHONE ENCOUNTER
Care Due:                  Date            Visit Type   Department     Provider  --------------------------------------------------------------------------------                                             [facility] PARKSM[facility] LOU                              [facility]   [address]  Last Visit: 09-      PATIENT      FAMILY Remy Nolan                               -         [facility]      [address]  Next Visit: 04-      CARE ([facility])   FAMILY Alberto  An                                                            Last  Test          Frequency    Reason                     Performed    Due Date  --------------------------------------------------------------------------------    HBA1C.......  6 months...  insulin..................  09- 03-    [facility] Embedded Care Due Messages. Reference number: [id number].   3/19/2025 11:11:57 PM CDT

## 2025-03-20 NOTE — TELEPHONE ENCOUNTER
Provider Staff:  Action required for this patient    Requires labs      Please see care gap opportunities below in Care Due Message.    Thanks!  [facility]     Appointments      Date Provider   Last Visit   9/5/2024 John Nolan MD   Next Visit   4/23/2025 John Nolan MD     Refill Decision Note   Payton Castillo  is requesting a refill authorization.  Brief Assessment and Rationale for Refill:  Approve     Medication Therapy Plan:         Comments:     Note composed:11:13 PM 03/19/2025             Appointments     Last Visit   9/5/2024 John Nolan MD   Next Visit   4/23/2025 John Nolan MD

## 2025-04-09 ENCOUNTER — LAB VISIT (OUTPATIENT)
Dept: LAB | Facility: HOSPITAL | Age: 70
End: 2025-04-09
Attending: NURSE PRACTITIONER
Payer: MEDICARE

## 2025-04-09 ENCOUNTER — RESULTS FOLLOW-UP (OUTPATIENT)
Dept: FAMILY MEDICINE | Facility: CLINIC | Age: 70
End: 2025-04-09

## 2025-04-09 DIAGNOSIS — E11.22 TYPE 2 DIABETES MELLITUS WITH END-STAGE RENAL DISEASE: ICD-10-CM

## 2025-04-09 DIAGNOSIS — N18.6 TYPE 2 DIABETES MELLITUS WITH END-STAGE RENAL DISEASE: ICD-10-CM

## 2025-04-09 DIAGNOSIS — N18.32 CHRONIC KIDNEY DISEASE (CKD) STAGE G3B/A1, MODERATELY DECREASED GLOMERULAR FILTRATION RATE (GFR) BETWEEN 30-44 ML/MIN/1.73 SQUARE METER AND ALBUMINURIA CREATININE RATIO LESS THAN 30 MG/G: ICD-10-CM

## 2025-04-09 DIAGNOSIS — R80.8 NEPHROGENOUS PROTEINURIA: ICD-10-CM

## 2025-04-09 DIAGNOSIS — E11.22 CKD STAGE 3 DUE TO TYPE 2 DIABETES MELLITUS: ICD-10-CM

## 2025-04-09 DIAGNOSIS — E11.42 TYPE 2 DIABETES MELLITUS WITH DIABETIC POLYNEUROPATHY, WITH LONG-TERM CURRENT USE OF INSULIN: ICD-10-CM

## 2025-04-09 DIAGNOSIS — E78.2 MIXED DYSLIPIDEMIA: ICD-10-CM

## 2025-04-09 DIAGNOSIS — Z90.5 ACQUIRED ABSENCE OF KIDNEY: ICD-10-CM

## 2025-04-09 DIAGNOSIS — Z79.4 TYPE 2 DIABETES MELLITUS WITH DIABETIC POLYNEUROPATHY, WITH LONG-TERM CURRENT USE OF INSULIN: ICD-10-CM

## 2025-04-09 DIAGNOSIS — N25.81 SECONDARY HYPERPARATHYROIDISM OF RENAL ORIGIN: Primary | ICD-10-CM

## 2025-04-09 DIAGNOSIS — N18.30 CKD STAGE 3 DUE TO TYPE 2 DIABETES MELLITUS: ICD-10-CM

## 2025-04-09 DIAGNOSIS — I10 ESSENTIAL HYPERTENSION: ICD-10-CM

## 2025-04-09 LAB
25(OH)D3+25(OH)D2 SERPL-MCNC: 24 NG/ML (ref 30–96)
ABSOLUTE EOSINOPHIL (SMH): 0.23 K/UL
ABSOLUTE MONOCYTE (SMH): 0.57 K/UL (ref 0.3–1)
ABSOLUTE NEUTROPHIL COUNT (SMH): 3.2 K/UL (ref 1.8–7.7)
ALBUMIN SERPL-MCNC: 4.2 G/DL (ref 3.5–5.2)
ALBUMIN SERPL-MCNC: 4.3 G/DL (ref 3.5–5.2)
ALBUMIN/CREAT UR: 542.5 UG/MG
ALP SERPL-CCNC: 62 UNIT/L (ref 55–135)
ALT SERPL-CCNC: 14 UNIT/L (ref 10–44)
ANION GAP (SMH): 7 MMOL/L (ref 8–16)
ANION GAP (SMH): 8 MMOL/L (ref 8–16)
AST SERPL-CCNC: 15 UNIT/L (ref 10–40)
BASOPHILS # BLD AUTO: 0.06 K/UL
BASOPHILS NFR BLD AUTO: 0.8 %
BILIRUB SERPL-MCNC: 0.5 MG/DL (ref 0.1–1)
BILIRUB UR QL STRIP.AUTO: NEGATIVE
BUN SERPL-MCNC: 23 MG/DL (ref 8–23)
BUN SERPL-MCNC: 25 MG/DL (ref 8–23)
CALCIUM SERPL-MCNC: 9.6 MG/DL (ref 8.7–10.5)
CALCIUM SERPL-MCNC: 9.8 MG/DL (ref 8.7–10.5)
CHLORIDE SERPL-SCNC: 104 MMOL/L (ref 95–110)
CHLORIDE SERPL-SCNC: 105 MMOL/L (ref 95–110)
CHOLEST SERPL-MCNC: 163 MG/DL (ref 120–199)
CHOLEST/HDLC SERPL: 2.7 {RATIO} (ref 2–5)
CLARITY UR: CLEAR
CO2 SERPL-SCNC: 28 MMOL/L (ref 23–29)
CO2 SERPL-SCNC: 30 MMOL/L (ref 23–29)
COLOR UR AUTO: YELLOW
CREAT SERPL-MCNC: 1.4 MG/DL (ref 0.5–1.4)
CREAT SERPL-MCNC: 1.5 MG/DL (ref 0.5–1.4)
CREAT UR-MCNC: 142.7 MG/DL (ref 15–325)
CREAT UR-MCNC: 143.8 MG/DL (ref 15–325)
EAG (SMH): 160 MG/DL (ref 68–131)
ERYTHROCYTE [DISTWIDTH] IN BLOOD BY AUTOMATED COUNT: 12.2 % (ref 11.5–14.5)
GFR SERPLBLD CREATININE-BSD FMLA CKD-EPI: 37 ML/MIN/1.73/M2
GFR SERPLBLD CREATININE-BSD FMLA CKD-EPI: 41 ML/MIN/1.73/M2
GLUCOSE SERPL-MCNC: 80 MG/DL (ref 70–110)
GLUCOSE SERPL-MCNC: 82 MG/DL (ref 70–110)
GLUCOSE UR QL STRIP: NEGATIVE
HBA1C MFR BLD: 7.2 % (ref 4.5–6.2)
HCT VFR BLD AUTO: 38.7 % (ref 37–48.5)
HDLC SERPL-MCNC: 61 MG/DL (ref 40–75)
HDLC SERPL: 37.4 % (ref 20–50)
HGB BLD-MCNC: 12.5 GM/DL (ref 12–16)
HGB UR QL STRIP: NEGATIVE
IMM GRANULOCYTES # BLD AUTO: 0.02 K/UL (ref 0–0.04)
IMM GRANULOCYTES NFR BLD AUTO: 0.3 % (ref 0–0.5)
KETONES UR QL STRIP: NEGATIVE
LDLC SERPL CALC-MCNC: 72.6 MG/DL (ref 63–159)
LEUKOCYTE ESTERASE UR QL STRIP: NEGATIVE
LYMPHOCYTES # BLD AUTO: 3.5 K/UL (ref 1–4.8)
MAGNESIUM SERPL-MCNC: 1.3 MG/DL (ref 1.6–2.6)
MCH RBC QN AUTO: 30.8 PG (ref 27–31)
MCHC RBC AUTO-ENTMCNC: 32.3 G/DL (ref 32–36)
MCV RBC AUTO: 95 FL (ref 82–98)
MICROALBUMIN UR-MCNC: 780.1 UG/ML
MICROSCOPIC COMMENT: NORMAL
NITRITE UR QL STRIP: NEGATIVE
NONHDLC SERPL-MCNC: 102 MG/DL
NUCLEATED RBC (/100WBC) (SMH): 0 /100 WBC
PH UR STRIP: 6 [PH]
PHOSPHATE SERPL-MCNC: 4.1 MG/DL (ref 2.7–4.5)
PLATELET # BLD AUTO: 295 K/UL (ref 150–450)
PMV BLD AUTO: 8.5 FL (ref 9.2–12.9)
POTASSIUM SERPL-SCNC: 4.8 MMOL/L (ref 3.5–5.1)
POTASSIUM SERPL-SCNC: 4.9 MMOL/L (ref 3.5–5.1)
PROT SERPL-MCNC: 7.9 GM/DL (ref 6–8.4)
PROT UR QL STRIP: ABNORMAL
PROT UR-MCNC: 116 MG/DL
PROT/CREAT UR: 0.81 MG/G{CREAT}
PTH-INTACT SERPL-MCNC: 136.7 PG/ML (ref 9–77)
RBC # BLD AUTO: 4.06 M/UL (ref 4–5.4)
RBC #/AREA URNS AUTO: <1 /HPF
RELATIVE EOSINOPHIL (SMH): 3 % (ref 0–8)
RELATIVE LYMPHOCYTE (SMH): 46.4 % (ref 18–48)
RELATIVE MONOCYTE (SMH): 7.5 % (ref 4–15)
RELATIVE NEUTROPHIL (SMH): 42 % (ref 38–73)
SODIUM SERPL-SCNC: 141 MMOL/L (ref 136–145)
SODIUM SERPL-SCNC: 141 MMOL/L (ref 136–145)
SP GR UR STRIP: 1.02
SQUAMOUS #/AREA URNS AUTO: <1 /HPF
TRIGL SERPL-MCNC: 147 MG/DL (ref 30–150)
URATE SERPL-MCNC: 7.5 MG/DL (ref 2.4–5.7)
UROBILINOGEN UR STRIP-ACNC: NEGATIVE EU/DL
WBC # BLD AUTO: 7.55 K/UL (ref 3.9–12.7)
WBC #/AREA URNS AUTO: 1 /HPF

## 2025-04-09 PROCEDURE — 83735 ASSAY OF MAGNESIUM: CPT

## 2025-04-09 PROCEDURE — 81003 URINALYSIS AUTO W/O SCOPE: CPT

## 2025-04-09 PROCEDURE — 83718 ASSAY OF LIPOPROTEIN: CPT

## 2025-04-09 PROCEDURE — 80053 COMPREHEN METABOLIC PANEL: CPT

## 2025-04-09 PROCEDURE — 82040 ASSAY OF SERUM ALBUMIN: CPT

## 2025-04-09 PROCEDURE — 82570 ASSAY OF URINE CREATININE: CPT

## 2025-04-09 PROCEDURE — 85025 COMPLETE CBC W/AUTO DIFF WBC: CPT

## 2025-04-09 PROCEDURE — 83036 HEMOGLOBIN GLYCOSYLATED A1C: CPT

## 2025-04-09 PROCEDURE — 84550 ASSAY OF BLOOD/URIC ACID: CPT

## 2025-04-09 PROCEDURE — 82306 VITAMIN D 25 HYDROXY: CPT

## 2025-04-09 PROCEDURE — 82043 UR ALBUMIN QUANTITATIVE: CPT

## 2025-04-09 PROCEDURE — 36415 COLL VENOUS BLD VENIPUNCTURE: CPT

## 2025-04-09 PROCEDURE — 83970 ASSAY OF PARATHORMONE: CPT

## 2025-04-10 ENCOUNTER — TELEPHONE (OUTPATIENT)
Dept: FAMILY MEDICINE | Facility: CLINIC | Age: 70
End: 2025-04-10
Payer: MEDICARE

## 2025-04-10 NOTE — TELEPHONE ENCOUNTER
----- Message from Anita sent at 4/10/2025  2:13 PM CDT -----  Pt needs to be seen asap. BP Issues. Pt #[phone]

## 2025-04-11 ENCOUNTER — OFFICE VISIT (OUTPATIENT)
Dept: FAMILY MEDICINE | Facility: CLINIC | Age: 70
End: 2025-04-11
Payer: MEDICARE

## 2025-04-11 VITALS
BODY MASS INDEX: 27.32 KG/M2 | WEIGHT: 170 LBS | OXYGEN SATURATION: 97 % | SYSTOLIC BLOOD PRESSURE: 142 MMHG | TEMPERATURE: 98 F | HEIGHT: 66 IN | HEART RATE: 53 BPM | DIASTOLIC BLOOD PRESSURE: 68 MMHG

## 2025-04-11 DIAGNOSIS — I10 ESSENTIAL HYPERTENSION: Primary | ICD-10-CM

## 2025-04-11 DIAGNOSIS — D63.1 ANEMIA, CHRONIC RENAL FAILURE, STAGE 3 (MODERATE): ICD-10-CM

## 2025-04-11 DIAGNOSIS — E11.42 TYPE 2 DIABETES MELLITUS WITH DIABETIC POLYNEUROPATHY, WITH LONG-TERM CURRENT USE OF INSULIN: ICD-10-CM

## 2025-04-11 DIAGNOSIS — N18.30 ANEMIA, CHRONIC RENAL FAILURE, STAGE 3 (MODERATE): ICD-10-CM

## 2025-04-11 DIAGNOSIS — H04.123 DRY EYES: ICD-10-CM

## 2025-04-11 DIAGNOSIS — M54.16 SPINAL STENOSIS OF LUMBAR REGION WITH RADICULOPATHY: ICD-10-CM

## 2025-04-11 DIAGNOSIS — R51.9 NONINTRACTABLE HEADACHE, UNSPECIFIED CHRONICITY PATTERN, UNSPECIFIED HEADACHE TYPE: ICD-10-CM

## 2025-04-11 DIAGNOSIS — M48.061 SPINAL STENOSIS OF LUMBAR REGION WITH RADICULOPATHY: ICD-10-CM

## 2025-04-11 DIAGNOSIS — Z79.4 TYPE 2 DIABETES MELLITUS WITH DIABETIC POLYNEUROPATHY, WITH LONG-TERM CURRENT USE OF INSULIN: ICD-10-CM

## 2025-04-11 PROCEDURE — 99999 PR PBB SHADOW E&M-EST. PATIENT-LVL III: CPT | Mod: PBBFAC,,, | Performed by: NURSE PRACTITIONER

## 2025-04-11 RX ORDER — AMLODIPINE BESYLATE 5 MG/1
5 TABLET ORAL DAILY
Qty: 90 TABLET | Refills: 3 | Status: SHIPPED | OUTPATIENT
Start: 2025-04-11 | End: 2026-04-11

## 2025-04-11 RX ORDER — CALCITRIOL 0.25 UG/1
CAPSULE ORAL
COMMUNITY

## 2025-04-11 NOTE — PROGRESS NOTES
This dictation has been generated using Modal Fluency Dictation some phonetic errors may occur. Please contact author for clarification if needed.     1. Essential hypertension  -     Basic Metabolic Panel; Future; Expected date: 04/11/2025    2. Nonintractable headache, unspecified chronicity pattern, unspecified headache type    3. Dry eyes    4. Anemia, chronic renal failure, stage 3 (moderate)  Overview:  Recent creatinine is 1.2.  Previously it was 1.13 months ago.  GFR is slightly low than 60.  Continue with precautions and avoid NSAIDs.  Avoid unnecessary antibiotics.      5. Spinal stenosis of lumbar region with radiculopathy    6. Type 2 diabetes mellitus with diabetic polyneuropathy, with long-term current use of insulin    Other orders  -     amLODIPine (NORVASC) 5 MG tablet; Take 1 tablet (5 mg total) by mouth once daily.  Dispense: 90 tablet; Refill: 3       Assessment & Plan    IMPRESSION:  - Evaluated elevated BP (152/64) in context of recent onset (2 weeks) and associated symptoms including headaches and visual disturbances.  - Initiated Amlodipine instead of increasing Benicar dose due to potential concerns for CKD III. Follow up with nephrologist Monday. Check BMP.   - Assessed for cardiac issues; heart rate noted to be on the low side.  - Performed neurological exam in light of headache complaints.  - Noted history of dry eyes, recent eye surgeries, and upcoming ophthalmology follow-up.  - Acknowledged arthritis pain and limitations on NSAID use due to stage 3 renal disease.  - Evaluated recent changes in glucose levels, noting potential correlation with pain. Steroid use limited.    CKD III   Monitored the patient's stage 3 kidney disease and scheduled a follow-up visit with Dr. Patricia Balderas for kidney treatment.   Planned to evaluate the patient's kidney function and assess limited pain medication options due to the condition.   Exercise caution in medication adjustments, particularly with  Benicar dosage, due to the patient's kidney status.    Palpitations:   Evaluated the patient's reported heart pounding and auscultated a low heart rate.   Blood pressure was elevated at 152/64, previously ranging between 106 and 139.   Confirmed patient is taking metoprolol daily and inquired about medication changes and adherence.   Considered increasing Benicar dosage but opted for Amlodipine instead due to kidney concerns.    CHRONIC KIDNEY DISEASE, STAGE 3:   Planned to evaluate kidney function after the patient's consultation with the nephrologist on Monday. See above. BMP check.     TYPE 2 DIABETES MELLITUS:   Monitored fluctuating blood sugar, influenced by eating habits and pain.   Discussed the patient's discontinuation of Jardiance due to itching.   Considered restarting Jardiance to determine if it was the cause of itching and to manage blood sugar.  No steroids for arthritis due to DM.    JOINT PAIN AND ARTHRITIS:   Evaluated reported arthritis pain in multiple joints, including fingers, knees, hips, and back.   Performed physical exam, noting equal strength in hands and arms.   Noted previous use of pain injections, discontinued due to limited relief.   Advised continued use of Tylenol for pain management due to kidney disease limitations.  No steroids for arthritis due to DM.    DRY EYE SYNDROME:   Monitored history of dry eyes for years, followed by excessive tearing after surgery in 2017.   Noted use of artificial tears as recommended by previous ophthalmologists.   Scheduled a follow-up visit with an ophthalmologist.  Vision right 20/50. Left 20/25. bilat 20/25.     URTICARIA (HIVES):   Monitored history of hives and itching since childhood, triggered by changes in body temperature.   Acknowledged difficulty in pinpointing the cause of itching and allergy-type symptoms.  Resuming Jardiance and pt will monitor SYMPTOMS.    HEADACHE:   Evaluated if headaches have improved with new medication after bp  lowered.   Monitored reported headaches, which started after vision changes.   Performed neurological exam, including cranial nerve tests.   Planned to assess vision and perform other tests due to headaches. See above. Acceptable. Known decrease in vision right sided but unknown values. History floaters left eye. Needs to follow up with ophthalmologist.     Noted use of Excedrin for severe headaches when Tylenol is ineffective.    VISUAL DISTURBANCE:   Monitored reported vision changes, including the need to close eyes to clear vision.   Planned to assess vision due to reported changes and headaches.   Scheduled follow-up visit with an ophthalmologist and ordered vision chart test.    DRUG ALLERGY:     Monitored history of itching and hives, possibly related to medication (Jardiance).   Acknowledged patient's approach to identifying potential medication-related allergies.   Noted discontinuation of Jardiance due to potential allergy.   Considered restarting Jardiance to confirm if it was the cause of allergic symptoms and to manage diabetes.             I will review all results and address accordingly.   No follow-ups on file.    ________________________________________________________________  ________________________________________________________________      No chief complaint on file.    History of present illness  History of Present Illness    HPI:  Ms. Castillo presents for a blood pressure check and reports experiencing headaches, vision changes, and pain. Ms. Castillo reports elevated blood pressure for the past 2 weeks, which she believes is related to arthritis pain. She also complains of frequent headaches and changes in eyesight, with vision changes preceding the headaches. She sees her eye doctor regularly.     Ms. Castillo has a history of eye problems, including chronic dry eyes. In 2017, her eyes began leaking excessively. She has undergone two surgeries to drain floaters and vitreous fluid from her eyes,  with the most recent surgery occurring earlier last year. Her glasses fog up due to heat from her eyes.    2 weeks ago, she began feeling high blood pressure. Yesterday, she felt particularly unwell with sweating and nausea. She had to close her eyes several times during the day to clear her vision and rest. She reports feeling her heart pounding.     Ms. Castillo has arthritis in her hips and back, which has been causing significant discomfort. She previously received pain shots but discontinued them due to limited relief. Due to stage 3 kidney disease, she is limited to taking Tylenol for pain. For severe headaches, she occasionally started taking Excedrin briefly, which seems to be more effective. We discussed NSAIDs in medication.     She reports fluctuating blood sugar, noting they seem higher when in pain. To manage her sugar levels, she tries not to eat after 8:30 PM.    Ms. Castillo mentions lifelong itching that worsens with increased body temperature or anxiety. She notes that the itching has been occurring more frequently lately, sometimes without apparent reason. She stopped the Jardiance to see if it was exacerbating and plans to resume. Itching is less currently.     She denies chest pain, shortness of breath, unilateral weakness, numbness, speech changes, or trouble swallowing.    MEDICATIONS:  Ms. Castillo is on Metoprolol daily and Benazepril 20 mg. She takes Tylenol as needed for pain and Excedrin as needed for severe headaches. Jardiance was discontinued due to potential side effect of itching, but pt planning to resume.    MEDICAL HISTORY:  Ms. Castillo has a history of hypertension, stage 3 kidney disease, diabetes, and arthritis in her hips and back.    SURGICAL HISTORY:  She underwent eye surgery in 2017 for an unspecified indication. Earlier last year, she had two eye surgeries to drain floaters and vitreous fluid from the eye.    ALLERGIES:  Ms. Castillo experiences hives and itching when her body  temperature increases, though the specific allergen is unknown. Med allergies noted.      ROS:  General: +excessive sweating  Eyes: +vision changes, +dry eyes  Cardiovascular: -chest pain, +palpitations  Respiratory: -shortness of breath  Gastrointestinal: +nausea  Musculoskeletal: +joint pain, +back pain, +upper extremity swelling, +joint stiffness  Skin: +itching  Neurological: +headache, +tingling  Psychiatric: +anxiety           Past Medical History:   Diagnosis Date    Allergy     aspartame,fructose monosodium glutamate and penicillins.    Anemia of chronic disorder 11/09/2017    Anemia, chronic renal failure, stage 2 (mild) 11/09/2017    Anemia, chronic renal failure, stage 3 (moderate) 11/09/2017    Asthma     Cancer     kidney    Cataract of right eye     CKD (chronic kidney disease), stage III     Diabetes mellitus     type II    Fibromyalgia     GERD (gastroesophageal reflux disease)     Hx of renal cell cancer - left nephrectomy Aug 2014 11/09/2017    Hypercholesteremia     Hypertension     IDDM (insulin dependent diabetes mellitus)     Lumbar spondylosis     Lumbosacral radiculopathy     Osteopenia     Palpitations     Plantar fasciitis, right     Sleep apnea     Syncope 02/2012    Tachycardia     Thoracic radiculopathy     TMJ (dislocation of temporomandibular joint)        Past Surgical History:   Procedure Laterality Date    CATARACT EXTRACTION Left 03/2016    INJECTION OF ANESTHETIC AGENT AROUND MEDIAL BRANCH NERVES INNERVATING LUMBAR FACET JOINT Bilateral 12/15/2022    Procedure: Block-nerve-medial branch-lumbar;  Surgeon: Howard Rivas MD;  Location: [facility] OR;  Service: Pain Management;  Laterality: Bilateral;  L3,4,5 MBB    INJECTION OF ANESTHETIC AGENT AROUND MEDIAL BRANCH NERVES INNERVATING LUMBAR FACET JOINT Bilateral 1/3/2023    Procedure: Block-nerve-medial branch-lumbar;  Surgeon: Howard Rivas MD;  Location: [facility] OR;  Service: Pain Management;  Laterality: Bilateral;  L3,4,5 MBB #2    INJECTION  OF JOINT Left 1/26/2024    Procedure: Injection, Joint;  Surgeon: Howard Rivas MD;  Location: [facility] OR;  Service: Anesthesiology;  Laterality: Left;  left GTB injection    INJECTION, SACROILIAC JOINT Left 10/7/2022    Procedure: INJECTION,SACROILIAC JOINT;  Surgeon: Cory Ruggiero MD;  Location: [facility] OR;  Service: Pain Management;  Laterality: Left;  sacroiliac injection    INJECTION, SACROILIAC JOINT Left 4/5/2023    Procedure: INJECTION,SACROILIAC JOINT;  Surgeon: Howard Rivas MD;  Location: [facility] OR;  Service: Pain Management;  Laterality: Left;  SIJ    INJECTION, SACROILIAC JOINT Left 1/26/2024    Procedure: INJECTION,SACROILIAC JOINT;  Surgeon: Howard Rivas MD;  Location: [facility] OR;  Service: Anesthesiology;  Laterality: Left;  left SIJ and Left GTB injection    JOINT REPLACEMENT Right     knee    RADIOFREQUENCY THERMOCOAGULATION Bilateral 1/13/2023    Procedure: RADIOFREQUENCY THERMAL COAGULATION;  Surgeon: Howard Rivas MD;  [facility]  Service: Pain Management;  Laterality: Bilateral;  L3,4,5 Raul RFA    SHOULDER OPEN ROTATOR CUFF REPAIR Right 02/2005    labral repair    TOTAL KNEE ARTHROPLASTY Left 2014    TUBAL LIGATION  1990    TUMOR REMOVAL  2014    from kidney       Family History   Problem Relation Name Age of Onset    Stomach cancer Mother      Breast cancer Mother      Diabetes Mother      Cancer Father         Social History     Socioeconomic History    Marital status:      Spouse name: Owen Castillo    Number of children: 2   Occupational History    Occupation: Part - member support [employer]     Employer: [employer]   Tobacco Use    Smoking status: Never    Smokeless tobacco: Never   Substance and Sexual Activity    Alcohol use: No    Drug use: No    Sexual activity: Yes     Partners: Male     Social Drivers of Health     Financial Resource Strain: Low Risk  (4/6/2022)    Overall Financial Resource Strain (CARDIA)     Difficulty of Paying Living Expenses: Not very hard   Food  "Insecurity: No Food Insecurity (8/8/2022)    Hunger Vital Sign     Worried About Running Out of Food in the Last Year: Never true     Ran Out of Food in the Last Year: Never true   Transportation Needs: No Transportation Needs (4/6/2022)    PRAPARE - Transportation     Lack of Transportation (Medical): No     Lack of Transportation (Non-Medical): No   Physical Activity: Inactive (8/8/2022)    Exercise Vital Sign     Days of Exercise per Week: 0 days     Minutes of Exercise per Session: 0 min   Stress: Stress Concern Present (8/8/2022)    [place] [employer] - Occupational Stress Questionnaire     Feeling of Stress : To some extent   Housing Stability: Low Risk  (8/8/2022)    Housing Stability Vital Sign     Unable to Pay for Housing in the Last Year: No     Number of Places Lived in the Last Year: 1     Unstable Housing in the Last Year: No       Current Medications[1]    Review of patient's allergies indicates:   Allergen Reactions    Aspartame Other (See Comments)     headache    Other reaction(s): Other (See Comments)  Headache  migraine  Headache  Other reaction(s): Other (See Comments)  Headache  migraine  headache    Penicillins Itching, Rash, Other (See Comments) and Hives     Rash  Rash  Rash  Rash  Rash    Stevioside (bulk) Other (See Comments)    Fructose Other (See Comments)     headache    Other reaction(s): Other (See Comments)  headache  migraine  headache  Other reaction(s): Other (See Comments)  headache  migraine  headache    Jardiance [empagliflozin] Other (See Comments)     Made her more hungry    Monosodium glutamate Other (See Comments)     Headache    Other reaction(s): Other (See Comments)  Headache  headache  Headache  Other reaction(s): Other (See Comments)  Headache  headache  Headache       Physical examination  Vitals Reviewed  BP (!) 152/64   Pulse (!) 53   Temp 98.1 °F (36.7 °C) (Oral)   Ht 5' 6" (1.676 m)   Wt 77.1 kg (169 lb 15.6 oz)   SpO2 97%   BMI 27.43 " kg/m²  Body mass index is 27.43 kg/m².     BP Readings from Last 3 Encounters:   04/11/25 (!) 152/64   12/16/24 106/66   09/05/24 133/70       Wt Readings from Last 3 Encounters:   04/11/25 77.1 kg (169 lb 15.6 oz)   12/16/24 75.8 kg (167 lb)   09/05/24 76.1 kg (167 lb 12.8 oz)       Physical Exam    Vitals: Blood pressure is 152/64. Heart rate is slightly low.    Cardiovascular: Regular rate. Regular rhythm. No murmurs. No rubs. No gallops. Normal S1, S2.    Respiratory: Normal respiratory effort. Clear to auscultation bilaterally. No rales. No rhonchi. No wheezing.    Musculoskeletal: Normal strength in hands. Normal strength in arms.    Neurological: Normal sensation on both sides of face.    IMAGING:  Ms. Castillo underwent eye surgery in 2017 to drain floaters and vitreous fluid from the eye. She had another eye surgery earlier last year for the same purpose.         This note was generated with the assistance of ambient listening technology. Verbal consent was obtained by the patient and accompanying visitor(s) for the recording of patient appointment to facilitate this note. I attest to having reviewed and edited the generated note for accuracy, though some syntax or spelling errors may persist. Please contact the author of this note for any clarification.      Call or return to clinic prn if these symptoms worsen or fail to improve as anticipated.    In excessive of 40 minutes total time spent for evaluation and management services. Time included elements of the following: time spent preparing to see patient, obtaining and reviewing separately obtained history, exam, evaluation, counseling and education of patient/family member or care giver, documenting in the HMR, independently interpreting results and communication of results, coordination of care ordering medications, tests, or procedures, referral and communication to other health care professionals.         [1]   Current Outpatient Medications  "  Medication Sig Dispense Refill    amLODIPine (NORVASC) 5 MG tablet Take 1 tablet (5 mg total) by mouth once daily. 90 tablet 3    atorvastatin (LIPITOR) 40 MG tablet Take 1 tablet (40 mg total) by mouth every evening. 90 tablet 3    azelastine (ASTELIN) 137 mcg (0.1 %) nasal spray 1 spray (137 mcg total) by Nasal route 2 (two) times daily. 30 mL 3    BD INSULIN SYRINGE 1 mL 28 gauge x 1/2" Syrg USE 1 SYRINGE ONCE DAILY 100 each 3    blood-glucose sensor (FREESTYLE BANG 3 SENSOR) Millicent Inject 1 Device into the skin every 14 (fourteen) days. 6 each 3    calcitRIOL (ROCALTROL) 0.25 MCG Cap Take by mouth.      cetirizine (ZYRTEC) 10 MG tablet Take 10 mg by mouth.      fluticasone propionate (FLONASE) 50 mcg/actuation nasal spray USE 2 SPRAYS IN EACH NOSTRIL ONCE DAILY 48 g 3    gabapentin (NEURONTIN) 800 MG tablet TAKE 1 TABLET THREE TIMES A  tablet 3    insulin aspart U-100 (NOVOLOG FLEXPEN U-100 INSULIN) 100 unit/mL (3 mL) InPn pen Inject 20 Units into the skin 3 (three) times daily with meals. 54 mL 3    insulin glargine U-100, Lantus, (LANTUS U-100 INSULIN) 100 unit/mL injection INJECT 70 UNITS UNDER THE SKIN EVERY EVENING 63 mL 3    insulin syringe-needle U-100 0.3 mL 30 Syrg by Misc.(Non-Drug; Combo Route) route 3 (three) times daily.      KERENDIA 10 mg Tab Take 1 tablet by mouth every evening.      metoprolol succinate (TOPROL-XL) 50 MG 24 hr tablet TAKE 1 TABLET DAILY 90 tablet 3    omeprazole (PRILOSEC) 40 MG capsule Take 40 mg by mouth every evening.      pen needle, diabetic (ADVOCATE PEN NEEDLE) 31 gauge x 5/16" Ndle 1 Device by Misc.(Non-Drug; Combo Route) route 3 (three) times daily. 100 each 3    empagliflozin (JARDIANCE) 10 mg tablet Take 10 mg by mouth.       No current facility-administered medications for this visit.     Facility-Administered Medications Ordered in Other Visits   Medication Dose Route Frequency Provider Last Rate Last Admin    lactated ringers infusion   Intravenous Once PRN " Howard Rivas MD        lactated ringers infusion   Intravenous Once PRN Howard Rivas MD        lactated ringers infusion   Intravenous Continuous Howard Rivas MD 25 mL/hr at 01/26/24 0937 500 mL at 01/26/24 0937

## 2025-04-17 ENCOUNTER — TELEPHONE (OUTPATIENT)
Dept: PAIN MEDICINE | Facility: CLINIC | Age: 70
End: 2025-04-17
Payer: MEDICARE

## 2025-04-17 ENCOUNTER — PATIENT MESSAGE (OUTPATIENT)
Dept: PAIN MEDICINE | Facility: CLINIC | Age: 70
End: 2025-04-17
Payer: MEDICARE

## 2025-04-23 ENCOUNTER — OFFICE VISIT (OUTPATIENT)
Dept: PAIN MEDICINE | Facility: CLINIC | Age: 70
End: 2025-04-23
Payer: MEDICARE

## 2025-04-23 ENCOUNTER — PATIENT MESSAGE (OUTPATIENT)
Dept: FAMILY MEDICINE | Facility: CLINIC | Age: 70
End: 2025-04-23

## 2025-04-23 ENCOUNTER — OFFICE VISIT (OUTPATIENT)
Dept: FAMILY MEDICINE | Facility: CLINIC | Age: 70
End: 2025-04-23
Payer: MEDICARE

## 2025-04-23 VITALS
WEIGHT: 170 LBS | HEART RATE: 75 BPM | SYSTOLIC BLOOD PRESSURE: 133 MMHG | HEIGHT: 66 IN | DIASTOLIC BLOOD PRESSURE: 72 MMHG | BODY MASS INDEX: 27.32 KG/M2

## 2025-04-23 VITALS
OXYGEN SATURATION: 99 % | HEART RATE: 85 BPM | WEIGHT: 175.5 LBS | BODY MASS INDEX: 28.21 KG/M2 | HEIGHT: 66 IN | SYSTOLIC BLOOD PRESSURE: 168 MMHG | DIASTOLIC BLOOD PRESSURE: 80 MMHG | TEMPERATURE: 98 F

## 2025-04-23 DIAGNOSIS — R53.83 FATIGUE, UNSPECIFIED TYPE: ICD-10-CM

## 2025-04-23 DIAGNOSIS — E53.8 B12 DEFICIENCY: ICD-10-CM

## 2025-04-23 DIAGNOSIS — M51.369 DEGENERATION OF INTERVERTEBRAL DISC OF LUMBAR REGION, UNSPECIFIED WHETHER PAIN PRESENT: ICD-10-CM

## 2025-04-23 DIAGNOSIS — M54.16 LUMBAR RADICULITIS: ICD-10-CM

## 2025-04-23 DIAGNOSIS — L29.9 ITCHING: ICD-10-CM

## 2025-04-23 DIAGNOSIS — R52 COMPLAINTS OF TOTAL BODY PAIN: Primary | ICD-10-CM

## 2025-04-23 DIAGNOSIS — M47.896 OTHER SPONDYLOSIS, LUMBAR REGION: ICD-10-CM

## 2025-04-23 DIAGNOSIS — M54.9 DORSALGIA, UNSPECIFIED: Primary | ICD-10-CM

## 2025-04-23 DIAGNOSIS — M79.10 MUSCLE PAIN: ICD-10-CM

## 2025-04-23 DIAGNOSIS — H60.8X2 CHRONIC ECZEMATOUS OTITIS EXTERNA OF LEFT EAR: ICD-10-CM

## 2025-04-23 PROCEDURE — 1159F MED LIST DOCD IN RCRD: CPT | Mod: CPTII,S$GLB,, | Performed by: INTERNAL MEDICINE

## 2025-04-23 PROCEDURE — 4010F ACE/ARB THERAPY RXD/TAKEN: CPT | Mod: CPTII,S$GLB,, | Performed by: INTERNAL MEDICINE

## 2025-04-23 PROCEDURE — 99214 OFFICE O/P EST MOD 30 MIN: CPT | Mod: S$GLB,,, | Performed by: INTERNAL MEDICINE

## 2025-04-23 PROCEDURE — 3075F SYST BP GE 130 - 139MM HG: CPT | Mod: CPTII,S$GLB,, | Performed by: ANESTHESIOLOGY

## 2025-04-23 PROCEDURE — 3062F POS MACROALBUMINURIA REV: CPT | Mod: CPTII,S$GLB,, | Performed by: INTERNAL MEDICINE

## 2025-04-23 PROCEDURE — 1125F AMNT PAIN NOTED PAIN PRSNT: CPT | Mod: CPTII,S$GLB,, | Performed by: ANESTHESIOLOGY

## 2025-04-23 PROCEDURE — 1159F MED LIST DOCD IN RCRD: CPT | Mod: CPTII,S$GLB,, | Performed by: ANESTHESIOLOGY

## 2025-04-23 PROCEDURE — 3288F FALL RISK ASSESSMENT DOCD: CPT | Mod: CPTII,S$GLB,, | Performed by: ANESTHESIOLOGY

## 2025-04-23 PROCEDURE — 1160F RVW MEDS BY RX/DR IN RCRD: CPT | Mod: CPTII,S$GLB,, | Performed by: INTERNAL MEDICINE

## 2025-04-23 PROCEDURE — 4010F ACE/ARB THERAPY RXD/TAKEN: CPT | Mod: CPTII,S$GLB,, | Performed by: ANESTHESIOLOGY

## 2025-04-23 PROCEDURE — 1125F AMNT PAIN NOTED PAIN PRSNT: CPT | Mod: CPTII,S$GLB,, | Performed by: INTERNAL MEDICINE

## 2025-04-23 PROCEDURE — G2211 COMPLEX E/M VISIT ADD ON: HCPCS | Mod: S$GLB,,, | Performed by: INTERNAL MEDICINE

## 2025-04-23 PROCEDURE — 1101F PT FALLS ASSESS-DOCD LE1/YR: CPT | Mod: CPTII,S$GLB,, | Performed by: INTERNAL MEDICINE

## 2025-04-23 PROCEDURE — 3288F FALL RISK ASSESSMENT DOCD: CPT | Mod: CPTII,S$GLB,, | Performed by: INTERNAL MEDICINE

## 2025-04-23 PROCEDURE — 3008F BODY MASS INDEX DOCD: CPT | Mod: CPTII,S$GLB,, | Performed by: INTERNAL MEDICINE

## 2025-04-23 PROCEDURE — 99999 PR PBB SHADOW E&M-EST. PATIENT-LVL IV: CPT | Mod: PBBFAC,,, | Performed by: INTERNAL MEDICINE

## 2025-04-23 PROCEDURE — 3078F DIAST BP <80 MM HG: CPT | Mod: CPTII,S$GLB,, | Performed by: ANESTHESIOLOGY

## 2025-04-23 PROCEDURE — 1101F PT FALLS ASSESS-DOCD LE1/YR: CPT | Mod: CPTII,S$GLB,, | Performed by: ANESTHESIOLOGY

## 2025-04-23 PROCEDURE — 3077F SYST BP >= 140 MM HG: CPT | Mod: CPTII,S$GLB,, | Performed by: INTERNAL MEDICINE

## 2025-04-23 PROCEDURE — 99215 OFFICE O/P EST HI 40 MIN: CPT | Mod: S$GLB,,, | Performed by: ANESTHESIOLOGY

## 2025-04-23 PROCEDURE — 3051F HG A1C>EQUAL 7.0%<8.0%: CPT | Mod: CPTII,S$GLB,, | Performed by: INTERNAL MEDICINE

## 2025-04-23 PROCEDURE — 3008F BODY MASS INDEX DOCD: CPT | Mod: CPTII,S$GLB,, | Performed by: ANESTHESIOLOGY

## 2025-04-23 PROCEDURE — 3066F NEPHROPATHY DOC TX: CPT | Mod: CPTII,S$GLB,, | Performed by: INTERNAL MEDICINE

## 2025-04-23 PROCEDURE — 3051F HG A1C>EQUAL 7.0%<8.0%: CPT | Mod: CPTII,S$GLB,, | Performed by: ANESTHESIOLOGY

## 2025-04-23 PROCEDURE — G2211 COMPLEX E/M VISIT ADD ON: HCPCS | Mod: S$GLB,,, | Performed by: ANESTHESIOLOGY

## 2025-04-23 PROCEDURE — 3066F NEPHROPATHY DOC TX: CPT | Mod: CPTII,S$GLB,, | Performed by: ANESTHESIOLOGY

## 2025-04-23 PROCEDURE — 99999 PR PBB SHADOW E&M-EST. PATIENT-LVL IV: CPT | Mod: PBBFAC,,, | Performed by: ANESTHESIOLOGY

## 2025-04-23 PROCEDURE — 3079F DIAST BP 80-89 MM HG: CPT | Mod: CPTII,S$GLB,, | Performed by: INTERNAL MEDICINE

## 2025-04-23 PROCEDURE — 3062F POS MACROALBUMINURIA REV: CPT | Mod: CPTII,S$GLB,, | Performed by: ANESTHESIOLOGY

## 2025-04-23 RX ORDER — FLUOCINOLONE ACETONIDE 0.11 MG/ML
4 OIL AURICULAR (OTIC) 2 TIMES DAILY
Qty: 20 ML | Refills: 1 | Status: SHIPPED | OUTPATIENT
Start: 2025-04-23

## 2025-04-23 NOTE — PROGRESS NOTES
This note was completed with dictation software and grammatical errors may exist.    FOLLOW UP NOTE:     CHIEF COMPLAINT: left sided back and hip pain      INTERVAL HISTORY: Payton Castillo is a 70 y.o.  female with PMH significant for HTN, DM II on insulin, CKD, and hx of bilateral knee arthroplasties presents as an established patient for the continued management of back and hip pain.  She has new patient to me but has seen Dr. Ruggiero in the past.  He has had short-lived relief with SI joint injections, greater trochanteric bursa injections as well as lumbar MB RFA procedures.  She states pain has slowly worsened.  Pain is a constant aching, throbbing pain back.  Pain radiates to hips and buttocks and posterior thighs.  Pain worsens prolonged sitting, standing, getting up.  She has tried physical therapy minimal benefit.  She denies any worsening weakness.  No bowel bladder changes.    PRIOR HPI: Payton Castillo is a 67 y.o. female with PMH significant for HTN, DM II on insulin, CKD, and hx of bilateral knee arthroplasties presents as a referral for the evaluation of back and hip pain. The patient reports that her pain began approximately decades ago after no inciting incident or trauma. The patient reports of worsening severity over time. Hip pain is currently her worst pain. The patient believes her low back and hip pain are related. The patient localizes her pain to the area across her lower back (L > R). The patient reports of radiation to her hips b/l (L > R) and down the lateral aspect of her BLE's to her ankles. The patient describes her pain as a throbbing and sharp type of pain and a burning type of pain in her legs. The patient reports of tingling in her BLE's and her feet. The patient reports that her pain is a 6/10 with prolonged standing. Patient denies of any urinary/fecal incontinence, saddle anesthesia, or weakness.       Aggravating factors: bending, standing, walking     Mitigating factors: sitting,  "heat, massage      Relevant Surgeries: no     Interventional Therapies: yes; saw Dr. Dane Dalton - did back injections with some benefit         CURRENT PAIN MEDICATIONS:   Currently taking: Biofreeze, OTC Tylenol, gabapentin 800 mg PO TID, Tylenol #3 PO BID     Has tried in the past:    Opioids: yes  NSAIDS: no; avoids secondary to CKD  Tylenol: yes  Muscle relaxants: yes; tried with some benefit; tried robaxin - was instructed to discontinue secondary to her kidney issues  TCAs: no  SNRIs: no  Anticonvulsants: yes  topical creams: yes      ROS:  Review of Systems   Constitutional:  Negative for chills and fever.   HENT:  Negative for sore throat.    Eyes:  Negative for visual disturbance.   Respiratory:  Negative for shortness of breath.    Cardiovascular:  Negative for chest pain.   Gastrointestinal:  Negative for nausea and vomiting.   Genitourinary:  Negative for difficulty urinating.   Musculoskeletal:  Positive for arthralgias and back pain.   Skin:  Negative for rash.   Allergic/Immunologic: Negative for immunocompromised state.   Neurological:  Negative for syncope.   Hematological:  Does not bruise/bleed easily.   Psychiatric/Behavioral:  Negative for suicidal ideas.         MEDICAL, SURGICAL, FAMILY, SOCIAL HX: reviewed    MEDICATIONS/ALLERGIES: reviewed    PHYSICAL EXAM:    VITALS: Vitals reviewed.   Vitals:    04/23/25 0837   BP: 133/72   Pulse: 75   Weight: 77.1 kg (169 lb 15.6 oz)   Height: 5' 6" (1.676 m)   PainSc:   8   PainLoc: Hip       Physical Exam  Vitals and nursing note reviewed.   Constitutional:       Appearance: Normal appearance. She is not toxic-appearing or diaphoretic.   HENT:      Head: Normocephalic and atraumatic.   Eyes:      General:         Right eye: No discharge.         Left eye: No discharge.      Extraocular Movements: Extraocular movements intact.      Conjunctiva/sclera: Conjunctivae normal.   Cardiovascular:      Rate and Rhythm: Normal rate.   Pulmonary:      Effort: " Pulmonary effort is normal. No respiratory distress.      Breath sounds: Normal breath sounds.   Abdominal:      Palpations: Abdomen is soft.   Skin:     General: Skin is warm and dry.      Findings: No rash.   Neurological:      Mental Status: She is alert and oriented to person, place, and time.   Psychiatric:         Mood and Affect: Mood and affect normal.         Cognition and Memory: Memory normal.         Judgment: Judgment normal.          UPPER EXTREMITIES: Normal alignment, normal range of motion, no atrophy, no skin changes,  hair growth and nail growth normal and equal bilaterally. No swelling, no tenderness.    LOWER EXTREMITIES:  Normal alignment, normal range of motion, no atrophy, no skin changes,  hair growth and nail growth normal and equal bilaterally. No swelling, no tenderness.     LUMBAR SPINE  Lumbar spine: ROM is limited with flexion extension and oblique extension with increased pain with passive ROM    ((--)) Supine straight leg raise    ((-)) Facet loading   Internal and external rotation of the hip causes no increased pain on either side.  Myofascial exam: Tenderness to palpation across lumbar paraspinous muscles.     ((-)) TTP at the SI joint  ((-)) MANDO's test  ((-)) One leg stand    ((-)) Distraction test  ((-)) Thigh thrust   + TTP left GTB  MOTOR: Tone and bulk: normal      MUSCLE STRENGTH:  Hip Flexion: Right 5/5, Left 5/5  Hip Extension: Right 5/5, Left 5/5  Leg Flexion: Right 5/5, Left 5/5  Leg Extension: Right 5/5, Left 5/5  Plantar Flexion: Right 5/5, Left 5/5  Dorsiflexion: Right 5/5, Left 5/5     Delt      Bi         Tri                         Right:   5/5       5/5       5/5       5/5         Left:     5/5       5/5       5/5       5/5            SENSATION: Light touch and pinprick intact bilaterally  REFLEXES: normal, symmetric, nonbrisk.  Toes down, no clonus. Negative woody's sign bilaterally.  GAIT:  Antalgic gait     IMAGING:    MRI Lumbar Spine without contrast  (9/16/2022):  FINDINGS: Comparison to multiple prior exams. The lumbar spine has normal lordotic curvature and vertebral body alignment, with normal vertebral heights, and no acute fractures or evidence of a diffuse marrow replacement process. Mild-to-moderate degenerative loss of disc height and signal involves multiple levels. The conus medullaris is normal in signal and terminates at L1-L2.     At T12-L1, L1-L2, L2-L3, L3-L4, and L4-L5, there is mild broad-based disc bulging, without focal disc herniation or spinal canal stenosis. There is mild multilevel facet arthropathy, without significant neural foraminal narrowing or evidence of nerve impingement..     At L5-S1, there is broad-based disc bulging with T2 hyperintense right paracentral partial-thickness annular fissure. There is no focal disc herniation or spinal canal stenosis, with bilateral facet hypertrophy and T2 hyperintense synovial cyst formation. There is no significant foraminal narrowing or evidence of nerve impingement.     There are no signal abnormalities of the paraspinal soft tissues or the paraspinal ligaments. The visualized retroperitoneum is unremarkable.     IMPRESSION: Multilevel lumbar degenerative disc disease and facet osteoarthritis, with no focal disc herniation, spinal canal stenosis, or evidence of nerve impingement at any level.    X-ray bilateral hips (8/26/2022):  There is mild osteoarthritic changes of the left hip with spurring of the acetabulum. The hip joints are symmetric in size. There are no fractures or acute osseous abnormalities. The SI joints are symmetrical. There is facet hypertrophy at L5-S1. The soft tissues are normal    X-ray sacroiliac joints (8/26/2022):  The SI joints are symmetric in size. There are no erosions or ankylosis. There are mild degenerative changes of the left hip. There is facet hypertrophy at L5-S1. There are no acute osseous anomalies.     IMPRESSION: Facet hypertrophy at L5-S1     Normal SI  joints     Mild degenerative changes of the left hip    ASSESSMENT: Payton Castillo is a 67 y.o. female with PMH significant for HTN, DM II on insulin, CKD, and hx of bilateral knee arthroplasties presents as an established patient for the continued management of back and hip pain. Treatment plan outlined below.   1. Dorsalgia, unspecified    2. Degeneration of intervertebral disc of lumbar region, unspecified whether pain present    3. Lumbar radiculitis    4. Other spondylosis, lumbar region        PLAN:    Reviewed pertinent imaging and records with patient  I have stressed the importance of physical activity and exercise to improve overall health  We will schedule updated lumbar MRI to evaluate continued worsening lower back pain and radiating hip and buttock  We will contact patient with MRI results

## 2025-04-23 NOTE — PROGRESS NOTES
Subjective:       Patient ID: Payton Castillo is a 70 y.o. female.    Chief Complaint: Shoulder Pain (Right ), Low-back Pain (X 2 weeks ), Hip Pain (Both hips), and Headache    Payton presents with multiple pain complaints, including shoulder, low back, hip, and generalized body pain, as well as concerns about elevated blood pressure and blood sugar.    HPI:     Miss Cain is a somewhat chronically ill 70-year-old female who comes for follow-up.    While this follow up was apparently designed for her chronic medical issues, today's visit seem to be dominated by complains of having moderately significant pains all over the body, not feeling better, shoulder pain and pain everywhere.      Hence it was decided to defer address all of her chronic medical issues including hypertension, hyperlipidemia and diabetes future date pending address all off her current presenting symptoms.    Underlying medical issues include the following:-    1.-hypertension  2.-hyperlipidemia  3.-diabetes mellitus type 2   4.-neuropathy  5.-gastroesophageal reflux  6.-bladder incontinence currently on solifenacin  7.-chronic hip pains,-worse lately-bilateral    Payton reports pain in multiple areas of her body, including shoulder, low back, hip, and legs, with generalized body pain significantly worsening over the past 2 weeks. Her shoulder pain is exacerbated by cool or cold environments, prompting her to keep it covered. She also reports pain behind her knees, despite having knee replacements.    She has been diagnosed with arthritis affecting her hips, hands, and fingers, as well as bursitis in her hips and shoulders.    Payton states that both her blood pressure and blood sugar have been elevated recently. She admits to increased consumption of chocolate-covered marshmallows around [facility], which may have contributed to the increase in her blood sugar. Her last hemoglobin A1c was 7.2 on 4/9/25, indicating an upward trend in blood  sugar.    She reports that Gabapentin has not been effective lately for pain management. She has been on Kerendia and Jardiance for at least 2 years. She stopped taking Jardiance about a month ago due to increased itching, which she describes as a condition she has had since childhood.    She mentions deep itching in her left ear for at least 6 months. She has been using an ear itch medicine, which provides some relief when used daily.    She is currently under the care of Dr. Rivas for her pain issues. She had a recent appointment where Dr. Rivas recommended a new MRI, as previous injections have not been effective. She is also following up with a nephrologist, who reported that her recent tests showed minimal changes and everything looks fine.    Recent visit has been noted with Dr. Tahir Roberts nephrology.  She has been listed as chronic kidney disease stage IIIB, hypertension and type 2 DM.      Intact PTH level was noted to be elevated though not listed as secondary hyperparathyroidism.  Vitamin-D level is slightly on the low side.      As far as therapy is concerned, she has been adequately covered with Kerendia, olmesartan and empagliflozin.     Patient also complains of itching though there was no rash.  Patient is a limited and not a very astute historian to make any correlation with the any medications.      Check out complain points out to left ear itching    MEDICATIONS:  Payton is on Gabapentin, which has not been effective lately for pain. She has been on Kerendia for at least 2 years and is also taking insulin. She uses ear itch medicine daily for itching in her left ear. Payton discontinued Jardiance about a month ago due to increased itching. She also stopped atorvastatin (Lipitor) for cholesterol as instructed by her doctor for 6 weeks.    MEDICAL HISTORY:  Payton has a history of hypertension, diabetes, and a kidney condition. She also has arthritis and bursitis in her hips and shoulders. She has a Vitamin  D deficiency.    FAMILY HISTORY:  Family history is significant for mother having arthritis, though the type was not specified.    SURGICAL HISTORY:  Payton has undergone knee replacement surgery on both knees.    TEST RESULTS:  Payton's Hemoglobin A1c was 7.2 on 4/9/25. Her kidney test showed minimal changes from the last one. Her Vitamin D levels were noted as low.    IMAGING:  An old MRI was mentioned in the patient's records.    SOCIAL HISTORY:  Marital status:       ROS:  General: -fever, -chills,  Positive for chronic -fatigue, -weight gain, -weight loss  Cardiovascular: -chest pain, -palpitations, -lower extremity edema  Respiratory: -cough, -shortness of breath  Gastrointestinal: -abdominal pain, -nausea, -vomiting, -diarrhea, -constipation, -blood in stool  Skin: -rash, -lesion, +itching  Neurological: +headache, -dizziness, -numbness, -tingling  Musculoskeletal: +joint pain, +pain with movement, +back pain, +limb pain, +muscle pain, +body aches  Ears: +ear pruritus, +ear pressure         Past Medical History:   Diagnosis Date    Allergy     aspartame,fructose monosodium glutamate and penicillins.    Anemia of chronic disorder 11/09/2017    Anemia, chronic renal failure, stage 2 (mild) 11/09/2017    Anemia, chronic renal failure, stage 3 (moderate) 11/09/2017    Asthma     Cancer     kidney    Cataract of right eye     CKD (chronic kidney disease), stage III     Diabetes mellitus     type II    Fibromyalgia     GERD (gastroesophageal reflux disease)     Hx of renal cell cancer - left nephrectomy Aug 2014 11/09/2017    Hypercholesteremia     Hypertension     IDDM (insulin dependent diabetes mellitus)     Lumbar spondylosis     Lumbosacral radiculopathy     Osteopenia     Palpitations     Plantar fasciitis, right     Sleep apnea     Syncope 02/2012    Tachycardia     Thoracic radiculopathy     TMJ (dislocation of temporomandibular joint)      Social History[1]  Past Surgical History:   Procedure  Laterality Date    CATARACT EXTRACTION Left 03/2016    INJECTION OF ANESTHETIC AGENT AROUND MEDIAL BRANCH NERVES INNERVATING LUMBAR FACET JOINT Bilateral 12/15/2022    Procedure: Block-nerve-medial branch-lumbar;  Surgeon: Howard Rivas MD;  Location: [facility] OR;  Service: Pain Management;  Laterality: Bilateral;  L3,4,5 MBB    INJECTION OF ANESTHETIC AGENT AROUND MEDIAL BRANCH NERVES INNERVATING LUMBAR FACET JOINT Bilateral 1/3/2023    Procedure: Block-nerve-medial branch-lumbar;  Surgeon: Howard Rivas MD;  Location: [facility] OR;  Service: Pain Management;  Laterality: Bilateral;  L3,4,5 MBB #2    INJECTION OF JOINT Left 1/26/2024    Procedure: Injection, Joint;  Surgeon: Howard Rivas MD;  Location: [facility] OR;  Service: Anesthesiology;  Laterality: Left;  left GTB injection    INJECTION, SACROILIAC JOINT Left 10/7/2022    Procedure: INJECTION,SACROILIAC JOINT;  Surgeon: Cory Ruggiero MD;  Location: [facility] OR;  Service: Pain Management;  Laterality: Left;  sacroiliac injection    INJECTION, SACROILIAC JOINT Left 4/5/2023    Procedure: INJECTION,SACROILIAC JOINT;  Surgeon: Howard Rivas MD;  Location: [facility] OR;  Service: Pain Management;  Laterality: Left;  SIJ    INJECTION, SACROILIAC JOINT Left 1/26/2024    Procedure: INJECTION,SACROILIAC JOINT;  Surgeon: Howard Rivas MD;  Location: [facility] OR;  Service: Anesthesiology;  Laterality: Left;  left SIJ and Left GTB injection    JOINT REPLACEMENT Right     knee    RADIOFREQUENCY THERMOCOAGULATION Bilateral 1/13/2023    Procedure: RADIOFREQUENCY THERMAL COAGULATION;  Surgeon: Howard Rivas MD;  Location: [facility] OR;  Service: Pain Management;  Laterality: Bilateral;  L3,4,5 Raul RFA    SHOULDER OPEN ROTATOR CUFF REPAIR Right 02/2005    labral repair    TOTAL KNEE ARTHROPLASTY Left 2014    TUBAL LIGATION  1990    TUMOR REMOVAL  2014    from kidney     Family History   Problem Relation Name Age of Onset    Stomach cancer Mother      Breast cancer Mother      Diabetes Mother      Cancer  "Father         Objective:      Physical Exam  HENT:      Ears:      Comments:  Flaking of dead skin noted in the left ear canal.  TMs bilaterally are normal.  Some wax noted.  Probably some irritation in the canal.  Skin:            Comments:  About shaded areas indicate pain sensation by patient.       Blood pressure (!) 168/80, pulse 85, temperature 98.2 °F (36.8 °C), height 5' 6" (1.676 m), weight 79.6 kg (175 lb 7.8 oz), SpO2 99%. Body mass index is 28.32 kg/m².  Physical Exam    General: chronically unwell and mild level persistent distress Well-developed. Well-nourished.  Eyes: EOMI. Sclerae anicteric.  Cardiovascular: Regular rate. Regular rhythm. No murmurs. No rubs. No gallops. Normal S1, S2.  Respiratory: Normal respiratory effort. Clear to auscultation bilaterally. No rales. No rhonchi. No wheezing.  Musculoskeletal: No  obvious deformity. Range of motion of right shoulder limited diffuse muscular tenderness in arms, thigh, calf and forearm muscles.  Extremities: No lower extremity edema.  Neurological: Alert & oriented   To context and situation.  Psychiatric: chronically anhedonic  Skin: Warm. Dry.              Assessment:       Lab Visit on 04/09/2025   Component Date Value Ref Range Status    Hemoglobin A1c 04/09/2025 7.2 (H)  4.5 - 6.2 % Final    Estimated Average Glucose 04/09/2025 160 (H)  68 - 131 mg/dL Final    Sodium 04/09/2025 141  136 - 145 mmol/L Final    Potassium 04/09/2025 4.8  3.5 - 5.1 mmol/L Final    Chloride 04/09/2025 105  95 - 110 mmol/L Final    CO2 04/09/2025 28  23 - 29 mmol/L Final    Glucose 04/09/2025 82  70 - 110 mg/dL Final    BUN 04/09/2025 25 (H)  8 - 23 mg/dL Final    Creatinine 04/09/2025 1.5 (H)  0.5 - 1.4 mg/dL Final    Calcium 04/09/2025 9.8  8.7 - 10.5 mg/dL Final    Protein Total 04/09/2025 7.9  6.0 - 8.4 gm/dL Final    Albumin 04/09/2025 4.3  3.5 - 5.2 g/dL Final    Bilirubin Total 04/09/2025 0.5  0.1 - 1.0 mg/dL Final    ALP 04/09/2025 62  55 - 135 unit/L Final    " AST 04/09/2025 15  10 - 40 unit/L Final    ALT 04/09/2025 14  10 - 44 unit/L Final    Anion Gap 04/09/2025 8  8 - 16 mmol/L Final    eGFR 04/09/2025 37 (L)  >60 mL/min/1.73/m2 Final    Cholesterol Total 04/09/2025 163  120 - 199 mg/dL Final    Triglyceride 04/09/2025 147  30 - 150 mg/dL Final    HDL Cholesterol 04/09/2025 61  40 - 75 mg/dL Final    LDL Cholesterol 04/09/2025 72.6  63.0 - 159.0 mg/dL Final    HDL/Cholesterol Ratio 04/09/2025 37.4  20.0 - 50.0 % Final    Cholesterol/HDL Ratio 04/09/2025 2.7  2.0 - 5.0 Final    Non HDL Cholesterol 04/09/2025 102  mg/dL Final    Urine Microalbumin 04/09/2025 780.1 (H)  <=19.8 ug/mL Final    Urine Creatinine 04/09/2025 143.8  15.0 - 325.0 mg/dL Final    Microalbumin/Creatinine Ratio Urine 04/09/2025 542.5 (H)  <=30.0 ug/mg Final    Sodium 04/09/2025 141  136 - 145 mmol/L Final    Potassium 04/09/2025 4.9  3.5 - 5.1 mmol/L Final    Chloride 04/09/2025 104  95 - 110 mmol/L Final    CO2 04/09/2025 30 (H)  23 - 29 mmol/L Final    Glucose 04/09/2025 80  70 - 110 mg/dL Final    BUN 04/09/2025 23  8 - 23 mg/dL Final    Creatinine 04/09/2025 1.4  0.5 - 1.4 mg/dL Final    Calcium 04/09/2025 9.6  8.7 - 10.5 mg/dL Final    Albumin 04/09/2025 4.2  3.5 - 5.2 g/dL Final    Phosphorus Level 04/09/2025 4.1  2.7 - 4.5 mg/dL Final    Anion Gap 04/09/2025 7 (L)  8 - 16 mmol/L Final    eGFR 04/09/2025 41 (L)  >60 mL/min/1.73/m2 Final    Color, UA 04/09/2025 Yellow  Yellow, Straw, Nimisha Final    Appearance, UA 04/09/2025 Clear  Clear Final    Spec Grav UA 04/09/2025 1.020  1.005 - 1.030 Final    pH, UA 04/09/2025 6.0  5.0 - 8.0 Final    Protein, UA 04/09/2025 2+ (A)  Negative Final    Glucose, UA 04/09/2025 Negative  Negative Final    Ketones, UA 04/09/2025 Negative  Negative Final    Blood, UA 04/09/2025 Negative  Negative Final    Bilirubin, UA 04/09/2025 Negative  Negative Final    Urobilinogen, UA 04/09/2025 Negative  <2.0 EU/dL Final    Nitrites, UA 04/09/2025 Negative  Negative Final     Leukocyte Esterase, UA 04/09/2025 Negative  Negative Final    Urine Protein 04/09/2025 116 (H)  <=15 mg/dL Final    Urine Creatinine 04/09/2025 142.7  15.0 - 325.0 mg/dL Final    Urine Protein/Creatinine Ratio 04/09/2025 0.81 (H)  <=0.20 Final    PTH Intact 04/09/2025 136.7 (H)  9.0 - 77.0 pg/mL Final    Vitamin D 04/09/2025 24 (L)  30 - 96 ng/mL Final    Magnesium 04/09/2025 1.3 (L)  1.6 - 2.6 mg/dL Final    Uric Acid 04/09/2025 7.5 (H)  2.4 - 5.7 mg/dL Final    WBC 04/09/2025 7.55  3.90 - 12.70 K/uL Final    RBC 04/09/2025 4.06  4.00 - 5.40 M/uL Final    Hgb 04/09/2025 12.5  12.0 - 16.0 gm/dL Final    Hct 04/09/2025 38.7  37.0 - 48.5 % Final    MCV 04/09/2025 95  82 - 98 fL Final    MCH 04/09/2025 30.8  27.0 - 31.0 pg Final    MCHC 04/09/2025 32.3  32.0 - 36.0 g/dL Final    RDW 04/09/2025 12.2  11.5 - 14.5 % Final    Platelet Count 04/09/2025 295  150 - 450 K/uL Final    MPV 04/09/2025 8.5 (L)  9.2 - 12.9 fL Final    Nucleated RBC 04/09/2025 0  <=0 /100 WBC Final    Neut % 04/09/2025 42.0  38 - 73 % Final    Lymph % 04/09/2025 46.4  18 - 48 % Final    Mono % 04/09/2025 7.5  4 - 15 % Final    Eos % 04/09/2025 3.0  0 - 8 % Final    Basophil % 04/09/2025 0.8  <=1.9 % Final    Imm Grans % 04/09/2025 0.3  0.0 - 0.5 % Final    Neut # 04/09/2025 3.2  1.8 - 7.7 K/uL Final    Lymph # 04/09/2025 3.50  1 - 4.8 K/uL Final    Mono # 04/09/2025 0.57  0.3 - 1 K/uL Final    Eos # 04/09/2025 0.23  <=0.5 K/uL Final    Baso # 04/09/2025 0.06  <=0.2 K/uL Final    Imm Grans # 04/09/2025 0.02  0.00 - 0.04 K/uL Final    RBC, UA 04/09/2025 <1  <=4 /HPF Final    WBC, UA 04/09/2025 1  <=5 /HPF Final    Squamous Epithelial Cells, UA 04/09/2025 <1  /HPF Final    Microscopic Comment 04/09/2025    Final   Lab Visit on 03/14/2025   Component Date Value Ref Range Status    Sodium 03/14/2025 137  136 - 145 mmol/L Final    Potassium 03/14/2025 5.0  3.5 - 5.1 mmol/L Final    Chloride 03/14/2025 102  95 - 110 mmol/L Final    CO2 03/14/2025 29  23  - 29 mmol/L Final    Glucose 03/14/2025 201 (H)  70 - 110 mg/dL Final    BUN 03/14/2025 22  8 - 23 mg/dL Final    Creatinine 03/14/2025 1.7 (H)  0.5 - 1.4 mg/dL Final    Calcium 03/14/2025 10.2  8.7 - 10.5 mg/dL Final    Anion Gap 03/14/2025 6 (L)  8 - 16 mmol/L Final    eGFR 03/14/2025 32.1 (A)  >60 mL/min/1.73 m^2 Final   Lab Visit on 03/07/2025   Component Date Value Ref Range Status    Vit D, 25-Hydroxy 03/07/2025 24 (L)  30 - 96 ng/mL Final     Component  Ref Range & Units (hover) 2 wk ago  (4/9/25) 7 mo ago  (9/6/24) 1 yr ago  (7/3/23) 2 yr ago  (3/20/23) 2 yr ago  (11/10/22) 3 yr ago  (3/11/22) 3 yr ago  (11/19/21)   Hemoglobin A1c 7.2 High  6.5 High  R, CM 7.7 High  CM 6.7 High  CM 6.7 High  CM 7.0 High  CM 7.0 High      Cholesterol Total 163 159      CM   Comment: The National Cholesterol Education Program (NCEP) has set the  following guidelines (reference ranges) for Cholesterol:  Optimal.....................<200 mg/dL  Borderline High.............200-239 mg/dL  High........................> or = 240 mg/dL         Assessment & Plan    C64.2 Malignant neoplasm of left kidney, except renal pelvis  I10 Essential (primary) hypertension  E11.65 Type 2 diabetes mellitus with hyperglycemia  N18.9 Chronic kidney disease, unspecified  M25.511 Pain in right shoulder  M25.512 Pain in left shoulder  M25.551 Pain in right hip  M25.552 Pain in left hip  M54.50 Low back pain, unspecified  M19.041 Primary osteoarthritis, right hand  M19.042 Primary osteoarthritis, left hand  M16.11 Unilateral primary osteoarthritis, right hip  M16.12 Unilateral primary osteoarthritis, left hip  M75.51 Bursitis of right shoulder  M75.52 Bursitis of left shoulder  M70.71 Other bursitis of hip, right hip  M70.72 Other bursitis of hip, left hip  E55.9 Vitamin D deficiency, unspecified  Z96.651 Presence of right artificial knee joint  Z96.652 Presence of left artificial knee joint  H93.292 Other abnormal auditory  perceptions, left ear    IMPRESSION:  - Focused on pain management due to widespread pain complaints.  - Considered potential causes of generalized pain, including vitamin D deficiency, kidney condition, and medication side effects.  - Suspect possible rheumatological condition (e.g., lupus, polymyalgia) or statin-induced myopathy.    I10 ESSENTIAL (PRIMARY) HYPERTENSION:  - Monitor the patient's blood pressure, which has been reported as elevated.  - Acknowledge hypertension as one of the patient's ongoing medical issues.    E11.65 TYPE 2 DIABETES MELLITUS WITH HYPERGLYCEMIA:  - Monitor glucose levels, which have been reported as elevated.  - The most recent hemoglobin A1c was 7.2 on 4/9/25, indicating suboptimal glycemic control.  - Continue insulin therapy.  - Note that the patient previously discontinued Jardiance due to pruritus.  - Acknowledge diabetes as one of the patient's ongoing medical issues.    N18.9 CHRONIC KIDNEY DISEASE, UNSPECIFIED:  - Continue regular follow-ups with a nephrologist to monitor the patient's kidney function.    M25.512 PAIN IN LEFT SHOULDER:  - Monitor bilateral shoulder pain, which is exacerbated by cold weather.  - Examine the right shoulder, noting pain in the joint and soreness in surrounding muscles.    M25.552 PAIN IN LEFT HIP:  - Monitor bilateral hip pain.    M54.50 LOW BACK PAIN, UNSPECIFIED:  - Monitor low back pain.    M19.042 PRIMARY OSTEOARTHRITIS, LEFT HAND:  - Monitor arthritis pain in bilateral hands and fingers.    M16.12 UNILATERAL PRIMARY OSTEOARTHRITIS, LEFT HIP:  - Monitor arthritis in bilateral hips.    M75.52 BURSITIS OF LEFT SHOULDER:  - Monitor bursitis in bilateral shoulders.    M70.72 OTHER BURSITIS OF HIP, LEFT HIP:  - Monitor bursitis in bilateral hips.    E55.9 VITAMIN D DEFICIENCY, UNSPECIFIED:  - Address the patient's low vitamin D levels.  - Consider supplementation and dietary changes.    Z96.652 PRESENCE OF LEFT ARTIFICIAL KNEE JOINT:  - Monitor  the condition and function of bilateral knee replacements.    H93.292 OTHER ABNORMAL AUDITORY PERCEPTIONS, LEFT EAR:  - Monitor the patient's left ear itching, which has persisted for at least 6 months.  - Examine the left ear.  - Continue the use of previously prescribed ear itch medication and prescribe new eardrops for the left ear itching.    ** NEEDS REVIEW **  C64.2 MALIGNANT NEOPLASM OF LEFT KIDNEY, EXCEPT RENAL PELVIS:  - Monitor the patient's condition through regular follow-ups with a nephrologist.  - The latest report indicates minimal changes from the previous visit, with overall satisfactory results.         Plan:   Complaints of total body pain  -     Sedimentation rate; Standing  -     C-Reactive Protein; Future; Expected date: 04/24/2025  -     RHEUMATOID FACTOR; Future; Expected date: 04/23/2025  -     Cyclic Citrullinated Peptide Antibody, IgG; Future; Expected date: 04/24/2025  -     Uric Acid; Future; Expected date: 04/24/2025  -     JAIME Screen w/Reflex; Future; Expected date: 04/24/2025    Itching  -     Sedimentation rate; Standing  -     C-Reactive Protein; Future; Expected date: 04/24/2025  -     Uric Acid; Future; Expected date: 04/24/2025  -     JAIME Screen w/Reflex; Future; Expected date: 04/24/2025    Fatigue, unspecified type  -     Sedimentation rate; Standing  -     C-Reactive Protein; Future; Expected date: 04/24/2025  -     JAIME Screen w/Reflex; Future; Expected date: 04/24/2025  -     Vitamin B12; Future; Expected date: 04/23/2025    B12 deficiency  -     Vitamin B12; Future; Expected date: 04/23/2025    Muscle pain  -     CK; Future; Expected date: 04/24/2025    Chronic eczematous otitis externa of left ear  -     fluocinolone acetonide oiL 0.01 % Drop; Place 4 drops in ear(s) 2 (two) times a day.  Dispense: 20 mL; Refill: 1       Patient's presentation today is with pain all over  The body and this dominates the conversation.  She seems to have myalgia symptoms on mild-to-moderate  pressure over the muscle groups from arm, forearm, thigh and calf.    My suspicion would be to rule out conditions like polymyalgia or consideration for fibromyalgia.    She is also on statin medications and check out for potential of myopathy and give her a statin holiday  Patient's combination of diabetes, hyperlipidemia, CKD has been noted as she slowly and steadily progresses with the disease conditions.  She keeps her regular follow up with Nephrology as he continues to monitor and is currently optimized on treatment with a combination of ARB (on olmesartan), Kerendia as a mineral corticoid agonist and Jardiance as a SGLT2 which are all known to benefit the Nephrology.     Consider:-Tetanus vaccination to be taken from pharmacy including RSV   BMI is in overweight category consideration for Ozempic has a GLP 1 with the hope of some weight loss and reduction of insulin burden.  Ozempic recently has been approved for chronic kidney disease also    Follow up in about 6 weeks (around 6/4/2025), or if symptoms worsen or fail to improve, for Diabetes/HTN/Lipids.    Current Medications[2]    This note was generated with the assistance of ambient listening technology. Verbal consent was obtained by the patient and accompanying visitor(s) for the recording of patient appointment to facilitate this note. I attest to having reviewed and edited the generated note for accuracy, though some syntax or spelling errors may persist. Please contact the author of this note for any clarification.      John Nolan           [1]   Social History  Socioeconomic History    Marital status:      Spouse name: Owen Castillo    Number of children: 2   Occupational History    Occupation: Part - member support [employer]     Employer: [employer]   Tobacco Use    Smoking status: Never    Smokeless tobacco: Never   Substance and Sexual Activity    Alcohol use: No    Drug use: No    Sexual activity: Yes     Partners: Male     Social Drivers  "of Health     Financial Resource Strain: Medium Risk (4/22/2025)    Overall Financial Resource Strain (CARDIA)     Difficulty of Paying Living Expenses: Somewhat hard   Food Insecurity: No Food Insecurity (4/22/2025)    Hunger Vital Sign     Worried About Running Out of Food in the Last Year: Never true     Ran Out of Food in the Last Year: Never true   Transportation Needs: No Transportation Needs (4/22/2025)    PRAPARE - Transportation     Lack of Transportation (Medical): No     Lack of Transportation (Non-Medical): No   Physical Activity: Sufficiently Active (4/22/2025)    Exercise Vital Sign     Days of Exercise per Week: 6 days     Minutes of Exercise per Session: 40 min   Stress: No Stress Concern Present (4/22/2025)    [employer] [place] [employer] - Occupational Stress Questionnaire     Feeling of Stress : Only a little   Housing Stability: Low Risk  (4/22/2025)    Housing Stability Vital Sign     Unable to Pay for Housing in the Last Year: No     Number of Times Moved in the Last Year: 0     Homeless in the Last Year: No   [2]   Current Outpatient Medications:     amLODIPine (NORVASC) 5 MG tablet, Take 1 tablet (5 mg total) by mouth once daily., Disp: 90 tablet, Rfl: 3    atorvastatin (LIPITOR) 40 MG tablet, Take 1 tablet (40 mg total) by mouth every evening., Disp: 90 tablet, Rfl: 3    azelastine (ASTELIN) 137 mcg (0.1 %) nasal spray, 1 spray (137 mcg total) by Nasal route 2 (two) times daily., Disp: 30 mL, Rfl: 3    BD INSULIN SYRINGE 1 mL 28 gauge x 1/2" Syrg, USE 1 SYRINGE ONCE DAILY, Disp: 100 each, Rfl: 3    blood-glucose sensor (FREESTYLE BANG 3 SENSOR) Millicent, Inject 1 Device into the skin every 14 (fourteen) days., Disp: 6 each, Rfl: 3    calcitRIOL (ROCALTROL) 0.25 MCG Cap, Take by mouth., Disp: , Rfl:     cetirizine (ZYRTEC) 10 MG tablet, Take 10 mg by mouth., Disp: , Rfl:     empagliflozin (JARDIANCE) 10 mg tablet, Take 10 mg by mouth., Disp: , Rfl:     fluticasone propionate " "(FLONASE) 50 mcg/actuation nasal spray, USE 2 SPRAYS IN EACH NOSTRIL ONCE DAILY, Disp: 48 g, Rfl: 3    gabapentin (NEURONTIN) 800 MG tablet, TAKE 1 TABLET THREE TIMES A DAY, Disp: 270 tablet, Rfl: 3    insulin aspart U-100 (NOVOLOG FLEXPEN U-100 INSULIN) 100 unit/mL (3 mL) InPn pen, Inject 20 Units into the skin 3 (three) times daily with meals., Disp: 54 mL, Rfl: 3    insulin glargine U-100, Lantus, (LANTUS U-100 INSULIN) 100 unit/mL injection, INJECT 70 UNITS UNDER THE SKIN EVERY EVENING, Disp: 63 mL, Rfl: 3    insulin syringe-needle U-100 0.3 mL 30 Syrg, by Misc.(Non-Drug; Combo Route) route 3 (three) times daily., Disp: , Rfl:     KERENDIA 10 mg Tab, Take 1 tablet by mouth every evening., Disp: , Rfl:     metoprolol succinate (TOPROL-XL) 50 MG 24 hr tablet, TAKE 1 TABLET DAILY, Disp: 90 tablet, Rfl: 3    omeprazole (PRILOSEC) 40 MG capsule, Take 40 mg by mouth every evening., Disp: , Rfl:     pen needle, diabetic (ADVOCATE PEN NEEDLE) 31 gauge x 5/16" Ndle, 1 Device by Misc.(Non-Drug; Combo Route) route 3 (three) times daily., Disp: 100 each, Rfl: 3    fluocinolone acetonide oiL 0.01 % Drop, Place 4 drops in ear(s) 2 (two) times a day., Disp: 20 mL, Rfl: 1  No current facility-administered medications for this visit.    Facility-Administered Medications Ordered in Other Visits:     lactated ringers infusion, , Intravenous, Once PRN, Howard Rivas MD    lactated ringers infusion, , Intravenous, Once PRN, Howard Rivas MD    lactated ringers infusion, , Intravenous, Continuous, Howard Rivas MD, Last Rate: 25 mL/hr at 01/26/24 0937, 500 mL at 01/26/24 0937    "

## 2025-04-24 ENCOUNTER — LAB VISIT (OUTPATIENT)
Dept: LAB | Facility: HOSPITAL | Age: 70
End: 2025-04-24
Attending: INTERNAL MEDICINE
Payer: MEDICARE

## 2025-04-24 DIAGNOSIS — L29.9 ITCHING: ICD-10-CM

## 2025-04-24 DIAGNOSIS — E53.8 B12 DEFICIENCY: ICD-10-CM

## 2025-04-24 DIAGNOSIS — R53.83 FATIGUE, UNSPECIFIED TYPE: ICD-10-CM

## 2025-04-24 DIAGNOSIS — R52 COMPLAINTS OF TOTAL BODY PAIN: ICD-10-CM

## 2025-04-24 LAB
C-REACTIVE PROTEIN (SMH): 0.1 MG/DL
CYCLIC CITRULLINATED PEPTIDE (CCP) (OHS): <0.5 U/ML
ERYTHROCYTE [SEDIMENTATION RATE] IN BLOOD: 31 MM/HR (ref 0–20)
RHEUMATOID FACT SERPL-ACNC: <10 IU/ML
URATE SERPL-MCNC: 7.1 MG/DL (ref 2.4–5.7)
VIT B12 SERPL-MCNC: 335 PG/ML (ref 210–950)

## 2025-04-24 PROCEDURE — 86140 C-REACTIVE PROTEIN: CPT

## 2025-04-24 PROCEDURE — 36415 COLL VENOUS BLD VENIPUNCTURE: CPT

## 2025-04-24 PROCEDURE — 84550 ASSAY OF BLOOD/URIC ACID: CPT

## 2025-04-24 PROCEDURE — 86200 CCP ANTIBODY: CPT

## 2025-04-24 PROCEDURE — 86038 ANTINUCLEAR ANTIBODIES: CPT

## 2025-04-24 PROCEDURE — 86431 RHEUMATOID FACTOR QUANT: CPT

## 2025-04-24 PROCEDURE — 85651 RBC SED RATE NONAUTOMATED: CPT

## 2025-04-24 PROCEDURE — 82607 VITAMIN B-12: CPT

## 2025-04-25 ENCOUNTER — RESULTS FOLLOW-UP (OUTPATIENT)
Dept: FAMILY MEDICINE | Facility: CLINIC | Age: 70
End: 2025-04-25

## 2025-04-25 LAB — ANA SER QL IF: NEGATIVE

## 2025-05-01 ENCOUNTER — HOSPITAL ENCOUNTER (OUTPATIENT)
Dept: RADIOLOGY | Facility: HOSPITAL | Age: 70
Discharge: HOME | End: 2025-05-01
Attending: ANESTHESIOLOGY
Payer: MEDICARE

## 2025-05-01 DIAGNOSIS — M54.9 DORSALGIA, UNSPECIFIED: ICD-10-CM

## 2025-05-01 PROCEDURE — 72148 MRI LUMBAR SPINE W/O DYE: CPT | Mod: TC

## 2025-05-01 PROCEDURE — 72148 MRI LUMBAR SPINE W/O DYE: CPT | Mod: 26,,, | Performed by: RADIOLOGY

## 2025-05-02 ENCOUNTER — TELEPHONE (OUTPATIENT)
Dept: PAIN MEDICINE | Facility: CLINIC | Age: 70
End: 2025-05-02
Payer: MEDICARE

## 2025-05-02 ENCOUNTER — RESULTS FOLLOW-UP (OUTPATIENT)
Dept: PAIN MEDICINE | Facility: CLINIC | Age: 70
End: 2025-05-02

## 2025-05-02 NOTE — TELEPHONE ENCOUNTER
Left message for a return call   
MRI reviewed. There is nerve tightness on the left side. We can schedule at L4-5 and L5-S1 TFESI if she desires.   
[Time Spent: ___ minutes] : I have spent [unfilled] minutes of time on the encounter.

## 2025-05-18 NOTE — PROGRESS NOTES
Subjective:       Patient ID: Payton Castillo is a 70 y.o. female.    Chief Complaint: Diabetes, Hypertension, and Hyperlipidemia    History of Present Illness    CHIEF COMPLAINT:  Payton presents for a follow-up visit to discuss management of multiple chronic conditions including diabetes, hypertension, hyperlipidemia, and chronic kidney disease.    HPI:  Payton has a history of diabetes, hypertension, hyperlipidemia, and chronic kidney disease. Her most recent A1C level was 7.2, increased from the previous reading of 6.5. She attributes this increase to consuming Easter candy but reports attempting to reduce sugar intake since then. She is currently taking insulin Lantus 100 mg, with 70 units daily, and Novolog 20 units.    She is being followed by nephrology for chronic kidney disease. Recent kidney function tests show improvement, with creatinine decreasing from 1.7 to 1.4, and GFR increasing from 32 to 41. Her intact PTH was reported as slightly high, which could lead to bone problems due to kidney issues. Vitamin D levels were slightly low at 24, and magnesium levels have been consistently low.    Her muscle soreness has improved since the last visit, stating it is less severe but still present. Joint pain appears to be ongoing, with minimal improvement noted.    She mentions having had an infected cyst removed by Dr. Brian, with a suture that remained in place longer than recommended. She attempted to remove it after 3 weeks but was unable to completely extract it, resulting in skin growth over the remaining suture.    She has a history of renal cancer diagnosed in 2014, which was treated with nephrectomy. She has been cancer-free since then.    She has a history of heart rate irregularities, described as tachycardia and bradycardia. She was previously evaluated by Dr. Blankenship and Dr. Goel for cardiac issues, but currently does not have a follow-up scheduled with a cardiologist.    She has a history  of obstructive sleep apnea and uses a device for management. Her sleep doctor has retired, necessitating a follow-up with a new provider.    She denies awareness of gout running in her family.    MEDICATIONS:  Payton is on Olmesartan 20 mg, Amlodipine 5 mg, and Metoprolol XL 50 mg, all taken daily for hypertension. She is also on Insulin Lantus 100 mg, 70 units daily subcutaneous, and Insulin Novolog 20 units for diabetes. She has been prescribed Caretia by Razia Balderas and uses Flonase at bedtime for sinuses. Jardiance was discontinued due to itching.    MEDICAL HISTORY:  Payton has a history of diabetes, hypertension, hyperlipidemia, and chronic kidney disease. She was diagnosed with renal cancer in 2014 and has been cancer-free since undergoing a nephrectomy. She also has obstructive sleep apnea, tachycardia, and bradycardia. Payton has completed the shingles vaccine series and pneumonia vaccine series. She received a flu vaccine on 10/26/2024.    FAMILY HISTORY:  Family history is significant for the patient's daughter being prediabetic and rapidly gaining weight.    SURGICAL HISTORY:  Payton underwent a nephrectomy in 2014 for renal cancer. Dr. Brian removed an infected cyst and placed a suture.      TEST RESULTS:  Payton's recent creatinine level is 1.4 mg/dL, showing improvement from previous readings of 1.5 and 1.7. Her GFR has improved to 41 mL/min from previous values of 37 and 32. Recent lab results show elevated intact PTH, slightly low Vitamin D at 24 ng/mL, and consistently low magnesium levels. Her A1C has increased to 7.2% from a previous 6.5%. Vitamin B12 levels are normal. Uric acid is high at 7.1 mg/dL (should be less than 5.7). Rheumatoid factor and C-reactive protein are normal, while sedimentation rate is slightly elevated at 31 mm/hr. A past sleep study diagnosed the patient with obstructive sleep apnea.    ROS:  General: -fever, -chills, -fatigue, -weight gain, -weight loss  Cardiovascular:  -chest pain, -palpitations, -lower extremity edema, +feelings of fast heart rate, +feelings of slow heart rate  Respiratory: -cough, -shortness of breath, +apnea, +intermittent breathing while sleeping  Gastrointestinal: -abdominal pain, -nausea, -vomiting, -diarrhea, -constipation, -blood in stool  Skin: -rash, -lesion, +itching, +lumps/masses  Neurological: -headache, -dizziness, -numbness, -tingling  Musculoskeletal: +muscle pain         Past Medical History:   Diagnosis Date    Allergy     aspartame,fructose monosodium glutamate and penicillins.    Anemia of chronic disorder 11/09/2017    Anemia, chronic renal failure, stage 2 (mild) 11/09/2017    Anemia, chronic renal failure, stage 3 (moderate) 11/09/2017    Asthma     Cancer     kidney    Cataract of right eye     CKD (chronic kidney disease), stage III     Diabetes mellitus     type II    Fibromyalgia     GERD (gastroesophageal reflux disease)     Hx of renal cell cancer - left nephrectomy Aug 2014 11/09/2017    Hypercholesteremia     Hypertension     IDDM (insulin dependent diabetes mellitus)     Lumbar spondylosis     Lumbosacral radiculopathy     Osteopenia     Palpitations     Plantar fasciitis, right     Sleep apnea     Syncope 02/2012    Tachycardia     Thoracic radiculopathy     TMJ (dislocation of temporomandibular joint)      Social History[1]  Past Surgical History:   Procedure Laterality Date    CATARACT EXTRACTION Left 03/2016    INJECTION OF ANESTHETIC AGENT AROUND MEDIAL BRANCH NERVES INNERVATING LUMBAR FACET JOINT Bilateral 12/15/2022    Procedure: Block-nerve-medial branch-lumbar;  Surgeon: Howard Rivas MD;  Location: [facility] OR;  Service: Pain Management;  Laterality: Bilateral;  L3,4,5 MBB    INJECTION OF ANESTHETIC AGENT AROUND MEDIAL BRANCH NERVES INNERVATING LUMBAR FACET JOINT Bilateral 1/3/2023    Procedure: Block-nerve-medial branch-lumbar;  Surgeon: Howard Rivas MD;  Location: [facility] OR;  Service: Pain Management;  Laterality: Bilateral;   L3,4,5 MBB #2    INJECTION OF JOINT Left 1/26/2024    Procedure: Injection, Joint;  Surgeon: Howard Rivas MD;  Location: [facility] OR;  Service: Anesthesiology;  Laterality: Left;  left GTB injection    INJECTION, SACROILIAC JOINT Left 10/7/2022    Procedure: INJECTION,SACROILIAC JOINT;  Surgeon: Cory Ruggiero MD;  Location: [facility] OR;  Service: Pain Management;  Laterality: Left;  sacroiliac injection    INJECTION, SACROILIAC JOINT Left 4/5/2023    Procedure: INJECTION,SACROILIAC JOINT;  Surgeon: Howard Rivas MD;  Location: [facility] OR;  Service: Pain Management;  Laterality: Left;  SIJ    INJECTION, SACROILIAC JOINT Left 1/26/2024    Procedure: INJECTION,SACROILIAC JOINT;  Surgeon: Howard Rivas MD;  Location: [facility] OR;  Service: Anesthesiology;  Laterality: Left;  left SIJ and Left GTB injection    JOINT REPLACEMENT Right     knee    RADIOFREQUENCY THERMOCOAGULATION Bilateral 1/13/2023    Procedure: RADIOFREQUENCY THERMAL COAGULATION;  Surgeon: Howard Rivas MD;  Location: [facility] OR;  Service: Pain Management;  Laterality: Bilateral;  L3,4,5 Raul RFA    SHOULDER OPEN ROTATOR CUFF REPAIR Right 02/2005    labral repair    TOTAL KNEE ARTHROPLASTY Left 2014    TUBAL LIGATION  1990    TUMOR REMOVAL  2014    from kidney     Family History   Problem Relation Name Age of Onset    Stomach cancer Mother      Breast cancer Mother      Diabetes Mother      Cancer Father         Objective:      Physical Exam  Cardiovascular:      Pulses:           Radial pulses are 1+ on the right side and 1+ on the left side.        Dorsalis pedis pulses are 1+ on the right side and 1+ on the left side.   Musculoskeletal:      Right foot: Normal range of motion. No deformity.      Left foot: Normal range of motion. No deformity.        Feet:    Feet:      Right foot:      Protective Sensation: 5 sites tested.  5 sites sensed.      Skin integrity: Callus present.      Left foot:      Protective Sensation: 5 sites tested.  5 sites sensed.      Skin  "integrity: Callus present.       Blood pressure 135/64, pulse (!) 58, height 5' 6" (1.676 m), weight 77 kg (169 lb 12.1 oz). Body mass index is 27.4 kg/m².  Physical Exam    General: No acute distress. Well-developed. Well-nourished.  Eyes: EOMI. Sclerae anicteric.  Cardiovascular: Regular rate. Regular rhythm. No murmurs. No rubs. No gallops. Normal S1, S2. Pulse on the right radial is 1+. Pulse on the left radial is 0.5+.  Respiratory: Normal respiratory effort. Clear to auscultation bilaterally. No rales. No rhonchi. No wheezing.  Abdomen soft  Musculoskeletal: No  obvious deformity.  Extremities: No lower extremity edema.  Neurological: Alert  Psychiatric:  Anhedonic and dysthymic   Skin: Warm. Dry. Callous on great toe.              Assessment:       Lab Visit on 04/24/2025   Component Date Value Ref Range Status    Sed Rate 04/24/2025 31 (H)  0 - 20 mm/hr Final    CRP 04/24/2025 0.10  <1.00 mg/dL Final    Rheumatoid Factor 04/24/2025 <10.0  <=15.0 IU/mL Final    CCP Quant 04/24/2025 <0.5  <=4.9 U/mL Final    Uric Acid 04/24/2025 7.1 (H)  2.4 - 5.7 mg/dL Final    Vitamin B12 04/24/2025 335  210 - 950 pg/mL Final    Antinuclear Antibodies, IFA 04/24/2025 Negative   Final   Lab Visit on 04/09/2025   Component Date Value Ref Range Status    Hemoglobin A1c 04/09/2025 7.2 (H)  4.5 - 6.2 % Final    Estimated Average Glucose 04/09/2025 160 (H)  68 - 131 mg/dL Final    Sodium 04/09/2025 141  136 - 145 mmol/L Final    Potassium 04/09/2025 4.8  3.5 - 5.1 mmol/L Final    Chloride 04/09/2025 105  95 - 110 mmol/L Final    CO2 04/09/2025 28  23 - 29 mmol/L Final    Glucose 04/09/2025 82  70 - 110 mg/dL Final    BUN 04/09/2025 25 (H)  8 - 23 mg/dL Final    Creatinine 04/09/2025 1.5 (H)  0.5 - 1.4 mg/dL Final    Calcium 04/09/2025 9.8  8.7 - 10.5 mg/dL Final    Protein Total 04/09/2025 7.9  6.0 - 8.4 gm/dL Final    Albumin 04/09/2025 4.3  3.5 - 5.2 g/dL Final    Bilirubin Total 04/09/2025 0.5  0.1 - 1.0 mg/dL Final    ALP " 04/09/2025 62  55 - 135 unit/L Final    AST 04/09/2025 15  10 - 40 unit/L Final    ALT 04/09/2025 14  10 - 44 unit/L Final    Anion Gap 04/09/2025 8  8 - 16 mmol/L Final    eGFR 04/09/2025 37 (L)  >60 mL/min/1.73/m2 Final    Cholesterol Total 04/09/2025 163  120 - 199 mg/dL Final    Triglyceride 04/09/2025 147  30 - 150 mg/dL Final    HDL Cholesterol 04/09/2025 61  40 - 75 mg/dL Final    LDL Cholesterol 04/09/2025 72.6  63.0 - 159.0 mg/dL Final    HDL/Cholesterol Ratio 04/09/2025 37.4  20.0 - 50.0 % Final    Cholesterol/HDL Ratio 04/09/2025 2.7  2.0 - 5.0 Final    Non HDL Cholesterol 04/09/2025 102  mg/dL Final    Urine Microalbumin 04/09/2025 780.1 (H)  <=19.8 ug/mL Final    Urine Creatinine 04/09/2025 143.8  15.0 - 325.0 mg/dL Final    Microalbumin/Creatinine Ratio Urine 04/09/2025 542.5 (H)  <=30.0 ug/mg Final    Sodium 04/09/2025 141  136 - 145 mmol/L Final    Potassium 04/09/2025 4.9  3.5 - 5.1 mmol/L Final    Chloride 04/09/2025 104  95 - 110 mmol/L Final    CO2 04/09/2025 30 (H)  23 - 29 mmol/L Final    Glucose 04/09/2025 80  70 - 110 mg/dL Final    BUN 04/09/2025 23  8 - 23 mg/dL Final    Creatinine 04/09/2025 1.4  0.5 - 1.4 mg/dL Final    Calcium 04/09/2025 9.6  8.7 - 10.5 mg/dL Final    Albumin 04/09/2025 4.2  3.5 - 5.2 g/dL Final    Phosphorus Level 04/09/2025 4.1  2.7 - 4.5 mg/dL Final    Anion Gap 04/09/2025 7 (L)  8 - 16 mmol/L Final    eGFR 04/09/2025 41 (L)  >60 mL/min/1.73/m2 Final    Color, UA 04/09/2025 Yellow  Yellow, Straw, Nimisha Final    Appearance, UA 04/09/2025 Clear  Clear Final    Spec Grav UA 04/09/2025 1.020  1.005 - 1.030 Final    pH, UA 04/09/2025 6.0  5.0 - 8.0 Final    Protein, UA 04/09/2025 2+ (A)  Negative Final    Glucose, UA 04/09/2025 Negative  Negative Final    Ketones, UA 04/09/2025 Negative  Negative Final    Blood, UA 04/09/2025 Negative  Negative Final    Bilirubin, UA 04/09/2025 Negative  Negative Final    Urobilinogen, UA 04/09/2025 Negative  <2.0 EU/dL Final    Nitrites,  UA 04/09/2025 Negative  Negative Final    Leukocyte Esterase, UA 04/09/2025 Negative  Negative Final    Urine Protein 04/09/2025 116 (H)  <=15 mg/dL Final    Urine Creatinine 04/09/2025 142.7  15.0 - 325.0 mg/dL Final    Urine Protein/Creatinine Ratio 04/09/2025 0.81 (H)  <=0.20 Final    PTH Intact 04/09/2025 136.7 (H)  9.0 - 77.0 pg/mL Final    Vitamin D 04/09/2025 24 (L)  30 - 96 ng/mL Final    Magnesium 04/09/2025 1.3 (L)  1.6 - 2.6 mg/dL Final    Uric Acid 04/09/2025 7.5 (H)  2.4 - 5.7 mg/dL Final    WBC 04/09/2025 7.55  3.90 - 12.70 K/uL Final    RBC 04/09/2025 4.06  4.00 - 5.40 M/uL Final    Hgb 04/09/2025 12.5  12.0 - 16.0 gm/dL Final    Hct 04/09/2025 38.7  37.0 - 48.5 % Final    MCV 04/09/2025 95  82 - 98 fL Final    MCH 04/09/2025 30.8  27.0 - 31.0 pg Final    MCHC 04/09/2025 32.3  32.0 - 36.0 g/dL Final    RDW 04/09/2025 12.2  11.5 - 14.5 % Final    Platelet Count 04/09/2025 295  150 - 450 K/uL Final    MPV 04/09/2025 8.5 (L)  9.2 - 12.9 fL Final    Nucleated RBC 04/09/2025 0  <=0 /100 WBC Final    Neut % 04/09/2025 42.0  38 - 73 % Final    Lymph % 04/09/2025 46.4  18 - 48 % Final    Mono % 04/09/2025 7.5  4 - 15 % Final    Eos % 04/09/2025 3.0  0 - 8 % Final    Basophil % 04/09/2025 0.8  <=1.9 % Final    Imm Grans % 04/09/2025 0.3  0.0 - 0.5 % Final    Neut # 04/09/2025 3.2  1.8 - 7.7 K/uL Final    Lymph # 04/09/2025 3.50  1 - 4.8 K/uL Final    Mono # 04/09/2025 0.57  0.3 - 1 K/uL Final    Eos # 04/09/2025 0.23  <=0.5 K/uL Final    Baso # 04/09/2025 0.06  <=0.2 K/uL Final    Imm Grans # 04/09/2025 0.02  0.00 - 0.04 K/uL Final    RBC, UA 04/09/2025 <1  <=4 /HPF Final    WBC, UA 04/09/2025 1  <=5 /HPF Final    Squamous Epithelial Cells, UA 04/09/2025 <1  /HPF Final    Microscopic Comment 04/09/2025    Final   Lab Visit on 03/14/2025   Component Date Value Ref Range Status    Sodium 03/14/2025 137  136 - 145 mmol/L Final    Potassium 03/14/2025 5.0  3.5 - 5.1 mmol/L Final    Chloride 03/14/2025 102  95 -  110 mmol/L Final    CO2 03/14/2025 29  23 - 29 mmol/L Final    Glucose 03/14/2025 201 (H)  70 - 110 mg/dL Final    BUN 03/14/2025 22  8 - 23 mg/dL Final    Creatinine 03/14/2025 1.7 (H)  0.5 - 1.4 mg/dL Final    Calcium 03/14/2025 10.2  8.7 - 10.5 mg/dL Final    Anion Gap 03/14/2025 6 (L)  8 - 16 mmol/L Final    eGFR 03/14/2025 32.1 (A)  >60 mL/min/1.73 m^2 Final   Lab Visit on 03/07/2025   Component Date Value Ref Range Status    Vit D, 25-Hydroxy 03/07/2025 24 (L)  30 - 96 ng/mL Final     Component  Ref Range & Units (hover) 1 mo ago  (4/9/25) 8 mo ago  (9/6/24) 1 yr ago  (7/3/23) 2 yr ago  (3/20/23) 2 yr ago  (11/10/22) 3 yr ago  (3/11/22) 3 yr ago  (11/19/21)   Hemoglobin A1c 7.2 High  6.5 High  R, CM 7.7 High  CM 6.7 High  CM 6.7 High  CM 7.0 High  CM 7.0 High  CM   Comment: According to ADA guidelines, hemoglobin A1C <7.0% represents     Component  Ref Range & Units (hover) 1 mo ago  (4/9/25) 4 mo ago  (1/10/25) 11 mo ago  (5/31/24) 1 yr ago  (2/27/24) 1 yr ago  (11/10/23) 1 yr ago  (8/11/23) 1 yr ago  (7/3/23)   Magnesium 1.3 Low  1.5 Low  1.7 1.5 Low  1.9 2.0 2.7 High      Assessment & Plan    C64.2 Malignant neoplasm of left kidney, except renal pelvis  I49.5 Tachycardia-bradycardia  E11.22 Type 2 diabetes mellitus with diabetic chronic kidney disease  N18.32 Chronic kidney disease, stage 3b  I10 Essential (primary) hypertension  Z85.528 Personal history of other malignant neoplasm of kidney  G47.33 Obstructive sleep apnea (adult) (pediatric)  E79.0 Hyperuricemia without signs of inflammatory arthritis and tophaceous disease  E55.9 Vitamin D deficiency, unspecified  E83.42 Hypomagnesemia  E21.3 Hyperparathyroidism, unspecified  Z83.3 Family history of diabetes mellitus  L02.91 Cutaneous abscess, unspecified  Z87.821 Personal history of retained foreign body fully removed  L84 Corns and callosities  I73.9 Peripheral vascular disease, unspecified  Cardiac arrhythmia  - Evaluated patient's condition of  alternating slow and fast heart rates.  - Continued Metoprolol 50 mg daily for heart rate management.  - Noted previous cardiologist, Dr. Po Goel, has relocated to the [facility].  - No current cardiologist follow-up documented in system.  - Will consider referral for cardiology follow-up.    E11.22 TYPE 2 DIABETES MELLITUS WITH DIABETIC CHRONIC KIDNEY DISEASE:  - Monitored diabetes management with current regimen of insulin glargine 70 units subcutaneously daily and Novolog 20 units.  - A1C increased to 7.2 from previous 6.5.  - Discussed potential prescription of Ozempic or Mounjaro for improved glycemic control and weight loss, explaining their popularity and benefits.  - Will need nephrologist approval due to CKD before initiating.  - Continue current insulin regimen and order A1C level in 3 months.  - Ozempic now has a indication for diabetes and CKD.    N18.32 CHRONIC KIDNEY DISEASE, STAGE 3B:  - Evaluated renal function showing improvement with creatinine decreased to 1.4 from previous readings of 1.5 and 1.7, and GFR increased to 41 from 37 and 32.  - Discussed impact of diabetes on kidney function.  - Recommend consulting with nephrologist (Patricia Balderas) regarding potential use of Ozempic/Mounjaro and their impact on kidneys.  - Continue Caretia for kidney management.    I10 ESSENTIAL (PRIMARY) HYPERTENSION:  - Monitored hypertension management with current regimen of Olmesartan 20 mg, Amlodipine 5 mg, and Metoprolol XL 50 mg daily.    G47.33 OBSTRUCTIVE SLEEP APNEA (ADULT) (PEDIATRIC):  - Sleep apnea management needs reevaluation since Dr. Christensen has retired.  - Referred patient to Corie Perez NP in the pulmonary department, for evaluation and potential new testing.    E79.0 HYPERURICEMIA WITHOUT SIGNS OF INFLAMMATORY ARTHRITIS AND TOPHACEOUS DISEASE:  - Laboratory results confirm hyperuricemia with uric acid level of 7.1 (normal <5.7).    E55.9 VITAMIN D DEFICIENCY,  UNSPECIFIED:  - Laboratory results confirm vitamin D deficiency with level slightly low at 24.  - Discussed supplementation, but prescription was canceled.    E83.42 HYPOMAGNESEMIA:  - Magnesium levels have been consistently low.  - Advised patient to take magnesium supplements.    E21.3 HYPERPARATHYROIDISM, UNSPECIFIED:  - Elevated intact PTH level due to kidney problems, indicating hyperparathyroidism.    Z83.3 FAMILY HISTORY OF DIABETES MELLITUS:  - Noted patient's daughter has experienced rapid weight gain and is in prediabetic condition.    L02.91 CUTANEOUS ABSCESS, UNSPECIFIED:  - Payton had an infected cyst removed by Dr. Brian with suture placement.  - Payton attempted to remove the suture after 3 weeks but could not extract it completely, resulting in skin growing over the remaining suture.    Z87.821 PERSONAL HISTORY OF RETAINED FOREIGN BODY FULLY REMOVED:  - Assessed complication of skin growing over incompletely removed suture from previous cyst excision.    L84 CORNS AND CALLOSITIES:  - Evaluated callus on the great to  e.  - Advised patient to trim toenails properly to avoid issues with shoes.    I73.9 PERIPHERAL VASCULAR DISEASE, UNSPECIFIED:  - Monitored peripheral pulses: right radial pulse is 1+ and left radial pulse is 0.5+.    ** NEEDS REVIEW **  C64.2 MALIGNANT NEOPLASM OF LEFT KIDNEY, EXCEPT RENAL PELVIS:  - Evaluated patient's history of renal cancer diagnosed in 2014, status post nephrectomy.  - Payton has remained cancer-free since the procedure.         Plan:   Essential hypertension  Comments:  Currently patient is amlodipine by mg olmesartan 20 mg and metoprolol XL 50 mg.  Continue to watch diet, salt and exercise.    Type 2 diabetes mellitus with diabetic polyneuropathy, with long-term current use of insulin  Comments:  Currently patient is on NovoLog as per sliding scale per day and injection Lantus up to 70 units per day.  Consider adding GLP 1  Orders:  -     Hemoglobin A1C; Future;  Expected date: 08/19/2025    Mixed dyslipidemia    CKD stage 3 due to type 2 diabetes mellitus  Comments:  Continue to follow up with Nephrology.  Keep hydrated.  Avoid NSAIDs.    Obstructive sleep apnea  Comments:  Referral miss Corie Perez NP pulmonary department's  Orders:  -     Ambulatory referral/consult to Pulmonology; Future; Expected date: 06/02/2025    Cardiac arrhythmia, unspecified cardiac arrhythmia type    History of kidney cancer    Medical issues reviewed  Labs ordered  If A1c level still above 7.0 will increase to 7.5 for Mounjaro  If between 6.5-7.0 will give her choice  If less than 6.5, she will continue with the present regimen with increase in exercise.  CKD stage 3 noted which is a attributed to diabetes and she is currently on  Kerendia    Follow up in about 3 months (around 8/19/2025), or if symptoms worsen or fail to improve, for Hypertension/lipids, Diabetes/HTN/Lipids/cardiac issues.  Visit today included increased complexity associated with the care of the episodic problem diabetes mellitus/hypertension addressed and managing the longitudinal care of the patient due to the serious and/or complex managed problem(s) diabetes mellitus/hypertension.    Current Medications[2]    This note was generated with the assistance of ambient listening technology. Verbal consent was obtained by the patient and accompanying visitor(s) for the recording of patient appointment to facilitate this note. I attest to having reviewed and edited the generated note for accuracy, though some syntax or spelling errors may persist. Please contact the author of this note for any clarification.      John An    Visit today included increased complexity associated with the care of the episodic problem diabetes and lipids addressed and managing the longitudinal care of the patient due to the serious and/or complex managed problem(s) diabetes lipids.  GLP-1 receptor agonists, such as semaglutide (Ozempic), can  be used in patients with diabetes even when the estimated glomerular filtration rate (eGFR) is as low as 15 mL/min/1.73 m². These medications are not excreted via the kidneys, making them suitable for patients with significantly impaired renal function[1][4].  In general clinical practice, when the GFR falls below 30 mL/min/1.73 m², treatment strategies often shift towards insulin or other agents like metaglinides or DPP4 inhibitors[2]. However, GLP-1 receptor agonists offer a valuable alternative due to their renal protective effects and ability to be used at lower eGFR levels without increased risk of hypoglycemia[2][5].   Therefore, the cut-off for GFR for considering GLP-1 receptor agonists like semaglutide (Ozempic) is not strictly defined by a specific GFR value but is generally considered safe down to an eGFR of 15 mL/min/1.73 m²[1].  Sources:  GLP-1 receptor agonists in diabetic kidney disease: current evidence and future directions (https://pmc.ncbi.nlm.nih.gov/articles/AOA8474769/).  Glucagon-like peptide-1 receptor agonists in diabetic kidney disease: A review of their kidney and heart protection (https://pmc.ncbi.nlm.nih.gov/articles/GCV21041680/).  GLP-1 Receptor Agonists and Diabetic Kidney Disease: A Call of Attention to Nephrologists (https://pmc.ncbi.nlm.nih.gov/articles/MOF3386527/).  pmc.ncbi.nlm.nih.gov (https://pmc.ncbi.nlm.nih.gov/articles/AHR4847066/).  pmc.ncbi.nlm.nih.gov (https://pmc.ncbi.nlm.nih.gov/articles/VZP04103099/).         [1]   Social History  Socioeconomic History    Marital status:      Spouse name: Owen Castillo    Number of children: 2   Occupational History    Occupation: Part - member support [employer]     Employer: [employer]   Tobacco Use    Smoking status: Never    Smokeless tobacco: Never   Substance and Sexual Activity    Alcohol use: No    Drug use: No    Sexual activity: Yes     Partners: Male     Social Drivers of Health     Financial Resource Strain: Medium Risk  "(4/22/2025)    Overall Financial Resource Strain (CARDIA)     Difficulty of Paying Living Expenses: Somewhat hard   Food Insecurity: No Food Insecurity (4/22/2025)    Hunger Vital Sign     Worried About Running Out of Food in the Last Year: Never true     Ran Out of Food in the Last Year: Never true   Transportation Needs: No Transportation Needs (4/22/2025)    PRAPARE - Transportation     Lack of Transportation (Medical): No     Lack of Transportation (Non-Medical): No   Physical Activity: Sufficiently Active (4/22/2025)    Exercise Vital Sign     Days of Exercise per Week: 6 days     Minutes of Exercise per Session: 40 min   Stress: No Stress Concern Present (4/22/2025)    [employer] - Occupational Stress Questionnaire     Feeling of Stress : Only a little   Housing Stability: Low Risk  (4/22/2025)    Housing Stability Vital Sign     Unable to Pay for Housing in the Last Year: No     Number of Times Moved in the Last Year: 0     Homeless in the Last Year: No   [2]   Current Outpatient Medications:     amLODIPine (NORVASC) 5 MG tablet, Take 1 tablet (5 mg total) by mouth once daily., Disp: 90 tablet, Rfl: 3    azelastine (ASTELIN) 137 mcg (0.1 %) nasal spray, 1 spray (137 mcg total) by Nasal route 2 (two) times daily., Disp: 30 mL, Rfl: 3    BD INSULIN SYRINGE 1 mL 28 gauge x 1/2" Syrg, USE 1 SYRINGE ONCE DAILY, Disp: 100 each, Rfl: 3    calcitRIOL (ROCALTROL) 0.25 MCG Cap, Take by mouth., Disp: , Rfl:     cetirizine (ZYRTEC) 10 MG tablet, Take 10 mg by mouth., Disp: , Rfl:     fluocinolone acetonide oiL 0.01 % Drop, Place 4 drops in ear(s) 2 (two) times a day., Disp: 20 mL, Rfl: 1    fluticasone propionate (FLONASE) 50 mcg/actuation nasal spray, USE 2 SPRAYS IN EACH NOSTRIL ONCE DAILY, Disp: 48 g, Rfl: 3    gabapentin (NEURONTIN) 800 MG tablet, TAKE 1 TABLET THREE TIMES A DAY, Disp: 270 tablet, Rfl: 3    insulin aspart U-100 (NOVOLOG FLEXPEN U-100 INSULIN) 100 unit/mL (3 mL) InPn pen, " "Inject 20 Units into the skin 3 (three) times daily with meals., Disp: 54 mL, Rfl: 3    insulin glargine U-100, Lantus, (LANTUS U-100 INSULIN) 100 unit/mL injection, INJECT 70 UNITS UNDER THE SKIN EVERY EVENING, Disp: 63 mL, Rfl: 3    insulin syringe-needle U-100 0.3 mL 30 Syrg, by Misc.(Non-Drug; Combo Route) route 3 (three) times daily., Disp: , Rfl:     KERENDIA 10 mg Tab, Take 1 tablet by mouth every evening., Disp: , Rfl:     metoprolol succinate (TOPROL-XL) 50 MG 24 hr tablet, TAKE 1 TABLET DAILY, Disp: 90 tablet, Rfl: 3    olmesartan (BENICAR) 20 MG tablet, Take 20 mg by mouth once daily., Disp: , Rfl:     omeprazole (PRILOSEC) 40 MG capsule, Take 40 mg by mouth every evening., Disp: , Rfl:     pen needle, diabetic (ADVOCATE PEN NEEDLE) 31 gauge x 5/16" Ndle, 1 Device by Misc.(Non-Drug; Combo Route) route 3 (three) times daily., Disp: 100 each, Rfl: 3  No current facility-administered medications for this visit.    "

## 2025-05-19 ENCOUNTER — PATIENT MESSAGE (OUTPATIENT)
Dept: FAMILY MEDICINE | Facility: CLINIC | Age: 70
End: 2025-05-19

## 2025-05-19 ENCOUNTER — OFFICE VISIT (OUTPATIENT)
Dept: FAMILY MEDICINE | Facility: CLINIC | Age: 70
End: 2025-05-19
Payer: MEDICARE

## 2025-05-19 VITALS
SYSTOLIC BLOOD PRESSURE: 135 MMHG | HEART RATE: 58 BPM | DIASTOLIC BLOOD PRESSURE: 64 MMHG | WEIGHT: 169.75 LBS | HEIGHT: 66 IN | BODY MASS INDEX: 27.28 KG/M2

## 2025-05-19 DIAGNOSIS — I10 ESSENTIAL HYPERTENSION: Primary | Chronic | ICD-10-CM

## 2025-05-19 DIAGNOSIS — I49.9 CARDIAC ARRHYTHMIA, UNSPECIFIED CARDIAC ARRHYTHMIA TYPE: ICD-10-CM

## 2025-05-19 DIAGNOSIS — Z79.4 TYPE 2 DIABETES MELLITUS WITH DIABETIC POLYNEUROPATHY, WITH LONG-TERM CURRENT USE OF INSULIN: Chronic | ICD-10-CM

## 2025-05-19 DIAGNOSIS — E78.2 MIXED DYSLIPIDEMIA: ICD-10-CM

## 2025-05-19 DIAGNOSIS — G47.33 OBSTRUCTIVE SLEEP APNEA: ICD-10-CM

## 2025-05-19 DIAGNOSIS — E11.22 CKD STAGE 3 DUE TO TYPE 2 DIABETES MELLITUS: ICD-10-CM

## 2025-05-19 DIAGNOSIS — N18.30 CKD STAGE 3 DUE TO TYPE 2 DIABETES MELLITUS: ICD-10-CM

## 2025-05-19 DIAGNOSIS — Z85.528 HISTORY OF KIDNEY CANCER: ICD-10-CM

## 2025-05-19 DIAGNOSIS — E11.42 TYPE 2 DIABETES MELLITUS WITH DIABETIC POLYNEUROPATHY, WITH LONG-TERM CURRENT USE OF INSULIN: Chronic | ICD-10-CM

## 2025-05-19 PROCEDURE — 3078F DIAST BP <80 MM HG: CPT | Mod: CPTII,S$GLB,, | Performed by: INTERNAL MEDICINE

## 2025-05-19 PROCEDURE — 3066F NEPHROPATHY DOC TX: CPT | Mod: CPTII,S$GLB,, | Performed by: INTERNAL MEDICINE

## 2025-05-19 PROCEDURE — 1160F RVW MEDS BY RX/DR IN RCRD: CPT | Mod: CPTII,S$GLB,, | Performed by: INTERNAL MEDICINE

## 2025-05-19 PROCEDURE — 3288F FALL RISK ASSESSMENT DOCD: CPT | Mod: CPTII,S$GLB,, | Performed by: INTERNAL MEDICINE

## 2025-05-19 PROCEDURE — 4010F ACE/ARB THERAPY RXD/TAKEN: CPT | Mod: CPTII,S$GLB,, | Performed by: INTERNAL MEDICINE

## 2025-05-19 PROCEDURE — 3008F BODY MASS INDEX DOCD: CPT | Mod: CPTII,S$GLB,, | Performed by: INTERNAL MEDICINE

## 2025-05-19 PROCEDURE — 1126F AMNT PAIN NOTED NONE PRSNT: CPT | Mod: CPTII,S$GLB,, | Performed by: INTERNAL MEDICINE

## 2025-05-19 PROCEDURE — 99999 PR PBB SHADOW E&M-EST. PATIENT-LVL III: CPT | Mod: PBBFAC,,, | Performed by: INTERNAL MEDICINE

## 2025-05-19 PROCEDURE — G2211 COMPLEX E/M VISIT ADD ON: HCPCS | Mod: S$GLB,,, | Performed by: INTERNAL MEDICINE

## 2025-05-19 PROCEDURE — 3051F HG A1C>EQUAL 7.0%<8.0%: CPT | Mod: CPTII,S$GLB,, | Performed by: INTERNAL MEDICINE

## 2025-05-19 PROCEDURE — 1159F MED LIST DOCD IN RCRD: CPT | Mod: CPTII,S$GLB,, | Performed by: INTERNAL MEDICINE

## 2025-05-19 PROCEDURE — 3062F POS MACROALBUMINURIA REV: CPT | Mod: CPTII,S$GLB,, | Performed by: INTERNAL MEDICINE

## 2025-05-19 PROCEDURE — 3075F SYST BP GE 130 - 139MM HG: CPT | Mod: CPTII,S$GLB,, | Performed by: INTERNAL MEDICINE

## 2025-05-19 PROCEDURE — 1101F PT FALLS ASSESS-DOCD LE1/YR: CPT | Mod: CPTII,S$GLB,, | Performed by: INTERNAL MEDICINE

## 2025-05-19 PROCEDURE — 99214 OFFICE O/P EST MOD 30 MIN: CPT | Mod: S$GLB,,, | Performed by: INTERNAL MEDICINE

## 2025-05-19 RX ORDER — OLMESARTAN MEDOXOMIL 20 MG/1
20 TABLET ORAL DAILY
COMMUNITY

## 2025-05-20 RX ORDER — LIDOCAINE HYDROCHLORIDE 10 MG/ML
1 INJECTION, SOLUTION EPIDURAL; INFILTRATION; INTRACAUDAL; PERINEURAL ONCE
OUTPATIENT
Start: 2025-05-20 | End: 2025-05-20

## 2025-05-20 RX ORDER — SODIUM CHLORIDE, SODIUM LACTATE, POTASSIUM CHLORIDE, CALCIUM CHLORIDE 600; 310; 30; 20 MG/100ML; MG/100ML; MG/100ML; MG/100ML
INJECTION, SOLUTION INTRAVENOUS CONTINUOUS
OUTPATIENT
Start: 2025-05-20

## 2025-05-22 ENCOUNTER — HOSPITAL ENCOUNTER (OUTPATIENT)
Facility: HOSPITAL | Age: 70
Discharge: HOME | End: 2025-05-22
Attending: ANESTHESIOLOGY | Admitting: ANESTHESIOLOGY
Payer: MEDICARE

## 2025-05-22 VITALS
TEMPERATURE: 98 F | WEIGHT: 175 LBS | HEART RATE: 56 BPM | HEIGHT: 66 IN | OXYGEN SATURATION: 99 % | RESPIRATION RATE: 18 BRPM | BODY MASS INDEX: 28.12 KG/M2 | SYSTOLIC BLOOD PRESSURE: 142 MMHG | DIASTOLIC BLOOD PRESSURE: 66 MMHG

## 2025-05-22 DIAGNOSIS — M54.16 LUMBAR RADICULITIS: ICD-10-CM

## 2025-05-22 PROCEDURE — 64483 NJX AA&/STRD TFRM EPI L/S 1: CPT | Mod: LT,,, | Performed by: ANESTHESIOLOGY

## 2025-05-22 PROCEDURE — 64483 NJX AA&/STRD TFRM EPI L/S 1: CPT | Mod: LT | Performed by: ANESTHESIOLOGY

## 2025-05-22 PROCEDURE — 63600175 PHARM REV CODE 636 W HCPCS: Performed by: ANESTHESIOLOGY

## 2025-05-22 PROCEDURE — 25500020 PHARM REV CODE 255: Performed by: ANESTHESIOLOGY

## 2025-05-22 PROCEDURE — 64484 NJX AA&/STRD TFRM EPI L/S EA: CPT | Mod: LT,,, | Performed by: ANESTHESIOLOGY

## 2025-05-22 PROCEDURE — 64484 NJX AA&/STRD TFRM EPI L/S EA: CPT | Mod: LT | Performed by: ANESTHESIOLOGY

## 2025-05-22 RX ORDER — LIDOCAINE HYDROCHLORIDE 10 MG/ML
INJECTION, SOLUTION EPIDURAL; INFILTRATION; INTRACAUDAL; PERINEURAL
Status: DISCONTINUED | OUTPATIENT
Start: 2025-05-22 | End: 2025-05-22 | Stop reason: HOSPADM

## 2025-05-22 RX ORDER — BUPIVACAINE HYDROCHLORIDE 2.5 MG/ML
INJECTION, SOLUTION EPIDURAL; INFILTRATION; INTRACAUDAL; PERINEURAL
Status: DISCONTINUED | OUTPATIENT
Start: 2025-05-22 | End: 2025-05-22 | Stop reason: HOSPADM

## 2025-05-22 RX ORDER — DEXAMETHASONE SODIUM PHOSPHATE 10 MG/ML
INJECTION INTRAMUSCULAR; INTRAVENOUS
Status: DISCONTINUED | OUTPATIENT
Start: 2025-05-22 | End: 2025-05-22 | Stop reason: HOSPADM

## 2025-05-22 NOTE — DISCHARGE SUMMARY
[facility] Services  Discharge Note  Short Stay    Procedure(s) (LRB):  INJECTION, SPINE, LUMBOSACRAL, TRANSFORAMINAL APPROACH l4-5 and l5-s1 (Left)      OUTCOME: Patient tolerated treatment/procedure well without complication and is now ready for discharge.    DISPOSITION: Home or Self Care    FINAL DIAGNOSIS:  <principal problem not specified>    FOLLOWUP: In clinic    DISCHARGE INSTRUCTIONS:    Discharge Procedure Orders   Notify your health care provider if you experience any of the following:  temperature >100.4     Notify your health care provider if you experience any of the following:  severe uncontrolled pain     Notify your health care provider if you experience any of the following:  redness, tenderness, or signs of infection (pain, swelling, redness, odor or green/yellow discharge around incision site)     Activity as tolerated        TIME SPENT ON DISCHARGE: 30 minutes

## 2025-05-22 NOTE — OP NOTE
PROCEDURE DATE: 5/22/2025    PROCEDURE: Left L4-5 and l5-S1 transforaminal epidural steroid injection under fluoroscopy    DIAGNOSIS: Lumbar radiculitis    Post op diagnosis: Same    PHYSICIAN: Howard Rivas MD    MEDICATIONS INJECTED:  Dexamethasone 5mg (0.5ml) and 1.5ml 0.25% bupivicaine at each nerve root.     LOCAL ANESTHETIC INJECTED:  Lidocaine 1%. 2 ml per site.    SEDATION MEDICATIONS: RN IV sedation    ESTIMATED BLOOD LOSS:  None    COMPLICATIONS:  None    TECHNIQUE:   A time-out was taken to identify patient and procedure side prior to starting the procedure. The patient was placed in a prone position, prepped and draped in the usual sterile fashion using ChloraPrep and sterile towels.  The area to be injected was determined under fluoroscopic guidance in AP and oblique view.  Local anesthetic was given by raising a wheal and going down to the hub of a 25-gauge 1.5 inch needle.  In oblique view, a 3.5 inch 22-gauge bent-tip spinal needle was introduced towards 6 oclock position of the pedicle of each above named nerve root level.  The needle was walked medially then hinged into the neural foramen and position was confirmed in AP and lateral views.  1ml contrast dye was injected to confirm appropriate placement and that there was no vascular uptake.  After negative aspiration for blood or CSF, the medication was then injected. This was performed at the left L4-5 and L5-S1 level(s). The patient tolerated the procedure well.    The patient was monitored after the procedure.  Patient was given post procedure and discharge instructions to follow at home. The patient was discharged in a stable condition.

## 2025-05-30 RX ORDER — GABAPENTIN 800 MG/1
800 TABLET ORAL 3 TIMES DAILY
Qty: 270 TABLET | Refills: 3 | Status: SHIPPED | OUTPATIENT
Start: 2025-05-30

## 2025-06-23 ENCOUNTER — TELEPHONE (OUTPATIENT)
Dept: PAIN MEDICINE | Facility: CLINIC | Age: 70
End: 2025-06-23

## 2025-06-23 ENCOUNTER — OFFICE VISIT (OUTPATIENT)
Dept: PAIN MEDICINE | Facility: CLINIC | Age: 70
End: 2025-06-23
Payer: MEDICARE

## 2025-06-23 VITALS
WEIGHT: 175.06 LBS | HEART RATE: 62 BPM | DIASTOLIC BLOOD PRESSURE: 77 MMHG | BODY MASS INDEX: 28.14 KG/M2 | SYSTOLIC BLOOD PRESSURE: 142 MMHG | HEIGHT: 66 IN

## 2025-06-23 DIAGNOSIS — M54.16 LUMBAR RADICULITIS: Primary | ICD-10-CM

## 2025-06-23 DIAGNOSIS — M51.369 DEGENERATION OF INTERVERTEBRAL DISC OF LUMBAR REGION, UNSPECIFIED WHETHER PAIN PRESENT: ICD-10-CM

## 2025-06-23 DIAGNOSIS — M47.896 OTHER SPONDYLOSIS, LUMBAR REGION: ICD-10-CM

## 2025-06-23 DIAGNOSIS — M54.9 DORSALGIA, UNSPECIFIED: ICD-10-CM

## 2025-06-23 PROCEDURE — 3051F HG A1C>EQUAL 7.0%<8.0%: CPT | Mod: CPTII,S$GLB,, | Performed by: ANESTHESIOLOGY

## 2025-06-23 PROCEDURE — 3062F POS MACROALBUMINURIA REV: CPT | Mod: CPTII,S$GLB,, | Performed by: ANESTHESIOLOGY

## 2025-06-23 PROCEDURE — 99215 OFFICE O/P EST HI 40 MIN: CPT | Mod: S$GLB,,, | Performed by: ANESTHESIOLOGY

## 2025-06-23 PROCEDURE — 3066F NEPHROPATHY DOC TX: CPT | Mod: CPTII,S$GLB,, | Performed by: ANESTHESIOLOGY

## 2025-06-23 PROCEDURE — G2211 COMPLEX E/M VISIT ADD ON: HCPCS | Mod: S$GLB,,, | Performed by: ANESTHESIOLOGY

## 2025-06-23 PROCEDURE — 1159F MED LIST DOCD IN RCRD: CPT | Mod: CPTII,S$GLB,, | Performed by: ANESTHESIOLOGY

## 2025-06-23 PROCEDURE — 1101F PT FALLS ASSESS-DOCD LE1/YR: CPT | Mod: CPTII,S$GLB,, | Performed by: ANESTHESIOLOGY

## 2025-06-23 PROCEDURE — 3288F FALL RISK ASSESSMENT DOCD: CPT | Mod: CPTII,S$GLB,, | Performed by: ANESTHESIOLOGY

## 2025-06-23 PROCEDURE — 4010F ACE/ARB THERAPY RXD/TAKEN: CPT | Mod: CPTII,S$GLB,, | Performed by: ANESTHESIOLOGY

## 2025-06-23 PROCEDURE — 3078F DIAST BP <80 MM HG: CPT | Mod: CPTII,S$GLB,, | Performed by: ANESTHESIOLOGY

## 2025-06-23 PROCEDURE — 1125F AMNT PAIN NOTED PAIN PRSNT: CPT | Mod: CPTII,S$GLB,, | Performed by: ANESTHESIOLOGY

## 2025-06-23 PROCEDURE — 3077F SYST BP >= 140 MM HG: CPT | Mod: CPTII,S$GLB,, | Performed by: ANESTHESIOLOGY

## 2025-06-23 PROCEDURE — 99999 PR PBB SHADOW E&M-EST. PATIENT-LVL III: CPT | Mod: PBBFAC,,, | Performed by: ANESTHESIOLOGY

## 2025-06-23 PROCEDURE — 3008F BODY MASS INDEX DOCD: CPT | Mod: CPTII,S$GLB,, | Performed by: ANESTHESIOLOGY

## 2025-06-23 RX ORDER — BLOOD-GLUCOSE SENSOR
EACH MISCELLANEOUS
COMMUNITY
Start: 2025-06-14

## 2025-06-23 NOTE — PROGRESS NOTES
This note was completed with dictation software and grammatical errors may exist.    FOLLOW UP NOTE:     CHIEF COMPLAINT: left sided back and hip pain      INTERVAL HISTORY: Payton Castillo is a 70 y.o.  female with PMH significant for HTN, DM II on insulin, CKD, and hx of bilateral knee arthroplasties presents as an established patient for the continued management of back and hip pain.  She has new patient to me but has seen Dr. Ruggiero in the past.  He has had short-lived relief with SI joint injections, greater trochanteric bursa injections as well as lumbar MB RFA procedures.  She states pain has slowly worsened.  Pain is a constant aching, throbbing pain back.  Pain radiates to hips and buttocks and posterior thighs.  Pain worsens prolonged sitting, standing, getting up.  She has tried physical therapy minimal benefit.  She underwent a left L4-5 and L5-S1 TFESI in May 2025 which provided over 50% relief for 4 weeks.  She desires repeat procedure for further compounded benefit.  She denies any worsening weakness.  No bowel bladder changes.    PRIOR HPI: Payton Castillo is a 67 y.o. female with PMH significant for HTN, DM II on insulin, CKD, and hx of bilateral knee arthroplasties presents as a referral for the evaluation of back and hip pain. The patient reports that her pain began approximately decades ago after no inciting incident or trauma. The patient reports of worsening severity over time. Hip pain is currently her worst pain. The patient believes her low back and hip pain are related. The patient localizes her pain to the area across her lower back (L > R). The patient reports of radiation to her hips b/l (L > R) and down the lateral aspect of her BLE's to her ankles. The patient describes her pain as a throbbing and sharp type of pain and a burning type of pain in her legs. The patient reports of tingling in her BLE's and her feet. The patient reports that her pain is a 6/10 with prolonged standing. Patient  "denies of any urinary/fecal incontinence, saddle anesthesia, or weakness.       Aggravating factors: bending, standing, walking     Mitigating factors: sitting, heat, massage      Relevant Surgeries: no     Interventional Therapies: yes; saw Dr. Dane Dalton - did back injections with some benefit         CURRENT PAIN MEDICATIONS:   Currently taking: Biofreeze, OTC Tylenol, gabapentin 800 mg PO TID, Tylenol #3 PO BID     Has tried in the past:    Opioids: yes  NSAIDS: no; avoids secondary to CKD  Tylenol: yes  Muscle relaxants: yes; tried with some benefit; tried robaxin - was instructed to discontinue secondary to her kidney issues  TCAs: no  SNRIs: no  Anticonvulsants: yes  topical creams: yes      ROS:  Review of Systems   Constitutional:  Negative for chills and fever.   HENT:  Negative for sore throat.    Eyes:  Negative for visual disturbance.   Respiratory:  Negative for shortness of breath.    Cardiovascular:  Negative for chest pain.   Gastrointestinal:  Negative for nausea and vomiting.   Genitourinary:  Negative for difficulty urinating.   Musculoskeletal:  Positive for arthralgias and back pain.   Skin:  Negative for rash.   Allergic/Immunologic: Negative for immunocompromised state.   Neurological:  Negative for syncope.   Hematological:  Does not bruise/bleed easily.   Psychiatric/Behavioral:  Negative for suicidal ideas.         MEDICAL, SURGICAL, FAMILY, SOCIAL HX: reviewed    MEDICATIONS/ALLERGIES: reviewed    PHYSICAL EXAM:    VITALS: Vitals reviewed.   Vitals:    06/23/25 1038   BP: (!) 142/77   Pulse: 62   Weight: 79.4 kg (175 lb 0.7 oz)   Height: 5' 6" (1.676 m)   PainSc:   4   PainLoc: Hip       Physical Exam  Vitals and nursing note reviewed.   Constitutional:       Appearance: Normal appearance. She is not toxic-appearing or diaphoretic.   HENT:      Head: Normocephalic and atraumatic.   Eyes:      General:         Right eye: No discharge.         Left eye: No discharge.      Extraocular " Movements: Extraocular movements intact.      Conjunctiva/sclera: Conjunctivae normal.   Cardiovascular:      Rate and Rhythm: Normal rate.   Pulmonary:      Effort: Pulmonary effort is normal. No respiratory distress.      Breath sounds: Normal breath sounds.   Abdominal:      Palpations: Abdomen is soft.   Skin:     General: Skin is warm and dry.      Findings: No rash.   Neurological:      Mental Status: She is alert and oriented to person, place, and time.   Psychiatric:         Mood and Affect: Mood and affect normal.         Cognition and Memory: Memory normal.         Judgment: Judgment normal.          UPPER EXTREMITIES: Normal alignment, normal range of motion, no atrophy, no skin changes,  hair growth and nail growth normal and equal bilaterally. No swelling, no tenderness.    LOWER EXTREMITIES:  Normal alignment, normal range of motion, no atrophy, no skin changes,  hair growth and nail growth normal and equal bilaterally. No swelling, no tenderness.     LUMBAR SPINE  Lumbar spine: ROM is limited with flexion extension and oblique extension with increased pain with passive ROM    ((--)) Supine straight leg raise    ((-)) Facet loading   Internal and external rotation of the hip causes no increased pain on either side.  Myofascial exam: Tenderness to palpation across lumbar paraspinous muscles.     ((-)) TTP at the SI joint  ((-)) MANDO's test  ((-)) One leg stand    ((-)) Distraction test  ((-)) Thigh thrust   + TTP left GTB  MOTOR: Tone and bulk: normal      MUSCLE STRENGTH:  Hip Flexion: Right 5/5, Left 5/5  Hip Extension: Right 5/5, Left 5/5  Leg Flexion: Right 5/5, Left 5/5  Leg Extension: Right 5/5, Left 5/5  Plantar Flexion: Right 5/5, Left 5/5  Dorsiflexion: Right 5/5, Left 5/5     Delt      Bi         Tri                         Right:   5/5       5/5       5/5       5/5         Left:     5/5       5/5       5/5       5/5            SENSATION: Light touch and pinprick intact  bilaterally  REFLEXES: normal, symmetric, nonbrisk.  Toes down, no clonus. Negative woody's sign bilaterally.  GAIT:  Antalgic gait     IMAGING:    MRI Lumbar Spine 4/2025    T11-12: Mild left asymmetric circumferential disc bulge.  Mild bilateral facet arthropathy and ligamentum flavum thickening.  No significant neural foraminal or spinal canal stenosis.     T12-L1: Circumferential disc bulge with small right central disc protrusion.  Mild bilateral facet arthropathy and ligamentum flavum thickening.  No significant neural foraminal or spinal canal stenosis.     L1-L2: Moderate disc space narrowing.  Right asymmetric circumferential disc bulge with superimposed left central/subarticular disc protrusion and a right foraminal/extraforaminal disc protrusion.  Mild bilateral facet arthropathy.  Ligamentum flavum thickening.  Moderate right and mild left neural foraminal stenosis.  Mild spinal canal and left lateral recess stenosis, with possible impingement upon the descending left L2 nerve root.     L2-L3: Moderate disc space narrowing.  Right asymmetric circumferential disc bulge with superimposed left central disc protrusion.  Mild bilateral facet arthropathy and ligamentum flavum thickening.  Mild-to-moderate right and mild left neural foraminal stenosis.  Mild spinal canal stenosis and left lateral recess stenosis.     L3-L4: Mild disc space narrowing.  Circumferential disc bulge with superimposed left central/subarticular disc protrusion, which moderately effaces the left lateral recess with possible impingement upon the descending left L4 nerve roots.  Mild bilateral facet arthropathy.  Ligamentum flavum thickening.  Mild bilateral foraminal stenosis.  Mild spinal canal stenosis.     L4-L5: Mild disc space narrowing.  Right asymmetric circumferential disc bulge.  Moderate bilateral facet arthropathy.  Ligamentum flavum thickening.  Moderate right and mild left neural foraminal stenosis.  Mild spinal canal and  lateral recess stenosis.     L5-S1: Moderate right asymmetric disc space narrowing.  Right asymmetric circumferential disc bulge with superimposed central/right subarticular disc extrusion with minimal caudal extension to the S1 pedicular level.  Severe bilateral facet arthropathy with superimpose left-sided anteriorly directed facet joint synovial cyst within far left foraminal zone, which may impinge upon the exiting left L5 nerve root.  Severe left and moderate right neural foraminal stenosis.  Mild spinal canal and severe left and mild right lateral recess stenosis, with possible impingement upon the descending left S1 nerve roots.       ASSESSMENT: Payton Castillo is a 67 y.o. female with PMH significant for HTN, DM II on insulin, CKD, and hx of bilateral knee arthroplasties presents as an established patient for the continued management of back and hip pain. Treatment plan outlined below.   1. Lumbar radiculitis    2. Dorsalgia, unspecified    3. Other spondylosis, lumbar region    4. Degeneration of intervertebral disc of lumbar region, unspecified whether pain present        PLAN:    Reviewed pertinent imaging and records with patient  I have stressed the importance of physical activity and exercise to improve overall health  I think that the patient's back pain and radicular leg symptoms are due to degenerative disc disease and have recommended a lumbar epidural steroid injection to the Left L5-S1 and S1 level(s).  May consider neurosurgical consultation if above is not further helpful.  Follow up after procedure

## 2025-06-23 NOTE — TELEPHONE ENCOUNTER
Level of Service:21416     MS OFFICE/OUTPT VISIT, EST, LEVL V, 40-54 MIN      Types of orders made on 06/23/2025: Medications, Procedure Request      Order Date:6/23/2025   Ordering User:ABELARDO RIVAS [[id number]]   Encounter Provider:Abelardo Rivas MD [[id number]]   Authorizing Provider: Abelardo Rivas MD [[id number]]   Department:[facility] PAIN MANAGEMENT[[id number]]      Common Order Information   Procedure -> Transforaminal Injection (Specify level and laterality) Cmt: Left             L5-S1 and S1      Order Specific Information   Order: Procedure Request Order for Pain Management [Custom: [id number]]  Order #:          [id number]: 1 FUTURE     Priority: Routine  Class: Clinic Performed     Future Order Information       Expires on:06/23/2026            Expected by:06/23/2025                   Associated Diagnoses       M54.16 Lumbar radiculitis       Facility Name: -> [facility]              Priority: Routine  Class: Clinic Performed     Future Order Information       Expires on:06/23/2026            Expected by:06/23/2025                   Associated Diagnoses       M54.16 Lumbar radiculitis       Procedure -> Transforaminal Injection (Specify level and laterality) Cmt:                 Left L5-S1 and S1      Duration Of Freeze Thaw-Cycle (Seconds): 0 Post-Care Instructions: I reviewed with the patient in detail post-care instructions. Patient is to wear sunprotection, and avoid picking at any of the treated lesions. Pt may apply Vaseline to crusted or scabbing areas. Render Note In Bullet Format When Appropriate: No Show Applicator Variable?: Yes Consent: The patient's consent was obtained including but not limited to risks of crusting, scabbing, blistering, scarring, darker or lighter pigmentary change, recurrence, incomplete removal and infection. Detail Level: Detailed

## 2025-06-26 ENCOUNTER — TELEPHONE (OUTPATIENT)
Dept: PAIN MEDICINE | Facility: CLINIC | Age: 70
End: 2025-06-26
Payer: MEDICARE

## 2025-07-03 ENCOUNTER — HOSPITAL ENCOUNTER (OUTPATIENT)
Facility: HOSPITAL | Age: 70
Discharge: HOME | End: 2025-07-03
Attending: ANESTHESIOLOGY | Admitting: ANESTHESIOLOGY
Payer: MEDICARE

## 2025-07-03 DIAGNOSIS — M54.16 LUMBAR RADICULITIS: Primary | ICD-10-CM

## 2025-07-03 LAB — POCT GLUCOSE: 115 MG/DL (ref 70–110)

## 2025-07-03 PROCEDURE — 64483 NJX AA&/STRD TFRM EPI L/S 1: CPT | Mod: LT | Performed by: ANESTHESIOLOGY

## 2025-07-03 PROCEDURE — 64483 NJX AA&/STRD TFRM EPI L/S 1: CPT | Mod: LT,,, | Performed by: ANESTHESIOLOGY

## 2025-07-03 PROCEDURE — 63600175 PHARM REV CODE 636 W HCPCS: Performed by: ANESTHESIOLOGY

## 2025-07-03 PROCEDURE — 64484 NJX AA&/STRD TFRM EPI L/S EA: CPT | Mod: LT,,, | Performed by: ANESTHESIOLOGY

## 2025-07-03 PROCEDURE — 25500020 PHARM REV CODE 255: Performed by: ANESTHESIOLOGY

## 2025-07-03 PROCEDURE — 64484 NJX AA&/STRD TFRM EPI L/S EA: CPT | Mod: LT | Performed by: ANESTHESIOLOGY

## 2025-07-03 RX ORDER — LIDOCAINE HYDROCHLORIDE 10 MG/ML
INJECTION, SOLUTION EPIDURAL; INFILTRATION; INTRACAUDAL; PERINEURAL
Status: DISCONTINUED | OUTPATIENT
Start: 2025-07-03 | End: 2025-07-03 | Stop reason: HOSPADM

## 2025-07-03 RX ORDER — LIDOCAINE HYDROCHLORIDE 10 MG/ML
1 INJECTION, SOLUTION EPIDURAL; INFILTRATION; INTRACAUDAL; PERINEURAL ONCE
Status: COMPLETED | OUTPATIENT
Start: 2025-07-03 | End: 2025-07-03

## 2025-07-03 RX ORDER — SODIUM CHLORIDE, SODIUM LACTATE, POTASSIUM CHLORIDE, CALCIUM CHLORIDE 600; 310; 30; 20 MG/100ML; MG/100ML; MG/100ML; MG/100ML
INJECTION, SOLUTION INTRAVENOUS CONTINUOUS
Status: DISCONTINUED | OUTPATIENT
Start: 2025-07-03 | End: 2025-07-03 | Stop reason: HOSPADM

## 2025-07-03 RX ORDER — DEXAMETHASONE SODIUM PHOSPHATE 10 MG/ML
INJECTION INTRAMUSCULAR; INTRAVENOUS
Status: DISCONTINUED | OUTPATIENT
Start: 2025-07-03 | End: 2025-07-03 | Stop reason: HOSPADM

## 2025-07-03 RX ORDER — MIDAZOLAM HYDROCHLORIDE 1 MG/ML
INJECTION INTRAMUSCULAR; INTRAVENOUS
Status: DISCONTINUED | OUTPATIENT
Start: 2025-07-03 | End: 2025-07-03 | Stop reason: HOSPADM

## 2025-07-03 RX ORDER — BUPIVACAINE HYDROCHLORIDE 2.5 MG/ML
INJECTION, SOLUTION EPIDURAL; INFILTRATION; INTRACAUDAL; PERINEURAL
Status: DISCONTINUED | OUTPATIENT
Start: 2025-07-03 | End: 2025-07-03 | Stop reason: HOSPADM

## 2025-07-03 RX ORDER — FENTANYL CITRATE 50 UG/ML
INJECTION, SOLUTION INTRAMUSCULAR; INTRAVENOUS
Status: DISCONTINUED | OUTPATIENT
Start: 2025-07-03 | End: 2025-07-03 | Stop reason: HOSPADM

## 2025-07-03 RX ADMIN — LIDOCAINE HYDROCHLORIDE 10 MG: 10 INJECTION, SOLUTION EPIDURAL; INFILTRATION; INTRACAUDAL; PERINEURAL at 09:07

## 2025-07-03 NOTE — DISCHARGE SUMMARY
[facility] - Periop Services  Discharge Note  Short Stay    Procedure(s) (LRB):  INJECTION, SPINE, LUMBOSACRAL, TRANSFORAMINAL APPROACH (Left)      OUTCOME: Patient tolerated treatment/procedure well without complication and is now ready for discharge.    DISPOSITION: Home or Self Care    FINAL DIAGNOSIS:  <principal problem not specified>    FOLLOWUP: In clinic    DISCHARGE INSTRUCTIONS:    Discharge Procedure Orders   Notify your health care provider if you experience any of the following:  temperature >100.4     Notify your health care provider if you experience any of the following:  severe uncontrolled pain     Notify your health care provider if you experience any of the following:  redness, tenderness, or signs of infection (pain, swelling, redness, odor or green/yellow discharge around incision site)     Activity as tolerated        TIME SPENT ON DISCHARGE: 30 minutes

## 2025-07-03 NOTE — PLAN OF CARE
Patient and spouse given discharge instructions. Educated on post-anesthesia precautions and when to notify MD. Instructed when to follow up. Peripheral IV discontinued with catheter intact. Transferred to car via wheelchair and left with sister to home.

## 2025-07-03 NOTE — OP NOTE
PROCEDURE DATE: 7/3/2025    PROCEDURE: Left L5-S1 and S1 transforaminal epidural steroid injection under fluoroscopy    DIAGNOSIS: Lumbar radiculitis    Post op diagnosis: Same    PHYSICIAN: Howard Rivas MD    MEDICATIONS INJECTED:  Dexamethasone 5mg (0.5ml) and 1.5ml 0.25% bupivicaine at each nerve root.     LOCAL ANESTHETIC INJECTED:  Lidocaine 1%. 2 ml per site.    SEDATION MEDICATIONS: RN IV Sedation    ESTIMATED BLOOD LOSS:  None    COMPLICATIONS:  None    TECHNIQUE:   A time-out was taken to identify patient and procedure side prior to starting the procedure. The patient was placed in a prone position, prepped and draped in the usual sterile fashion using ChloraPrep and sterile towels.  The area to be injected was determined under fluoroscopic guidance in AP and oblique view.  Local anesthetic was given by raising a wheal and going down to the hub of a 25-gauge 1.5 inch needle.  In oblique view, a 3.5 inch 22-gauge bent-tip spinal needle was introduced towards 6 oclock position of the pedicle of each above named nerve root level.  The needle was walked medially then hinged into the neural foramen and position was confirmed in AP and lateral views.  1ml contrast dye was injected to confirm appropriate placement and that there was no vascular uptake.  After negative aspiration for blood or CSF, the medication was then injected. This was performed at the left L5-S1 and S1 level(s). The patient tolerated the procedure well.    The patient was monitored after the procedure.  Patient was given post procedure and discharge instructions to follow at home. The patient was discharged in a stable condition.

## 2025-07-07 VITALS
WEIGHT: 170 LBS | OXYGEN SATURATION: 100 % | BODY MASS INDEX: 27.32 KG/M2 | RESPIRATION RATE: 18 BRPM | HEART RATE: 56 BPM | HEIGHT: 66 IN | SYSTOLIC BLOOD PRESSURE: 137 MMHG | DIASTOLIC BLOOD PRESSURE: 69 MMHG | TEMPERATURE: 98 F

## 2025-07-24 ENCOUNTER — OFFICE VISIT (OUTPATIENT)
Dept: PULMONOLOGY | Facility: CLINIC | Age: 70
End: 2025-07-24
Payer: MEDICARE

## 2025-07-24 VITALS
WEIGHT: 172.63 LBS | OXYGEN SATURATION: 98 % | SYSTOLIC BLOOD PRESSURE: 136 MMHG | HEART RATE: 61 BPM | HEIGHT: 66 IN | DIASTOLIC BLOOD PRESSURE: 72 MMHG | BODY MASS INDEX: 27.74 KG/M2

## 2025-07-24 DIAGNOSIS — R51.9 FREQUENT HEADACHES: ICD-10-CM

## 2025-07-24 DIAGNOSIS — R06.83 SNORES: ICD-10-CM

## 2025-07-24 DIAGNOSIS — G47.33 OBSTRUCTIVE SLEEP APNEA: ICD-10-CM

## 2025-07-24 DIAGNOSIS — G47.10 HYPERSOMNOLENCE: Primary | ICD-10-CM

## 2025-07-24 DIAGNOSIS — G47.00 INSOMNIA, UNSPECIFIED TYPE: ICD-10-CM

## 2025-07-24 PROCEDURE — 99999 PR PBB SHADOW E&M-EST. PATIENT-LVL V: CPT | Mod: PBBFAC,,, | Performed by: NURSE PRACTITIONER

## 2025-07-24 PROCEDURE — 3066F NEPHROPATHY DOC TX: CPT | Mod: CPTII,S$GLB,, | Performed by: NURSE PRACTITIONER

## 2025-07-24 PROCEDURE — 3288F FALL RISK ASSESSMENT DOCD: CPT | Mod: CPTII,S$GLB,, | Performed by: NURSE PRACTITIONER

## 2025-07-24 PROCEDURE — 3075F SYST BP GE 130 - 139MM HG: CPT | Mod: CPTII,S$GLB,, | Performed by: NURSE PRACTITIONER

## 2025-07-24 PROCEDURE — 3008F BODY MASS INDEX DOCD: CPT | Mod: CPTII,S$GLB,, | Performed by: NURSE PRACTITIONER

## 2025-07-24 PROCEDURE — 99204 OFFICE O/P NEW MOD 45 MIN: CPT | Mod: S$GLB,,, | Performed by: NURSE PRACTITIONER

## 2025-07-24 PROCEDURE — 1125F AMNT PAIN NOTED PAIN PRSNT: CPT | Mod: CPTII,S$GLB,, | Performed by: NURSE PRACTITIONER

## 2025-07-24 PROCEDURE — 3062F POS MACROALBUMINURIA REV: CPT | Mod: CPTII,S$GLB,, | Performed by: NURSE PRACTITIONER

## 2025-07-24 PROCEDURE — 1159F MED LIST DOCD IN RCRD: CPT | Mod: CPTII,S$GLB,, | Performed by: NURSE PRACTITIONER

## 2025-07-24 PROCEDURE — 1101F PT FALLS ASSESS-DOCD LE1/YR: CPT | Mod: CPTII,S$GLB,, | Performed by: NURSE PRACTITIONER

## 2025-07-24 PROCEDURE — 3078F DIAST BP <80 MM HG: CPT | Mod: CPTII,S$GLB,, | Performed by: NURSE PRACTITIONER

## 2025-07-24 PROCEDURE — 3051F HG A1C>EQUAL 7.0%<8.0%: CPT | Mod: CPTII,S$GLB,, | Performed by: NURSE PRACTITIONER

## 2025-07-24 PROCEDURE — 4010F ACE/ARB THERAPY RXD/TAKEN: CPT | Mod: CPTII,S$GLB,, | Performed by: NURSE PRACTITIONER

## 2025-07-24 NOTE — PROGRESS NOTES
SUBJECTIVE:    Patient ID: Payton Castillo is a 70 y.o. female.    Chief Complaint: New Patient (New Patient/Referred by John Nolan for DENISSE on CPAP/Former pt of Angel[facility])    HPI    Patient here today to be evaluated for DENISSE. She has known DENISSE states was diagnosed approximately 10 years ago, still has the same machine.  She did feel benefit from it for years but is now feeling fatigued and hypersomnolent during the day. She naps during the day, snores, has difficulty falling and staying asleep at night. She does have brain fog and headaches despite sleeping on her machine. She takes Gabapentin 3 times a day for neuropathy and does suffer with hip and back pain   Past Medical History:   Diagnosis Date    Allergy     aspartame,fructose monosodium glutamate and penicillins.    Anemia of chronic disorder 11/09/2017    Anemia, chronic renal failure, stage 2 (mild) 11/09/2017    Anemia, chronic renal failure, stage 3 (moderate) 11/09/2017    Asthma     Cancer     kidney    Cataract of right eye     CKD (chronic kidney disease), stage III     Diabetes mellitus     type II    Fibromyalgia     GERD (gastroesophageal reflux disease)     Hx of renal cell cancer - left nephrectomy Aug 2014 11/09/2017    Hypercholesteremia     Hypertension     IDDM (insulin dependent diabetes mellitus)     Lumbar spondylosis     Lumbosacral radiculopathy     Osteopenia     Palpitations     Plantar fasciitis, right     Sleep apnea     Syncope 02/2012    Tachycardia     Thoracic radiculopathy     TMJ (dislocation of temporomandibular joint)      Past Surgical History:   Procedure Laterality Date    CATARACT EXTRACTION Left 03/2016    INJECTION OF ANESTHETIC AGENT AROUND MEDIAL BRANCH NERVES INNERVATING LUMBAR FACET JOINT Bilateral 12/15/2022    Procedure: Block-nerve-medial branch-lumbar;  Surgeon: Howard Rivas MD;  Location: [facility];  Service: Pain Management;  Laterality: Bilateral;  L3,4,5 MBB    INJECTION OF ANESTHETIC AGENT AROUND  MEDIAL BRANCH NERVES INNERVATING LUMBAR FACET JOINT Bilateral 1/3/2023    Procedure: Block-nerve-medial branch-lumbar;  Surgeon: Howard Rivas MD;  Location: [facility] [place];  Service: Pain Management;  Laterality: Bilateral;  L3,4,5 MBB #2    INJECTION OF JOINT Left 1/26/2024    Procedure: Injection, Joint;  Surgeon: Howard Rivas MD;  Location: [facility] OR;  Service: Anesthesiology;  Laterality: Left;  left GTB injection    INJECTION, SACROILIAC JOINT Left 10/7/2022    Procedure: INJECTION,SACROILIAC JOINT;  Surgeon: Cory Ruggiero MD;  Location: [facility] OR;  Service: Pain Management;  Laterality: Left;  sacroiliac injection    INJECTION, SACROILIAC JOINT Left 4/5/2023    Procedure: INJECTION,SACROILIAC JOINT;  Surgeon: Howard Rivas MD;  Location: [facility] OR;  Service: Pain Management;  Laterality: Left;  SIJ    INJECTION, SACROILIAC JOINT Left 1/26/2024    Procedure: INJECTION,SACROILIAC JOINT;  Surgeon: Howard Rivas MD;  Location: [facility] OR;  Service: Anesthesiology;  Laterality: Left;  left SIJ and Left GTB injection    INJECTION, SPINE, LUMBOSACRAL, TRANSFORAMINAL APPROACH Left 5/22/2025    Procedure: INJECTION, SPINE, LUMBOSACRAL, TRANSFORAMINAL APPROACH;  Surgeon: Howard Rivas MD;  Location: [facility];  Service: Pain Management;  Laterality: Left;    INJECTION, SPINE, LUMBOSACRAL, TRANSFORAMINAL APPROACH Left 7/3/2025    Procedure: INJECTION, SPINE, LUMBOSACRAL, TRANSFORAMINAL APPROACH;  Surgeon: Howard Rivas MD;  Location: [facility];  Service: Pain Management;  Laterality: Left;    JOINT REPLACEMENT Right     knee    RADIOFREQUENCY THERMOCOAGULATION Bilateral 1/13/2023    Procedure: RADIOFREQUENCY THERMAL COAGULATION;  Surgeon: Howard Rivas MD;  Location: [facility] OR;  Service: Pain Management;  Laterality: Bilateral;  L3,4,5 Raul RFA    SHOULDER OPEN ROTATOR CUFF REPAIR Right 02/2005    labral repair    TOTAL KNEE ARTHROPLASTY Left 2014    TUBAL LIGATION  1990    TUMOR REMOVAL  2014    from  "kidney     Family History   Problem Relation Name Age of Onset    Stomach cancer Mother      Breast cancer Mother      Diabetes Mother      Cancer Father          Social History:   Marital Status:   Occupation: Data Unavailable  Alcohol History:  reports no history of alcohol use.  Tobacco History:  reports that she has never smoked. She has never used smokeless tobacco.  Drug History:  reports no history of drug use.    Review of patient's allergies indicates:   Allergen Reactions    Aspartame Other (See Comments)     headache    Other reaction(s): Other (See Comments)  Headache  migraine  Headache  Other reaction(s): Other (See Comments)  Headache  migraine  headache    Penicillins Itching, Rash, Other (See Comments) and Hives     Rash  Rash  Rash  Rash  Rash    Stevioside (bulk) Other (See Comments)    Fructose Other (See Comments)     headache    Other reaction(s): Other (See Comments)  headache  migraine  headache  Other reaction(s): Other (See Comments)  headache  migraine  headache    Jardiance [empagliflozin] Other (See Comments)     Made her more hungry    Monosodium glutamate Other (See Comments)     Headache    Other reaction(s): Other (See Comments)  Headache  headache  Headache  Other reaction(s): Other (See Comments)  Headache  headache  Headache       Current Medications[1]        Review of Systems  General: Feeling Well. Hypersomnolence, snores  Eyes: Vision is good.  ENT:  No sinusitis or pharyngitis.   Heart:: No chest pain or palpitations.  Lungs: No cough, sputum, or wheezing.  GI: No Nausea, vomiting, constipation, diarrhea, or reflux.  : 1-2 times a night urinates  Musculoskeletal: No joint pain or myalgias.  Skin: No lesions or rashes.  Neuro:  headaches and brain fog, neuropathy    Lymph: No edema or adenopathy.  Psych: No anxiety or depression.  Endo: No weight change.    OBJECTIVE:      /72   Pulse 61   Ht 5' 6" (1.676 m)   Wt 78.3 kg (172 lb 9.6 oz)   SpO2 98%   BMI " 27.86 kg/m²     Physical Exam  GENERAL: Older patient in no distress.  HEENT: Pupils equal and reactive. Extraocular movements intact. Nose intact.      Pharynx moist. Malampatti 4  NECK: Supple.  13.5 inches  HEART: Regular rate and rhythm. No murmur or gallop auscultated.  LUNGS: Clear to auscultation and percussion. Lung excursion symmetrical. No change in fremitus. No adventitial noises.  ABDOMEN: Bowel sounds present. Non-tender, no masses palpated.  EXTREMITIES: Normal muscle tone and joint movement, no cyanosis or clubbing.   LYMPHATICS: No adenopathy palpated, no edema.  SKIN: Dry, intact, no lesions.   NEURO: Cranial nerves II-XII intact. Motor strength 5/5 bilaterally, upper and lower extremities.  PSYCH: Appropriate affect.    Assessment:       1. Hypersomnolence    2. Obstructive sleep apnea    3. Frequent headaches    4. Snores        [place] is 15  Plan:       Split night sleep study   Needs new machine   Avoid naps, no caffeine after 3pm  Need set bedtime routine    Follow up in about 2 months (around 9/24/2025).               [1]   Current Outpatient Medications   Medication Sig Dispense Refill    amLODIPine (NORVASC) 5 MG tablet Take 1 tablet (5 mg total) by mouth once daily. 90 tablet 3    azelastine (ASTELIN) 137 mcg (0.1 %) nasal spray 1 spray (137 mcg total) by Nasal route 2 (two) times daily. 30 mL 3    calcitRIOL (ROCALTROL) 0.25 MCG Cap Take by mouth.      cetirizine (ZYRTEC) 10 MG tablet Take 10 mg by mouth.      fluticasone propionate (FLONASE) 50 mcg/actuation nasal spray USE 2 SPRAYS IN EACH NOSTRIL ONCE DAILY 48 g 3    gabapentin (NEURONTIN) 800 MG tablet TAKE 1 TABLET THREE TIMES A  tablet 3    insulin aspart U-100 (NOVOLOG FLEXPEN U-100 INSULIN) 100 unit/mL (3 mL) InPn pen Inject 20 Units into the skin 3 (three) times daily with meals. 54 mL 3    insulin glargine U-100, Lantus, (LANTUS U-100 INSULIN) 100 unit/mL injection INJECT 70 UNITS UNDER THE SKIN EVERY EVENING 63 mL 3     "KERENDIA 10 mg Tab Take 1 tablet by mouth every evening.      metoprolol succinate (TOPROL-XL) 50 MG 24 hr tablet TAKE 1 TABLET DAILY 90 tablet 3    olmesartan (BENICAR) 20 MG tablet Take 20 mg by mouth once daily.      omeprazole (PRILOSEC) 40 MG capsule Take 40 mg by mouth every evening.      BD INSULIN SYRINGE 1 mL 28 gauge x 1/2" Syrg USE 1 SYRINGE ONCE DAILY 100 each 3    fluocinolone acetonide oiL 0.01 % Drop Place 4 drops in ear(s) 2 (two) times a day. (Patient not taking: Reported on 2025) 20 mL 1    FREESTYLE BANG 3 PLUS SENSOR Millicent SMARTSI device SUB-Q      insulin syringe-needle U-100 0.3 mL 30 Syrg by Misc.(Non-Drug; Combo Route) route 3 (three) times daily.      pen needle, diabetic (ADVOCATE PEN NEEDLE) 31 gauge x 5/16" Ndle 1 Device by Misc.(Non-Drug; Combo Route) route 3 (three) times daily. 100 each 3     No current facility-administered medications for this visit.     "

## 2025-07-24 NOTE — PATIENT INSTRUCTIONS
Split night sleep study   Needs new machine   Avoid naps, no caffeine after 3pm  Need set bedtime routine

## 2025-07-28 ENCOUNTER — LAB VISIT (OUTPATIENT)
Dept: LAB | Facility: HOSPITAL | Age: 70
End: 2025-07-28
Attending: INTERNAL MEDICINE
Payer: MEDICARE

## 2025-07-28 DIAGNOSIS — N18.6 TYPE 2 DIABETES MELLITUS WITH END-STAGE RENAL DISEASE: Primary | ICD-10-CM

## 2025-07-28 DIAGNOSIS — N18.32 CHRONIC KIDNEY DISEASE (CKD) STAGE G3B/A1, MODERATELY DECREASED GLOMERULAR FILTRATION RATE (GFR) BETWEEN 30-44 ML/MIN/1.73 SQUARE METER AND ALBUMINURIA CREATININE RATIO LESS THAN 30 MG/G: ICD-10-CM

## 2025-07-28 DIAGNOSIS — I10 ESSENTIAL HYPERTENSION: ICD-10-CM

## 2025-07-28 DIAGNOSIS — L29.9 ITCHING: ICD-10-CM

## 2025-07-28 DIAGNOSIS — E11.22 TYPE 2 DIABETES MELLITUS WITH END-STAGE RENAL DISEASE: Primary | ICD-10-CM

## 2025-07-28 DIAGNOSIS — R52 COMPLAINTS OF TOTAL BODY PAIN: ICD-10-CM

## 2025-07-28 DIAGNOSIS — R53.83 FATIGUE, UNSPECIFIED TYPE: ICD-10-CM

## 2025-07-28 LAB
ABSOLUTE EOSINOPHIL (SMH): 0.33 K/UL
ABSOLUTE MONOCYTE (SMH): 0.59 K/UL (ref 0.3–1)
ABSOLUTE NEUTROPHIL COUNT (SMH): 3.6 K/UL (ref 1.8–7.7)
ALBUMIN SERPL-MCNC: 4.2 G/DL (ref 3.5–5.2)
ANION GAP (SMH): 6 MMOL/L (ref 8–16)
BACTERIA #/AREA URNS AUTO: NORMAL /HPF
BASOPHILS # BLD AUTO: 0.06 K/UL
BASOPHILS NFR BLD AUTO: 0.9 %
BILIRUB UR QL STRIP.AUTO: NEGATIVE
BUN SERPL-MCNC: 21 MG/DL (ref 8–23)
CALCIUM SERPL-MCNC: 9.6 MG/DL (ref 8.7–10.5)
CHLORIDE SERPL-SCNC: 104 MMOL/L (ref 95–110)
CLARITY UR: CLEAR
CO2 SERPL-SCNC: 30 MMOL/L (ref 23–29)
COLOR UR AUTO: YELLOW
CREAT SERPL-MCNC: 1.3 MG/DL (ref 0.5–1.4)
CREAT UR-MCNC: 117.5 MG/DL (ref 15–325)
ERYTHROCYTE [DISTWIDTH] IN BLOOD BY AUTOMATED COUNT: 12.8 % (ref 11.5–14.5)
ERYTHROCYTE [SEDIMENTATION RATE] IN BLOOD: 17 MM/HR (ref 0–20)
GFR SERPLBLD CREATININE-BSD FMLA CKD-EPI: 44 ML/MIN/1.73/M2
GLUCOSE SERPL-MCNC: 89 MG/DL (ref 70–110)
GLUCOSE UR QL STRIP: NEGATIVE
HCT VFR BLD AUTO: 38.1 % (ref 37–48.5)
HGB BLD-MCNC: 13 GM/DL (ref 12–16)
HGB UR QL STRIP: NEGATIVE
IMM GRANULOCYTES # BLD AUTO: 0.01 K/UL (ref 0–0.04)
IMM GRANULOCYTES NFR BLD AUTO: 0.1 % (ref 0–0.5)
KETONES UR QL STRIP: NEGATIVE
LEUKOCYTE ESTERASE UR QL STRIP: ABNORMAL
LYMPHOCYTES # BLD AUTO: 2.42 K/UL (ref 1–4.8)
MCH RBC QN AUTO: 31.2 PG (ref 27–31)
MCHC RBC AUTO-ENTMCNC: 34.1 G/DL (ref 32–36)
MCV RBC AUTO: 91 FL (ref 82–98)
MICROSCOPIC COMMENT: NORMAL
NITRITE UR QL STRIP: NEGATIVE
NUCLEATED RBC (/100WBC) (SMH): 0 /100 WBC
PH UR STRIP: 6 [PH]
PHOSPHATE SERPL-MCNC: 3.5 MG/DL (ref 2.7–4.5)
PLATELET # BLD AUTO: 339 K/UL (ref 150–450)
PMV BLD AUTO: 8.5 FL (ref 9.2–12.9)
POTASSIUM SERPL-SCNC: 4.5 MMOL/L (ref 3.5–5.1)
PROT UR QL STRIP: ABNORMAL
PROT UR-MCNC: 102 MG/DL
PROT/CREAT UR: 0.87 MG/G{CREAT}
RBC # BLD AUTO: 4.17 M/UL (ref 4–5.4)
RBC #/AREA URNS AUTO: <1 /HPF
RELATIVE EOSINOPHIL (SMH): 4.7 % (ref 0–8)
RELATIVE LYMPHOCYTE (SMH): 34.7 % (ref 18–48)
RELATIVE MONOCYTE (SMH): 8.5 % (ref 4–15)
RELATIVE NEUTROPHIL (SMH): 51.1 % (ref 38–73)
SODIUM SERPL-SCNC: 140 MMOL/L (ref 136–145)
SP GR UR STRIP: 1.01
SQUAMOUS #/AREA URNS AUTO: 1 /HPF
UROBILINOGEN UR STRIP-ACNC: NEGATIVE EU/DL
WBC # BLD AUTO: 6.98 K/UL (ref 3.9–12.7)
WBC #/AREA URNS AUTO: 2 /HPF

## 2025-07-28 PROCEDURE — 85025 COMPLETE CBC W/AUTO DIFF WBC: CPT

## 2025-07-28 PROCEDURE — 84100 ASSAY OF PHOSPHORUS: CPT

## 2025-07-28 PROCEDURE — 84156 ASSAY OF PROTEIN URINE: CPT

## 2025-07-28 PROCEDURE — 83036 HEMOGLOBIN GLYCOSYLATED A1C: CPT

## 2025-07-28 PROCEDURE — 85651 RBC SED RATE NONAUTOMATED: CPT

## 2025-07-28 PROCEDURE — 36415 COLL VENOUS BLD VENIPUNCTURE: CPT

## 2025-07-28 PROCEDURE — 81003 URINALYSIS AUTO W/O SCOPE: CPT

## 2025-07-29 LAB
EAG (SMH): 169 MG/DL (ref 68–131)
HBA1C MFR BLD: 7.5 % (ref 4.5–6.2)

## 2025-07-30 ENCOUNTER — PROCEDURE VISIT (OUTPATIENT)
Dept: SLEEP MEDICINE | Facility: HOSPITAL | Age: 70
End: 2025-07-30
Attending: NURSE PRACTITIONER
Payer: MEDICARE

## 2025-07-30 ENCOUNTER — OFFICE VISIT (OUTPATIENT)
Dept: PAIN MEDICINE | Facility: CLINIC | Age: 70
End: 2025-07-30
Payer: MEDICARE

## 2025-07-30 VITALS
HEART RATE: 73 BPM | HEIGHT: 66 IN | DIASTOLIC BLOOD PRESSURE: 74 MMHG | WEIGHT: 172.63 LBS | BODY MASS INDEX: 27.74 KG/M2 | SYSTOLIC BLOOD PRESSURE: 152 MMHG

## 2025-07-30 DIAGNOSIS — M70.62 GREATER TROCHANTERIC BURSITIS OF LEFT HIP: ICD-10-CM

## 2025-07-30 DIAGNOSIS — G47.10 HYPERSOMNOLENCE: ICD-10-CM

## 2025-07-30 DIAGNOSIS — M54.16 LUMBAR RADICULITIS: ICD-10-CM

## 2025-07-30 DIAGNOSIS — M47.896 OTHER SPONDYLOSIS, LUMBAR REGION: Primary | ICD-10-CM

## 2025-07-30 DIAGNOSIS — G47.33 OBSTRUCTIVE SLEEP APNEA: ICD-10-CM

## 2025-07-30 DIAGNOSIS — M51.369 DEGENERATION OF INTERVERTEBRAL DISC OF LUMBAR REGION, UNSPECIFIED WHETHER PAIN PRESENT: ICD-10-CM

## 2025-07-30 PROCEDURE — 3062F POS MACROALBUMINURIA REV: CPT | Mod: CPTII,S$GLB,, | Performed by: PHYSICIAN ASSISTANT

## 2025-07-30 PROCEDURE — 99999 PR PBB SHADOW E&M-EST. PATIENT-LVL III: CPT | Mod: PBBFAC,,, | Performed by: PHYSICIAN ASSISTANT

## 2025-07-30 PROCEDURE — 3077F SYST BP >= 140 MM HG: CPT | Mod: CPTII,S$GLB,, | Performed by: PHYSICIAN ASSISTANT

## 2025-07-30 PROCEDURE — 95811 POLYSOM 6/>YRS CPAP 4/> PARM: CPT

## 2025-07-30 PROCEDURE — 3078F DIAST BP <80 MM HG: CPT | Mod: CPTII,S$GLB,, | Performed by: PHYSICIAN ASSISTANT

## 2025-07-30 PROCEDURE — 3008F BODY MASS INDEX DOCD: CPT | Mod: CPTII,S$GLB,, | Performed by: PHYSICIAN ASSISTANT

## 2025-07-30 PROCEDURE — 99214 OFFICE O/P EST MOD 30 MIN: CPT | Mod: S$GLB,,, | Performed by: PHYSICIAN ASSISTANT

## 2025-07-30 PROCEDURE — 3051F HG A1C>EQUAL 7.0%<8.0%: CPT | Mod: CPTII,S$GLB,, | Performed by: PHYSICIAN ASSISTANT

## 2025-07-30 PROCEDURE — 1125F AMNT PAIN NOTED PAIN PRSNT: CPT | Mod: CPTII,S$GLB,, | Performed by: PHYSICIAN ASSISTANT

## 2025-07-30 PROCEDURE — 4010F ACE/ARB THERAPY RXD/TAKEN: CPT | Mod: CPTII,S$GLB,, | Performed by: PHYSICIAN ASSISTANT

## 2025-07-30 PROCEDURE — 3066F NEPHROPATHY DOC TX: CPT | Mod: CPTII,S$GLB,, | Performed by: PHYSICIAN ASSISTANT

## 2025-07-30 PROCEDURE — 1159F MED LIST DOCD IN RCRD: CPT | Mod: CPTII,S$GLB,, | Performed by: PHYSICIAN ASSISTANT

## 2025-07-30 NOTE — PROGRESS NOTES
FOLLOW UP NOTE:     CHIEF COMPLAINT: left sided back and hip pain      INTERVAL HISTORY: Payton Castillo is a 70 y.o.  female with PMH significant for HTN, DM II on insulin, CKD, and hx of bilateral knee arthroplasties presents as an established patient for the continued management of back and hip pain.  She is s/p left TF JERRI at L5-S1, S1. Reports > 75% relief of left LE pain. Continues to c/o low back pain worse with bending.  He has had short-lived relief with SI joint injections, greater trochanteric bursa injections as well as lumbar MB RFA procedures.  She states pain has slowly worsened.  Pain is a constant aching, throbbing pain back.  Pain radiates to hips and buttocks and posterior thighs.  Pain worsens prolonged sitting, standing, getting up.  She has tried physical therapy minimal benefit.  She underwent a left L4-5 and L5-S1 TFESI in May 2025 which provided over 50% relief for 4 weeks.  She desires repeat procedure for further compounded benefit.  She denies any worsening weakness.  No bowel bladder changes.    PRIOR HPI: Payton Castillo is a 67 y.o. female with PMH significant for HTN, DM II on insulin, CKD, and hx of bilateral knee arthroplasties presents as a referral for the evaluation of back and hip pain. The patient reports that her pain began approximately decades ago after no inciting incident or trauma. The patient reports of worsening severity over time. Hip pain is currently her worst pain. The patient believes her low back and hip pain are related. The patient localizes her pain to the area across her lower back (L > R). The patient reports of radiation to her hips b/l (L > R) and down the lateral aspect of her BLE's to her ankles. The patient describes her pain as a throbbing and sharp type of pain and a burning type of pain in her legs. The patient reports of tingling in her BLE's and her feet. The patient reports that her pain is a 6/10 with prolonged standing. Patient denies of any  "urinary/fecal incontinence, saddle anesthesia, or weakness.       Aggravating factors: bending, standing, walking     Mitigating factors: sitting, heat, massage      Relevant Surgeries: no     Interventional Therapies: yes; saw Dr. Dane Dalton - did back injections with some benefit         CURRENT PAIN MEDICATIONS:   Currently taking: Biofreeze, OTC Tylenol, gabapentin 800 mg PO TID, Tylenol #3 PO BID     Has tried in the past:    Opioids: yes  NSAIDS: no; avoids secondary to CKD  Tylenol: yes  Muscle relaxants: yes; tried with some benefit; tried robaxin - was instructed to discontinue secondary to her kidney issues  TCAs: no  SNRIs: no  Anticonvulsants: yes  topical creams: yes      ROS:  Review of Systems   Constitutional:  Negative for chills and fever.   HENT:  Negative for sore throat.    Eyes:  Negative for visual disturbance.   Respiratory:  Negative for shortness of breath.    Cardiovascular:  Negative for chest pain.   Gastrointestinal:  Negative for nausea and vomiting.   Genitourinary:  Negative for difficulty urinating.   Musculoskeletal:  Positive for arthralgias and back pain.   Skin:  Negative for rash.   Allergic/Immunologic: Negative for immunocompromised state.   Neurological:  Negative for syncope.   Hematological:  Does not bruise/bleed easily.   Psychiatric/Behavioral:  Negative for suicidal ideas.         MEDICAL, SURGICAL, FAMILY, SOCIAL HX: reviewed    MEDICATIONS/ALLERGIES: reviewed    PHYSICAL EXAM:    VITALS: Vitals reviewed.   Vitals:    07/30/25 0859   BP: (!) 152/74   Pulse: 73   Weight: 78.3 kg (172 lb 9.9 oz)   Height: 5' 6" (1.676 m)   PainSc:   5   PainLoc: Back       Physical Exam  Vitals and nursing note reviewed.   Constitutional:       Appearance: Normal appearance. She is not toxic-appearing or diaphoretic.   HENT:      Head: Normocephalic and atraumatic.   Eyes:      General:         Right eye: No discharge.         Left eye: No discharge.      Extraocular Movements: " Extraocular movements intact.      Conjunctiva/sclera: Conjunctivae normal.   Cardiovascular:      Rate and Rhythm: Normal rate.   Pulmonary:      Effort: Pulmonary effort is normal. No respiratory distress.      Breath sounds: Normal breath sounds.   Abdominal:      Palpations: Abdomen is soft.   Skin:     General: Skin is warm and dry.      Findings: No rash.   Neurological:      Mental Status: She is alert and oriented to person, place, and time.   Psychiatric:         Mood and Affect: Mood and affect normal.         Cognition and Memory: Memory normal.         Judgment: Judgment normal.          UPPER EXTREMITIES: Normal alignment, normal range of motion, no atrophy, no skin changes,  hair growth and nail growth normal and equal bilaterally. No swelling, no tenderness.    LOWER EXTREMITIES:  Normal alignment, normal range of motion, no atrophy, no skin changes,  hair growth and nail growth normal and equal bilaterally. No swelling, no tenderness.     LUMBAR SPINE  Lumbar spine: ROM is limited with flexion extension and oblique extension with increased pain with passive ROM    ((--)) Supine straight leg raise    ((-)) Facet loading   Internal and external rotation of the hip causes no increased pain on either side.  Myofascial exam: Tenderness to palpation across lumbar paraspinous muscles.     ((-)) TTP at the SI joint  ((-)) MANDO's test  ((-)) One leg stand    ((-)) Distraction test  ((-)) Thigh thrust   + TTP left GTB  MOTOR: Tone and bulk: normal      MUSCLE STRENGTH:  Hip Flexion: Right 5/5, Left 5/5  Hip Extension: Right 5/5, Left 5/5  Leg Flexion: Right 5/5, Left 5/5  Leg Extension: Right 5/5, Left 5/5  Plantar Flexion: Right 5/5, Left 5/5  Dorsiflexion: Right 5/5, Left 5/5     Delt      Bi         Tri                         Right:   5/5       5/5       5/5       5/5         Left:     5/5       5/5       5/5       5/5            SENSATION: Light touch and pinprick intact bilaterally  REFLEXES:  normal, symmetric, nonbrisk.  Toes down, no clonus. Negative woody's sign bilaterally.  GAIT:  Antalgic gait     IMAGING:    MRI Lumbar Spine 4/2025    T11-12: Mild left asymmetric circumferential disc bulge.  Mild bilateral facet arthropathy and ligamentum flavum thickening.  No significant neural foraminal or spinal canal stenosis.     T12-L1: Circumferential disc bulge with small right central disc protrusion.  Mild bilateral facet arthropathy and ligamentum flavum thickening.  No significant neural foraminal or spinal canal stenosis.     L1-L2: Moderate disc space narrowing.  Right asymmetric circumferential disc bulge with superimposed left central/subarticular disc protrusion and a right foraminal/extraforaminal disc protrusion.  Mild bilateral facet arthropathy.  Ligamentum flavum thickening.  Moderate right and mild left neural foraminal stenosis.  Mild spinal canal and left lateral recess stenosis, with possible impingement upon the descending left L2 nerve root.     L2-L3: Moderate disc space narrowing.  Right asymmetric circumferential disc bulge with superimposed left central disc protrusion.  Mild bilateral facet arthropathy and ligamentum flavum thickening.  Mild-to-moderate right and mild left neural foraminal stenosis.  Mild spinal canal stenosis and left lateral recess stenosis.     L3-L4: Mild disc space narrowing.  Circumferential disc bulge with superimposed left central/subarticular disc protrusion, which moderately effaces the left lateral recess with possible impingement upon the descending left L4 nerve roots.  Mild bilateral facet arthropathy.  Ligamentum flavum thickening.  Mild bilateral foraminal stenosis.  Mild spinal canal stenosis.     L4-L5: Mild disc space narrowing.  Right asymmetric circumferential disc bulge.  Moderate bilateral facet arthropathy.  Ligamentum flavum thickening.  Moderate right and mild left neural foraminal stenosis.  Mild spinal canal and lateral recess  stenosis.     L5-S1: Moderate right asymmetric disc space narrowing.  Right asymmetric circumferential disc bulge with superimposed central/right subarticular disc extrusion with minimal caudal extension to the S1 pedicular level.  Severe bilateral facet arthropathy with superimpose left-sided anteriorly directed facet joint synovial cyst within far left foraminal zone, which may impinge upon the exiting left L5 nerve root.  Severe left and moderate right neural foraminal stenosis.  Mild spinal canal and severe left and mild right lateral recess stenosis, with possible impingement upon the descending left S1 nerve roots.       ASSESSMENT: Payton Castillo is a 67 y.o. female with PMH significant for HTN, DM II on insulin, CKD, and hx of bilateral knee arthroplasties presents as an established patient for the continued management of back and hip pain. Treatment plan outlined below.   1. Other spondylosis, lumbar region    2. Degeneration of intervertebral disc of lumbar region, unspecified whether pain present    3. Greater trochanteric bursitis of left hip    4. Lumbar radiculitis        PLAN:    Reviewed pertinent imaging and records with patient  I have stressed the importance of physical activity and exercise to improve overall health  Monitor progress from lumbar epidural steroid injection to the Left L5-S1 and S1 level(s).  Recommend healthy back program. She will consider this  F/u prn

## 2025-07-31 ENCOUNTER — TELEPHONE (OUTPATIENT)
Dept: PULMONOLOGY | Facility: CLINIC | Age: 70
End: 2025-07-31
Payer: MEDICARE

## 2025-07-31 NOTE — TELEPHONE ENCOUNTER
Compliance download form her current machine shows 93% compliant sleeping 5 hours with an AHI of 0.4.  Her max pressure is 19.8 [id number].  she is on an autopap 15-20

## 2025-08-05 ENCOUNTER — TELEPHONE (OUTPATIENT)
Dept: PULMONOLOGY | Facility: CLINIC | Age: 70
End: 2025-08-05
Payer: MEDICARE

## 2025-08-05 DIAGNOSIS — G47.33 OSA (OBSTRUCTIVE SLEEP APNEA): Primary | ICD-10-CM

## 2025-08-05 NOTE — TELEPHONE ENCOUNTER
Called patient let her know the sleep study showed she needs to be on 12cm of pressure.  An order for a new machine has been placed.  If you do not hear from anyone within a week, please call us at [phone].  She verbalized an understanding.

## 2025-08-11 ENCOUNTER — PATIENT MESSAGE (OUTPATIENT)
Dept: FAMILY MEDICINE | Facility: CLINIC | Age: 70
End: 2025-08-11
Payer: MEDICARE

## 2025-08-11 DIAGNOSIS — Z79.4 TYPE 2 DIABETES MELLITUS WITH DIABETIC POLYNEUROPATHY, WITH LONG-TERM CURRENT USE OF INSULIN: ICD-10-CM

## 2025-08-11 DIAGNOSIS — E11.42 TYPE 2 DIABETES MELLITUS WITH DIABETIC POLYNEUROPATHY, WITH LONG-TERM CURRENT USE OF INSULIN: ICD-10-CM

## 2025-08-12 RX ORDER — INSULIN GLARGINE 100 [IU]/ML
INJECTION, SOLUTION SUBCUTANEOUS
Qty: 63 ML | Refills: 3 | Status: SHIPPED | OUTPATIENT
Start: 2025-08-12

## 2025-08-21 ENCOUNTER — OFFICE VISIT (OUTPATIENT)
Dept: FAMILY MEDICINE | Facility: CLINIC | Age: 70
End: 2025-08-21
Payer: MEDICARE

## 2025-08-21 ENCOUNTER — PATIENT MESSAGE (OUTPATIENT)
Dept: FAMILY MEDICINE | Facility: CLINIC | Age: 70
End: 2025-08-21

## 2025-08-21 VITALS
SYSTOLIC BLOOD PRESSURE: 110 MMHG | BODY MASS INDEX: 27.28 KG/M2 | DIASTOLIC BLOOD PRESSURE: 67 MMHG | WEIGHT: 169.75 LBS | HEIGHT: 66 IN

## 2025-08-21 DIAGNOSIS — K21.9 GASTROESOPHAGEAL REFLUX DISEASE WITHOUT ESOPHAGITIS: ICD-10-CM

## 2025-08-21 DIAGNOSIS — G47.33 OBSTRUCTIVE SLEEP APNEA: ICD-10-CM

## 2025-08-21 DIAGNOSIS — I10 ESSENTIAL HYPERTENSION: Primary | ICD-10-CM

## 2025-08-21 DIAGNOSIS — G89.29 CHRONIC RIGHT SHOULDER PAIN: ICD-10-CM

## 2025-08-21 DIAGNOSIS — M25.511 CHRONIC RIGHT SHOULDER PAIN: ICD-10-CM

## 2025-08-21 DIAGNOSIS — Z12.31 ENCOUNTER FOR SCREENING MAMMOGRAM FOR BREAST CANCER: ICD-10-CM

## 2025-08-21 DIAGNOSIS — E11.22 CKD STAGE 3 DUE TO TYPE 2 DIABETES MELLITUS: ICD-10-CM

## 2025-08-21 DIAGNOSIS — E78.2 MIXED DYSLIPIDEMIA: ICD-10-CM

## 2025-08-21 DIAGNOSIS — R53.83 FATIGUE, UNSPECIFIED TYPE: ICD-10-CM

## 2025-08-21 DIAGNOSIS — N18.30 CKD STAGE 3 DUE TO TYPE 2 DIABETES MELLITUS: ICD-10-CM

## 2025-08-21 DIAGNOSIS — R52 COMPLAINTS OF TOTAL BODY PAIN: ICD-10-CM

## 2025-08-21 DIAGNOSIS — E11.42 TYPE 2 DIABETES MELLITUS WITH DIABETIC POLYNEUROPATHY, WITH LONG-TERM CURRENT USE OF INSULIN: ICD-10-CM

## 2025-08-21 DIAGNOSIS — Z79.4 TYPE 2 DIABETES MELLITUS WITH DIABETIC POLYNEUROPATHY, WITH LONG-TERM CURRENT USE OF INSULIN: ICD-10-CM

## 2025-08-21 DIAGNOSIS — Z85.528 HISTORY OF KIDNEY CANCER: ICD-10-CM

## 2025-08-21 DIAGNOSIS — H60.8X2 CHRONIC ECZEMATOUS OTITIS EXTERNA OF LEFT EAR: ICD-10-CM

## 2025-08-21 PROCEDURE — 99999 PR PBB SHADOW E&M-EST. PATIENT-LVL IV: CPT | Mod: PBBFAC,,, | Performed by: INTERNAL MEDICINE

## 2025-08-21 RX ORDER — FLUOCINOLONE ACETONIDE 0.11 MG/ML
4 OIL AURICULAR (OTIC) 2 TIMES DAILY
Qty: 20 ML | Refills: 1 | Status: SHIPPED | OUTPATIENT
Start: 2025-08-21

## 2025-08-21 RX ORDER — AMLODIPINE BESYLATE 5 MG/1
TABLET ORAL
Refills: 0 | OUTPATIENT
Start: 2025-08-21

## 2025-08-21 RX ORDER — OMEPRAZOLE 40 MG/1
40 CAPSULE, DELAYED RELEASE ORAL NIGHTLY
Qty: 90 CAPSULE | Refills: 3 | Status: SHIPPED | OUTPATIENT
Start: 2025-08-21 | End: 2026-08-21

## 2025-08-21 RX ORDER — INSULIN GLARGINE 100 [IU]/ML
INJECTION, SOLUTION SUBCUTANEOUS
Qty: 30 ML | Refills: 3 | Status: SHIPPED | OUTPATIENT
Start: 2025-08-21

## 2025-08-25 ENCOUNTER — OFFICE VISIT (OUTPATIENT)
Dept: ORTHOPEDICS | Facility: CLINIC | Age: 70
End: 2025-08-25
Payer: MEDICARE

## 2025-08-25 ENCOUNTER — TELEPHONE (OUTPATIENT)
Dept: FAMILY MEDICINE | Facility: CLINIC | Age: 70
End: 2025-08-25
Payer: MEDICARE

## 2025-08-25 ENCOUNTER — HOSPITAL ENCOUNTER (OUTPATIENT)
Dept: RADIOLOGY | Facility: HOSPITAL | Age: 70
Discharge: HOME | End: 2025-08-25
Payer: MEDICARE

## 2025-08-25 VITALS — HEIGHT: 66 IN | BODY MASS INDEX: 27.97 KG/M2 | WEIGHT: 174 LBS

## 2025-08-25 DIAGNOSIS — Z98.890 HISTORY OF ARTHROSCOPY OF RIGHT SHOULDER: ICD-10-CM

## 2025-08-25 DIAGNOSIS — Z79.4 TYPE 2 DIABETES MELLITUS WITH DIABETIC POLYNEUROPATHY, WITH LONG-TERM CURRENT USE OF INSULIN: Primary | ICD-10-CM

## 2025-08-25 DIAGNOSIS — M75.51 SUBACROMIAL BURSITIS OF RIGHT SHOULDER JOINT: Primary | ICD-10-CM

## 2025-08-25 DIAGNOSIS — E11.42 TYPE 2 DIABETES MELLITUS WITH DIABETIC POLYNEUROPATHY, WITH LONG-TERM CURRENT USE OF INSULIN: Primary | ICD-10-CM

## 2025-08-25 PROCEDURE — 99214 OFFICE O/P EST MOD 30 MIN: CPT | Mod: 25,S$GLB,,

## 2025-08-25 PROCEDURE — 3008F BODY MASS INDEX DOCD: CPT | Mod: CPTII,S$GLB,,

## 2025-08-25 PROCEDURE — 1159F MED LIST DOCD IN RCRD: CPT | Mod: CPTII,S$GLB,,

## 2025-08-25 PROCEDURE — 73030 X-RAY EXAM OF SHOULDER: CPT | Mod: 26,RT,, | Performed by: RADIOLOGY

## 2025-08-25 PROCEDURE — 4010F ACE/ARB THERAPY RXD/TAKEN: CPT | Mod: CPTII,S$GLB,,

## 2025-08-25 PROCEDURE — 99999 PR PBB SHADOW E&M-EST. PATIENT-LVL IV: CPT | Mod: PBBFAC,,,

## 2025-08-25 PROCEDURE — 3066F NEPHROPATHY DOC TX: CPT | Mod: CPTII,S$GLB,,

## 2025-08-25 PROCEDURE — 3288F FALL RISK ASSESSMENT DOCD: CPT | Mod: CPTII,S$GLB,,

## 2025-08-25 PROCEDURE — 1125F AMNT PAIN NOTED PAIN PRSNT: CPT | Mod: CPTII,S$GLB,,

## 2025-08-25 PROCEDURE — 20610 DRAIN/INJ JOINT/BURSA W/O US: CPT | Mod: RT,S$GLB,,

## 2025-08-25 PROCEDURE — 1160F RVW MEDS BY RX/DR IN RCRD: CPT | Mod: CPTII,S$GLB,,

## 2025-08-25 PROCEDURE — 1101F PT FALLS ASSESS-DOCD LE1/YR: CPT | Mod: CPTII,S$GLB,,

## 2025-08-25 PROCEDURE — 73030 X-RAY EXAM OF SHOULDER: CPT | Mod: TC,PN,RT

## 2025-08-25 PROCEDURE — 3062F POS MACROALBUMINURIA REV: CPT | Mod: CPTII,S$GLB,,

## 2025-08-25 PROCEDURE — 3051F HG A1C>EQUAL 7.0%<8.0%: CPT | Mod: CPTII,S$GLB,,

## 2025-08-25 RX ORDER — TRIAMCINOLONE ACETONIDE 40 MG/ML
40 INJECTION, SUSPENSION INTRA-ARTICULAR; INTRAMUSCULAR
Status: DISCONTINUED | OUTPATIENT
Start: 2025-08-25 | End: 2025-08-25 | Stop reason: HOSPADM

## 2025-08-25 RX ADMIN — TRIAMCINOLONE ACETONIDE 40 MG: 40 INJECTION, SUSPENSION INTRA-ARTICULAR; INTRAMUSCULAR at 02:08

## 2025-08-26 RX ORDER — INSULIN GLARGINE 100 [IU]/ML
70 INJECTION, SOLUTION SUBCUTANEOUS NIGHTLY
Qty: 15 ML | Refills: 5 | Status: SHIPPED | OUTPATIENT
Start: 2025-08-26 | End: 2026-08-26

## 2025-08-29 ENCOUNTER — HOSPITAL ENCOUNTER (OUTPATIENT)
Dept: RADIOLOGY | Facility: HOSPITAL | Age: 70
Discharge: HOME | End: 2025-08-29
Attending: INTERNAL MEDICINE
Payer: MEDICARE

## 2025-09-02 DIAGNOSIS — E11.42 TYPE 2 DIABETES MELLITUS WITH DIABETIC POLYNEUROPATHY, WITH LONG-TERM CURRENT USE OF INSULIN: Primary | ICD-10-CM

## 2025-09-02 DIAGNOSIS — Z79.4 TYPE 2 DIABETES MELLITUS WITH DIABETIC POLYNEUROPATHY, WITH LONG-TERM CURRENT USE OF INSULIN: Primary | ICD-10-CM

## 2025-09-02 RX ORDER — BLOOD-GLUCOSE SENSOR
1 EACH MISCELLANEOUS
Qty: 6 EACH | Refills: 3 | Status: SHIPPED | OUTPATIENT
Start: 2025-09-02 | End: 2026-08-28

## 2025-09-04 DIAGNOSIS — E11.42 TYPE 2 DIABETES MELLITUS WITH DIABETIC POLYNEUROPATHY, WITH LONG-TERM CURRENT USE OF INSULIN: ICD-10-CM

## 2025-09-04 DIAGNOSIS — Z79.4 TYPE 2 DIABETES MELLITUS WITH DIABETIC POLYNEUROPATHY, WITH LONG-TERM CURRENT USE OF INSULIN: ICD-10-CM

## 2025-09-05 RX ORDER — BLOOD-GLUCOSE SENSOR
1 EACH MISCELLANEOUS
Qty: 6 EACH | Refills: 0 | OUTPATIENT
Start: 2025-09-05 | End: 2026-08-31

## (undated) DEVICE — NDL HYPODERMIC BLUNT 18G 1.5IN

## (undated) DEVICE — SYS LABEL CORRECT MED

## (undated) DEVICE — GLOVE SURG ULTRA TOUCH 7.5

## (undated) DEVICE — UNDERGLOVES BIOGEL PI SIZE 7.5

## (undated) DEVICE — TUBING MINIBORE EXTENSION

## (undated) DEVICE — SYR DISP LL 5CC

## (undated) DEVICE — SYR LUER LOCK STERILE 10ML

## (undated) DEVICE — CONTAINER SPECIMEN OR STER 4OZ

## (undated) DEVICE — NDL SPINAL SPINOCAN 22GX3.5

## (undated) DEVICE — CHLORAPREP 10.5 ML APPLICATOR

## (undated) DEVICE — NDL TUOHY EPIDURAL 20G X 3.5

## (undated) DEVICE — SPONGE BULKEE II ABSRB 6X6.75

## (undated) DEVICE — NDL SPINAL 22GX5

## (undated) DEVICE — UNDERGLOVES BIOGEL PI SZ 7 LF

## (undated) DEVICE — APPLICATOR CHLORAPREP ORN 26ML

## (undated) DEVICE — NDL SAFETY 25G X 1.5 ECLIPSE

## (undated) DEVICE — GLOVE SENSICARE PI ALOE 7.5

## (undated) DEVICE — CANNULA RADIOPAQUE 20G CURVED

## (undated) DEVICE — NDL SPINAL CHIBA 22GX6 GRAD

## (undated) DEVICE — DRAPE MEDIUM SHEET 40X70IN

## (undated) DEVICE — TOWEL OR DISP STRL BLUE 4/PK

## (undated) DEVICE — PAD GROUNDING DISPER ELECTRODE

## (undated) DEVICE — COVER PROXIMA MAYO STAND

## (undated) DEVICE — SYR GLASS 5CC LUER LOK

## (undated) DEVICE — NDL SPINAL 25GX3.5 SPINOCAN

## (undated) DEVICE — GLOVE SENSICARE PI ALOE 6.5

## (undated) DEVICE — SET EXTENSION STERILE 30IN

## (undated) DEVICE — GLOVE SENSICARE PI GRN 7.5

## (undated) DEVICE — DRAPE THREE-QTR REINF 53X77IN

## (undated) DEVICE — GLOVE SENSICARE PI ALOE 6

## (undated) DEVICE — COVER TABLE 44X90 STERILE

## (undated) DEVICE — STRAP OR TABLE 5IN X 72IN